# Patient Record
Sex: MALE | Race: WHITE | NOT HISPANIC OR LATINO | Employment: OTHER | URBAN - METROPOLITAN AREA
[De-identification: names, ages, dates, MRNs, and addresses within clinical notes are randomized per-mention and may not be internally consistent; named-entity substitution may affect disease eponyms.]

---

## 2017-01-01 PROBLEM — J44.89 BRONCHITIS WITH CHRONIC AIRWAY OBSTRUCTION (HCC): Status: ACTIVE | Noted: 2017-01-01

## 2017-01-02 ENCOUNTER — APPOINTMENT (OUTPATIENT)
Dept: RADIOLOGY | Facility: MEDICAL CENTER | Age: 77
DRG: 872 | End: 2017-01-02
Attending: INTERNAL MEDICINE
Payer: MEDICARE

## 2017-01-02 PROCEDURE — 71010 DX-CHEST-LIMITED (1 VIEW): CPT

## 2017-01-05 PROBLEM — F17.200 TOBACCO DEPENDENCE: Status: ACTIVE | Noted: 2017-01-05

## 2017-03-22 ENCOUNTER — HOSPITAL ENCOUNTER (OUTPATIENT)
Dept: LAB | Facility: MEDICAL CENTER | Age: 77
End: 2017-03-22
Attending: FAMILY MEDICINE
Payer: MEDICARE

## 2017-03-22 LAB
ALBUMIN SERPL BCP-MCNC: 4 G/DL (ref 3.2–4.9)
ALBUMIN/GLOB SERPL: 1.5 G/DL
ALP SERPL-CCNC: 81 U/L (ref 30–99)
ALT SERPL-CCNC: 15 U/L (ref 2–50)
ANION GAP SERPL CALC-SCNC: 6 MMOL/L (ref 0–11.9)
AST SERPL-CCNC: 22 U/L (ref 12–45)
BASOPHILS # BLD AUTO: 0.08 K/UL (ref 0–0.12)
BASOPHILS NFR BLD AUTO: 0.7 % (ref 0–1.8)
BILIRUB SERPL-MCNC: 0.6 MG/DL (ref 0.1–1.5)
BUN SERPL-MCNC: 15 MG/DL (ref 8–22)
CALCIUM SERPL-MCNC: 9.7 MG/DL (ref 8.5–10.5)
CHLORIDE SERPL-SCNC: 101 MMOL/L (ref 96–112)
CHOLEST SERPL-MCNC: 140 MG/DL (ref 100–199)
CO2 SERPL-SCNC: 32 MMOL/L (ref 20–33)
CREAT SERPL-MCNC: 0.67 MG/DL (ref 0.5–1.4)
EOSINOPHIL # BLD: 0.16 K/UL (ref 0–0.51)
EOSINOPHIL NFR BLD AUTO: 1.4 % (ref 0–6.9)
ERYTHROCYTE [DISTWIDTH] IN BLOOD BY AUTOMATED COUNT: 50.1 FL (ref 35.9–50)
GLOBULIN SER CALC-MCNC: 2.7 G/DL (ref 1.9–3.5)
GLUCOSE SERPL-MCNC: 85 MG/DL (ref 65–99)
HCT VFR BLD AUTO: 49.6 % (ref 42–52)
HDLC SERPL-MCNC: 45 MG/DL
HGB BLD-MCNC: 16.1 G/DL (ref 14–18)
IMM GRANULOCYTES # BLD AUTO: 0.02 K/UL (ref 0–0.11)
IMM GRANULOCYTES NFR BLD AUTO: 0.2 % (ref 0–0.9)
LDLC SERPL CALC-MCNC: 80 MG/DL
LYMPHOCYTES # BLD: 3.23 K/UL (ref 1–4.8)
LYMPHOCYTES NFR BLD AUTO: 28.6 % (ref 22–41)
MCH RBC QN AUTO: 31.3 PG (ref 27–33)
MCHC RBC AUTO-ENTMCNC: 32.5 G/DL (ref 33.7–35.3)
MCV RBC AUTO: 96.5 FL (ref 81.4–97.8)
MONOCYTES # BLD: 0.86 K/UL (ref 0–0.85)
MONOCYTES NFR BLD AUTO: 7.6 % (ref 0–13.4)
NEUTROPHILS # BLD: 6.96 K/UL (ref 1.82–7.42)
NEUTROPHILS NFR BLD AUTO: 61.5 % (ref 44–72)
NRBC # BLD AUTO: 0 K/UL
NRBC BLD-RTO: 0 /100 WBC
PLATELET # BLD AUTO: 393 K/UL (ref 164–446)
PMV BLD AUTO: 9.9 FL (ref 9–12.9)
POTASSIUM SERPL-SCNC: 4.7 MMOL/L (ref 3.6–5.5)
PROT SERPL-MCNC: 6.7 G/DL (ref 6–8.2)
PSA SERPL DL<=0.01 NG/ML-MCNC: 4.63 NG/ML (ref 0–4)
RBC # BLD AUTO: 5.14 M/UL (ref 4.7–6.1)
SODIUM SERPL-SCNC: 139 MMOL/L (ref 135–145)
TRIGL SERPL-MCNC: 73 MG/DL (ref 0–149)
TSH SERPL DL<=0.005 MIU/L-ACNC: 0.44 UIU/ML (ref 0.3–3.7)
WBC # BLD AUTO: 11.3 K/UL (ref 4.8–10.8)

## 2017-03-22 PROCEDURE — 80053 COMPREHEN METABOLIC PANEL: CPT

## 2017-03-22 PROCEDURE — 36415 COLL VENOUS BLD VENIPUNCTURE: CPT

## 2017-03-22 PROCEDURE — 85025 COMPLETE CBC W/AUTO DIFF WBC: CPT

## 2017-03-22 PROCEDURE — 84443 ASSAY THYROID STIM HORMONE: CPT

## 2017-03-22 PROCEDURE — 80061 LIPID PANEL: CPT

## 2017-03-22 PROCEDURE — 84153 ASSAY OF PSA TOTAL: CPT | Mod: GA

## 2017-05-01 ENCOUNTER — HOSPITAL ENCOUNTER (OUTPATIENT)
Dept: RADIOLOGY | Facility: MEDICAL CENTER | Age: 77
End: 2017-05-01
Attending: FAMILY MEDICINE
Payer: MEDICARE

## 2017-05-01 VITALS — HEIGHT: 72 IN | BODY MASS INDEX: 20.72 KG/M2 | WEIGHT: 153 LBS

## 2017-05-01 DIAGNOSIS — N40.0 ENLARGED PROSTATE: ICD-10-CM

## 2017-05-01 DIAGNOSIS — R97.20 ELEVATED PSA: ICD-10-CM

## 2017-05-01 PROCEDURE — 700117 HCHG RX CONTRAST REV CODE 255: Performed by: FAMILY MEDICINE

## 2017-05-01 PROCEDURE — A9270 NON-COVERED ITEM OR SERVICE: HCPCS | Performed by: RADIOLOGY

## 2017-05-01 PROCEDURE — 700111 HCHG RX REV CODE 636 W/ 250 OVERRIDE (IP)

## 2017-05-01 PROCEDURE — 700101 HCHG RX REV CODE 250

## 2017-05-01 PROCEDURE — 72197 MRI PELVIS W/O & W/DYE: CPT

## 2017-05-01 PROCEDURE — A9579 GAD-BASE MR CONTRAST NOS,1ML: HCPCS | Performed by: FAMILY MEDICINE

## 2017-05-01 PROCEDURE — 700102 HCHG RX REV CODE 250 W/ 637 OVERRIDE(OP): Performed by: RADIOLOGY

## 2017-05-01 RX ORDER — DIAZEPAM 5 MG/1
5 TABLET ORAL
Status: COMPLETED | OUTPATIENT
Start: 2017-05-01 | End: 2017-05-01

## 2017-05-01 RX ADMIN — GADODIAMIDE 20 ML: 287 INJECTION INTRAVENOUS at 15:45

## 2017-05-01 RX ADMIN — DIAZEPAM 5 MG: 5 TABLET ORAL at 13:40

## 2017-05-01 ASSESSMENT — PAIN SCALES - GENERAL
PAINLEVEL_OUTOF10: 0
PAINLEVEL_OUTOF10: 0

## 2017-05-01 NOTE — DISCHARGE INSTRUCTIONS
MRI ADULT DISCHARGE INSTRUCTIONS    You have been medicated today for your scan. Please follow the instructions below to ensure your safe recovery. If you have any questions or problems, feel free to call us at [phone] or [phone].     1.   Have someone stay with you to assist you as needed.    2.   Do not drive or operate any mechanical devices.    3.   Do not perform any activity that requires concentration. Make no major decisions over the next 24 hours.     4.   Be careful changing positions from laying down to sitting or standing, as you may become dizzy.     5.   Do not drink alcohol for 48 hours.    6.   There are no restrictions for eating your normal meals. Drink fluids.    7.   You may continue your usual medications for pain, tranquilizers, muscle relaxants or sedatives when awake.     8.   Tomorrow, you may continue your normal daily activities.     9.   Pressure dressing on 10 - 15 minutes. If swelling or bleeding occurs when removed, continue placing direct pressure on injection site for another 5 minutes, or until bleeding stops.     I have been informed of and understand the above discharge instructions. Diazepam (VALIUM) Oral solution  What is this medicine?  You were prescribed DIAZEPAM (dye AZ e antolin) for the procedure you had today. This medication is a benzodiazepine. It is used to treat anxiety and nervousness. It also can help treat alcohol withdrawal, relax muscles, and treat certain types of seizures.  This medicine may be used for other purposes; ask your health care provider or pharmacist if you have questions.  What side effects may I notice from receiving this medicine?  Side effects that you should report to your doctor or health care professional as soon as possible:  • allergic reactions like skin rash, itching or hives, swelling of the face, lips, or tongue  • angry, confused, depressed, other mood changes  • breathing problems  • feeling faint or lightheaded, falls  • muscle  cramps  • problems with balance, talking, walking  • restlessness  • tremors  • trouble passing urine or change in the amount of urine  • unusually weak or tired  Side effects that usually do not require medical attention (report to your doctor or health care professional if they continue or are bothersome):  • difficulty sleeping, nightmares  • dizziness, drowsiness, clumsiness, or unsteadiness, a hangover effect  • headache  • nausea, vomiting  This list may not describe all possible side effects. Call your doctor for medical advice about side effects. You may report side effects to FDA at 2-890-YWG-6377.

## 2017-05-01 NOTE — IP AVS SNAPSHOT
" <p align=\"LEFT\"><IMG SRC=\"[email or web address]\" alt=\"Image\" WIDTH=\"50%\" HEIGHT=\"200\" BORDER=\"\"></p>      `           Humphrey Cano   MRN: [id number]    Department:  [facility] - MRI [address]   Date of Visit:              `  Discharge Instructions       MRI ADULT DISCHARGE INSTRUCTIONS    You have been medicated today for your scan. Please follow the instructions below to ensure your safe recovery. If you have any questions or problems, feel free to call us at [phone] or [phone].     1.   Have someone stay with you to assist you as needed.    2.   Do not drive or operate any mechanical devices.    3.   Do not perform any activity that requires concentration. Make no major decisions over the next 24 hours.     4.   Be careful changing positions from laying down to sitting or standing, as you may become dizzy.     5.   Do not drink alcohol for 48 hours.    6.   There are no restrictions for eating your normal meals. Drink fluids.    7.   You may continue your usual medications for pain, tranquilizers, muscle relaxants or sedatives when awake.     8.   Tomorrow, you may continue your normal daily activities.     9.   Pressure dressing on 10 - 15 minutes. If swelling or bleeding occurs when removed, continue placing direct pressure on injection site for another 5 minutes, or until bleeding stops.     I have been informed of and understand the above discharge instructions. Diazepam (VALIUM) Oral solution  What is this medicine?  You were prescribed DIAZEPAM (dye AZ e antolin) for the procedure you had today. This medication is a benzodiazepine. It is used to treat anxiety and nervousness. It also can help treat alcohol withdrawal, relax muscles, and treat certain types of seizures.  This medicine may be used for other purposes; ask your health care provider or pharmacist if you have questions.  What side effects may I notice from receiving this medicine?  Side effects that you should report to your " doctor or health care professional as soon as possible:  • allergic reactions like skin rash, itching or hives, swelling of the face, lips, or tongue  • angry, confused, depressed, other mood changes  • breathing problems  • feeling faint or lightheaded, falls  • muscle cramps  • problems with balance, talking, walking  • restlessness  • tremors  • trouble passing urine or change in the amount of urine  • unusually weak or tired  Side effects that usually do not require medical attention (report to your doctor or health care professional if they continue or are bothersome):  • difficulty sleeping, nightmares  • dizziness, drowsiness, clumsiness, or unsteadiness, a hangover effect  • headache  • nausea, vomiting  This list may not describe all possible side effects. Call your doctor for medical advice about side effects. You may report side effects to FDA at 3-846-FDA-7704.       `       Diet / Nutrition:    Follow any diet instructions given to you by your doctor or the dietician, including how much salt (sodium) you are allowed each day.    If you are overweight, talk to your doctor about a weight reduction plan.    Activity:    Remain physically active following your doctor's instructions about exercise and activity.    Rest often.     Any time you become even a little tired or short of breath, SIT DOWN and rest.    Worsening Symptoms:    Report any of the following signs and symptoms to the doctor's office immediately:    *Pain of jaw, arm, or neck  *Chest pain not relieved by medication                               *Dizziness or loss of consciousness  *Difficulty breathing even when at rest   *More tired than usual                                       *Bleeding drainage or swelling of surgical site  *Swelling of feet, ankles, legs or stomach                 *Fever (>100ºF)  *Pink or blood tinged sputum  *Weight gain (3lbs/day or 5lbs /week)           *Shock from internal defibrillator (if  applicable)  *Palpitations or irregular heartbeats                *Cool and/or numb extremities    Stroke Awareness    Common Risk Factors for Stroke include:    Age  Atrial Fibrillation  Carotid Artery Stenosis  Diabetes Mellitus  Excessive alcohol consumption  High blood pressure  Overweight   Physical inactivity  Smoking    Warning signs and symptoms of a stroke include:    *Sudden numbness or weakness of the face, arm or leg (especially on one side of the body).  *Sudden confusion, trouble speaking or understanding.  *Sudden trouble seeing in one or both eyes.  *Sudden trouble walking, dizziness, loss of balance or coordination.Sudden severe headache with no known cause.    It is very important to get treatment quickly when a stroke occurs. If you experience any of the above warning signs, call 911 immediately.                    `     Quit Smoking / Tobacco Use:    I understand the use of any tobacco products increases my chance of suffering from future heart disease or stroke and could cause other illnesses which may shorten my life. Quitting the use of tobacco products is the single most important thing I can do to improve my health. For further information on smoking / tobacco cessation call a Toll Free Quit Line at 1-876.697.8050 (*[employer] [place]) or 1-804.314.8069 (American [employer] Association) or you can access the web based program at www.lungusa.org.    [place] Tobacco Users Help Line:  (503) 941-3503       Toll Free: 1-493.514.3825  Quit Tobacco Program [facility] (882)778-1073    Crisis Hotline:    [place] Crisis Hotline:  2-928-ZBZXWMK or 1-279.363.4996    [place] Crisis Hotline:    1-350.324.6505 or 239-119-7408    Discharge Survey:   Thank you for choosing [facility]. We hope we did everything we could to make your hospital stay a pleasant one. You may be receiving a phone survey and we would appreciate your time and participation in answering the questions. Your  input is very valuable to us in our efforts to improve our service to our patients and their families.        My signature on this form indicates that:    1. I have reviewed and understand the above information.  2. My questions regarding this information have been answered to my satisfaction.  3. I have formulated a plan with my discharge nurse to obtain my prescribed medications for home.                   `           Patient or Caregiver Signature:  ____________________________________________________________    Date:  ____________________________________________________________       `

## 2017-05-22 ENCOUNTER — APPOINTMENT (OUTPATIENT)
Dept: RADIOLOGY | Facility: MEDICAL CENTER | Age: 77
End: 2017-05-22
Attending: EMERGENCY MEDICINE
Payer: MEDICARE

## 2017-05-22 ENCOUNTER — HOSPITAL ENCOUNTER (EMERGENCY)
Facility: MEDICAL CENTER | Age: 77
End: 2017-05-22
Attending: EMERGENCY MEDICINE
Payer: MEDICARE

## 2017-05-22 VITALS
BODY MASS INDEX: 20.45 KG/M2 | OXYGEN SATURATION: 92 % | HEART RATE: 51 BPM | TEMPERATURE: 99 F | WEIGHT: 154.32 LBS | SYSTOLIC BLOOD PRESSURE: 141 MMHG | RESPIRATION RATE: 17 BRPM | DIASTOLIC BLOOD PRESSURE: 57 MMHG | HEIGHT: 73 IN

## 2017-05-22 DIAGNOSIS — R91.8 LUNG MASS: ICD-10-CM

## 2017-05-22 DIAGNOSIS — R04.2 HEMOPTYSIS: ICD-10-CM

## 2017-05-22 DIAGNOSIS — Z72.0 TOBACCO USE: ICD-10-CM

## 2017-05-22 LAB
ALBUMIN SERPL BCP-MCNC: 4.2 G/DL (ref 3.2–4.9)
ALBUMIN/GLOB SERPL: 1.7 G/DL
ALP SERPL-CCNC: 89 U/L (ref 30–99)
ALT SERPL-CCNC: 13 U/L (ref 2–50)
ANION GAP SERPL CALC-SCNC: 6 MMOL/L (ref 0–11.9)
AST SERPL-CCNC: 27 U/L (ref 12–45)
BASOPHILS # BLD AUTO: 0.8 % (ref 0–1.8)
BASOPHILS # BLD: 0.07 K/UL (ref 0–0.12)
BILIRUB SERPL-MCNC: 0.6 MG/DL (ref 0.1–1.5)
BNP SERPL-MCNC: 82 PG/ML (ref 0–100)
BUN SERPL-MCNC: 15 MG/DL (ref 8–22)
CALCIUM SERPL-MCNC: 9.2 MG/DL (ref 8.5–10.5)
CHLORIDE SERPL-SCNC: 106 MMOL/L (ref 96–112)
CO2 SERPL-SCNC: 29 MMOL/L (ref 20–33)
CREAT SERPL-MCNC: 0.82 MG/DL (ref 0.5–1.4)
EOSINOPHIL # BLD AUTO: 0.2 K/UL (ref 0–0.51)
EOSINOPHIL NFR BLD: 2.2 % (ref 0–6.9)
ERYTHROCYTE [DISTWIDTH] IN BLOOD BY AUTOMATED COUNT: 48 FL (ref 35.9–50)
GFR SERPL CREATININE-BSD FRML MDRD: >60 ML/MIN/1.73 M 2
GLOBULIN SER CALC-MCNC: 2.5 G/DL (ref 1.9–3.5)
GLUCOSE SERPL-MCNC: 86 MG/DL (ref 65–99)
HCT VFR BLD AUTO: 51 % (ref 42–52)
HGB BLD-MCNC: 17.1 G/DL (ref 14–18)
IMM GRANULOCYTES # BLD AUTO: 0.01 K/UL (ref 0–0.11)
IMM GRANULOCYTES NFR BLD AUTO: 0.1 % (ref 0–0.9)
INR PPP: 0.95 (ref 0.87–1.13)
LYMPHOCYTES # BLD AUTO: 3.2 K/UL (ref 1–4.8)
LYMPHOCYTES NFR BLD: 36 % (ref 22–41)
MCH RBC QN AUTO: 31.2 PG (ref 27–33)
MCHC RBC AUTO-ENTMCNC: 33.5 G/DL (ref 33.7–35.3)
MCV RBC AUTO: 93.1 FL (ref 81.4–97.8)
MONOCYTES # BLD AUTO: 0.71 K/UL (ref 0–0.85)
MONOCYTES NFR BLD AUTO: 8 % (ref 0–13.4)
NEUTROPHILS # BLD AUTO: 4.7 K/UL (ref 1.82–7.42)
NEUTROPHILS NFR BLD: 52.9 % (ref 44–72)
NRBC # BLD AUTO: 0.03 K/UL
NRBC BLD AUTO-RTO: 0.3 /100 WBC
PLATELET # BLD AUTO: 281 K/UL (ref 164–446)
PMV BLD AUTO: 9.7 FL (ref 9–12.9)
POTASSIUM SERPL-SCNC: 4.5 MMOL/L (ref 3.6–5.5)
PROT SERPL-MCNC: 6.7 G/DL (ref 6–8.2)
PROTHROMBIN TIME: 13 SEC (ref 12–14.6)
RBC # BLD AUTO: 5.48 M/UL (ref 4.7–6.1)
SODIUM SERPL-SCNC: 141 MMOL/L (ref 135–145)
WBC # BLD AUTO: 8.9 K/UL (ref 4.8–10.8)

## 2017-05-22 PROCEDURE — 700117 HCHG RX CONTRAST REV CODE 255: Performed by: EMERGENCY MEDICINE

## 2017-05-22 PROCEDURE — 85610 PROTHROMBIN TIME: CPT

## 2017-05-22 PROCEDURE — 85025 COMPLETE CBC W/AUTO DIFF WBC: CPT

## 2017-05-22 PROCEDURE — 99284 EMERGENCY DEPT VISIT MOD MDM: CPT

## 2017-05-22 PROCEDURE — 71275 CT ANGIOGRAPHY CHEST: CPT

## 2017-05-22 PROCEDURE — 36415 COLL VENOUS BLD VENIPUNCTURE: CPT

## 2017-05-22 PROCEDURE — 80053 COMPREHEN METABOLIC PANEL: CPT

## 2017-05-22 PROCEDURE — 71010 DX-CHEST-PORTABLE (1 VIEW): CPT

## 2017-05-22 PROCEDURE — 83880 ASSAY OF NATRIURETIC PEPTIDE: CPT

## 2017-05-22 RX ADMIN — IOHEXOL 75 ML: 350 INJECTION, SOLUTION INTRAVENOUS at 10:31

## 2017-05-22 ASSESSMENT — ENCOUNTER SYMPTOMS
ABDOMINAL PAIN: 0
BACK PAIN: 0
NAUSEA: 0
FEVER: 0
DIARRHEA: 0
HEMOPTYSIS: 1
HEADACHES: 0
SHORTNESS OF BREATH: 1
WHEEZING: 0
VOMITING: 0
COUGH: 1

## 2017-05-22 ASSESSMENT — PAIN SCALES - GENERAL
PAINLEVEL_OUTOF10: 0

## 2017-05-22 NOTE — ED PROVIDER NOTES
"ED Provider Note    Scribed for Melodie Canchola D.O. by Paul Kim. 5/22/2017, 8:53 AM.    Primary care provider: Randall Jama M.D.  Means of arrival: Walk In  History obtained from: Patient  History limited by: None     CHIEF COMPLAINT  Chief Complaint   Patient presents with   • Cough     started last night, with blood    • Congestion       HPI  Humphrey Cano is a 76 y.o. male who presents to the Emergency Department complaining of hemoptysis. Patient reports onset of cough last night. Patient was coughing forcefully last night, when he noticed \"bright red\" blood in his tissue after coughing. Patient describes a \"teaspoon\" amount of blood secondary to hemoptysis. He reports one more episode of hemoptysis after he woke up this morning. He denies any history of previous similar symptoms. Patient is current 0.3 ppd smoker, recently cut down from 1 ppd in the last 6 months. He has been smoking cigarettes since he was 11 years old. He otherwise, has no systemic complaints.  Patient cesar any nausea, vomiting, abdominal pain, diarrhea, chest pain, fever.     REVIEW OF SYSTEMS  Review of Systems   Constitutional: Negative for fever.   Respiratory: Positive for cough, hemoptysis and shortness of breath. Negative for wheezing.    Cardiovascular: Negative for chest pain.   Gastrointestinal: Negative for nausea, vomiting, abdominal pain and diarrhea.   Musculoskeletal: Negative for back pain.   Neurological: Negative for headaches.   All other systems reviewed and are negative.  C.     PAST MEDICAL HISTORY   has a past medical history of Hyperlipidemia; Hypertension; Myocardial infarct (with stent); Pneumonia; Indigestion; Backpain (occasional); CATARACT; and COPD.    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    SOCIAL HISTORY  Social History   Substance Use Topics   • Smoking status: Current Every Day Smoker -- 1.00 packs/day      Comment: 1 " "pk a day   • Alcohol Use: No      History   Drug Use No     FAMILY HISTORY  No history pertinent to complaint.     CURRENT MEDICATIONS  Home Medications     **Home medications have not yet been reviewed for this encounter**          ALLERGIES  No Known Allergies    PHYSICAL EXAM  VITAL SIGNS: /57 mmHg  Pulse 71  Temp(Src) 37.2 °C (99 °F) (Temporal)  Resp 18  Ht 1.854 m (6' 1\")  Wt 70 kg (154 lb 5.2 oz)  BMI 20.36 kg/m2  SpO2 91%  Vitals reviewed.  Constitutional: Patient is oriented to person, place, and time. Appears well-developed and well-nourished. No distress.    Head: Normocephalic and atraumatic.   Ears: Normal external ears bilaterally.   Mouth/Throat: Oropharynx is clear and moist, no exudates. No evidence of blood in oropharynx.   Eyes: Conjunctivae are normal. Pupils are equal, round, and reactive to light.   Neck: Normal range of motion. Neck supple.   Cardiovascular: Normal rate, regular rhythm and normal heart sounds. Normal peripheral pulses.  Pulmonary/Chest: Effort normal and breath sounds normal. No respiratory distress, no wheezes, rhonchi, or rales. No chest wall tenderness.  Abdominal: Soft. Bowel sounds are normal. There is no tenderness, rebound or guarding, or peritoneal signs. No CVA tenderness.  Musculoskeletal: No edema and no tenderness.   Lymphadenopathy: No cervical adenopathy.   Neurological: No focal deficits.   Skin: Skin is warm and dry. No erythema. No pallor.   Psychiatric: Patient has a normal mood and affect.     LABS  Results for orders placed or performed during the hospital encounter of 05/22/17   CBC w/ Differential   Result Value Ref Range    WBC 8.9 4.8 - 10.8 K/uL    RBC 5.48 4.70 - 6.10 M/uL    Hemoglobin 17.1 14.0 - 18.0 g/dL    Hematocrit 51.0 42.0 - 52.0 %    MCV 93.1 81.4 - 97.8 fL    MCH 31.2 27.0 - 33.0 pg    MCHC 33.5 (L) 33.7 - 35.3 g/dL    RDW 48.0 35.9 - 50.0 fL    Platelet Count 281 164 - 446 K/uL    MPV 9.7 9.0 - 12.9 fL    Neutrophils-Polys " 52.90 44.00 - 72.00 %    Lymphocytes 36.00 22.00 - 41.00 %    Monocytes 8.00 0.00 - 13.40 %    Eosinophils 2.20 0.00 - 6.90 %    Basophils 0.80 0.00 - 1.80 %    Immature Granulocytes 0.10 0.00 - 0.90 %    Nucleated RBC 0.30 /100 WBC    Neutrophils (Absolute) 4.70 1.82 - 7.42 K/uL    Lymphs (Absolute) 3.20 1.00 - 4.80 K/uL    Monos (Absolute) 0.71 0.00 - 0.85 K/uL    Eos (Absolute) 0.20 0.00 - 0.51 K/uL    Baso (Absolute) 0.07 0.00 - 0.12 K/uL    Immature Granulocytes (abs) 0.01 0.00 - 0.11 K/uL    NRBC (Absolute) 0.03 K/uL   Complete Metabolic Panel (CMP)   Result Value Ref Range    Sodium 141 135 - 145 mmol/L    Potassium 4.5 3.6 - 5.5 mmol/L    Chloride 106 96 - 112 mmol/L    Co2 29 20 - 33 mmol/L    Anion Gap 6.0 0.0 - 11.9    Glucose 86 65 - 99 mg/dL    Bun 15 8 - 22 mg/dL    Creatinine 0.82 0.50 - 1.40 mg/dL    Calcium 9.2 8.5 - 10.5 mg/dL    AST(SGOT) 27 12 - 45 U/L    ALT(SGPT) 13 2 - 50 U/L    Alkaline Phosphatase 89 30 - 99 U/L    Total Bilirubin 0.6 0.1 - 1.5 mg/dL    Albumin 4.2 3.2 - 4.9 g/dL    Total Protein 6.7 6.0 - 8.2 g/dL    Globulin 2.5 1.9 - 3.5 g/dL    A-G Ratio 1.7 g/dL   Btype Natriuretic Peptide   Result Value Ref Range    B Natriuretic Peptide 82 0 - 100 pg/mL   PT/INR   Result Value Ref Range    PT 13.0 12.0 - 14.6 sec    INR 0.95 0.87 - 1.13   ESTIMATED GFR   Result Value Ref Range    GFR If African American >60 >60 mL/min/1.73 m 2    GFR If Non African American >60 >60 mL/min/1.73 m 2      All labs reviewed by me    RADIOLOGY  CT-CTA CHEST PULMONARY ARTERY W/ RECONS   Final Result         1. Spiculated mass in the left upper lobe adjacent to the left hilum, concerning for malignancy. Additional 5 mm nodule more laterally could relate to a satellite lesion. No enlarged lymph nodes identified.      2. Subpleural nodular opacities in the right upper lobe are nonspecific. Malignancy cannot be entirely excluded.      3. No CT evidence of pulmonary embolism.               DX-CHEST-PORTABLE (1  VIEW)   Final Result         1. No acute cardiopulmonary abnormalities are identified.        The radiologist's interpretation of all radiological studies have been reviewed by me.    COURSE & MEDICAL DECISION MAKING  Pertinent Labs & Imaging studies reviewed. (See chart for details)      8:53 AM - Patient seen and examined at bedside. Ordered CT CTA Chest, Chest  X Ray, CBC, CMP, BNP, PT/INR, GFR to evaluate his symptoms. The differential diagnoses include but are not limited to: bronchitis, pneumonia, cancer, pulmonary embolism    12:03 PM patient is reevaluated. He has no new complaints. No pain. He has a bradycardia, otherwise, his vital signs are normal. We discussed CT findings which unfortunately, are concerning for lung cancer. There is a spiculated mass in the left upper lobe. Otherwise, his labs were unrevealing.    I've spoken with Dr. Jama, the patient's primary care doctor. Regarding this finding on CT as well as the labs. He suggested having the patient follow-up with him at noon today. This patient is very well known to him and he will discuss with him further plan of care. I've updated the patient on this plan of care they will go straight from the emergency room to Dr. Jama's office. I think this is a reasonable plan of care. Patient's discharged home in stable condition.      FINAL IMPRESSION  1. Hemoptysis    2. Lung mass    3. Tobacco use          Paul WARNER (Lyndsey), am scribing for, and in the presence of, Melodie Canchola D.O..    Electronically signed by: Paul Kim (Lyndsey), 5/22/2017    IMelodie D.O. personally performed the services described in this documentation, as scribed by Paul Kim in my presence, and it is both accurate and complete.    The note accurately reflects work and decisions made by me.  Melodie Canchola  5/22/2017  12:01 PM

## 2017-05-22 NOTE — ED NOTES
Pt ambulatory to [facility] with steady gait accompanied by family.  Changing into a gown.  Up for ERP evaluation.

## 2017-05-22 NOTE — DISCHARGE INSTRUCTIONS
Pulmonary Nodule  A pulmonary nodule is a small, round growth of tissue in the lung. Pulmonary nodules can range in size from less than 1/5 inch (4 mm) to a little bigger than an inch (25 mm). Most pulmonary nodules are detected when imaging tests of the lung are being performed for a different problem. Pulmonary nodules are usually not cancerous (benign). However, some pulmonary nodules are cancerous (malignant). Follow-up treatment or testing is based on the size of the pulmonary nodule and your risk of getting lung cancer.   CAUSES  Benign pulmonary nodules can be caused by various things. Some of the causes include:   · Bacterial, fungal, or viral infections. This is usually an old infection that is no longer active, but it can sometimes be a current, active infection.  · A benign mass of tissue.  · Inflammation from conditions such as rheumatoid arthritis.    · Abnormal blood vessels in the lungs.  Malignant pulmonary nodules can result from lung cancer or from cancers that spread to the lung from other places in the body.  SIGNS AND SYMPTOMS  Pulmonary nodules usually do not cause symptoms.  DIAGNOSIS  Most often, pulmonary nodules are found incidentally when an X-ray or CT scan is performed to look for some other problem in the lung area. To help determine whether a pulmonary nodule is benign or malignant, your health care provider will take a medical history and order a variety of tests. Tests done may include:   · Blood tests.  · A skin test called a tuberculin test. This test is used to determine if you have been exposed to the germ that causes tuberculosis.    · Chest X-rays. If possible, a new X-ray may be compared with X-rays you have had in the past.     · CT scan. This test shows smaller pulmonary nodules more clearly than an X-ray.    · Positron emission tomography (PET) scan. In this test, a safe amount of a radioactive substance is injected into the bloodstream. Then, the scan takes a picture of  the pulmonary nodule. The radioactive substance is eliminated from your body in your urine.    · Biopsy. A tiny piece of the pulmonary nodule is removed so it can be checked under a microscope.  TREATMENT   Pulmonary nodules that are benign normally do not require any treatment because they usually do not cause symptoms or breathing problems. Your health care provider may want to monitor the pulmonary nodule through follow-up CT scans. The frequency of these CT scans will vary based on the size of the nodule and the risk factors for lung cancer. For example, CT scans will need to be done more frequently if the pulmonary nodule is larger and if you have a history of smoking and a family history of cancer. Further testing or biopsies may be done if any follow-up CT scan shows that the size of the pulmonary nodule has increased.  HOME CARE INSTRUCTIONS  · Only take over-the-counter or prescription medicines as directed by your health care provider.  · Keep all follow-up appointments with your health care provider.  SEEK MEDICAL CARE IF:  · You have trouble breathing when you are active.    · You feel sick or unusually tired.    · You do not feel like eating.    · You lose weight without trying to.    · You develop chills or night sweats.    SEEK IMMEDIATE MEDICAL CARE IF:  · You cannot catch your breath, or you begin wheezing.    · You cannot stop coughing.    · You cough up blood.    · You become dizzy or feel like you are going to pass out.    · You have sudden chest pain.    · You have a fever or persistent symptoms for more than 2-3 days.    · You have a fever and your symptoms suddenly get worse.  MAKE SURE YOU:  · Understand these instructions.  · Will watch your condition.  · Will get help right away if you are not doing well or get worse.     This information is not intended to replace advice given to you by your health care provider. Make sure you discuss any questions you have with your health care  provider.     Document Released: 10/15/2010 Document Revised: 08/20/2014 Document Reviewed: 06/09/2014  [employer] Interactive Patient Education ©2016 [employer]      Hemoptysis  Hemoptysis, which means coughing up blood, can be a sign of a minor problem or a serious medical condition. The blood that is coughed up may come from the lungs and airways. Coughed-up blood can also come from bleeding that occurs outside the lungs and airways. Blood can drain into the windpipe during a severe nosebleed or when blood is vomited from the stomach. Because hemoptysis can be a sign of something serious, a medical evaluation is required. For some people with hemoptysis, no definite cause is ever identified.  CAUSES   The most common cause of hemoptysis is bronchitis. Some other common causes include:   · A ruptured blood vessel caused by coughing or an infection.    · A medical condition that causes damage to the large air passageways (bronchiectasis).    · A blood clot in the lungs (pulmonary embolism).    · Pneumonia.    · Tuberculosis.    · Breathing in a small foreign object.    · Cancer.  For some people with hemoptysis, no definite cause is ever identified.    HOME CARE INSTRUCTIONS  · Only take over-the-counter or prescription medicines as directed by your caregiver. Do not use cough suppressants unless your caregiver approves.  · If your caregiver prescribes antibiotic medicines, take them as directed. Finish them even if you start to feel better.  · Do not smoke. Also avoid secondhand smoke.  · Follow up with your caregiver as directed.  SEEK IMMEDIATE MEDICAL CARE IF:   · You cough up bloody mucus for longer than a week.  · You have a blood-producing cough that is severe or getting worse.  · You have a blood-producing cough that comes and goes over time.  · You develop problems with your breathing.    · You vomit blood.  · You develop bloody or black-colored stools.  · You have chest pain.    · You develop night  sweats.  · You feel faint or pass out.    · You have a fever or persistent symptoms for more than 2-3 days.    · You have a fever and your symptoms suddenly get worse.  MAKE SURE YOU:  · Understand these instructions.  · Will watch your condition.  · Will get help right away if you are not doing well or get worse.     This information is not intended to replace advice given to you by your health care provider. Make sure you discuss any questions you have with your health care provider.     Document Released: 02/26/2003 Document Revised: 12/04/2013 Document Reviewed: 10/04/2013  [employer] Interactive Patient Education ©2016 [employer]

## 2017-05-22 NOTE — ED NOTES
Pt given d/c instructions and f/u infowith verbal understanding.  VSS at discharge.  PIV d/c'd with tip intact.  Pt ambulatory from the ED w/ steady gait.  All belongings in possession on discharge.  Pt escorted to the lobby by RN.  Pt going directly to Dr Jama's office at this time.

## 2017-05-22 NOTE — ED AVS SNAPSHOT
5/22/2017    Humphrey Cano  [address]  [place] [address]    Dear Humphrey:    [facility] wants to ensure your discharge home is safe and you or your loved ones have had all of your questions answered regarding your care after you leave the hospital.    Below is a list of resources and contact information should you have any questions regarding your hospital stay, follow-up instructions, or active medical symptoms.    Questions or Concerns Regarding… Contact   Medical Questions Related to Your Discharge  (7 days a week, 8am-5pm) Contact a Nurse Care Coordinator   [phone]   Medical Questions Not Related to Your Discharge  (24 hours a day / 7 days a week)  Contact the Nurse Health Line   [phone]    Medications or Discharge Instructions Refer to your discharge packet   or contact your [facility] Primary Care Provider   [phone]   Follow-up Appointment(s) Schedule your appointment via [employer]   or contact Scheduling [phone]   Billing Review your statement via [employer]  or contact Billing [phone]   Medical Records Review your records via [employer]   or contact Medical Records [phone]     You may receive a telephone call within two days of discharge. This call is to make certain you understand your discharge instructions and have the opportunity to have any questions answered. You can also easily access your medical information, test results and upcoming appointments via the [employer] free online health management tool. You can learn more and sign up at [employer]. For assistance setting up your [employer] account, please call [phone].    Once again, we want to ensure your discharge home is safe and that you have a clear understanding of any next steps in your care. If you have any questions or concerns, please do not hesitate to contact us, we are here for you. Thank you for choosing [facility] for your healthcare needs.    Sincerely,    Your [facility] Healthcare Team

## 2017-05-22 NOTE — ED NOTES
"75 y/o male ambulate to triage   Chief Complaint   Patient presents with   • Cough     started last night, with blood    • Congestion     Pt states he was coughing forcefully and noticed blood \"bright red\"  Mask applied   "

## 2017-05-22 NOTE — ED AVS SNAPSHOT
Cempra Access Code: [email or web address]  Expires: 6/21/2017  9:26 AM    Cempra  A secure, online tool to manage your health information     [employer]’s Cempra® is a secure, online tool that connects you to your personalized health information from the privacy of your home -- day or night - making it very easy for you to manage your healthcare. Once the activation process is completed, you can even access your medical information using the Cempra gavino, which is available for free in the Apple Gavino store or Google Play store.     Cempra provides the following levels of access (as shown below):   My Chart Features   [facility] Primary Care Doctor [facility]  Specialists [facility]  Urgent  Care Non-[facility]  Primary Care  Doctor   Email your healthcare team securely and privately 24/7 X X X X   Manage appointments: schedule your next appointment; view details of past/upcoming appointments X      Request prescription refills. X      View recent personal medical records, including lab and immunizations X X X X   View health record, including health history, allergies, medications X X X X   Read reports about your outpatient visits, procedures, consult and ER notes X X X X   See your discharge summary, which is a recap of your hospital and/or ER visit that includes your diagnosis, lab results, and care plan. X X       How to register for Cempra:  1. Go to  [email or web address].  2. Click on the Sign Up Now box, which takes you to the New Member Sign Up page. You will need to provide the following information:  a. Enter your Cempra Access Code exactly as it appears at the top of this page. (You will not need to use this code after you’ve completed the sign-up process. If you do not sign up before the expiration date, you must request a new code.)   b. Enter your date of birth.   c. Enter your home email address.   d. Click Submit, and follow the next screen’s instructions.  3. Create a Cempra ID. This will be your Cempra  login ID and cannot be changed, so think of one that is secure and easy to remember.  4. Create a [employer] password. You can change your password at any time.  5. Enter your Password Reset Question and Answer. This can be used at a later time if you forget your password.   6. Enter your e-mail address. This allows you to receive e-mail notifications when new information is available in [employer].  7. Click Sign Up. You can now view your health information.    For assistance activating your [employer] account, call [phone]

## 2017-05-22 NOTE — ED AVS SNAPSHOT
Home Care Instructions                                                                                                                Humphrey Cano   MRN: [id number]    Department:  [facility], Emergency Dept   Date of Visit:  5/22/2017            [facility], Emergency Dept    [address]    [place] [address]    Phone:  [phone]      You were seen by     Melodie Canchola D.O.      Your Diagnosis Was     Hemoptysis     R04.2       These are the medications you received during your hospitalization from 05/22/2017 0821 to 05/22/2017 1124     Date/Time Order Dose Route Action    05/22/2017 1031 iohexol (OMNIPAQUE) 350 mg/mL 75 mL Intravenous Given      Follow-up Information     1. Follow up with Randall Jama M.D..    Specialty:  Family Medicine    Why:  Today at Noon    Contact information    [address] [facility]  [address]  [facility] [id number] [phone]        Medication Information     Review all of your home medications and newly ordered medications with your primary doctor and/or pharmacist as soon as possible. Follow medication instructions as directed by your doctor and/or pharmacist.     Please keep your complete medication list with you and share with your physician. Update the information when medications are discontinued, doses are changed, or new medications (including over-the-counter products) are added; and carry medication information at all times in the event of emergency situations.               Medication List      ASK your doctor about these medications        Instructions    Morning Afternoon Evening Bedtime    albuterol 108 (90 BASE) MCG/ACT Aers inhalation aerosol        Inhale 2 Puffs by mouth every 6 hours as needed for Shortness of Breath.   Dose:  2 Puff                        ascorbic acid 500 MG Tabs   Commonly known as:  ascorbic acid        Take 500 mg by mouth every day.   Dose:  500 mg                        Aspirin 325 MG Tbec           Take 1 Tab by mouth every day.   Dose:  325 mg                        budesonide-formoterol 80-4.5 MCG/ACT Aero   Commonly known as:  SYMBICORT        Inhale 2 Puffs by mouth 2 Times a Day.   Dose:  2 Puff                        calcium carbonate 500 MG Tabs   Commonly known as:  OS-JACKIE 500        Take 500 mg by mouth 2 times a day, with meals.   Dose:  500 mg                        coenzyme Q-10 30 MG capsule        Take 60 mg by mouth every day.   Dose:  60 mg                        diltiazem  MG Cp24   Commonly known as:  CARDIZEM CD        Take 1 Cap by mouth every day.   Dose:  240 mg                        magnesium oxide 400 MG Tabs   Commonly known as:  MAG-OX        Take 400 mg by mouth every day.   Dose:  400 mg                        * nicotine 14 MG/24HR Pt24   Commonly known as:  NICODERM        Apply 1 Patch to skin as directed every 24 hours. For tobacco dependence: 305.1   Dose:  1 Patch                        * nicotine 7 MG/24HR Pt24   Commonly known as:  NICODERM        Apply 1 Patch to skin as directed every 24 hours. For tobacco dependence: 305.1   Dose:  1 Patch                        predniSONE 20 MG Tabs   Commonly known as:  DELTASONE        Doctor's comments:  Quantity per pharm pref   40mg PO QD x 5 days, then 20mg PO QD x 5 days, then 10 mg PO QD x 6 days                        simvastatin 10 MG Tabs   Commonly known as:  ZOCOR        Take 10 mg by mouth every evening.   Dose:  10 mg                        vitamin D 1000 UNIT Tabs   Commonly known as:  cholecalciferol        Take 1,000 Units by mouth every day.   Dose:  1000 Units                        vitamin e 400 UNIT Caps   Commonly known as:  VITAMIN E        Take 400 Units by mouth 2 times a day.   Dose:  400 Units                        * Notice:  This list has 2 medication(s) that are the same as other medications prescribed for you. Read the directions carefully, and ask your doctor or other care provider to review them  with you.            Procedures and tests performed during your visit     Btype Natriuretic Peptide    CBC w/ Differential    CONSENT FOR CONTRAST INJECTION    CT-CTA CHEST PULMONARY ARTERY W/ RECONS    Complete Metabolic Panel (CMP)    DX-CHEST-PORTABLE (1 VIEW)    ESTIMATED GFR    IV Saline Lock    PT/INR        Discharge Instructions       Pulmonary Nodule  A pulmonary nodule is a small, round growth of tissue in the lung. Pulmonary nodules can range in size from less than 1/5 inch (4 mm) to a little bigger than an inch (25 mm). Most pulmonary nodules are detected when imaging tests of the lung are being performed for a different problem. Pulmonary nodules are usually not cancerous (benign). However, some pulmonary nodules are cancerous (malignant). Follow-up treatment or testing is based on the size of the pulmonary nodule and your risk of getting lung cancer.   CAUSES  Benign pulmonary nodules can be caused by various things. Some of the causes include:   · Bacterial, fungal, or viral infections. This is usually an old infection that is no longer active, but it can sometimes be a current, active infection.  · A benign mass of tissue.  · Inflammation from conditions such as rheumatoid arthritis.    · Abnormal blood vessels in the lungs.  Malignant pulmonary nodules can result from lung cancer or from cancers that spread to the lung from other places in the body.  SIGNS AND SYMPTOMS  Pulmonary nodules usually do not cause symptoms.  DIAGNOSIS  Most often, pulmonary nodules are found incidentally when an X-ray or CT scan is performed to look for some other problem in the lung area. To help determine whether a pulmonary nodule is benign or malignant, your health care provider will take a medical history and order a variety of tests. Tests done may include:   · Blood tests.  · A skin test called a tuberculin test. This test is used to determine if you have been exposed to the germ that causes tuberculosis.     · Chest X-rays. If possible, a new X-ray may be compared with X-rays you have had in the past.     · CT scan. This test shows smaller pulmonary nodules more clearly than an X-ray.    · Positron emission tomography (PET) scan. In this test, a safe amount of a radioactive substance is injected into the bloodstream. Then, the scan takes a picture of the pulmonary nodule. The radioactive substance is eliminated from your body in your urine.    · Biopsy. A tiny piece of the pulmonary nodule is removed so it can be checked under a microscope.  TREATMENT   Pulmonary nodules that are benign normally do not require any treatment because they usually do not cause symptoms or breathing problems. Your health care provider may want to monitor the pulmonary nodule through follow-up CT scans. The frequency of these CT scans will vary based on the size of the nodule and the risk factors for lung cancer. For example, CT scans will need to be done more frequently if the pulmonary nodule is larger and if you have a history of smoking and a family history of cancer. Further testing or biopsies may be done if any follow-up CT scan shows that the size of the pulmonary nodule has increased.  HOME CARE INSTRUCTIONS  · Only take over-the-counter or prescription medicines as directed by your health care provider.  · Keep all follow-up appointments with your health care provider.  SEEK MEDICAL CARE IF:  · You have trouble breathing when you are active.    · You feel sick or unusually tired.    · You do not feel like eating.    · You lose weight without trying to.    · You develop chills or night sweats.    SEEK IMMEDIATE MEDICAL CARE IF:  · You cannot catch your breath, or you begin wheezing.    · You cannot stop coughing.    · You cough up blood.    · You become dizzy or feel like you are going to pass out.    · You have sudden chest pain.    · You have a fever or persistent symptoms for more than 2-3 days.    · You have a fever and your  symptoms suddenly get worse.  MAKE SURE YOU:  · Understand these instructions.  · Will watch your condition.  · Will get help right away if you are not doing well or get worse.     This information is not intended to replace advice given to you by your health care provider. Make sure you discuss any questions you have with your health care provider.     Document Released: 10/15/2010 Document Revised: 08/20/2014 Document Reviewed: 06/09/2014  [employer] Interactive Patient Education ©2016 Else[employer] Inc.      Hemoptysis  Hemoptysis, which means coughing up blood, can be a sign of a minor problem or a serious medical condition. The blood that is coughed up may come from the lungs and airways. Coughed-up blood can also come from bleeding that occurs outside the lungs and airways. Blood can drain into the windpipe during a severe nosebleed or when blood is vomited from the stomach. Because hemoptysis can be a sign of something serious, a medical evaluation is required. For some people with hemoptysis, no definite cause is ever identified.  CAUSES   The most common cause of hemoptysis is bronchitis. Some other common causes include:   · A ruptured blood vessel caused by coughing or an infection.    · A medical condition that causes damage to the large air passageways (bronchiectasis).    · A blood clot in the lungs (pulmonary embolism).    · Pneumonia.    · Tuberculosis.    · Breathing in a small foreign object.    · Cancer.  For some people with hemoptysis, no definite cause is ever identified.    HOME CARE INSTRUCTIONS  · Only take over-the-counter or prescription medicines as directed by your caregiver. Do not use cough suppressants unless your caregiver approves.  · If your caregiver prescribes antibiotic medicines, take them as directed. Finish them even if you start to feel better.  · Do not smoke. Also avoid secondhand smoke.  · Follow up with your caregiver as directed.  SEEK IMMEDIATE MEDICAL CARE IF:   · You cough  up bloody mucus for longer than a week.  · You have a blood-producing cough that is severe or getting worse.  · You have a blood-producing cough that comes and goes over time.  · You develop problems with your breathing.    · You vomit blood.  · You develop bloody or black-colored stools.  · You have chest pain.    · You develop night sweats.  · You feel faint or pass out.    · You have a fever or persistent symptoms for more than 2-3 days.    · You have a fever and your symptoms suddenly get worse.  MAKE SURE YOU:  · Understand these instructions.  · Will watch your condition.  · Will get help right away if you are not doing well or get worse.     This information is not intended to replace advice given to you by your health care provider. Make sure you discuss any questions you have with your health care provider.     Document Released: 02/26/2003 Document Revised: 12/04/2013 Document Reviewed: 10/04/2013  "[employer]" Interactive Patient Education ©2016 "[employer]" Inc.            Patient Information     Patient Information    Following emergency treatment: all patient requiring follow-up care must return either to a private physician or a clinic if your condition worsens before you are able to obtain further medical attention, please return to the emergency room.     Billing Information    At [facility], we work to make the billing process streamlined for our patients.  Our Representatives are here to answer any questions you may have regarding your hospital bill.  If you have insurance coverage and have supplied your insurance information to us, we will submit a claim to your insurer on your behalf.  Should you have any questions regarding your bill, we can be reached online or by phone as follows:  Online: You are able pay your bills online or live chat with our representatives about any billing questions you may have. We are here to help Monday - Friday from 8:00am to 7:30pm and 9:00am - 12:00pm on Saturdays.   Please visit [email or web address]  for more information.   Phone:  [phone] or [phone]    Please note that your emergency physician, surgeon, pathologist, radiologist, anesthesiologist, and other specialists are not employed by [facility] and will therefore bill separately for their services.  Please contact them directly for any questions concerning their bills at the numbers below:     Emergency Physician Services:  [phone]  [facility]:  [phone]  [facility]:  [phone]  [facility]:  [phone]    1. Your final bill may vary from the amount quoted upon discharge if all procedures are not complete at that time, or if your doctor has additional procedures of which we are not aware. You will receive an additional bill if you return to the Emergency Department at [facility] for suture removal regardless of the facility of which the sutures were placed.     2. Please arrange for settlement of this account at the emergency registration.    3. All self-pay accounts are due in full at the time of treatment.  If you are unable to meet this obligation then payment is expected within 4-5 days.     4. If you have had radiology studies (CT, X-ray, Ultrasound, MRI), you have received a preliminary result during your emergency department visit. Please contact the radiology department [phone] to receive a copy of your final result. Please discuss the Final result with your primary physician or with the follow up physician provided.     Crisis Hotline:  [place] Crisis Hotline:  3-496-UJNSRPK or 1-352.273.4581  [place] Crisis Hotline:    1-848.239.6422 or 171-419-3768         ED Discharge Follow Up Questions    1. In order to provide you with very good care, we would like to follow up with a phone call in the next few days.  May we have your permission to contact you?     YES /  NO    2. What is the best phone number  to call you? (       )_____-__________    3. What is the best time to call you?      Morning  /  Afternoon  /  Evening                   Patient Signature:  ____________________________________________________________    Date:  ____________________________________________________________

## 2017-06-09 ENCOUNTER — HOSPITAL ENCOUNTER (OUTPATIENT)
Dept: RADIOLOGY | Facility: MEDICAL CENTER | Age: 77
End: 2017-06-09
Attending: SURGERY
Payer: MEDICARE

## 2017-06-09 VITALS — WEIGHT: 155 LBS | HEIGHT: 73 IN | BODY MASS INDEX: 20.54 KG/M2

## 2017-06-09 DIAGNOSIS — D38.1 NEOPLASM OF UNCERTAIN BEHAVIOR OF TRACHEA, BRONCHUS, AND LUNG: ICD-10-CM

## 2017-06-09 PROCEDURE — A9270 NON-COVERED ITEM OR SERVICE: HCPCS | Performed by: RADIOLOGY

## 2017-06-09 PROCEDURE — A9579 GAD-BASE MR CONTRAST NOS,1ML: HCPCS | Performed by: SURGERY

## 2017-06-09 PROCEDURE — 70553 MRI BRAIN STEM W/O & W/DYE: CPT

## 2017-06-09 PROCEDURE — 700117 HCHG RX CONTRAST REV CODE 255: Performed by: SURGERY

## 2017-06-09 PROCEDURE — 700102 HCHG RX REV CODE 250 W/ 637 OVERRIDE(OP): Performed by: RADIOLOGY

## 2017-06-09 RX ORDER — DIAZEPAM 5 MG/1
5 TABLET ORAL
Status: COMPLETED | OUTPATIENT
Start: 2017-06-09 | End: 2017-06-09

## 2017-06-09 RX ADMIN — DIAZEPAM 5 MG: 5 TABLET ORAL at 13:35

## 2017-06-09 RX ADMIN — GADODIAMIDE 15 ML: 287 INJECTION INTRAVENOUS at 15:45

## 2017-06-09 ASSESSMENT — PAIN SCALES - GENERAL
PAINLEVEL_OUTOF10: 0
PAINLEVEL_OUTOF10: 0

## 2017-06-09 NOTE — IP AVS SNAPSHOT
" <p align=\"LEFT\"><IMG SRC=\"[email or web address]\" alt=\"Image\" WIDTH=\"50%\" HEIGHT=\"200\" BORDER=\"\"></p>      `           Humphrey Cano   MRN: [id number]    Department:  [facility] - MRI [address]   Date of Visit:              `  Discharge Instructions       MRI ADULT DISCHARGE INSTRUCTIONS    You have been medicated today for your scan. Please follow the instructions below to ensure your safe recovery. If you have any questions or problems, feel free to call us at [phone] or [phone].     1.   Have someone stay with you to assist you as needed.    2.   Do not drive or operate any mechanical devices.    3.   Do not perform any activity that requires concentration. Make no major decisions over the next 24 hours.     4.   Be careful changing positions from laying down to sitting or standing, as you may become dizzy.     5.   Do not drink alcohol for 48 hours.    6.   There are no restrictions for eating your normal meals. Drink fluids.    7.   You may continue your usual medications for pain, tranquilizers, muscle relaxants or sedatives when awake.     8.   Tomorrow, you may continue your normal daily activities.     9.   Pressure dressing on 10 - 15 minutes. If swelling or bleeding occurs when removed, continue placing direct pressure on injection site for another 5 minutes, or until bleeding stops.     I have been informed of and understand the above discharge instructions. Diazepam (VALIUM) Oral solution  What is this medicine?  You were prescribed DIAZEPAM (dye AZ e antolin) for the procedure you had today. This medication is a benzodiazepine. It is used to treat anxiety and nervousness. It also can help treat alcohol withdrawal, relax muscles, and treat certain types of seizures.  This medicine may be used for other purposes; ask your health care provider or pharmacist if you have questions.  What side effects may I notice from receiving this medicine?  Side effects that you should report to your " doctor or health care professional as soon as possible:  • allergic reactions like skin rash, itching or hives, swelling of the face, lips, or tongue  • angry, confused, depressed, other mood changes  • breathing problems  • feeling faint or lightheaded, falls  • muscle cramps  • problems with balance, talking, walking  • restlessness  • tremors  • trouble passing urine or change in the amount of urine  • unusually weak or tired  Side effects that usually do not require medical attention (report to your doctor or health care professional if they continue or are bothersome):  • difficulty sleeping, nightmares  • dizziness, drowsiness, clumsiness, or unsteadiness, a hangover effect  • headache  • nausea, vomiting  This list may not describe all possible side effects. Call your doctor for medical advice about side effects. You may report side effects to FDA at 8-831-FDA-2512.       `       Diet / Nutrition:    Follow any diet instructions given to you by your doctor or the dietician, including how much salt (sodium) you are allowed each day.    If you are overweight, talk to your doctor about a weight reduction plan.    Activity:    Remain physically active following your doctor's instructions about exercise and activity.    Rest often.     Any time you become even a little tired or short of breath, SIT DOWN and rest.    Worsening Symptoms:    Report any of the following signs and symptoms to the doctor's office immediately:    *Pain of jaw, arm, or neck  *Chest pain not relieved by medication                               *Dizziness or loss of consciousness  *Difficulty breathing even when at rest   *More tired than usual                                       *Bleeding drainage or swelling of surgical site  *Swelling of feet, ankles, legs or stomach                 *Fever (>100ºF)  *Pink or blood tinged sputum  *Weight gain (3lbs/day or 5lbs /week)           *Shock from internal defibrillator (if  applicable)  *Palpitations or irregular heartbeats                *Cool and/or numb extremities    Stroke Awareness    Common Risk Factors for Stroke include:    Age  Atrial Fibrillation  Carotid Artery Stenosis  Diabetes Mellitus  Excessive alcohol consumption  High blood pressure  Overweight   Physical inactivity  Smoking    Warning signs and symptoms of a stroke include:    *Sudden numbness or weakness of the face, arm or leg (especially on one side of the body).  *Sudden confusion, trouble speaking or understanding.  *Sudden trouble seeing in one or both eyes.  *Sudden trouble walking, dizziness, loss of balance or coordination.Sudden severe headache with no known cause.    It is very important to get treatment quickly when a stroke occurs. If you experience any of the above warning signs, call 911 immediately.                    `     Quit Smoking / Tobacco Use:    I understand the use of any tobacco products increases my chance of suffering from future heart disease or stroke and could cause other illnesses which may shorten my life. Quitting the use of tobacco products is the single most important thing I can do to improve my health. For further information on smoking / tobacco cessation call a Toll Free Quit Line at 1-679.892.9471 (*[employer] [place]) or 1-717.568.7562 (American [employer] Association) or you can access the web based program at www.lungusa.org.    [place] Tobacco Users Help Line:  (335) 813-1003       Toll Free: 1-443.224.4871  Quit Tobacco Program [facility] (803)194-4609    Crisis Hotline:    [place] Crisis Hotline:  4-483-HCRRGIE or 1-226.748.8850    [place] Crisis Hotline:    1-576.157.6439 or 892-073-2616    Discharge Survey:   Thank you for choosing [facility]. We hope we did everything we could to make your hospital stay a pleasant one. You may be receiving a phone survey and we would appreciate your time and participation in answering the questions. Your  input is very valuable to us in our efforts to improve our service to our patients and their families.        My signature on this form indicates that:    1. I have reviewed and understand the above information.  2. My questions regarding this information have been answered to my satisfaction.  3. I have formulated a plan with my discharge nurse to obtain my prescribed medications for home.                   `           Patient or Caregiver Signature:  ____________________________________________________________    Date:  ____________________________________________________________       `

## 2017-06-09 NOTE — DISCHARGE INSTRUCTIONS
MRI ADULT DISCHARGE INSTRUCTIONS    You have been medicated today for your scan. Please follow the instructions below to ensure your safe recovery. If you have any questions or problems, feel free to call us at [phone] or [phone].     1.   Have someone stay with you to assist you as needed.    2.   Do not drive or operate any mechanical devices.    3.   Do not perform any activity that requires concentration. Make no major decisions over the next 24 hours.     4.   Be careful changing positions from laying down to sitting or standing, as you may become dizzy.     5.   Do not drink alcohol for 48 hours.    6.   There are no restrictions for eating your normal meals. Drink fluids.    7.   You may continue your usual medications for pain, tranquilizers, muscle relaxants or sedatives when awake.     8.   Tomorrow, you may continue your normal daily activities.     9.   Pressure dressing on 10 - 15 minutes. If swelling or bleeding occurs when removed, continue placing direct pressure on injection site for another 5 minutes, or until bleeding stops.     I have been informed of and understand the above discharge instructions. Diazepam (VALIUM) Oral solution  What is this medicine?  You were prescribed DIAZEPAM (dye AZ e antolin) for the procedure you had today. This medication is a benzodiazepine. It is used to treat anxiety and nervousness. It also can help treat alcohol withdrawal, relax muscles, and treat certain types of seizures.  This medicine may be used for other purposes; ask your health care provider or pharmacist if you have questions.  What side effects may I notice from receiving this medicine?  Side effects that you should report to your doctor or health care professional as soon as possible:  • allergic reactions like skin rash, itching or hives, swelling of the face, lips, or tongue  • angry, confused, depressed, other mood changes  • breathing problems  • feeling faint or lightheaded, falls  • muscle  cramps  • problems with balance, talking, walking  • restlessness  • tremors  • trouble passing urine or change in the amount of urine  • unusually weak or tired  Side effects that usually do not require medical attention (report to your doctor or health care professional if they continue or are bothersome):  • difficulty sleeping, nightmares  • dizziness, drowsiness, clumsiness, or unsteadiness, a hangover effect  • headache  • nausea, vomiting  This list may not describe all possible side effects. Call your doctor for medical advice about side effects. You may report side effects to FDA at 4-526-AUA-4563.

## 2017-06-20 ENCOUNTER — HOSPITAL ENCOUNTER (OUTPATIENT)
Dept: OTHER | Facility: MEDICAL CENTER | Age: 77
End: 2017-06-20
Attending: SURGERY
Payer: MEDICARE

## 2017-06-20 PROCEDURE — 94726 PLETHYSMOGRAPHY LUNG VOLUMES: CPT

## 2017-06-20 PROCEDURE — 94726 PLETHYSMOGRAPHY LUNG VOLUMES: CPT | Mod: 26 | Performed by: INTERNAL MEDICINE

## 2017-06-20 PROCEDURE — 94729 DIFFUSING CAPACITY: CPT

## 2017-06-20 PROCEDURE — 94060 EVALUATION OF WHEEZING: CPT

## 2017-06-20 PROCEDURE — 94060 EVALUATION OF WHEEZING: CPT | Mod: 26 | Performed by: INTERNAL MEDICINE

## 2017-06-20 PROCEDURE — 94729 DIFFUSING CAPACITY: CPT | Mod: 26 | Performed by: INTERNAL MEDICINE

## 2017-06-20 ASSESSMENT — PULMONARY FUNCTION TESTS
FEV1/FVC_PERCENT_PREDICTED: 71
FEV1: 1.27
FEV1/FVC: 54
FVC_PERCENT_PREDICTED: 55
FEV1_PERCENT_CHANGE: 8
FEV1_PERCENT_CHANGE: 12
FEV1_PREDICTED: 3.22
FEV1: 1.17
FVC: 2.15
FVC: 2.42
FEV1/FVC_PERCENT_PREDICTED: 73
FVC_PERCENT_PREDICTED: 49
FEV1/FVC_PERCENT_CHANGE: 67
FEV1/FVC_PERCENT_PREDICTED: 73
FVC_PREDICTED: 4.42
FEV1_PERCENT_PREDICTED: 36
FEV1_PERCENT_PREDICTED: 39
FEV1/FVC: 52.48

## 2017-06-23 NOTE — PROCEDURES
DATE OF TEST:  6/20/2017    TECHNICIAN NOTES:  The patient had good effort and cooperation.  The results   of the test meet the ATS standards for acceptability and repeatability.    SPIROMETRY:  1.  FVC was 2.42 liters, 55% of predicted.  2.  FEV1 was 1.27 liters, 39% of predicted.  3.  FEV1/FVC ratio was 52%.  4.  There was no significant response to bronchodilators.  5.  Flow volume loop was scooped consistent with obstruction.    LUNG VOLUMES:  1.  Total lung capacity was 7.89 liters, 109% of predicted.  2.  Residual volume was 5.63 liters, 218% of predicted.  3.  Diffusion capacity was moderately decreased to 52% of predicted.    IMPRESSION:  The patient has severe obstructive ventilatory defect with FEV1   only 39% of predicted and FEV1/FVC ratio of 52%.  The patient also has severe   air trapping, which is consistent with obstruction.  This reduction in   diffusion capacity can be seen in emphysema due to the loss of   alveolar-capillary units.  These findings are consistent with obstructive   ventilatory defect as mentioned above, probably secondary to chronic   obstructive pulmonary disease given the patient's history of heavy smoking.    Clinical correlation is required.       ____________________________________     MD JUAN Reeves / MIKE    DD:  06/22/2017 16:13:19  DT:  06/22/2017 18:41:27    D#:  [id number]  Job#:  [id number]

## 2017-07-05 RX ORDER — LISINOPRIL 5 MG/1
5 TABLET ORAL DAILY
COMMUNITY
End: 2019-01-01

## 2017-07-11 ENCOUNTER — HOSPITAL ENCOUNTER (INPATIENT)
Facility: MEDICAL CENTER | Age: 77
LOS: 8 days | DRG: 164 | End: 2017-07-19
Attending: SURGERY | Admitting: SURGERY
Payer: MEDICARE

## 2017-07-11 ENCOUNTER — APPOINTMENT (OUTPATIENT)
Dept: RADIOLOGY | Facility: MEDICAL CENTER | Age: 77
DRG: 164 | End: 2017-07-11
Attending: PHYSICIAN ASSISTANT
Payer: MEDICARE

## 2017-07-11 PROBLEM — D38.1 NEOPLASM OF UNCERTAIN BEHAVIOR OF TRACHEA, BRONCHUS, AND LUNG: Status: ACTIVE | Noted: 2017-07-11

## 2017-07-11 LAB
ABO GROUP BLD: NORMAL
BLD GP AB SCN SERPL QL: NORMAL
COMPONENT R 8504R: NORMAL
COMPONENT R 8504R: NORMAL
RH BLD: NORMAL

## 2017-07-11 PROCEDURE — 502571 HCHG PACK, LAP CHOLE: Performed by: SURGERY

## 2017-07-11 PROCEDURE — 500385 HCHG DRAIN, PLEUROVAC ADUL: Performed by: SURGERY

## 2017-07-11 PROCEDURE — A6402 STERILE GAUZE <= 16 SQ IN: HCPCS | Performed by: SURGERY

## 2017-07-11 PROCEDURE — 700102 HCHG RX REV CODE 250 W/ 637 OVERRIDE(OP): Performed by: PHYSICIAN ASSISTANT

## 2017-07-11 PROCEDURE — 86901 BLOOD TYPING SEROLOGIC RH(D): CPT

## 2017-07-11 PROCEDURE — 88307 TISSUE EXAM BY PATHOLOGIST: CPT

## 2017-07-11 PROCEDURE — 501463 HCHG STAPLES, UNIV. ROTIC: Performed by: SURGERY

## 2017-07-11 PROCEDURE — 700111 HCHG RX REV CODE 636 W/ 250 OVERRIDE (IP)

## 2017-07-11 PROCEDURE — 160041 HCHG SURGERY MINUTES - EA ADDL 1 MIN LEVEL 4: Performed by: SURGERY

## 2017-07-11 PROCEDURE — 86850 RBC ANTIBODY SCREEN: CPT

## 2017-07-11 PROCEDURE — 500697 HCHG HEMOCLIP, LARGE (ORANGE): Performed by: SURGERY

## 2017-07-11 PROCEDURE — 160009 HCHG ANES TIME/MIN: Performed by: SURGERY

## 2017-07-11 PROCEDURE — 160048 HCHG OR STATISTICAL LEVEL 1-5: Performed by: SURGERY

## 2017-07-11 PROCEDURE — 501583 HCHG TROCAR, THRD CAN&SEAL 5X100: Performed by: SURGERY

## 2017-07-11 PROCEDURE — 160002 HCHG RECOVERY MINUTES (STAT): Performed by: SURGERY

## 2017-07-11 PROCEDURE — 501838 HCHG SUTURE GENERAL: Performed by: SURGERY

## 2017-07-11 PROCEDURE — 700101 HCHG RX REV CODE 250

## 2017-07-11 PROCEDURE — 501570 HCHG TROCAR, SEPARATOR: Performed by: SURGERY

## 2017-07-11 PROCEDURE — A9270 NON-COVERED ITEM OR SERVICE: HCPCS | Performed by: PHYSICIAN ASSISTANT

## 2017-07-11 PROCEDURE — 88309 TISSUE EXAM BY PATHOLOGIST: CPT

## 2017-07-11 PROCEDURE — 160036 HCHG PACU - EA ADDL 30 MINS PHASE I: Performed by: SURGERY

## 2017-07-11 PROCEDURE — 500312 HCHG CONNECTOR, BLAKE DRAIN 1-WAY: Performed by: SURGERY

## 2017-07-11 PROCEDURE — 71010 DX-CHEST-PORTABLE (1 VIEW): CPT

## 2017-07-11 PROCEDURE — 0BTG4ZZ RESECTION OF LEFT UPPER LUNG LOBE, PERCUTANEOUS ENDOSCOPIC APPROACH: ICD-10-PCS | Performed by: SURGERY

## 2017-07-11 PROCEDURE — A6404 STERILE GAUZE > 48 SQ IN: HCPCS | Performed by: SURGERY

## 2017-07-11 PROCEDURE — 86900 BLOOD TYPING SEROLOGIC ABO: CPT

## 2017-07-11 PROCEDURE — 07B74ZX EXCISION OF THORAX LYMPHATIC, PERCUTANEOUS ENDOSCOPIC APPROACH, DIAGNOSTIC: ICD-10-PCS | Performed by: SURGERY

## 2017-07-11 PROCEDURE — 770006 HCHG ROOM/CARE - MED/SURG/GYN SEMI*

## 2017-07-11 PROCEDURE — A4450 NON-WATERPROOF TAPE: HCPCS | Performed by: SURGERY

## 2017-07-11 PROCEDURE — 160029 HCHG SURGERY MINUTES - 1ST 30 MINS LEVEL 4: Performed by: SURGERY

## 2017-07-11 PROCEDURE — 500378 HCHG DRAIN, J-VAC ROUND 19FR: Performed by: SURGERY

## 2017-07-11 PROCEDURE — 88305 TISSUE EXAM BY PATHOLOGIST: CPT

## 2017-07-11 PROCEDURE — 502240 HCHG MISC OR SUPPLY RC 0272: Performed by: SURGERY

## 2017-07-11 PROCEDURE — 86923 COMPATIBILITY TEST ELECTRIC: CPT | Mod: 91

## 2017-07-11 PROCEDURE — 160035 HCHG PACU - 1ST 60 MINS PHASE I: Performed by: SURGERY

## 2017-07-11 PROCEDURE — 501581 HCHG TROCAR: Performed by: SURGERY

## 2017-07-11 RX ORDER — MAGNESIUM HYDROXIDE 1200 MG/15ML
LIQUID ORAL
Status: DISCONTINUED | OUTPATIENT
Start: 2017-07-11 | End: 2017-07-11 | Stop reason: HOSPADM

## 2017-07-11 RX ORDER — LIDOCAINE HYDROCHLORIDE 10 MG/ML
0.5 INJECTION, SOLUTION INFILTRATION; PERINEURAL
Status: DISCONTINUED | OUTPATIENT
Start: 2017-07-11 | End: 2017-07-19 | Stop reason: HOSPADM

## 2017-07-11 RX ORDER — SODIUM CHLORIDE, SODIUM LACTATE, POTASSIUM CHLORIDE, CALCIUM CHLORIDE 600; 310; 30; 20 MG/100ML; MG/100ML; MG/100ML; MG/100ML
INJECTION, SOLUTION INTRAVENOUS
Status: DISCONTINUED | OUTPATIENT
Start: 2017-07-11 | End: 2017-07-11 | Stop reason: HOSPADM

## 2017-07-11 RX ORDER — DIPHENHYDRAMINE HCL 25 MG
25 TABLET ORAL EVERY 6 HOURS PRN
Status: DISCONTINUED | OUTPATIENT
Start: 2017-07-11 | End: 2017-07-19 | Stop reason: HOSPADM

## 2017-07-11 RX ORDER — SODIUM CHLORIDE, SODIUM LACTATE, POTASSIUM CHLORIDE, CALCIUM CHLORIDE 600; 310; 30; 20 MG/100ML; MG/100ML; MG/100ML; MG/100ML
INJECTION, SOLUTION INTRAVENOUS CONTINUOUS
Status: DISCONTINUED | OUTPATIENT
Start: 2017-07-11 | End: 2017-07-15 | Stop reason: ALTCHOICE

## 2017-07-11 RX ORDER — HALOPERIDOL 5 MG/ML
INJECTION INTRAMUSCULAR
Status: COMPLETED
Start: 2017-07-11 | End: 2017-07-11

## 2017-07-11 RX ORDER — LORAZEPAM 2 MG/ML
.5-1 INJECTION INTRAMUSCULAR EVERY 4 HOURS PRN
Status: DISCONTINUED | OUTPATIENT
Start: 2017-07-11 | End: 2017-07-16

## 2017-07-11 RX ORDER — MORPHINE SULFATE 4 MG/ML
1-5 INJECTION, SOLUTION INTRAMUSCULAR; INTRAVENOUS
Status: DISCONTINUED | OUTPATIENT
Start: 2017-07-11 | End: 2017-07-11

## 2017-07-11 RX ORDER — LIDOCAINE HYDROCHLORIDE 10 MG/ML
INJECTION, SOLUTION INFILTRATION; PERINEURAL
Status: COMPLETED
Start: 2017-07-11 | End: 2017-07-11

## 2017-07-11 RX ORDER — MORPHINE SULFATE 4 MG/ML
1-4 INJECTION, SOLUTION INTRAMUSCULAR; INTRAVENOUS
Status: DISCONTINUED | OUTPATIENT
Start: 2017-07-11 | End: 2017-07-19 | Stop reason: HOSPADM

## 2017-07-11 RX ORDER — HYDRALAZINE HYDROCHLORIDE 20 MG/ML
10 INJECTION INTRAMUSCULAR; INTRAVENOUS EVERY 6 HOURS PRN
Status: DISCONTINUED | OUTPATIENT
Start: 2017-07-11 | End: 2017-07-19 | Stop reason: HOSPADM

## 2017-07-11 RX ORDER — DILTIAZEM HYDROCHLORIDE 240 MG/1
240 CAPSULE, COATED, EXTENDED RELEASE ORAL DAILY
Status: DISCONTINUED | OUTPATIENT
Start: 2017-07-12 | End: 2017-07-19 | Stop reason: HOSPADM

## 2017-07-11 RX ORDER — LISINOPRIL 5 MG/1
5 TABLET ORAL DAILY
Status: DISCONTINUED | OUTPATIENT
Start: 2017-07-12 | End: 2017-07-19 | Stop reason: HOSPADM

## 2017-07-11 RX ORDER — ENALAPRILAT 1.25 MG/ML
2.5 INJECTION INTRAVENOUS EVERY 6 HOURS PRN
Status: DISCONTINUED | OUTPATIENT
Start: 2017-07-11 | End: 2017-07-19 | Stop reason: HOSPADM

## 2017-07-11 RX ORDER — PROMETHAZINE HYDROCHLORIDE 25 MG/1
25 SUPPOSITORY RECTAL EVERY 6 HOURS PRN
Status: DISCONTINUED | OUTPATIENT
Start: 2017-07-11 | End: 2017-07-19 | Stop reason: HOSPADM

## 2017-07-11 RX ORDER — FAMOTIDINE 20 MG/1
20 TABLET, FILM COATED ORAL 2 TIMES DAILY
Status: DISCONTINUED | OUTPATIENT
Start: 2017-07-11 | End: 2017-07-19 | Stop reason: HOSPADM

## 2017-07-11 RX ORDER — LIDOCAINE AND PRILOCAINE 25; 25 MG/G; MG/G
1 CREAM TOPICAL
Status: DISCONTINUED | OUTPATIENT
Start: 2017-07-11 | End: 2017-07-19 | Stop reason: HOSPADM

## 2017-07-11 RX ORDER — HYDROCODONE BITARTRATE AND ACETAMINOPHEN 5; 325 MG/1; MG/1
1-2 TABLET ORAL EVERY 4 HOURS PRN
Status: DISCONTINUED | OUTPATIENT
Start: 2017-07-11 | End: 2017-07-19 | Stop reason: HOSPADM

## 2017-07-11 RX ORDER — BUPIVACAINE HYDROCHLORIDE AND EPINEPHRINE 5; 5 MG/ML; UG/ML
INJECTION, SOLUTION EPIDURAL; INTRACAUDAL; PERINEURAL
Status: DISCONTINUED | OUTPATIENT
Start: 2017-07-11 | End: 2017-07-11 | Stop reason: HOSPADM

## 2017-07-11 RX ORDER — DIPHENHYDRAMINE HYDROCHLORIDE 50 MG/ML
25 INJECTION INTRAMUSCULAR; INTRAVENOUS EVERY 6 HOURS PRN
Status: DISCONTINUED | OUTPATIENT
Start: 2017-07-11 | End: 2017-07-19 | Stop reason: HOSPADM

## 2017-07-11 RX ORDER — MORPHINE SULFATE 10 MG/ML
5 INJECTION, SOLUTION INTRAMUSCULAR; INTRAVENOUS
Status: DISCONTINUED | OUTPATIENT
Start: 2017-07-11 | End: 2017-07-19 | Stop reason: HOSPADM

## 2017-07-11 RX ORDER — SIMVASTATIN 20 MG
10 TABLET ORAL NIGHTLY
Status: DISCONTINUED | OUTPATIENT
Start: 2017-07-11 | End: 2017-07-19 | Stop reason: HOSPADM

## 2017-07-11 RX ADMIN — SIMVASTATIN 10 MG: 20 TABLET, FILM COATED ORAL at 20:29

## 2017-07-11 RX ADMIN — FENTANYL CITRATE 25 MCG: 50 INJECTION, SOLUTION INTRAMUSCULAR; INTRAVENOUS at 16:49

## 2017-07-11 RX ADMIN — HYDROMORPHONE HYDROCHLORIDE 0.2 MG: 1 INJECTION, SOLUTION INTRAMUSCULAR; INTRAVENOUS; SUBCUTANEOUS at 16:52

## 2017-07-11 RX ADMIN — HYDROMORPHONE HYDROCHLORIDE 0.2 MG: 1 INJECTION, SOLUTION INTRAMUSCULAR; INTRAVENOUS; SUBCUTANEOUS at 17:02

## 2017-07-11 RX ADMIN — HYDROMORPHONE HYDROCHLORIDE 0.2 MG: 1 INJECTION, SOLUTION INTRAMUSCULAR; INTRAVENOUS; SUBCUTANEOUS at 17:07

## 2017-07-11 RX ADMIN — LIDOCAINE HYDROCHLORIDE: 10 INJECTION, SOLUTION INFILTRATION; PERINEURAL at 11:15

## 2017-07-11 RX ADMIN — FENTANYL CITRATE 25 MCG: 50 INJECTION, SOLUTION INTRAMUSCULAR; INTRAVENOUS at 16:55

## 2017-07-11 RX ADMIN — FENTANYL CITRATE 50 MCG: 50 INJECTION, SOLUTION INTRAMUSCULAR; INTRAVENOUS at 17:03

## 2017-07-11 RX ADMIN — SODIUM CHLORIDE, POTASSIUM CHLORIDE, SODIUM LACTATE AND CALCIUM CHLORIDE: 600; 310; 30; 20 INJECTION, SOLUTION INTRAVENOUS at 11:30

## 2017-07-11 RX ADMIN — HYDROMORPHONE HYDROCHLORIDE 0.2 MG: 1 INJECTION, SOLUTION INTRAMUSCULAR; INTRAVENOUS; SUBCUTANEOUS at 17:18

## 2017-07-11 RX ADMIN — SODIUM CHLORIDE, POTASSIUM CHLORIDE, SODIUM LACTATE AND CALCIUM CHLORIDE: 600; 310; 30; 20 INJECTION, SOLUTION INTRAVENOUS at 16:30

## 2017-07-11 RX ADMIN — HYDROMORPHONE HYDROCHLORIDE 0.2 MG: 1 INJECTION, SOLUTION INTRAMUSCULAR; INTRAVENOUS; SUBCUTANEOUS at 17:13

## 2017-07-11 RX ADMIN — FAMOTIDINE 20 MG: 20 TABLET, FILM COATED ORAL at 20:29

## 2017-07-11 ASSESSMENT — PAIN SCALES - GENERAL
PAINLEVEL_OUTOF10: ASSUMED PAIN PRESENT
PAINLEVEL_OUTOF10: ASSUMED PAIN PRESENT
PAINLEVEL_OUTOF10: 0
PAINLEVEL_OUTOF10: 4
PAINLEVEL_OUTOF10: ASSUMED PAIN PRESENT
PAINLEVEL_OUTOF10: 0

## 2017-07-11 NOTE — OP REPORT
DATE OF OPERATION: 7/11/2017    PREOPERATIVE DIAGNOSIS:  Left upper lobe lung mass     POSTOPERATIVE DIAGNOSIS:  Same    PROCEDURE PERFORMED:  1.  Bronchoscopy  2.  Thoracoscopic left upper lobectomy  3.  Thoracic lymphadenectomy    SURGEON: John H Ganser, M.D.    ASSISTANT: DANG Steven PA-C    ANESTHESIOLOGIST: Dr. Fong    ANESTHESIA:  General endotracheal anesthesia.    INDICATION:  The patient is a 76 y.o. male with Left upper lobe mass found incidentally. PET is positive. Lung function is reduced but felt amenable to upper lobectomy. He is taken to the operating room for thoracoscopic left upper lobectomy and node dissection. Risks and alternatives discussed.    FINDINGS:  Central mass. No effusion or adenopathy.    WOUND CLASSIFICATION: Class Clean contaminated    SPECIMEN:  Left upper lobe, nodes.    ESTIMATED BLOOD LOSS:  100 mL.    Procedure:    The patient was identified and taken to the operating room. General anesthetic was administered and he was placed in the right lateral decubitus position and his left chest prepped and draped in the usual sterile fashion. Local anesthesia of Marcaine was injected prior to making skin incisions. A small incision was made in the midaxillary line in approximately 7 intercostal space and a 5 mm trocar and the thoracoscope inserted. Additional 5 and one 12 trochars were placed appropriately. Inspection showed no evidence of effusion or nodularity. The hilar pleura was divided with cautery. The apical artery branch was circumferentially dissected and divided with the vascular stapler. Hilar and AP window nodes were dissected out and sent as separate specimens. The vein was then circumferentially dissected and divided with the vascular stapler. The fissure was then opened and the artery identified there. Posterior and lingular branches were divided with the vascular stapler. Nodes around the bronchus were dissected out and sent with the station 10 lymph nodes. The  bronchus was then divided with the stapler using a green load.    The 12 mm trocar site in the 8th intercostal space anteriorly was then enlarged and endoscopic bag and passed. The specimen was withdrawn. Palpation confirmed the mass in the center of the lobe. Hemostasis assured and the lung was reexpanded. A 19 [place] drain was then passed and secured skin with silk. The other incisions were closed with Vicryl. Sterile dressings were applied in the tube attached to a pleural suction device. Patient was returned to recovery in stable condition

## 2017-07-11 NOTE — IP AVS SNAPSHOT
7/19/2017    Humphrey Cano Jr.   [address]  [facility] [address]    Dear Humphrey:    [facility] wants to ensure your discharge home is safe and you or your loved ones have had all of your questions answered regarding your care after you leave the hospital.    Below is a list of resources and contact information should you have any questions regarding your hospital stay, follow-up instructions, or active medical symptoms.    Questions or Concerns Regarding… Contact   Medical Questions Related to Your Discharge  (7 days a week, 8am-5pm) Contact a Nurse Care Coordinator   [phone]   Medical Questions Not Related to Your Discharge  (24 hours a day / 7 days a week)  Contact the Nurse Health Line   [phone]    Medications or Discharge Instructions Refer to your discharge packet   or contact your [facility] Primary Care Provider   [phone]   Follow-up Appointment(s) Schedule your appointment via [employer]book   or contact Scheduling [phone]   Billing Review your statement via W[employer]book  or contact Billing [phone]   Medical Records Review your records via W[employer]book   or contact Medical Records [phone]     You may receive a telephone call within two days of discharge. This call is to make certain you understand your discharge instructions and have the opportunity to have any questions answered. You can also easily access your medical information, test results and upcoming appointments via the [employer]book free online health management tool. You can learn more and sign up at [email or web address]. For assistance setting up your Wipebook account, please call [phone].    Once again, we want to ensure your discharge home is safe and that you have a clear understanding of any next steps in your care. If you have any questions or concerns, please do not hesitate to contact us, we are here for you. Thank you for choosing [facility] for your healthcare needs.    Sincerely,    Your [facility] Healthcare Team

## 2017-07-11 NOTE — IP AVS SNAPSHOT
Home Care Instructions                                                                                                                  Name:Humphrey Cano Jr.  Medical Record Number:[id number]  CSN: [id number]    YOB: 1940   Age: 76 y.o.  Sex: male  HT:1.829 m (6') WT: 71 kg (156 lb 8.4 oz)          Admit Date: 7/11/2017     Discharge Date:   Today's Date: 7/19/2017  Attending Doctor:  John H Ganser, M.D.                  Allergies:  Review of patient's allergies indicates no known allergies.            Discharge Instructions       Discharge Instructions    Discharged to home by car with relative. Discharged via walking, hospital escort: Yes.  Special equipment needed: Oxygen    Be sure to schedule a follow-up appointment with your primary care doctor or any specialists as instructed.     Discharge Plan:   May shower   Remove Tegaderms in 3-4 days  No baths, hot tubs, soaks for 7 days  No lifting >20 lbs for 1 wk  No driving for 4-5 days   F/U with Dr. Ganser in 1-2 weeks  F/U with Dr. Wheat in 2 weeks    Influenza Vaccine Indication: Not indicated: Previously immunized this influenza season and > 8 years of age    I understand that a diet low in cholesterol, fat, and sodium is recommended for good health. Unless I have been given specific instructions below for another diet, I accept this instruction as my diet prescription.   Other diet: Regular    Special Instructions: None    · Is patient discharged on Warfarin / Coumadin?   No     · Is patient Post Blood Transfusion?  No    Depression / Suicide Risk    As you are discharged from this [facility] facility, it is important to learn how to keep safe from harming yourself.    Recognize the warning signs:  · Abrupt changes in personality, positive or negative- including increase in energy   · Giving away possessions  · Change in eating patterns- significant weight changes-  positive or negative  · Change in sleeping patterns- unable to sleep or  sleeping all the time   · Unwillingness or inability to communicate  · Depression  · Unusual sadness, discouragement and loneliness  · Talk of wanting to die  · Neglect of personal appearance   · Rebelliousness- reckless behavior  · Withdrawal from people/activities they love  · Confusion- inability to concentrate     If you or a loved one observes any of these behaviors or has concerns about self-harm, here's what you can do:  · Talk about it- your feelings and reasons for harming yourself  · Remove any means that you might use to hurt yourself (examples: pills, rope, extension cords, firearm)  · Get professional help from the community (Mental Health, Substance Abuse, psychological counseling)  · Do not be alone:Call your Safe Contact- someone whom you trust who will be there for you.  · Call your local CRISIS HOTLINE 492-1316 or 318-100-1692  · Call your local Children's Mobile Crisis Response Team [place] (256) 935-1821 or www.Wifi Online  · Call the toll free National Suicide Prevention Hotlines   · National Suicide Prevention Lifeline 337-846-NAXU (9310)  · National Hope Line Network 800-SUICIDE (231-4486)  Amiodarone tablets  What is this medicine?  AMIODARONE (a WILBER oh da rajni) is an antiarrhythmic drug. It helps make your heart beat regularly. Because of the side effects caused by this medicine, it is only used when other medicines have not worked. It is usually used for heartbeat problems that may be life threatening.  This medicine may be used for other purposes; ask your health care provider or pharmacist if you have questions.  COMMON BRAND NAME(S): Cordarone, Pacerone  What should I tell my health care provider before I take this medicine?  They need to know if you have any of these conditions:  -liver disease  -lung disease  -other heart problems  -thyroid disease  -an unusual or allergic reaction to amiodarone, iodine, other medicines, foods, dyes, or preservatives  -pregnant or trying to  get pregnant  -breast-feeding  How should I use this medicine?  Take this medicine by mouth with a glass of water. Follow the directions on the prescription label. You can take this medicine with or without food. However, you should always take it the same way each time. Take your doses at regular intervals. Do not take your medicine more often than directed. Do not stop taking except on the advice of your doctor or health care professional.  A special MedGuide will be given to you by the pharmacist with each prescription and refill. Be sure to read this information carefully each time.  Talk to your pediatrician regarding the use of this medicine in children. Special care may be needed.  Overdosage: If you think you have taken too much of this medicine contact a poison control center or emergency room at once.  NOTE: This medicine is only for you. Do not share this medicine with others.  What if I miss a dose?  If you miss a dose, take it as soon as you can. If it is almost time for your next dose, take only that dose. Do not take double or extra doses.  What may interact with this medicine?  Do not take this medicine with any of the following medications:  -abarelix  -amoxapine  -apomorphine  -arsenic trioxide  -certain macrolide antibiotics  -certain quinolone antibiotics  -cisapride  -droperidol  -haloperidol  -hawthorn  -levomethadyl  -maprotiline  -medicines for malaria like chloroquine and halofantrine  -medicines for mental depression such as tricyclic antidepressants  -medicines to control heart rhythm like disopyramide, dofetilide, ibutilide, propafenone, and sotalol  -methadone  -mibefradil  -pentamidine  -phenothiazines like chlorpromazine, mesoridazine, and thioridazine  -pimozide  -probucol  -ranolazine  -sertindole  -vardenafil  -red yeast rice  -ziprasidone  This medicine may also interact with the following medications:  -beta blockers  -calcium channel  blockers  -cholestyramine  -cimetidine  -clopidogrel  -cyclosporine  -dextromethorphan  -digoxin  -diuretics  -fentanyl  -flecainide  -fluindione  -general anesthetics  -grapefruit juice  -lidocaine  -loratadine  -medicines for fungal infections like ketoconazole, fluconazole, and itraconazole  -medicines for HIV, AIDS  -medicines for seizures such as phenytoin  -medicines for thyroid problems  -medicines to lower cholesterol such as atorvastatin, cerivastatin, lovastatin, or simvastatin  -methotrexate  -procainamide  -quinidine  -rifampin, rifabutin, or rifapentine  -Boris's Wort  -trazodone  -warfarin  This list may not describe all possible interactions. Give your health care provider a list of all the medicines, herbs, non-prescription drugs, or dietary supplements you use. Also tell them if you smoke, drink alcohol, or use illegal drugs. Some items may interact with your medicine.  What should I watch for while using this medicine?  Your condition will be monitored closely when you first begin therapy. Often, this drug is first started in a hospital or other monitored health care setting. Once you are on maintenance therapy, visit your doctor or health care professional for regular checks on your progress. Because your condition and use of this medicine carry some risk, it is a good idea to carry an identification card, necklace or bracelet with details of your condition, medications, and doctor or health care professional.  You may get drowsy or dizzy. Do not drive, use machinery, or do anything that needs mental alertness until you know how this medicine affects you. Do not stand or sit up quickly, especially if you are an older patient. This reduces the risk of dizzy or fainting spells.  This medicine can make you more sensitive to the sun. Keep out of the sun. If you cannot avoid being in the sun, wear protective clothing and use sunscreen. Do not use sun lamps or tanning beds/booths.  You should have  regular eye exams before and during treatment. Call your doctor if you have blurred vision, see halos, or your eyes become sensitive to light. Your eyes may get dry. It may be helpful to use a lubricating eye solution or artificial tears solution.  If you are going to have surgery or a procedure that requires contrast dyes, tell your doctor or health care professional that you are taking this medicine.  What side effects may I notice from receiving this medicine?  Side effects that you should report to your doctor or health care professional as soon as possible:  -allergic reactions like skin rash, itching or hives, swelling of the face, lips, or tongue  -blue-gray coloring of the skin  -blurred vision, seeing blue green halos, increased sensitivity of the eyes to light  -breathing problems  -chest pain  -dark urine  -fast, irregular heartbeat  -feeling faint or light-headed  -intolerance to heat or cold  -nausea or vomiting  -pain and swelling of the scrotum  -pain, tingling, numbness in feet, hands  -redness, blistering, peeling or loosening of the skin, including inside the mouth  -spitting up blood  -stomach pain  -sweating  -unusual or uncontrolled movements of body  -unusually weak or tired  -weight gain or loss  -yellowing of the eyes or skin  Side effects that usually do not require medical attention (report to your doctor or health care professional if they continue or are bothersome):  -change in sex drive or performance  -constipation  -dizziness  -headache  -loss of appetite  -trouble sleeping  This list may not describe all possible side effects. Call your doctor for medical advice about side effects. You may report side effects to FDA at 3-891-FDA-3171.  Where should I keep my medicine?  Keep out of the reach of children.  Store at room temperature between 20 and 25 degrees C (68 and 77 degrees F). Protect from light. Keep container tightly closed. Throw away any unused medicine after the expiration  date.  NOTE: This sheet is a summary. It may not cover all possible information. If you have questions about this medicine, talk to your doctor, pharmacist, or health care provider.  © 2014, Elsevier/Gold Standard. (7/16/2010 2:08:28 PM)    Rivaroxaban oral tablets  What is this medicine?  RIVAROXABAN (ri va ZEB a ban) is an anticoagulant (blood thinner). It is used to treat blood clots in the lungs or in the veins. It is also used after knee or hip surgeries to prevent blood clots. It is also used to lower the chance of stroke in people with a medical condition called atrial fibrillation.  This medicine may be used for other purposes; ask your health care provider or pharmacist if you have questions.  COMMON BRAND NAME(S): Xarelto  What should I tell my health care provider before I take this medicine?  They need to know if you have any of these conditions:  -bleeding disorders  -bleeding in the brain  -blood in your stools (black or tarry stools) or if you have blood in your vomit  -history of stomach bleeding  -kidney disease  -liver disease  -low blood counts, like low white cell, platelet, or red cell counts  -recent or planned spinal or epidural procedure  -take medicines that treat or prevent blood clots  -an unusual or allergic reaction to rivaroxaban, other medicines, foods, dyes, or preservatives  -pregnant or trying to get pregnant  -breast-feeding  How should I use this medicine?  Take this medicine by mouth with a glass of water. Follow the directions on the prescription label. Take your medicine at regular intervals. Do not take it more often than directed. Do not stop taking except on your doctor's advice.  If you are taking this medicine after hip or knee replacement surgery, take it with or without food.  If you are taking this medicine for atrial fibrillation, take it with your evening meal. If you are taking this medicine to treat blood clots, take it with food at the same time each day. If you  are unable to swallow your tablet, you may crush the tablet and mix it in applesauce. Then, immediately eat the applesauce. You should eat more food right after you eat the applesauce containing the crushed tablet.  Talk to your pediatrician regarding the use of this medicine in children. Special care may be needed.  Overdosage: If you think you've taken too much of this medicine contact a poison control center or emergency room at once.  Overdosage: If you think you have taken too much of this medicine contact a poison control center or emergency room at once.  NOTE: This medicine is only for you. Do not share this medicine with others.  What if I miss a dose?  If you take your medicine once a day and miss a dose, take the missed dose as soon as you remember. If you take your medicine twice a day and miss a dose, take the missed dose immediately. In this instance, 2 tablets may be taken at the same time. The next day you should take 1 tablet twice a day as directed.  What may interact with this medicine?  -aspirin and aspirin-like medicines  -certain antibiotics like erythromycin, azithromycin, and clarithromycin  -certain medicines for fungal infections like ketoconazole and itraconazole  -certain medicines for irregular heart beat like amiodarone, quinidine, dronedarone  -certain medicines for seizures like carbamazepine, phenytoin  -certain medicines that treat or prevent blood clots like warfarin, enoxaparin, and dalteparin   -conivaptan  -diltiazem  -felodipine  -indinavir  -lopinavir; ritonavir  -NSAIDS, medicines for pain and inflammation, like ibuprofen or naproxen  -ranolazine  -rifampin  -ritonavir  -Boris's wort  -verapamil  This list may not describe all possible interactions. Give your health care provider a list of all the medicines, herbs, non-prescription drugs, or dietary supplements you use. Also tell them if you smoke, drink alcohol, or use illegal drugs. Some items may interact with your  medicine.  What should I watch for while using this medicine?  Do not stop taking this medicine without first talking to your doctor. Stopping this medicine may increase your risk of having a stroke. Be sure to refill your prescription before you run out of medicine.  This medicine may increase your risk to bruise or bleed. Call your doctor or health care professional if you notice any unusual bleeding.  Be careful brushing and flossing your teeth or using a toothpick because you may bleed more easily. If you have any dental work done, tell your dentist you are receiving this medicine.  What side effects may I notice from receiving this medicine?  Side effects that you should report to your doctor or health care professional as soon as possible:  -allergic reactions like skin rash, itching or hives, swelling of the face, lips, or tongue  -back pain  -bloody or black, tarry stools  -changes in vision  -confusion, trouble speaking or understanding  -red or dark-brown urine  -redness, blistering, peeling or loosening of the skin, including inside the mouth  -severe headaches  -spitting up blood or brown material that looks like coffee grounds  -sudden numbness or weakness of the face, arm or leg  -trouble walking, dizziness, loss of balance or coordination  -unusual bruising or bleeding from the eye, gums, or nose   Side effects that usually do not require medical attention (Report these to your doctor or health care professional if they continue or are bothersome.):  -dizziness  -muscle pain  This list may not describe all possible side effects. Call your doctor for medical advice about side effects. You may report side effects to FDA at 0-639-FDA-4886.  Where should I keep my medicine?  Keep out of the reach of children.  Store at room temperature between 15 and 30 degrees C (59 and 86 degrees F). Throw away any unused medicine after the expiration date.  NOTE: This sheet is a summary. It may not cover all possible  information. If you have questions about this medicine, talk to your doctor, pharmacist, or health care provider.  © 2014, Elsevier/Gold Standard. (3/17/2014 3:32:09 PM)        Your appointments     Jul 25, 2017  3:20 PM   HOSPITAL FOLLOW UP with Calvin Wheat MD,[facility]    [address] [address]  [address]  [facility] [address]   [phone]                 Discharge Medication Instructions:    Below are the medications your physician expects you to take upon discharge:    Review all your home medications and newly ordered medications with your doctor and/or pharmacist. Follow medication instructions as directed by your doctor and/or pharmacist.    Please keep your medication list with you and share with your physician.               Medication List      START taking these medications        Instructions    Morning Afternoon Evening Bedtime    amiodarone 400 MG tablet   Last time this was given:  400 mg on 7/19/2017  8:21 AM   Commonly known as:  PACERONE   Next Dose Due:  7/20/2017        Take 1 Tab by mouth every day.   Dose:  400 mg                        digoxin 125 MCG Tabs   Last time this was given:  125 mcg on 7/18/2017 10:04 PM   Commonly known as:  LANOXIN   Next Dose Due:  7/19/2017        Take 1 Tab by mouth every day at 6 PM.   Dose:  125 mcg                        rivaroxaban 15 MG Tabs tablet   Commonly known as:  XARELTO   Next Dose Due:  7/19/2017        Take 1 Tab by mouth with dinner.   Dose:  15 mg                          CHANGE how you take these medications        Instructions    Morning Afternoon Evening Bedtime    aspirin 81 MG EC tablet   Start taking on:  7/20/2017   What changed:    - medication strength  - how much to take   Next Dose Due:  7/20/2017        Take 1 Tab by mouth every day.   Dose:  81 mg                          CONTINUE taking these medications        Instructions    Morning Afternoon Evening Bedtime     calcium carbonate 500 MG Tabs   Commonly known as:  OS-JACKIE 500   Next Dose Due:  7/19/2017        Take 500 mg by mouth 2 times a day, with meals.   Dose:  500 mg                        CoQ-10 100 MG Caps   Next Dose Due:  7/19/2017        Take  by mouth every day.                        diltiazem  MG Cp24   Last time this was given:  240 mg on 7/19/2017  9:00 AM   Commonly known as:  CARDIZEM CD   Next Dose Due:  7/20/2017        Take 1 Cap by mouth every day.   Dose:  240 mg                        lisinopril 5 MG Tabs   Last time this was given:  5 mg on 7/19/2017  8:22 AM   Commonly known as:  PRINIVIL   Next Dose Due:  7/20/2017        Take 5 mg by mouth every day.   Dose:  5 mg                        Non Formulary Request   Next Dose Due:  7/19/2017        BLACK CUMIN OIL 1 TABLESPOON TWICE A DAY                        simvastatin 10 MG Tabs   Last time this was given:  10 mg on 7/18/2017 10:05 PM   Commonly known as:  ZOCOR   Next Dose Due:  7/19/2017        Take 10 mg by mouth every evening.   Dose:  10 mg                        VITAMIN C PO   Next Dose Due:  7/19/2017        Take 2,000 mg by mouth every day.   Dose:  2000 mg                        vitamin D 1000 UNIT Tabs   Commonly known as:  cholecalciferol   Next Dose Due:  7/19/2017        Take 1,000 Units by mouth 2 Times a Day.   Dose:  1000 Units                        vitamin e 400 UNIT Caps   Commonly known as:  VITAMIN E   Next Dose Due:  7/19/2017        Take 400 Units by mouth 2 times a day.   Dose:  400 Units                             Where to Get Your Medications      Information about where to get these medications is not yet available     ! Ask your nurse or doctor about these medications    - amiodarone 400 MG tablet  - aspirin 81 MG EC tablet  - digoxin 125 MCG Tabs  - rivaroxaban 15 MG Tabs tablet            Instructions           Diet / Nutrition:    Follow any diet instructions given to you by your doctor or the dietician,  including how much salt (sodium) you are allowed each day.    If you are overweight, talk to your doctor about a weight reduction plan.    Activity:    Remain physically active following your doctor's instructions about exercise and activity.    Rest often.     Any time you become even a little tired or short of breath, SIT DOWN and rest.    Worsening Symptoms:    Report any of the following signs and symptoms to the doctor's office immediately:    *Pain of jaw, arm, or neck  *Chest pain not relieved by medication                               *Dizziness or loss of consciousness  *Difficulty breathing even when at rest   *More tired than usual                                       *Bleeding drainage or swelling of surgical site  *Swelling of feet, ankles, legs or stomach                 *Fever (>100ºF)  *Pink or blood tinged sputum  *Weight gain (3lbs/day or 5lbs /week)           *Shock from internal defibrillator (if applicable)  *Palpitations or irregular heartbeats                *Cool and/or numb extremities    Stroke Awareness    Common Risk Factors for Stroke include:    Age  Atrial Fibrillation  Carotid Artery Stenosis  Diabetes Mellitus  Excessive alcohol consumption  High blood pressure  Overweight   Physical inactivity  Smoking    Warning signs and symptoms of a stroke include:    *Sudden numbness or weakness of the face, arm or leg (especially on one side of the body).  *Sudden confusion, trouble speaking or understanding.  *Sudden trouble seeing in one or both eyes.  *Sudden trouble walking, dizziness, loss of balance or coordination.Sudden severe headache with no known cause.    It is very important to get treatment quickly when a stroke occurs. If you experience any of the above warning signs, call 911 immediately.                   Disclaimer         Quit Smoking / Tobacco Use:    I understand the use of any tobacco products increases my chance of suffering from future heart disease or stroke and  could cause other illnesses which may shorten my life. Quitting the use of tobacco products is the single most important thing I can do to improve my health. For further information on smoking / tobacco cessation call a Toll Free Quit Line at 1-442.555.7513 (*National [employer] [place]) or 1-165.905.1498 (American Lung Association) or you can access the web based program at www.lungusa.org.    [place] Tobacco Users Help Line:  (787) 395-5148       Toll Free: 1-557.267.1011  Quit Tobacco Program [facility] ([phone]    Crisis Hotline:    [place] Crisis Hotline:  4-128-ULWBOXQ or 1-779.207.7036    [place] Crisis Hotline:    1-748.377.1474 or 523-107-4536    Discharge Survey:   Thank you for choosing [facility]. We hope we did everything we could to make your hospital stay a pleasant one. You may be receiving a phone survey and we would appreciate your time and participation in answering the questions. Your input is very valuable to us in our efforts to improve our service to our patients and their families.        My signature on this form indicates that:    1. I have reviewed and understand the above information.  2. My questions regarding this information have been answered to my satisfaction.  3. I have formulated a plan with my discharge nurse to obtain my prescribed medications for home.                  Disclaimer         __________________________________                     __________       ________                       Patient Signature                                                 Date                    Time

## 2017-07-11 NOTE — IP AVS SNAPSHOT
" <p align=\"LEFT\"><IMG SRC=\"//[email or web address]$/[email or web address]\" alt=\"Image\" WIDTH=\"50%\" HEIGHT=\"200\" BORDER=\"\"></p>                   Name:Humphrey Cano Jr.  Medical Record Number:[id number]  CSN: [id number]    YOB: 1940   Age: 76 y.o.  Sex: male  HT:1.829 m (6') WT: 71 kg (156 lb 8.4 oz)          Admit Date: 7/11/2017     Discharge Date:   Today's Date: 7/19/2017  Attending Doctor:  John H Ganser, M.D.                  Allergies:  Review of patient's allergies indicates no known allergies.          Your appointments     Jul 25, 2017  3:20 PM   HOSPITAL FOLLOW UP with Calvin Wheat MD,[facility] ([facility]    [address] [place] [address]  [address] [facility] [address]   [phone]                 Medication List      Take these Medications        Instructions    amiodarone 400 MG tablet   Commonly known as:  PACERONE    Take 1 Tab by mouth every day.   Dose:  400 mg       aspirin 81 MG EC tablet   Start taking on:  7/20/2017   What changed:    - medication strength  - how much to take    Take 1 Tab by mouth every day.   Dose:  81 mg       calcium carbonate 500 MG Tabs   Commonly known as:  OS-JACKIE 500    Take 500 mg by mouth 2 times a day, with meals.   Dose:  500 mg       CoQ-10 100 MG Caps    Take  by mouth every day.       digoxin 125 MCG Tabs   Commonly known as:  LANOXIN    Take 1 Tab by mouth every day at 6 PM.   Dose:  125 mcg       diltiazem  MG Cp24   Commonly known as:  CARDIZEM CD    Take 1 Cap by mouth every day.   Dose:  240 mg       lisinopril 5 MG Tabs   Commonly known as:  PRINIVIL    Take 5 mg by mouth every day.   Dose:  5 mg       Non Formulary Request    BLACK CUMIN OIL 1 TABLESPOON TWICE A DAY       rivaroxaban 15 MG Tabs tablet   Commonly known as:  XARELTO    Take 1 Tab by mouth with dinner.   Dose:  15 mg       simvastatin 10 MG Tabs   Commonly known as:  ZOCOR    Take 10 mg by mouth every evening.   Dose:  10 mg      " VITAMIN C PO    Take 2,000 mg by mouth every day.   Dose:  2000 mg       vitamin D 1000 UNIT Tabs   Commonly known as:  cholecalciferol    Take 1,000 Units by mouth 2 Times a Day.   Dose:  1000 Units       vitamin e 400 UNIT Caps   Commonly known as:  VITAMIN E    Take 400 Units by mouth 2 times a day.   Dose:  400 Units

## 2017-07-11 NOTE — IP AVS SNAPSHOT
Nourish Access Code: Activation code not generated  Current Nourish Status: Active    [employer]  A secure, online tool to manage your health information     [employer]s Nourish® is a secure, online tool that connects you to your personalized health information from the privacy of your home -- day or night - making it very easy for you to manage your healthcare. Once the activation process is completed, you can even access your medical information using the Nourish gavino, which is available for free in the Apple Gavino store or Google Play store.     Nourish provides the following levels of access (as shown below):   My Chart Features   [facility] Primary Care Doctor [facility]  Specialists [facility]  Urgent  Care Non-[facility]  Primary Care  Doctor   Email your healthcare team securely and privately 24/7 X X X X   Manage appointments: schedule your next appointment; view details of past/upcoming appointments X      Request prescription refills. X      View recent personal medical records, including lab and immunizations X X X X   View health record, including health history, allergies, medications X X X X   Read reports about your outpatient visits, procedures, consult and ER notes X X X X   See your discharge summary, which is a recap of your hospital and/or ER visit that includes your diagnosis, lab results, and care plan. X X       How to register for Nourish:  1. Go to  [email or web address].  2. Click on the Sign Up Now box, which takes you to the New Member Sign Up page. You will need to provide the following information:  a. Enter your Nourish Access Code exactly as it appears at the top of this page. (You will not need to use this code after you’ve completed the sign-up process. If you do not sign up before the expiration date, you must request a new code.)   b. Enter your date of birth.   c. Enter your home email address.   d. Click Submit, and follow the next screen’s instructions.  3. Create a Nourish ID. This will  be your [employer] login ID and cannot be changed, so think of one that is secure and easy to remember.  4. Create a [employer] password. You can change your password at any time.  5. Enter your Password Reset Question and Answer. This can be used at a later time if you forget your password.   6. Enter your e-mail address. This allows you to receive e-mail notifications when new information is available in [employer].  7. Click Sign Up. You can now view your health information.    For assistance activating your [employer] account, call [phone]

## 2017-07-12 ENCOUNTER — APPOINTMENT (OUTPATIENT)
Dept: RADIOLOGY | Facility: MEDICAL CENTER | Age: 77
DRG: 164 | End: 2017-07-12
Attending: PHYSICIAN ASSISTANT
Payer: MEDICARE

## 2017-07-12 LAB
ANION GAP SERPL CALC-SCNC: 6 MMOL/L (ref 0–11.9)
BUN SERPL-MCNC: 17 MG/DL (ref 8–22)
CALCIUM SERPL-MCNC: 8.7 MG/DL (ref 8.5–10.5)
CHLORIDE SERPL-SCNC: 102 MMOL/L (ref 96–112)
CO2 SERPL-SCNC: 29 MMOL/L (ref 20–33)
CREAT SERPL-MCNC: 0.82 MG/DL (ref 0.5–1.4)
ERYTHROCYTE [DISTWIDTH] IN BLOOD BY AUTOMATED COUNT: 52.3 FL (ref 35.9–50)
GFR SERPL CREATININE-BSD FRML MDRD: >60 ML/MIN/1.73 M 2
GLUCOSE SERPL-MCNC: 115 MG/DL (ref 65–99)
HCT VFR BLD AUTO: 42.4 % (ref 42–52)
HGB BLD-MCNC: 14 G/DL (ref 14–18)
MCH RBC QN AUTO: 30.6 PG (ref 27–33)
MCHC RBC AUTO-ENTMCNC: 33 G/DL (ref 33.7–35.3)
MCV RBC AUTO: 92.8 FL (ref 81.4–97.8)
PLATELET # BLD AUTO: 245 K/UL (ref 164–446)
PMV BLD AUTO: 9.6 FL (ref 9–12.9)
POTASSIUM SERPL-SCNC: 4.6 MMOL/L (ref 3.6–5.5)
RBC # BLD AUTO: 4.57 M/UL (ref 4.7–6.1)
SODIUM SERPL-SCNC: 137 MMOL/L (ref 135–145)
WBC # BLD AUTO: 18.4 K/UL (ref 4.8–10.8)

## 2017-07-12 PROCEDURE — 80048 BASIC METABOLIC PNL TOTAL CA: CPT

## 2017-07-12 PROCEDURE — 770006 HCHG ROOM/CARE - MED/SURG/GYN SEMI*

## 2017-07-12 PROCEDURE — 85027 COMPLETE CBC AUTOMATED: CPT

## 2017-07-12 PROCEDURE — 700102 HCHG RX REV CODE 250 W/ 637 OVERRIDE(OP): Performed by: PHYSICIAN ASSISTANT

## 2017-07-12 PROCEDURE — A9270 NON-COVERED ITEM OR SERVICE: HCPCS | Performed by: PHYSICIAN ASSISTANT

## 2017-07-12 PROCEDURE — 71010 DX-CHEST-PORTABLE (1 VIEW): CPT

## 2017-07-12 PROCEDURE — 36415 COLL VENOUS BLD VENIPUNCTURE: CPT

## 2017-07-12 PROCEDURE — 700105 HCHG RX REV CODE 258: Performed by: PHYSICIAN ASSISTANT

## 2017-07-12 PROCEDURE — 700105 HCHG RX REV CODE 258: Performed by: SURGERY

## 2017-07-12 PROCEDURE — 700111 HCHG RX REV CODE 636 W/ 250 OVERRIDE (IP): Performed by: PHYSICIAN ASSISTANT

## 2017-07-12 RX ADMIN — DILTIAZEM HYDROCHLORIDE 240 MG: 240 CAPSULE, COATED, EXTENDED RELEASE ORAL at 10:15

## 2017-07-12 RX ADMIN — HYDROCODONE BITARTRATE AND ACETAMINOPHEN 2 TABLET: 5; 325 TABLET ORAL at 12:58

## 2017-07-12 RX ADMIN — HYDROCODONE BITARTRATE AND ACETAMINOPHEN 2 TABLET: 5; 325 TABLET ORAL at 17:00

## 2017-07-12 RX ADMIN — ENOXAPARIN SODIUM 40 MG: 100 INJECTION SUBCUTANEOUS at 10:16

## 2017-07-12 RX ADMIN — MORPHINE SULFATE 4 MG: 4 INJECTION INTRAVENOUS at 10:15

## 2017-07-12 RX ADMIN — FAMOTIDINE 20 MG: 20 TABLET, FILM COATED ORAL at 22:07

## 2017-07-12 RX ADMIN — SIMVASTATIN 10 MG: 20 TABLET, FILM COATED ORAL at 22:07

## 2017-07-12 RX ADMIN — LISINOPRIL 5 MG: 5 TABLET ORAL at 10:16

## 2017-07-12 RX ADMIN — FAMOTIDINE 20 MG: 20 TABLET, FILM COATED ORAL at 10:16

## 2017-07-12 RX ADMIN — HYDROCODONE BITARTRATE AND ACETAMINOPHEN 2 TABLET: 5; 325 TABLET ORAL at 22:07

## 2017-07-12 RX ADMIN — SODIUM CHLORIDE, POTASSIUM CHLORIDE, SODIUM LACTATE AND CALCIUM CHLORIDE: 600; 310; 30; 20 INJECTION, SOLUTION INTRAVENOUS at 00:03

## 2017-07-12 ASSESSMENT — ENCOUNTER SYMPTOMS
NAUSEA: 0
FEVER: 0
COUGH: 1
CHILLS: 0
VOMITING: 0

## 2017-07-12 ASSESSMENT — LIFESTYLE VARIABLES
AVERAGE NUMBER OF DAYS PER WEEK YOU HAVE A DRINK CONTAINING ALCOHOL: 1
HAVE YOU EVER FELT YOU SHOULD CUT DOWN ON YOUR DRINKING: NO
TOTAL SCORE: 0
HOW MANY TIMES IN THE PAST YEAR HAVE YOU HAD 5 OR MORE DRINKS IN A DAY: 0
EVER HAD A DRINK FIRST THING IN THE MORNING TO STEADY YOUR NERVES TO GET RID OF A HANGOVER: NO
EVER FELT BAD OR GUILTY ABOUT YOUR DRINKING: NO
EVER_SMOKED: YES
ALCOHOL_USE: YES
TOTAL SCORE: 0
CONSUMPTION TOTAL: NEGATIVE
EVER_SMOKED: YES
PACK_YEARS: 65
TOTAL SCORE: 0
HAVE PEOPLE ANNOYED YOU BY CRITICIZING YOUR DRINKING: NO
ON A TYPICAL DAY WHEN YOU DRINK ALCOHOL HOW MANY DRINKS DO YOU HAVE: 1

## 2017-07-12 ASSESSMENT — COPD QUESTIONNAIRES
COPD SCREENING SCORE: 6
HAVE YOU SMOKED AT LEAST 100 CIGARETTES IN YOUR ENTIRE LIFE: YES
DURING THE PAST 4 WEEKS HOW MUCH DID YOU FEEL SHORT OF BREATH: SOME OF THE TIME
DO YOU EVER COUGH UP ANY MUCUS OR PHLEGM?: YES, A FEW DAYS A WEEK OR MONTH

## 2017-07-12 ASSESSMENT — PAIN SCALES - GENERAL
PAINLEVEL_OUTOF10: 8
PAINLEVEL_OUTOF10: 3
PAINLEVEL_OUTOF10: 5
PAINLEVEL_OUTOF10: 5

## 2017-07-12 NOTE — PROGRESS NOTES
Assumed care at 0700. Received report from night shift RN. Bedside report completed. AOx4.    C/o pain-medicated.  Denies nausea.  Tolerating diet.   Left Chest Tube to 20cm wall suction, small air leak present, dressing CDI.  93% O2 Saturation on 3L O2  +voiding. -BM since PTA.  Ambulating with 1 assist.   Pt call light and belongings within reach, fall precautions and hourly rounding in place. Family at bedside.

## 2017-07-12 NOTE — PROGRESS NOTES
Surgical Progress Note    Author: Fransico Steven Date & Time created: 2017   2:30 PM     Interval Events:  S/p  Bronchoscopy,  Thoracoscopic left upper lobectomy, Thoracic lymphadenectomy -POD#1, doing well; pain controlled; using IS  Review of Systems   Constitutional: Negative for fever and chills.   Respiratory: Positive for cough.    Gastrointestinal: Negative for nausea and vomiting.   Skin: Negative for itching and rash.     Hemodynamics:  Temp (24hrs), Av.7 °C (98 °F), Min:36.1 °C (97 °F), Max:37.4 °C (99.3 °F)  Temperature: 36.8 °C (98.3 °F)  Pulse  Av  Min: 62  Max: 88Heart Rate (Monitored): 64  Blood Pressure : 138/49 mmHg, Arterial BP: 113/55 mmHg, NIBP: 122/50 mmHg     Respiratory:    Respiration: 18, Pulse Oximetry: 95 %, O2 Daily Delivery Respiratory : Silicone Nasal Cannula  Chest Tube Group 1 (A) Left ROUND CASIMIRO DRAIN 19-Device: Suction 20 cm Water  Work Of Breathing / Effort: Mild  RUL Breath Sounds: Clear, RML Breath Sounds: Diminished, RLL Breath Sounds: Diminished, JENNIFER Breath Sounds: Diminished, LLL Breath Sounds: Diminished  Neuro:  GCS       Fluids:    Intake/Output Summary (Last 24 hours) at 17 1430  Last data filed at 17 1200   Gross per 24 hour   Intake   1470 ml   Output   1891 ml   Net   -421 ml        Current Diet Order   Procedures   • DIET ORDER     Physical Exam   Constitutional: He is oriented to person, place, and time. No distress.   Cardiovascular: Regular rhythm.    Pulmonary/Chest: Effort normal. No respiratory distress.   Chest tube in place, ~ 110ml serosang output/24 hrs  Small expiratory airleak seen  Wounds c/d/i   Abdominal: Soft.   Neurological: He is alert and oriented to person, place, and time.   Skin: Skin is warm and dry.   Psychiatric: He has a normal mood and affect.   Nursing note and vitals reviewed.    Labs:  Recent Results (from the past 24 hour(s))   BASIC METABOLIC PANEL    Collection Time: 17  3:36 AM   Result Value Ref Range     Sodium 137 135 - 145 mmol/L    Potassium 4.6 3.6 - 5.5 mmol/L    Chloride 102 96 - 112 mmol/L    Co2 29 20 - 33 mmol/L    Glucose 115 (H) 65 - 99 mg/dL    Bun 17 8 - 22 mg/dL    Creatinine 0.82 0.50 - 1.40 mg/dL    Calcium 8.7 8.5 - 10.5 mg/dL    Anion Gap 6.0 0.0 - 11.9   CBC WITHOUT DIFFERENTIAL    Collection Time: 07/12/17  3:36 AM   Result Value Ref Range    WBC 18.4 (H) 4.8 - 10.8 K/uL    RBC 4.57 (L) 4.70 - 6.10 M/uL    Hemoglobin 14.0 14.0 - 18.0 g/dL    Hematocrit 42.4 42.0 - 52.0 %    MCV 92.8 81.4 - 97.8 fL    MCH 30.6 27.0 - 33.0 pg    MCHC 33.0 (L) 33.7 - 35.3 g/dL    RDW 52.3 (H) 35.9 - 50.0 fL    Platelet Count 245 164 - 446 K/uL    MPV 9.6 9.0 - 12.9 fL   ESTIMATED GFR    Collection Time: 07/12/17  3:36 AM   Result Value Ref Range    GFR If African American >60 >60 mL/min/1.73 m 2    GFR If Non African American >60 >60 mL/min/1.73 m 2     Medical Decision Making, by Problem:  Active Hospital Problems    Diagnosis   • Neoplasm of uncertain behavior of trachea, bronchus, 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  Frequent I.S.  Mobilize  D/c IVF, KVO      Quality Measures:  Labs reviewed, Medications reviewed and Radiology images reviewed  Alvarado catheter: No Alvarado      DVT Prophylaxis: Enoxaparin (Lovenox)  DVT prophylaxis - mechanical: SCDs            Discussed patient condition with Family, RN, Patient and Dr. Ganser

## 2017-07-12 NOTE — RESPIRATORY CARE
COPD EDUCATION by COPD CLINICAL EDUCATOR  7/12/2017 at 4:36 PM by Myrtle Paige     Patient reviewed by COPD education team. Patient does not qualify for COPD program.

## 2017-07-12 NOTE — PROGRESS NOTES
"Pt A&O X 4, with prompting. Pt feels a \"little foggy in the head\" since surgery.   Assessment complete.   Pt has L CT to 20cm wall suction, no air leak present, dressing CDI.  Call light within reach, personal belonging available, bed in low position.   Pt reports of 0/10 pain, communication board updated, POC discussed.   No additional needs at this time.   Hourly rounding in place.    "

## 2017-07-12 NOTE — PROGRESS NOTES
Pt admitted to room T 434-2 via transport in hospital bed from PACU at 2000. Current VSS, and is reporting of 0/10 pain on a scale of 1-10. Pt and wife orientated to room call light and skyline. Reviewed plan of care (equipment, incentive spirometer, sequential compression devices, medications, activity, diet, fall precautions, skin care, and pain) with patient and family.

## 2017-07-13 ENCOUNTER — APPOINTMENT (OUTPATIENT)
Dept: RADIOLOGY | Facility: MEDICAL CENTER | Age: 77
DRG: 164 | End: 2017-07-13
Attending: PHYSICIAN ASSISTANT
Payer: MEDICARE

## 2017-07-13 PROCEDURE — 700111 HCHG RX REV CODE 636 W/ 250 OVERRIDE (IP): Performed by: PHYSICIAN ASSISTANT

## 2017-07-13 PROCEDURE — 302043 TAPE, HYPAFIX: Performed by: SURGERY

## 2017-07-13 PROCEDURE — 700102 HCHG RX REV CODE 250 W/ 637 OVERRIDE(OP): Performed by: PHYSICIAN ASSISTANT

## 2017-07-13 PROCEDURE — 71010 DX-CHEST-PORTABLE (1 VIEW): CPT

## 2017-07-13 PROCEDURE — A9270 NON-COVERED ITEM OR SERVICE: HCPCS | Performed by: PHYSICIAN ASSISTANT

## 2017-07-13 PROCEDURE — 770006 HCHG ROOM/CARE - MED/SURG/GYN SEMI*

## 2017-07-13 RX ADMIN — FAMOTIDINE 20 MG: 20 TABLET, FILM COATED ORAL at 22:30

## 2017-07-13 RX ADMIN — HYDROCODONE BITARTRATE AND ACETAMINOPHEN 2 TABLET: 5; 325 TABLET ORAL at 06:17

## 2017-07-13 RX ADMIN — LISINOPRIL 5 MG: 5 TABLET ORAL at 10:28

## 2017-07-13 RX ADMIN — ENOXAPARIN SODIUM 40 MG: 100 INJECTION SUBCUTANEOUS at 10:28

## 2017-07-13 RX ADMIN — HYDROCODONE BITARTRATE AND ACETAMINOPHEN 2 TABLET: 5; 325 TABLET ORAL at 18:56

## 2017-07-13 RX ADMIN — DILTIAZEM HYDROCHLORIDE 240 MG: 240 CAPSULE, COATED, EXTENDED RELEASE ORAL at 10:28

## 2017-07-13 RX ADMIN — DIPHENHYDRAMINE HYDROCHLORIDE 25 MG: 50 INJECTION, SOLUTION INTRAMUSCULAR; INTRAVENOUS at 09:41

## 2017-07-13 RX ADMIN — SIMVASTATIN 10 MG: 20 TABLET, FILM COATED ORAL at 22:30

## 2017-07-13 RX ADMIN — FAMOTIDINE 20 MG: 20 TABLET, FILM COATED ORAL at 10:28

## 2017-07-13 RX ADMIN — HYDROCODONE BITARTRATE AND ACETAMINOPHEN 2 TABLET: 5; 325 TABLET ORAL at 02:08

## 2017-07-13 RX ADMIN — MORPHINE SULFATE 2 MG: 4 INJECTION INTRAVENOUS at 19:24

## 2017-07-13 RX ADMIN — HYDROCODONE BITARTRATE AND ACETAMINOPHEN 2 TABLET: 5; 325 TABLET ORAL at 15:01

## 2017-07-13 ASSESSMENT — PAIN SCALES - GENERAL
PAINLEVEL_OUTOF10: 3
PAINLEVEL_OUTOF10: 4
PAINLEVEL_OUTOF10: 4
PAINLEVEL_OUTOF10: 6
PAINLEVEL_OUTOF10: 10

## 2017-07-13 NOTE — PROGRESS NOTES
Assumed care of pt. Pt is A&O x4 and assessment was completed. Pt had a new pulse ox placed, and has call light within reach, family present at the time. Bed rails are up, and patient was educated on using call light for any questions or needs. At the time pt reported no pain. Pt also was able to ambulate with assistance of CNA. Was fatigued afterwards and rested on chair in room. Pt was then assisted to his bed, with family present, and call light within reach. Hourly rounds are being placed.

## 2017-07-13 NOTE — PROGRESS NOTES
Assumed care at 0700. Received report from night shift RN. Bedside report completed. AOx4.     Denies pain.  Denies nausea.  Tolerating diet.  Left Chest Tube to 20cm wall suction, small air leak present, dressing CDI.  96% O2 Saturation on 2L O2  +voiding. -BM since PTA.  Ambulating with 1 assist and FWW.    Pt call light and belongings within reach, fall precautions and hourly rounding in place. Family at bedside.

## 2017-07-13 NOTE — PROGRESS NOTES
Bedside report received from day RN, Bruna, assumed care of pt at 2000.   Pt A&O X 4. Assessment complete.   Pt has CT to L side at 20cm wall suction, air leak present, MD aware. SP02 98% on L NC.   Call light within reach, personal belonging available, bed in low position.   Pt reports of 2/10 pain,medicated per MAR, communication board updated, POC discussed.   No additional needs at this time.   Hourly rounding in place.

## 2017-07-14 ENCOUNTER — APPOINTMENT (OUTPATIENT)
Dept: RADIOLOGY | Facility: MEDICAL CENTER | Age: 77
DRG: 164 | End: 2017-07-14
Attending: SURGERY
Payer: MEDICARE

## 2017-07-14 ENCOUNTER — APPOINTMENT (OUTPATIENT)
Dept: RADIOLOGY | Facility: MEDICAL CENTER | Age: 77
DRG: 164 | End: 2017-07-14
Attending: PHYSICIAN ASSISTANT
Payer: MEDICARE

## 2017-07-14 ENCOUNTER — HOSPITAL ENCOUNTER (OUTPATIENT)
Dept: RADIOLOGY | Facility: MEDICAL CENTER | Age: 77
End: 2017-07-14

## 2017-07-14 PROCEDURE — 700111 HCHG RX REV CODE 636 W/ 250 OVERRIDE (IP): Performed by: PHYSICIAN ASSISTANT

## 2017-07-14 PROCEDURE — 71010 DX-CHEST-PORTABLE (1 VIEW): CPT

## 2017-07-14 PROCEDURE — 770006 HCHG ROOM/CARE - MED/SURG/GYN SEMI*

## 2017-07-14 PROCEDURE — 700105 HCHG RX REV CODE 258: Performed by: SURGERY

## 2017-07-14 PROCEDURE — 700102 HCHG RX REV CODE 250 W/ 637 OVERRIDE(OP): Performed by: PHYSICIAN ASSISTANT

## 2017-07-14 PROCEDURE — A9270 NON-COVERED ITEM OR SERVICE: HCPCS | Performed by: PHYSICIAN ASSISTANT

## 2017-07-14 RX ADMIN — ENOXAPARIN SODIUM 40 MG: 100 INJECTION SUBCUTANEOUS at 10:36

## 2017-07-14 RX ADMIN — FAMOTIDINE 20 MG: 20 TABLET, FILM COATED ORAL at 21:12

## 2017-07-14 RX ADMIN — SODIUM CHLORIDE, POTASSIUM CHLORIDE, SODIUM LACTATE AND CALCIUM CHLORIDE: 600; 310; 30; 20 INJECTION, SOLUTION INTRAVENOUS at 18:45

## 2017-07-14 RX ADMIN — SIMVASTATIN 10 MG: 20 TABLET, FILM COATED ORAL at 21:12

## 2017-07-14 RX ADMIN — FAMOTIDINE 20 MG: 20 TABLET, FILM COATED ORAL at 10:36

## 2017-07-14 RX ADMIN — DIPHENHYDRAMINE HCL 25 MG: 25 TABLET ORAL at 19:08

## 2017-07-14 RX ADMIN — LISINOPRIL 5 MG: 5 TABLET ORAL at 10:36

## 2017-07-14 RX ADMIN — HYDROCODONE BITARTRATE AND ACETAMINOPHEN 2 TABLET: 5; 325 TABLET ORAL at 18:38

## 2017-07-14 RX ADMIN — DILTIAZEM HYDROCHLORIDE 240 MG: 240 CAPSULE, COATED, EXTENDED RELEASE ORAL at 10:35

## 2017-07-14 RX ADMIN — HYDROCODONE BITARTRATE AND ACETAMINOPHEN 2 TABLET: 5; 325 TABLET ORAL at 10:36

## 2017-07-14 RX ADMIN — HYDROCODONE BITARTRATE AND ACETAMINOPHEN 2 TABLET: 5; 325 TABLET ORAL at 23:58

## 2017-07-14 RX ADMIN — HYDROCODONE BITARTRATE AND ACETAMINOPHEN 2 TABLET: 5; 325 TABLET ORAL at 01:46

## 2017-07-14 ASSESSMENT — ENCOUNTER SYMPTOMS
FEVER: 0
COUGH: 1
CHILLS: 0
NAUSEA: 0
VOMITING: 0

## 2017-07-14 ASSESSMENT — PAIN SCALES - GENERAL
PAINLEVEL_OUTOF10: 4
PAINLEVEL_OUTOF10: 5
PAINLEVEL_OUTOF10: 5
PAINLEVEL_OUTOF10: 2

## 2017-07-14 NOTE — PROGRESS NOTES
Surgical Progress Note    Author: Fransico Steven Date & Time created: 2017   12:09 PM     Interval Events:  S/p  Bronchoscopy,  Thoracoscopic left upper lobectomy, Thoracic lymphadenectomy -POD#3, doing well; pain controlled; using IS; ambulatory; aurelio po; passing flatus  Review of Systems   Constitutional: Negative for fever and chills.   Respiratory: Positive for cough.    Cardiovascular: Negative for chest pain and leg swelling.   Gastrointestinal: Negative for nausea and vomiting.   Skin: Negative for itching and rash.     Hemodynamics:  Temp (24hrs), Av.3 °C (97.3 °F), Min:36.1 °C (97 °F), Max:36.6 °C (97.8 °F)  Temperature: 36.3 °C (97.4 °F)  Pulse  Av.4  Min: 56  Max: 95   Blood Pressure : 155/63 mmHg     Respiratory:    Respiration: 18, Pulse Oximetry: 92 %  Chest Tube Group 1 (A) Left ROUND CASIMIRO DRAIN 19-Device: Suction 20 cm Water;Air Leak Present  Work Of Breathing / Effort: Mild  RUL Breath Sounds: Diminished, RML Breath Sounds: Diminished, RLL Breath Sounds: Diminished, JENNIFER Breath Sounds: Absent, LLL Breath Sounds: Diminished  Neuro:  GCS       Fluids:    Intake/Output Summary (Last 24 hours) at 17 1209  Last data filed at 17 0745   Gross per 24 hour   Intake    280 ml   Output    995 ml   Net   -715 ml        Current Diet Order   Procedures   • DIET ORDER     Physical Exam   Constitutional: He is oriented to person, place, and time. No distress.   Cardiovascular: Regular rhythm.    Pulmonary/Chest: Effort normal.   Chest tube in place, ~ 50ml serosang output/12 hrs  Small airleak seen  Wounds c/d/i  No ptx on CXR   Abdominal: Soft.   Neurological: He is alert and oriented to person, place, and time.   Skin: Skin is warm and dry.   Psychiatric: He has a normal mood and affect.   Nursing note and vitals reviewed.    Labs:  No results found for this or any previous visit (from the past 24 hour(s)).  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 Ganser  IS  Up ad dayana  Hep lock IV    Quality Measures:  Core Measures    Discussed patient condition with Family, Patient and Dr. Ganser

## 2017-07-14 NOTE — PROGRESS NOTES
"Met with patient and wife.  Introduced self and explained role of navigator.  They reported that patient was \"cancer free\".  Patient and wife able to provide additional information that physician had reviewed with them.  Provided education and reinforced use of oxygen if needed, and incentive spirometer.  Discussed Dr Ganser will follow up from surgery and then discuss continued follow up as well.  Gave business card for them to call if questions or concerns arise at this time.  No barriers to care identified and no additional questions at this time.  "

## 2017-07-14 NOTE — PROGRESS NOTES
Pt received on his room AxOx4 with PIV on his right FA and left wrist patent SL. Left chest tube in place with drsg CDI, small leak noted, per report MD is aware. Saturating 95-96% on 2L/NC. Pain well controlled on Norco 1-2 tabs and Morphine IV given alternatively. Up to the BR with 1 assist and he tolerated well. Voiding without difficulty. Needs attended. Call light within reach. Hourly rounding practiced.

## 2017-07-14 NOTE — PROGRESS NOTES
Progress Note:    S: Doing well  Walking in halls    O:  Recent Labs      07/12/17   0336   WBC  18.4*   RBC  4.57*   HEMOGLOBIN  14.0   HEMATOCRIT  42.4   MCV  92.8   MCH  30.6   MCHC  33.0*   RDW  52.3*   PLATELETCT  245   MPV  9.6     Recent Labs      07/12/17   0336   SODIUM  137   POTASSIUM  4.6   CHLORIDE  102   CO2  29   GLUCOSE  115*   BUN  17   CREATININE  0.82   CALCIUM  8.7         Current Facility-Administered Medications   Medication Dose   • lidocaine-prilocaine (EMLA) 2.5-2.5 % cream 1 Application  1 Application    Or   • lidocaine (XYLOCAINE) 1%  injection  0.5 mL   • lactated ringers infusion     • diltiazem CD (CARDIZEM CD) capsule 240 mg  240 mg   • lisinopril (PRINIVIL) tablet 5 mg  5 mg   • simvastatin (ZOCOR) tablet 10 mg  10 mg   • Respiratory Care per Protocol     • enalaprilat (VASOTEC) injection 2.5 mg  2.5 mg   • enoxaparin (LOVENOX) inj 40 mg  40 mg   • famotidine (PEPCID) tablet 20 mg  20 mg   • hydrALAZINE (APRESOLINE) injection 10 mg  10 mg   • hydrocodone-acetaminophen (NORCO) 5-325 MG per tablet 1-2 Tab  1-2 Tab   • lactated ringers infusion     • lorazepam (ATIVAN) injection 0.5-1 mg  0.5-1 mg   • promethazine (PHENERGAN) suppository 25 mg  25 mg   • diphenhydrAMINE (BENADRYL) tablet/capsule 25 mg  25 mg    Or   • diphenhydrAMINE (BENADRYL) injection 25 mg  25 mg   • morphine (pf) 4 mg/ml injection 1-4 mg  1-4 mg    Or   • morphine (pf) 10 mg/ml injection 5 mg  5 mg       PE:  Blood pressure 114/68, pulse 95, temperature 36.1 °C (97 °F), resp. rate 20, height 1.829 m (6'), weight 70.9 kg (156 lb 4.9 oz), SpO2 96 %.    Intake/Output Summary (Last 24 hours) at 07/13/17 1718  Last data filed at 07/13/17 1230   Gross per 24 hour   Intake    440 ml   Output    634 ml   Net   -194 ml       Chest tube: Small air leak      Rads:  DX-CHEST-PORTABLE (1 VIEW)   Final Result         1.  Left pulmonary infiltrates or contusion, similar to prior.   2.  Tiny residual left pneumothorax, decreased  in size since prior.   3.  Extensive soft tissue gas in the left chest wall, appears somewhat increased since prior.      DX-CHEST-PORTABLE (1 VIEW)   Final Result         1.  Left pulmonary infiltrates or contusion, similar to prior.   2.  Left apical pneumothorax, decreased in size since prior study, thoracostomy tube remains in place.      DX-CHEST-PORTABLE (1 VIEW)   Final Result      Postsurgical change in the left lung with chest tube in place with apparent small left apical pneumothorax.          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  [facility]

## 2017-07-14 NOTE — PROGRESS NOTES
Assumed care of patient at 0700. Patient AOx4. Resting cofortably in bed, chest tube in place to suction, no air leak noted. Denies any pain or nausea. Family is at bedside, no further needs at this time. The call light is in reach, hourly rounding is in place, and floor clear of obstacles. LBM 7/11. Voiding small amounts. Ambulation attempted at 0810 but patient did not feel comfortable walking, so he moved from bed to chair. 1100 ambulated with 1+ assist and walker.

## 2017-07-14 NOTE — PROGRESS NOTES
Assumed care at 0700. Received report from night shift RN. Bedside report completed. AOx4.     C/O pain-medicated.  Denies nausea.  Tolerating diet.  Left Chest Tube to 20cm wall suction, dressing CDI.  95% O2 Saturation on 2L O2  +voiding. -BM since PTA.  Ambulating with 1 assist and FWW.    Pt call light and belongings within reach, fall precautions and hourly rounding in place. Family at bedside

## 2017-07-14 NOTE — CARE PLAN
Problem: Safety  Goal: Free from accidental injury  Calls appropriately. Call light within reach. Hourly rounding practiced.     Problem: Skin Integrity  Goal: Skin integrity is maintained or improved  No skin breakdown noted. Can turned and repositioned himself in bed.      Problem: Pain  Goal: Alleviation of Pain or a reduction in pain to the patient’s comfort goal  Pain well controlled with Norco tab and Morphine IV given alternatively.

## 2017-07-15 LAB
ANION GAP SERPL CALC-SCNC: 7 MMOL/L (ref 0–11.9)
BUN SERPL-MCNC: 8 MG/DL (ref 8–22)
CALCIUM SERPL-MCNC: 9.4 MG/DL (ref 8.5–10.5)
CHLORIDE SERPL-SCNC: 97 MMOL/L (ref 96–112)
CO2 SERPL-SCNC: 33 MMOL/L (ref 20–33)
CREAT SERPL-MCNC: 0.64 MG/DL (ref 0.5–1.4)
EKG IMPRESSION: NORMAL
ERYTHROCYTE [DISTWIDTH] IN BLOOD BY AUTOMATED COUNT: 47.3 FL (ref 35.9–50)
GFR SERPL CREATININE-BSD FRML MDRD: >60 ML/MIN/1.73 M 2
GLUCOSE SERPL-MCNC: 116 MG/DL (ref 65–99)
HCT VFR BLD AUTO: 44.7 % (ref 42–52)
HGB BLD-MCNC: 15.1 G/DL (ref 14–18)
MCH RBC QN AUTO: 30.7 PG (ref 27–33)
MCHC RBC AUTO-ENTMCNC: 33.8 G/DL (ref 33.7–35.3)
MCV RBC AUTO: 90.9 FL (ref 81.4–97.8)
MORPHOLOGY BLD-IMP: NORMAL
PLATELET # BLD AUTO: 261 K/UL (ref 164–446)
PMV BLD AUTO: 9.6 FL (ref 9–12.9)
POTASSIUM SERPL-SCNC: 3.7 MMOL/L (ref 3.6–5.5)
RBC # BLD AUTO: 4.92 M/UL (ref 4.7–6.1)
SODIUM SERPL-SCNC: 137 MMOL/L (ref 135–145)
WBC # BLD AUTO: 13 K/UL (ref 4.8–10.8)

## 2017-07-15 PROCEDURE — 700111 HCHG RX REV CODE 636 W/ 250 OVERRIDE (IP): Performed by: SURGERY

## 2017-07-15 PROCEDURE — 80048 BASIC METABOLIC PNL TOTAL CA: CPT

## 2017-07-15 PROCEDURE — 700105 HCHG RX REV CODE 258

## 2017-07-15 PROCEDURE — 700111 HCHG RX REV CODE 636 W/ 250 OVERRIDE (IP): Performed by: PHYSICIAN ASSISTANT

## 2017-07-15 PROCEDURE — 93005 ELECTROCARDIOGRAM TRACING: CPT | Performed by: SURGERY

## 2017-07-15 PROCEDURE — 700111 HCHG RX REV CODE 636 W/ 250 OVERRIDE (IP)

## 2017-07-15 PROCEDURE — 85027 COMPLETE CBC AUTOMATED: CPT

## 2017-07-15 PROCEDURE — A9270 NON-COVERED ITEM OR SERVICE: HCPCS | Performed by: PHYSICIAN ASSISTANT

## 2017-07-15 PROCEDURE — 36415 COLL VENOUS BLD VENIPUNCTURE: CPT

## 2017-07-15 PROCEDURE — 770020 HCHG ROOM/CARE - TELE (206)

## 2017-07-15 PROCEDURE — 93010 ELECTROCARDIOGRAM REPORT: CPT | Performed by: INTERNAL MEDICINE

## 2017-07-15 PROCEDURE — 700102 HCHG RX REV CODE 250 W/ 637 OVERRIDE(OP): Performed by: PHYSICIAN ASSISTANT

## 2017-07-15 RX ORDER — DEXTROSE MONOHYDRATE 50 MG/ML
500 INJECTION, SOLUTION INTRAVENOUS CONTINUOUS
Status: DISCONTINUED | OUTPATIENT
Start: 2017-07-15 | End: 2017-07-18

## 2017-07-15 RX ADMIN — DEXTROSE MONOHYDRATE 500 ML: 50 INJECTION, SOLUTION INTRAVENOUS at 01:47

## 2017-07-15 RX ADMIN — AMIODARONE HYDROCHLORIDE 150 MG: 50 INJECTION, SOLUTION INTRAVENOUS at 01:48

## 2017-07-15 RX ADMIN — LISINOPRIL 5 MG: 5 TABLET ORAL at 08:57

## 2017-07-15 RX ADMIN — ENOXAPARIN SODIUM 40 MG: 100 INJECTION SUBCUTANEOUS at 08:48

## 2017-07-15 RX ADMIN — SIMVASTATIN 10 MG: 20 TABLET, FILM COATED ORAL at 21:20

## 2017-07-15 RX ADMIN — HYDROCODONE BITARTRATE AND ACETAMINOPHEN 2 TABLET: 5; 325 TABLET ORAL at 09:24

## 2017-07-15 RX ADMIN — FAMOTIDINE 20 MG: 20 TABLET, FILM COATED ORAL at 21:20

## 2017-07-15 RX ADMIN — AMIODARONE HYDROCHLORIDE 0.5 MG/MIN: 50 INJECTION, SOLUTION INTRAVENOUS at 10:38

## 2017-07-15 RX ADMIN — HYDRALAZINE HYDROCHLORIDE 10 MG: 20 INJECTION INTRAMUSCULAR; INTRAVENOUS at 23:46

## 2017-07-15 RX ADMIN — ENOXAPARIN SODIUM 80 MG: 100 INJECTION SUBCUTANEOUS at 21:20

## 2017-07-15 RX ADMIN — DILTIAZEM HYDROCHLORIDE 240 MG: 240 CAPSULE, COATED, EXTENDED RELEASE ORAL at 08:57

## 2017-07-15 RX ADMIN — AMIODARONE HYDROCHLORIDE 1 MG/MIN: 50 INJECTION, SOLUTION INTRAVENOUS at 02:11

## 2017-07-15 RX ADMIN — ENOXAPARIN SODIUM 40 MG: 100 INJECTION SUBCUTANEOUS at 10:40

## 2017-07-15 RX ADMIN — HYDROCODONE BITARTRATE AND ACETAMINOPHEN 1 TABLET: 5; 325 TABLET ORAL at 21:25

## 2017-07-15 RX ADMIN — FAMOTIDINE 20 MG: 20 TABLET, FILM COATED ORAL at 08:57

## 2017-07-15 RX ADMIN — DIPHENHYDRAMINE HYDROCHLORIDE 25 MG: 50 INJECTION, SOLUTION INTRAMUSCULAR; INTRAVENOUS at 08:48

## 2017-07-15 ASSESSMENT — PAIN SCALES - GENERAL
PAINLEVEL_OUTOF10: 2
PAINLEVEL_OUTOF10: 0
PAINLEVEL_OUTOF10: 9
PAINLEVEL_OUTOF10: 1
PAINLEVEL_OUTOF10: 0

## 2017-07-15 NOTE — PROGRESS NOTES
Spoke to Dr. Ganser about pt'd HR that staying between 140s-150s with HTN at 183/96. Reported EKG result of Atrial  Fib with multiple PVCs. Pt denies any chest pain or any form of pain. Dr. Ganser ordered to transfer pt to tele floor, administer amiodarone bolus and start pt on amiodarone drip per pharmacy dose. RBAV.

## 2017-07-15 NOTE — PROGRESS NOTES
Spoke to daughter Anne Javier to inform about recent events and pt's transfer to room 724-2. All questions answered. Daughter stated they will visit in the morning.

## 2017-07-15 NOTE — CODE DOCUMENTATION
Pt has increased HR from 60's up to mid 130's.  No complaints of chest pain.  Pt is clear to diminished.  EKG at bedside. MD paged.

## 2017-07-15 NOTE — PROGRESS NOTES
Pt arrived to floor. Assessment conducted. Amiodarone bolus given and drip initiated. Pt tolerated well. Pt is anxious, not reporting any pain. HR and BP decreased after bolus and pt is becoming more calm.  Will continue to monitor closely. Call light is within reach, safety precautions are in place and pt educated to call for assistance.

## 2017-07-15 NOTE — CONSULTS
"Cardiology Consult Note:    Linda Duke  Date & Time note created:    7/15/2017   10:19 AM     Referring MD:  Dr. Ganser    Patient ID:   Name:             Humphrey Cano   YOB: 1940  Age:                 76 y.o.  male   MRN:               [id number]                                                             Reason for Consult:      A. fib with RVR post lobectomy    History of Present Illness:    Patient is a pleasant 76-year-old male with known history of emphysema, prior history of CAD angiogram from 2005 showed patent stent in mid LAD mid RCA 40-50% stenosis septal  severe ostial stenosis which is at distal end of the stent and severe ostial stenosis of small diagonal branch, ostial LCx 20% stenosis, lung cancer without any metastasis, emphysema, hypertension, dyslipidemia, admitted for thoracoscopic left upper lobectomy which was performed on 7/11/17 overnight went into A. fib with RVR because of which cardiology was consulted.     Patient is currently alert oriented ×1 denied any complaints of chest pain. Patient was asymptomatic even when he was in A. fib with RVR. Most of the information obtained from reviewing the chart talking to family members.    Review of Systems:      Unobtainable due to confusion.  Denied any complaints of pain.      Past Medical History:   Past Medical History   Diagnosis Date   • Hyperlipidemia    • Hypertension    • Pneumonia    • Indigestion    • Backpain occasional   • COPD    • High cholesterol    • Myocardial infarct (CMS-HCC) with stent     1/23/2005   • Cancer (CMS-HCC) 6/2017     Lung   • CATARACT      Bilateral IOL's   • Coughing blood 7/5/17     Blood and productive in May 2017. 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   • Thoracoscopy Left 7/11/2017     Procedure: THORACOSCOPY UPPER LOBECTOMY, NODE DISSECTION;  Surgeon: John H Ganser, M.D.;  Location: SURGERY [facility];  Service:     hernia repair  Needed. Hospital Medications:    Current facility-administered medications:   •  Action is required: Protocol 405 Amiodarone Infusion Pharmacy Protocol has been implemented , , , Once **AND** Protocol 405 - Amiodarone Infusion, , , CONTINUOUS **AND** Protocol 405 - Amiodarone Infusion, , , CONTINUOUS **AND** Protocol 405 - Amiodarone Infusion, , , CONTINUOUS **AND** Protocol 405 - Amiodarone Infusion, , , CONTINUOUS **AND** Protocol 405 - Amiodarone Infusion, , , CONTINUOUS **AND** Protocol 405 - Amiodarone Infusion, , , CONTINUOUS **AND** Protocol 405 - Amiodarone Infusion, , , CONTINUOUS **AND** amiodarone (CORDARONE) 450 mg in D5W 250 mL Infusion, 0.5-1 mg/min, Intravenous, Continuous, Levon S. Algate, PHARMD, Last Rate: 17 mL/hr at 07/15/17 0827, 0.5 mg/min at 07/15/17 0827  •  D5W infusion 500 mL, 500 mL, Intravenous, Continuous, Levon SJose Enrique Dickeyate, PHARMD, Last Rate: 30 mL/hr at 07/15/17 0147, 500 mL at 07/15/17 0147  •  enoxaparin (LOVENOX) inj 60 mg, 60 mg, Subcutaneous, BID, John H Ganser, M.D.  •  lidocaine-prilocaine (EMLA) 2.5-2.5 % cream 1 Application, 1 Application, Topical, Once PRN **OR** lidocaine (XYLOCAINE) 1%  injection, 0.5 mL, Intradermal, Once PRN, John H Ganser, M.D.  •  lactated ringers infusion, , Intravenous, Continuous, John H Ganser, M.D., Last Rate: 10 mL/hr at 07/14/17 1845  •  diltiazem CD (CARDIZEM CD) capsule 240 mg, 240 mg, Oral, DAILY, Fransico J Steven, P.A., 240 mg at 07/15/17 0857  •  lisinopril (PRINIVIL) tablet 5 mg, 5 mg, Oral, DAILY, Fransico Steven, P.A., 5 mg at 07/15/17 0857  •  simvastatin (ZOCOR) tablet 10 mg, 10 mg, Oral, Nightly, Fransico Steven, P.A., 10 mg at 07/14/17 2112  •  Respiratory  Care per Protocol, , Nebulization, Continuous RT, Fransico Steven, P.A.  •  enalaprilat (VASOTEC) injection 2.5 mg, 2.5 mg, Intravenous, Q6HRS PRN, Fransico Steven, P.A.  •  famotidine (PEPCID) tablet 20 mg, 20 mg, Oral, BID, 20 mg at 07/15/17 0857 **OR** [DISCONTINUED] famotidine (PEPCID) injection 20 mg, 20 mg, Intravenous, BID, Fransico Steven, P.A.  •  hydrALAZINE (APRESOLINE) injection 10 mg, 10 mg, Intravenous, Q6HRS PRN, Fransico Steven, P.A.  •  hydrocodone-acetaminophen (NORCO) 5-325 MG per tablet 1-2 Tab, 1-2 Tab, Oral, Q4HRS PRN, Fransico Steven, P.A., 2 Tab at 07/15/17 0924  •  lactated ringers infusion, , Intravenous, Continuous, Fransico Steven, P.A., Last Rate: 75 mL/hr at 07/11/17 1630  •  lorazepam (ATIVAN) injection 0.5-1 mg, 0.5-1 mg, Intravenous, Q4HRS PRN, Fransico Steven, P.A.  •  promethazine (PHENERGAN) suppository 25 mg, 25 mg, Rectal, Q6HRS PRN, Fransico Steven, P.A.  •  diphenhydrAMINE (BENADRYL) tablet/capsule 25 mg, 25 mg, Oral, Q6HRS PRN, 25 mg at 07/14/17 1908 **OR** diphenhydrAMINE (BENADRYL) injection 25 mg, 25 mg, Intravenous, Q6HRS PRN, Fransico Steven, P.A., 25 mg at 07/15/17 0848  •  morphine (pf) 4 mg/ml injection 1-4 mg, 1-4 mg, Intravenous, Q HOUR PRN, 2 mg at 07/13/17 1924 **OR** morphine (pf) 10 mg/ml injection 5 mg, 5 mg, Intravenous, Q HOUR PRN, Fransico Steven, P.A.    Current Outpatient Medications:  Prescriptions prior to admission   Medication Sig Dispense Refill Last Dose   • Ascorbic Acid (VITAMIN C PO) Take 2,000 mg by mouth every day.   7/5/2017 at AM   • Coenzyme Q10 (COQ-10) 100 MG Cap Take  by mouth every day.   7/5/2017 at AM   • lisinopril (PRINIVIL) 5 MG Tab Take 5 mg by mouth every day.   7/5/2017 at AM   • Non Formulary Request BLACK CUMIN OIL  1 TABLESPOON TWICE A DAY   7/3/2017 at AM   • diltiazem CD (CARDIZEM CD) 240 MG CAPSULE SR 24 HR Take 1 Cap by mouth every day. 30 Cap 2 7/11/2017 at 0830   • Aspirin 325 MG Tablet Delayed Response Take 1 Tab by mouth every  day. 365 Tab 0 7/3/2017 at AM   • simvastatin (ZOCOR) 10 MG Tab Take 10 mg by mouth every evening.   7/4/2017 at PM   • vitamin D (CHOLECALCIFEROL) 1000 UNIT Tab Take 1,000 Units by mouth 2 Times a Day.   7/5/2017 at AM   • calcium carbonate (OS-JACKIE 500) 500 MG Tab Take 500 mg by mouth 2 times a day, with meals.   7/5/2017 at AM   • vitamin e (VITAMIN E) 400 UNIT Cap Take 400 Units by mouth 2 times a day.   7/5/2017 at AM       Medication Allergy:  No Known Allergies    Family History:  History reviewed. No pertinent family history.   Unobtainable due to confusion    Social History:  Social History     Social History   • Marital Status:      Spouse Name: N/A   • Number of Children: N/A   • Years of Education: N/A     Occupational History   • Not on file.     Social History Main Topics   • Smoking status: Former Smoker -- 1.00 packs/day for 60 years     Types: Cigarettes     Quit date: 06/21/2017   • Smokeless tobacco: Not on file      Comment: 1 pk a day   • Alcohol Use: Yes      Comment: Rarely   • Drug Use: No   • Sexual Activity: Not on file     Other Topics Concern   • Not on file     Social History Narrative    ** Merged History Encounter **              Physical Exam:  Vitals/ General Appearance:   Weight/BMI: Body mass index is 21.19 kg/(m^2).  Blood pressure 136/75, pulse 119, temperature 36.8 °C (98.3 °F), resp. rate 17, height 1.829 m (6'), weight 70.9 kg (156 lb 4.9 oz), SpO2 98 %.  Filed Vitals:    07/15/17 0800 07/15/17 0854 07/15/17 0942 07/15/17 0950   BP: 136/75      Pulse: 110 94 130 119   Temp: 36.8 °C (98.3 °F)      Resp: 16 17     Height:       Weight:       SpO2: 99% 97%  98%     Oxygen Therapy:  Pulse Oximetry: 98 %, O2 (LPM): 0, O2 Delivery: None (Room Air)    Constitutional:   Well developed, Well nourished, No acute distress  HENMT:  Normocephalic, Atraumatic, Oropharynx moist mucous membranes, No oral exudates, Nose normal.   Eyes:  EOMI, Conjunctiva normal, No discharge.  Neck:   Normal range of motion, No cervical tenderness,  no JVD.  Cardiovascular: Irregularly irregular rhythm tachycardic could not appreciate any murmurs  Dorsalis pedis pulses 1+ no lower extremity edema  Lungs:  decreased air entry bilateral lungs no wheezing or crackles.   Chest tube present on left side  Abdomen: Bowel sounds normal, Soft, No tenderness, No guarding, No rebound, No masses, No hepatosplenomegaly.  Skin: Warm, Dry, No erythema, No rash, no induration.  Neurologic: Alert & oriented x 1, moving all 4 extremities.  Psychiatric: Normal mood.      MDM (Data Review):     Records reviewed and summarized in current documentation    Lab Data Review:  Recent Results (from the past 24 hour(s))   EKG    Collection Time: 07/15/17 12:15 AM   Result Value Ref Range    Report       [facility]    Test Date:  2017-07-15  Pt Name:    JOSE DE JESUS HOPPER               Department: 141  MRN:        [id number]                      Room:       [facility]  Gender:     M                            Technician: CHRISTIAN  :        1940                   Requested By:JOHN H GANSER  Order #:    [id number]                    Reading MD:    Measurements  Intervals                                Axis  Rate:       122                          P:  IL:                                      QRS:        67  QRSD:       98                           T:          -65  QT:         312  QTc:        445    Interpretive Statements  ATRIAL FIBRILLATION, V-RATE    MULTIPLE PREMATURE COMPLEXES, VENT & SUPRAVEN  RSR' IN V1 OR V2, PROBABLY NORMAL VARIANT  REPOL ABNRM SUGGESTS ISCHEMIA, DIFFUSE LEADS  BASELINE WANDER IN LEAD(S) V4  Compared to ECG 2017 08:26:17  RSR' in V1 or V2 now present  Early repolarization now present  Possible ischemia now present  Sinus bradycardia no longer present   ST (T wave) deviation no longer present     CBC WITHOUT DIFFERENTIAL    Collection Time: 07/15/17  9:19 AM   Result Value Ref Range    WBC 13.0 (H) 4.8 - 10.8  K/uL    RBC 4.92 4.70 - 6.10 M/uL    Hemoglobin 15.1 14.0 - 18.0 g/dL    Hematocrit 44.7 42.0 - 52.0 %    MCV 90.9 81.4 - 97.8 fL    MCH 30.7 27.0 - 33.0 pg    MCHC 33.8 33.7 - 35.3 g/dL    RDW 47.3 35.9 - 50.0 fL    Platelet Count 261 164 - 446 K/uL    MPV 9.6 9.0 - 12.9 fL   PERIPHERAL SMEAR REVIEW    Collection Time: 07/15/17  9:19 AM   Result Value Ref Range    Peripheral Smear Review see below          Chest Xray: 17  Reviewed  1.  Tiny LEFT apical pneumothorax with chest tube present, no definite change from prior exam.  2.  Extensive LEFT chest wall emphysema.  3.  Volume loss of LEFT hemithorax, improved from prior exam with improvement of LEFT basilar atelectasis    EK/15/17  Atrial fibrillation at 122 bpm normal axis ST depression inferolateral leads could be rate related versus ischemia  RSR prime in V1.  EKG personally reviewed by me.    ECHO: 17  No prior study is available for comparison.   Normal left ventricular systolic function.  Left ventricular ejection fraction is visually estimated to be 60%.  Grade II diastolic dysfunction.  The right ventricle was normal in size and function.  No significant valve disease or flow abnormalities    CT chest: 17  Left lung cancer without PET-CT evidence of metastatic disease    Coronary angiogram: 2005  Left Main mild plaque in proximal portion no significant disease.  Ostium LCx 20% stenosis.  Patent stent in mid LAD.  Second septal  at the distal end of stent has severe ostial lesion.   With the small negligible diagonal branch arises from proximal edge of stent severe ostial lesion.  Diffuse calcification of mid and distal RCA. 40-50% diffuse lesion in mid RCA. RCA is dominant large caliber vessel.  Distal anterior wall and apical inferior wall are mildly hypokinetic with LVEF 60%.    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 fib with RVR because of which cardiology was consulted.     A. fib with RVR:  Continue IV amiodarone drip along with Lovenox for anticoagulation.   Continue Cardizem 240 mg daily. (2 underlying emphysema patient is not on beta blockers) once the acute situation resolve we'll consider switching him to Coreg on outpatient basis.  Hopefully in a 1 or 2 he might spontaneously converted back to sinus rhythm.    Coronary artery disease:  If no contraindication consider initiating aspirin 81 mg daily  Angiogram from 2005 showed patent stent in mid LAD but he does known nonocclusive disease in RCA and circumflex with severe ostial stenosis of a septal  as well as diagonal.  Patient will need outpatient follow-up for management of CAD.  Continue Zocor 10 mg qHs.    Hypertension:  Continue lisinopril and Cardizem.    Thank you for allowing me to participate in taking care of patient.    Linda Morales MD.

## 2017-07-15 NOTE — CODE DOCUMENTATION
Per Dr. Ganser, order amiodarone gtt with bolus and transfer patient to tele.  Orders read back to MD and implemented.

## 2017-07-15 NOTE — PROGRESS NOTES
Report given to MARYA Aburto. Pt transferring to Tele with 2 RNs from Telemetry [facility].

## 2017-07-15 NOTE — PROGRESS NOTES
Pt's . Denies chest pain or any form of pain. Reported to Charge RN. Then rapid response was called.

## 2017-07-15 NOTE — PROGRESS NOTES
Patient sitting in chair in room with multiple family members around him. Patient denies pain in chest area at this time. Oxygen SATs above 90% on 1L of oxygen per NC. Patient has been confused all day and restless at times pulling at his lines. Family at bedside at all times and able to redirect patient. Very supportive family.

## 2017-07-15 NOTE — PROGRESS NOTES
Progress Note:    S: Rapid A. Fib this AM, transferred to [facility] and Amiodarone started  Rate better, still A. Fib  Developed left chest, neck and face swelling    O:              Current Facility-Administered Medications   Medication Dose   • amiodarone (CORDARONE) 450 mg in D5W 250 mL Infusion  0.5-1 mg/min   • D5W infusion 500 mL  500 mL   • lidocaine-prilocaine (EMLA) 2.5-2.5 % cream 1 Application  1 Application    Or   • lidocaine (XYLOCAINE) 1%  injection  0.5 mL   • lactated ringers infusion     • diltiazem CD (CARDIZEM CD) capsule 240 mg  240 mg   • lisinopril (PRINIVIL) tablet 5 mg  5 mg   • simvastatin (ZOCOR) tablet 10 mg  10 mg   • Respiratory Care per Protocol     • enalaprilat (VASOTEC) injection 2.5 mg  2.5 mg   • enoxaparin (LOVENOX) inj 40 mg  40 mg   • famotidine (PEPCID) tablet 20 mg  20 mg   • hydrALAZINE (APRESOLINE) injection 10 mg  10 mg   • hydrocodone-acetaminophen (NORCO) 5-325 MG per tablet 1-2 Tab  1-2 Tab   • lactated ringers infusion     • lorazepam (ATIVAN) injection 0.5-1 mg  0.5-1 mg   • promethazine (PHENERGAN) suppository 25 mg  25 mg   • diphenhydrAMINE (BENADRYL) tablet/capsule 25 mg  25 mg    Or   • diphenhydrAMINE (BENADRYL) injection 25 mg  25 mg   • morphine (pf) 4 mg/ml injection 1-4 mg  1-4 mg    Or   • morphine (pf) 10 mg/ml injection 5 mg  5 mg       PE:  Blood pressure 136/75, pulse 94, temperature 36.8 °C (98.3 °F), resp. rate 17, height 1.829 m (6'), weight 70.9 kg (156 lb 4.9 oz), SpO2 97 %.    Intake/Output Summary (Last 24 hours) at 07/15/17 0929  Last data filed at 07/15/17 0905   Gross per 24 hour   Intake   1370 ml   Output   2700 ml   Net  -1330 ml       Chest tube: Small air leak  SQ air left chest, neck and face-mild  Wounds dry    Rads:  CT-FOREIGN FILM CAT SCAN   Preliminary Result      DX-CHEST-PORTABLE (1 VIEW)   Final Result      1.  Tiny LEFT apical pneumothorax with chest tube present, no definite change from prior exam.   2.  Extensive LEFT chest wall  emphysema.   3.  Volume loss of LEFT hemithorax, improved from prior exam with improvement of LEFT basilar atelectasis.      DX-CHEST-PORTABLE (1 VIEW)   Final Result      Chest findings unchanged compared to 7/13.      DX-CHEST-PORTABLE (1 VIEW)   Final Result         1.  Left pulmonary infiltrates or contusion, similar to prior.   2.  Tiny residual left pneumothorax, decreased in size since prior.   3.  Extensive soft tissue gas in the left chest wall, appears somewhat increased since prior.      DX-CHEST-PORTABLE (1 VIEW)   Final Result         1.  Left pulmonary infiltrates or contusion, similar to prior.   2.  Left apical pneumothorax, decreased in size since prior study, thoracostomy tube remains in place.      DX-CHEST-PORTABLE (1 VIEW)   Final Result      Postsurgical change in the left lung with chest tube in place with apparent small left apical pneumothorax.          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  [facility]

## 2017-07-15 NOTE — PROGRESS NOTES
Assumed care after bedside report received from Criselda MALHOTRA. Patient is awake and alert and orientated x3. Wife is at bedside. Plan of care reviewed with patient. Verbalized understanding.

## 2017-07-15 NOTE — RESPIRATORY CARE
Respiratory Rapid Response Note    Symptoms elevated HR     Breath Sounds  Pre/Post Intervention: Pre Intervention Assessment (07/15/17 0016)  RUL Breath Sounds: Clear (07/15/17 0016)  RML Breath Sounds: Diminished (07/15/17 0016)  RLL Breath Sounds: Diminished (07/15/17 0016)  JENNIFER Breath Sounds: Clear (07/15/17 0016)  LLL Breath Sounds: Diminished (07/15/17 0016)       O2 (LPM): 2 (07/15/17 0016)  O2 Daily Delivery Respiratory : Silicone Nasal Cannula (07/15/17 0016)    Events/Summary/Plan: RRT (07/15/17 0016)

## 2017-07-15 NOTE — CARE PLAN
Problem: Bowel/Gastric:  Goal: Normal bowel function is maintained or improved  Outcome: PROGRESSING AS EXPECTED  Unable to ambulate patient due to chest tube and multiple lines. Encouraged fluids, prune juice and stool softeners to keep bowels moving.

## 2017-07-15 NOTE — PROGRESS NOTES
Family reports patients face, neck and left eye are swollen. Increased swelling on left eye and left face. Crepitus noted in shoulders/upper chest/neck.

## 2017-07-15 NOTE — CARE PLAN
Problem: Safety  Goal: Will remain free from injury  Outcome: PROGRESSING AS EXPECTED  Patient restless today and confused. Family members at bedside redirecting patient and assisting with keeping him safe from falls or injury.

## 2017-07-15 NOTE — PROGRESS NOTES
Physician Assistant for surgery paged at this time to update them on patients facial swelling. Waiting on phone at this time.  Received return phone call from Dr. Ganser. New orders received.

## 2017-07-16 ENCOUNTER — APPOINTMENT (OUTPATIENT)
Dept: RADIOLOGY | Facility: MEDICAL CENTER | Age: 77
DRG: 164 | End: 2017-07-16
Attending: SURGERY
Payer: MEDICARE

## 2017-07-16 PROCEDURE — 700111 HCHG RX REV CODE 636 W/ 250 OVERRIDE (IP)

## 2017-07-16 PROCEDURE — 700111 HCHG RX REV CODE 636 W/ 250 OVERRIDE (IP): Performed by: SURGERY

## 2017-07-16 PROCEDURE — 700105 HCHG RX REV CODE 258

## 2017-07-16 PROCEDURE — 700102 HCHG RX REV CODE 250 W/ 637 OVERRIDE(OP): Performed by: PHYSICIAN ASSISTANT

## 2017-07-16 PROCEDURE — 700111 HCHG RX REV CODE 636 W/ 250 OVERRIDE (IP): Performed by: INTERNAL MEDICINE

## 2017-07-16 PROCEDURE — 770020 HCHG ROOM/CARE - TELE (206)

## 2017-07-16 PROCEDURE — 71010 DX-CHEST-PORTABLE (1 VIEW): CPT

## 2017-07-16 PROCEDURE — 700111 HCHG RX REV CODE 636 W/ 250 OVERRIDE (IP): Performed by: PHYSICIAN ASSISTANT

## 2017-07-16 PROCEDURE — A9270 NON-COVERED ITEM OR SERVICE: HCPCS | Performed by: PHYSICIAN ASSISTANT

## 2017-07-16 PROCEDURE — 71010 DX-CHEST-LIMITED (1 VIEW): CPT

## 2017-07-16 RX ORDER — DIGOXIN 0.25 MG/ML
500 INJECTION INTRAMUSCULAR; INTRAVENOUS ONCE
Status: COMPLETED | OUTPATIENT
Start: 2017-07-16 | End: 2017-07-16

## 2017-07-16 RX ORDER — HALOPERIDOL 5 MG/ML
5 INJECTION INTRAMUSCULAR EVERY 4 HOURS PRN
Status: CANCELLED | OUTPATIENT
Start: 2017-07-16

## 2017-07-16 RX ORDER — DIGOXIN 125 MCG
125 TABLET ORAL DAILY
Status: DISCONTINUED | OUTPATIENT
Start: 2017-07-17 | End: 2017-07-19 | Stop reason: HOSPADM

## 2017-07-16 RX ORDER — HALOPERIDOL 5 MG/ML
5 INJECTION INTRAMUSCULAR EVERY 4 HOURS PRN
Status: DISCONTINUED | OUTPATIENT
Start: 2017-07-16 | End: 2017-07-19 | Stop reason: HOSPADM

## 2017-07-16 RX ORDER — DIGOXIN 125 MCG
125 TABLET ORAL DAILY
Status: DISCONTINUED | OUTPATIENT
Start: 2017-07-16 | End: 2017-07-16

## 2017-07-16 RX ORDER — DIGOXIN 0.25 MG/ML
250 INJECTION INTRAMUSCULAR; INTRAVENOUS ONCE
Status: COMPLETED | OUTPATIENT
Start: 2017-07-16 | End: 2017-07-16

## 2017-07-16 RX ORDER — LORAZEPAM 2 MG/ML
.5-1 INJECTION INTRAMUSCULAR
Status: DISCONTINUED | OUTPATIENT
Start: 2017-07-16 | End: 2017-07-19 | Stop reason: HOSPADM

## 2017-07-16 RX ADMIN — ENOXAPARIN SODIUM 80 MG: 100 INJECTION SUBCUTANEOUS at 19:17

## 2017-07-16 RX ADMIN — HYDROCODONE BITARTRATE AND ACETAMINOPHEN 2 TABLET: 5; 325 TABLET ORAL at 19:17

## 2017-07-16 RX ADMIN — ENOXAPARIN SODIUM 80 MG: 100 INJECTION SUBCUTANEOUS at 09:17

## 2017-07-16 RX ADMIN — DIGOXIN 250 MCG: 0.25 INJECTION INTRAMUSCULAR; INTRAVENOUS at 15:24

## 2017-07-16 RX ADMIN — AMIODARONE HYDROCHLORIDE 0.5 MG/MIN: 50 INJECTION, SOLUTION INTRAVENOUS at 04:19

## 2017-07-16 RX ADMIN — LORAZEPAM 1 MG: 2 INJECTION INTRAMUSCULAR; INTRAVENOUS at 17:23

## 2017-07-16 RX ADMIN — FAMOTIDINE 20 MG: 20 TABLET, FILM COATED ORAL at 19:17

## 2017-07-16 RX ADMIN — FAMOTIDINE 20 MG: 20 TABLET, FILM COATED ORAL at 09:16

## 2017-07-16 RX ADMIN — DILTIAZEM HYDROCHLORIDE 240 MG: 240 CAPSULE, COATED, EXTENDED RELEASE ORAL at 09:16

## 2017-07-16 RX ADMIN — DIGOXIN 500 MCG: 0.25 INJECTION INTRAMUSCULAR; INTRAVENOUS at 09:10

## 2017-07-16 RX ADMIN — DIPHENHYDRAMINE HCL 25 MG: 25 TABLET ORAL at 19:17

## 2017-07-16 RX ADMIN — LISINOPRIL 5 MG: 5 TABLET ORAL at 09:16

## 2017-07-16 RX ADMIN — SIMVASTATIN 10 MG: 20 TABLET, FILM COATED ORAL at 19:17

## 2017-07-16 RX ADMIN — AMIODARONE HYDROCHLORIDE 0.5 MG/MIN: 50 INJECTION, SOLUTION INTRAVENOUS at 17:26

## 2017-07-16 RX ADMIN — DEXTROSE MONOHYDRATE 500 ML: 50 INJECTION, SOLUTION INTRAVENOUS at 09:12

## 2017-07-16 ASSESSMENT — PATIENT HEALTH QUESTIONNAIRE - PHQ9
2. FEELING DOWN, DEPRESSED, IRRITABLE, OR HOPELESS: NOT AT ALL
1. LITTLE INTEREST OR PLEASURE IN DOING THINGS: NOT AT ALL
SUM OF ALL RESPONSES TO PHQ9 QUESTIONS 1 AND 2: 0
SUM OF ALL RESPONSES TO PHQ QUESTIONS 1-9: 0

## 2017-07-16 ASSESSMENT — PAIN SCALES - GENERAL
PAINLEVEL_OUTOF10: 0
PAINLEVEL_OUTOF10: 0
PAINLEVEL_OUTOF10: 4
PAINLEVEL_OUTOF10: 0

## 2017-07-16 ASSESSMENT — ENCOUNTER SYMPTOMS
ABDOMINAL PAIN: 0
CHILLS: 0
VOMITING: 0
PALPITATIONS: 0
CLAUDICATION: 0
FEVER: 0
NAUSEA: 0
ORTHOPNEA: 0
PND: 0
SHORTNESS OF BREATH: 0
COUGH: 0

## 2017-07-16 NOTE — PROGRESS NOTES
Cardiology Progress Note               Author: Blossom Britton Date & Time created: 2017  12:34 PM     Interval History:  Consultation for atrial fibrillation with RVR post the left lung upper lobectomy    Admitted for thoracoscopy left upper lobectomy    History of emphysema, coronary angiogram in  (patent stent in mid LAD, 50% stenosis mid RCA, severe ostial stenosis of septal , severe ostial stenosis of small diagonal) lung cancer without metastasis, dyslipidemia    Labs reviewed  NO labs today     BP =147/97  HR = ( 150 on standing )          17 underwent left upper lobectomy with node dissection secondary to left upper lobe lung mass which found incidentally    Review of Systems   Respiratory: Negative for cough and shortness of breath.    Cardiovascular: Negative for chest pain, palpitations, orthopnea, claudication, leg swelling and PND.       Physical Exam   Constitutional: He is oriented to person, place, and time.   HENT:   Head: Normocephalic.   Eyes: Conjunctivae are normal.   Neck: No JVD present. No thyromegaly present.   Cardiovascular: An irregularly irregular rhythm present. Tachycardia present.    Pulses:       Carotid pulses are 2+ on the right side, and 2+ on the left side.       Radial pulses are 2+ on the left side.        Posterior tibial pulses are 2+ on the right side, and 2+ on the left side.   Pulmonary/Chest: He has wheezes.   Mild wheezing   Abdominal: Soft.   Musculoskeletal: He exhibits no edema.   Neurological: He is alert and oriented to person, place, and time.   Skin: Skin is warm and dry.       Hemodynamics:  Temp (24hrs), Av.7 °C (98.1 °F), Min:36.4 °C (97.6 °F), Max:37.1 °C (98.8 °F)  Temperature: 37.1 °C (98.8 °F)  Pulse  Av.5  Min: 56  Max: 148   Blood Pressure : 147/97 mmHg     Respiratory:    Respiration: 16, Pulse Oximetry: 91 %  Chest Tube Group 1 (A) Left ROUND CASIMIRO DRAIN 19-Device:  (suction 30 cm per Dr. Cohen)  Work Of Breathing  / Effort: Moderate  RUL Breath Sounds: Clear, RML Breath Sounds: Clear, RLL Breath Sounds: Clear;Diminished, JENNIFER Breath Sounds: Diminished, LLL Breath Sounds: Diminished  Fluids:  Date 07/16/17 0700 - 07/17/17 0659   Shift 6900-8648 3501-1005 0569-6347 24 Hour Total   I  N  T  A  K  E   Shift Total       O  U  T  P  U  T   Urine 775   775    Shift Total 775   775   Weight (kg) 67.8 67.8 67.8 67.8       Weight: 67.8 kg (149 lb 7.6 oz)  GI/Nutrition:  Orders Placed This Encounter   Procedures   • DIET ORDER     Standing Status: Standing      Number of Occurrences: 1      Standing Expiration Date:      Order Specific Question:  Diet:     Answer:  Regular [1]     Lab Results:  Recent Labs      07/15/17   0919   WBC  13.0*   RBC  4.92   HEMOGLOBIN  15.1   HEMATOCRIT  44.7   MCV  90.9   MCH  30.7   MCHC  33.8   RDW  47.3   PLATELETCT  261   MPV  9.6     Recent Labs      07/15/17   0919   SODIUM  137   POTASSIUM  3.7   CHLORIDE  97   CO2  33   GLUCOSE  116*   BUN  8   CREATININE  0.64   CALCIUM  9.4                         Medical Decision Making, by Problem:  Active Hospital Problems    Diagnosis    coronary artery disease  Lung cancer  Previous stent/PCI  Hypertension  Plan:    A. fib with RVR (new onset post lobectomy), IV amiodarone, diltiazem 240, digoxin was added for RVR with standing, remains in afib      On therapeutic Lovenox    Hypertension, lisinopril 5 & diltiazem 240, blood pressure 130's-140's/70-90, room to up titrate ACE-I      History of CAD and previous PCI/stent, on simvastatin 10, start aspirin if okay with surgeon        Medications reviewed and Labs reviewed

## 2017-07-16 NOTE — PROGRESS NOTES
Bedside report performed with [place]. Pt is a/o, safety check performed, all possessions and call bell within reach. POC and doctors orders addressed as needed.

## 2017-07-16 NOTE — PROGRESS NOTES
Surgical Progress Note    Author: Lucille Manrique Date & Time created: 2017   10:25 AM     Interval Events:  S/p Bronchoscopy, thoracoscopic left upper lobectomy and thoracic lymphadenectomy     Chest xray shows increased SQ air.  Difficulty with swallow and voice changed from SQ emphysema.  Chest tube found to be kinked. Increased suction to 30 cm.  Follow up xray this afternoon.  Boost added for nutrition.    Review of Systems   Constitutional: Negative for fever and chills.   Respiratory: Negative for cough and shortness of breath.    Gastrointestinal: Negative for nausea, vomiting and abdominal pain.   All other systems reviewed and are negative.    Hemodynamics:  Temp (24hrs), Av.8 °C (98.2 °F), Min:36.4 °C (97.6 °F), Max:37.2 °C (98.9 °F)  Temperature: 37.1 °C (98.8 °F)  Pulse  Av.5  Min: 56  Max: 148   Blood Pressure : 147/97 mmHg     Respiratory:    Respiration: 16, Pulse Oximetry: 91 %  Chest Tube Group 1 (A) Left ROUND CASIMIRO DRAIN 19-Device: Suction 20 cm Water;Air Leak Present  Work Of Breathing / Effort: Moderate  RUL Breath Sounds: Clear, RML Breath Sounds: Clear, RLL Breath Sounds: Clear;Diminished, JENNIFER Breath Sounds: Diminished, LLL Breath Sounds: Diminished  Neuro:  GCS       Fluids:    Intake/Output Summary (Last 24 hours) at 17 1025  Last data filed at 17 1000   Gross per 24 hour   Intake   2340 ml   Output   2375 ml   Net    -35 ml     Weight: 67.8 kg (149 lb 7.6 oz)  Current Diet Order   Procedures   • DIET ORDER     Physical Exam   Constitutional: He is oriented to person, place, and time. He appears well-developed. No distress.   HENT:   Head: Normocephalic.   Eyes: Pupils are equal, round, and reactive to light.   Neck: Normal range of motion.   Cardiovascular: Normal rate and regular rhythm.    Pulmonary/Chest: Effort normal. No respiratory distress. He exhibits no tenderness.   crepitus   Abdominal: Soft. He exhibits no distension. There is no rebound.    Musculoskeletal: Normal range of motion. He exhibits no edema.   Neurological: He is alert and oriented to person, place, and time.   Skin: Skin is warm and dry.   Psychiatric: He has a normal mood and affect.   Nursing note and vitals reviewed.    Labs:  No results found for this or any previous visit (from the past 24 hour(s)).  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  Difficulty with swallow and voice changed from SQ emphysema.  Chest tube found to be kinked. Increased suction to 30 cm.  Follow up xray this afternoon.  Boost added for nutrition.    Quality Measures:  Labs reviewed, Medications reviewed and Radiology images reviewed  Alvarado catheter: No Alvarado      DVT Prophylaxis: Enoxaparin (Lovenox)  DVT prophylaxis - mechanical: SCDs  Ulcer prophylaxis: Yes    Assessed for rehab: Patient was assess for and/or received rehabilitation services during this hospitalization      Discussed patient condition with Family, RN, Patient and Dr. Cohen

## 2017-07-16 NOTE — CARE PLAN
Problem: Infection  Goal: Will remain free from infection  Intervention: Assess signs and symptoms of infection  Pt lab values monitored, VS monitored. No new s/s infection.           Problem: Respiratory:  Goal: Respiratory status will improve  Intervention: Assess and monitor pulmonary status    07/15/17 0016 07/15/17 2005 07/16/17 0800   OTHER   Respiration --  --  16   Pulse Oximetry --  --  91 %   O2 (LPM) --  --  0   Work Of Breathing / Effort --  Moderate --    Pre/Post Intervention Pre Intervention Assessment --  --    RUL Breath Sounds --  --  Clear   RML Breath Sounds --  --  Clear   RLL Breath Sounds --  --  Clear;Diminished   JENNIFER Breath Sounds --  --  Diminished   LLL Breath Sounds --  --  Diminished

## 2017-07-16 NOTE — PROGRESS NOTES
Report received by NOC RN. Assumed care of pt. Assessment complete. Family at bedside. Pt A&O x 4. Pt has chest tube left torso-draining bloody fluid (night rn reports 290 mL overnight)20 cm suction, dressing CDI, is in afib rate 90s-amio drip running at .5. Pt in no apparent signs of distress. Plan of care discussed. Call light in reach,  bed in lowest position, and pt has no further questions at this time.

## 2017-07-16 NOTE — PROGRESS NOTES
Paged Dr. Cohen to update pt is noted to have puffy swelling around the left eye with crepitus. Dr. Cohen orders increase suction to 40.

## 2017-07-17 LAB
LV EJECT FRACT  99904: 55
LV EJECT FRACT MOD 4C 99902: 63.25

## 2017-07-17 PROCEDURE — 770020 HCHG ROOM/CARE - TELE (206)

## 2017-07-17 PROCEDURE — 93308 TTE F-UP OR LMTD: CPT

## 2017-07-17 PROCEDURE — A9270 NON-COVERED ITEM OR SERVICE: HCPCS | Performed by: INTERNAL MEDICINE

## 2017-07-17 PROCEDURE — 700111 HCHG RX REV CODE 636 W/ 250 OVERRIDE (IP): Performed by: SURGERY

## 2017-07-17 PROCEDURE — 700111 HCHG RX REV CODE 636 W/ 250 OVERRIDE (IP)

## 2017-07-17 PROCEDURE — 700102 HCHG RX REV CODE 250 W/ 637 OVERRIDE(OP): Performed by: INTERNAL MEDICINE

## 2017-07-17 PROCEDURE — 700102 HCHG RX REV CODE 250 W/ 637 OVERRIDE(OP): Performed by: PHYSICIAN ASSISTANT

## 2017-07-17 PROCEDURE — 93306 TTE W/DOPPLER COMPLETE: CPT | Mod: 26 | Performed by: INTERNAL MEDICINE

## 2017-07-17 PROCEDURE — 700105 HCHG RX REV CODE 258

## 2017-07-17 PROCEDURE — A9270 NON-COVERED ITEM OR SERVICE: HCPCS | Performed by: PHYSICIAN ASSISTANT

## 2017-07-17 PROCEDURE — 700111 HCHG RX REV CODE 636 W/ 250 OVERRIDE (IP): Performed by: PHYSICIAN ASSISTANT

## 2017-07-17 RX ORDER — ECHINACEA PURPUREA EXTRACT 125 MG
2 TABLET ORAL PRN
Status: DISCONTINUED | OUTPATIENT
Start: 2017-07-17 | End: 2017-07-19 | Stop reason: HOSPADM

## 2017-07-17 RX ADMIN — LISINOPRIL 5 MG: 5 TABLET ORAL at 08:50

## 2017-07-17 RX ADMIN — AMIODARONE HYDROCHLORIDE 0.51 MG/MIN: 50 INJECTION, SOLUTION INTRAVENOUS at 11:56

## 2017-07-17 RX ADMIN — FAMOTIDINE 20 MG: 20 TABLET, FILM COATED ORAL at 21:42

## 2017-07-17 RX ADMIN — DIGOXIN 125 MCG: 125 TABLET ORAL at 18:43

## 2017-07-17 RX ADMIN — HALOPERIDOL LACTATE 5 MG: 5 INJECTION, SOLUTION INTRAMUSCULAR at 00:59

## 2017-07-17 RX ADMIN — FAMOTIDINE 20 MG: 20 TABLET, FILM COATED ORAL at 08:50

## 2017-07-17 RX ADMIN — HALOPERIDOL LACTATE 5 MG: 5 INJECTION, SOLUTION INTRAMUSCULAR at 23:21

## 2017-07-17 RX ADMIN — ENOXAPARIN SODIUM 80 MG: 100 INJECTION SUBCUTANEOUS at 08:50

## 2017-07-17 RX ADMIN — ENALAPRILAT 2.5 MG: 1.25 INJECTION, SOLUTION INTRAVENOUS at 08:50

## 2017-07-17 RX ADMIN — DEXTROSE MONOHYDRATE 500 ML: 50 INJECTION, SOLUTION INTRAVENOUS at 18:43

## 2017-07-17 RX ADMIN — DIPHENHYDRAMINE HYDROCHLORIDE 25 MG: 50 INJECTION, SOLUTION INTRAMUSCULAR; INTRAVENOUS at 02:04

## 2017-07-17 RX ADMIN — SIMVASTATIN 10 MG: 20 TABLET, FILM COATED ORAL at 21:42

## 2017-07-17 RX ADMIN — ENOXAPARIN SODIUM 80 MG: 100 INJECTION SUBCUTANEOUS at 21:43

## 2017-07-17 RX ADMIN — HALOPERIDOL LACTATE 5 MG: 5 INJECTION, SOLUTION INTRAMUSCULAR at 12:04

## 2017-07-17 RX ADMIN — DILTIAZEM HYDROCHLORIDE 240 MG: 240 CAPSULE, COATED, EXTENDED RELEASE ORAL at 08:50
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
## 2017-07-17 NOTE — PROGRESS NOTES
Bedside report performed with Bruce. Pt is a/o, safety check performed, all possessions and call bell within reach. POC and doctors orders addressed as needed.
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
## 2017-07-17 NOTE — PROGRESS NOTES
Dr. Ganser gave the verbal okay to release the restraints and have the Pt sit up in a chair. An agreement was made with the Pt regarding when or if restraints would be reinstated, and the Pt verbalized understanding and agreed. The Pt is found to be sound of mind, and in accordance with the family's assessment of his baseline, is capable of making such an agreement.
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
## 2017-07-17 NOTE — PROGRESS NOTES
Date of Service: 7/17/2017  Chief Complaint:No chief complaint on file.    Interval History:  77 y/o male;Consultation for atrial fibrillation with RVR post the left lung upper lobectomy Currently fast VR needs better control;    Breathing OK  History of emphysema, coronary angiogram in 2005 (patent stent in mid LAD, 50% stenosis mid RCA, severe ostial stenosis of septal , severe ostial stenosis of small diagonal) lung cancer without metastasis, dyslipidemiaAll recent medication, labs, imaging studies and procedures reviewed    Physical Exam   Blood pressure 155/102, pulse 125, temperature 36.6 °C (97.9 °F), resp. rate 18, height 1.829 m (6'), weight 67.8 kg (149 lb 7.6 oz), SpO2 95 %.    Constitutional: He appears well-developed.   HENT: Normocephalic and atraumatic. No scleral icterus.   Neck: No JVD present.   Cardiovascular: Fast rate. IRR; Exam reveals no gallop and no friction rub. No murmur heard.   Pulmonary/Chest: CTAB coarse   Abdominal: S/NT/ND BS+   Musculoskeletal: He exhibits no edema. Pulses present.  Skin: Skin is warm and dry.       Intake/Output Summary (Last 24 hours) at 07/17/17 0849  Last data filed at 07/17/17 0600   Gross per 24 hour   Intake   1574 ml   Output   1770 ml   Net   -196 ml       LABS:  Lab Results   Component Value Date/Time    CHOLESTEROL, 03/22/2017 11:24 AM    LDL 80 03/22/2017 11:24 AM    HDL 45 03/22/2017 11:24 AM    TRIGLYCERIDES 73 03/22/2017 11:24 AM       Lab Results   Component Value Date/Time    WBC 13.0* 07/15/2017 09:19 AM    RBC 4.92 07/15/2017 09:19 AM    HEMOGLOBIN 15.1 07/15/2017 09:19 AM    HEMATOCRIT 44.7 07/15/2017 09:19 AM    MCV 90.9 07/15/2017 09:19 AM    NEUTROPHILS-POLYS 46.90 07/05/2017 08:34 AM    LYMPHOCYTES 38.80 07/05/2017 08:34 AM    MONOCYTES 10.50 07/05/2017 08:34 AM    EOSINOPHILS 2.90 07/05/2017 08:34 AM    BASOPHILS 0.80 07/05/2017 08:34 AM    ANISOCYTOSIS 1+ 10/19/2011 02:26 PM     Lab Results   Component Value Date/Time     SODIUM 137 07/15/2017 09:19 AM    POTASSIUM 3.7 07/15/2017 09:19 AM    CHLORIDE 97 07/15/2017 09:19 AM    CO2 33 07/15/2017 09:19 AM    GLUCOSE 116* 07/15/2017 09:19 AM    BUN 8 07/15/2017 09:19 AM    CREATININE 0.64 07/15/2017 09:19 AM         Lab Results   Component Value Date/Time    ALKALINE PHOSPHATASE 70 07/05/2017 08:34 AM    AST(SGOT) 25 07/05/2017 08:34 AM    ALT(SGPT) 14 07/05/2017 08:34 AM    TOTAL BILIRUBIN 0.6 07/05/2017 08:34 AM      Lab Results   Component Value Date/Time    B NATRIURETIC PEPTIDE 82 05/22/2017 09:10 AM      No results found for: TSH  Lab Results   Component Value Date/Time    PT 13.0 05/22/2017 09:10 AM    INR 0.95 05/22/2017 09:10 AM        Medications reviewed    Imaging reviewed    ECHO(1/2017):No prior study is available for comparison.   Normal left ventricular systolic function.  Left ventricular ejection fraction is visually estimated to be 60%.  Grade II diastolic dysfunction.  The right ventricle was normal in size and function.  No significant valve disease or flow abnormalities    Impressions:  A. fib with RVR (new onset post lobectomy),   Hypertension,    Recommendations:  WBC elevated; monitor, no fever  IV amiodarone, diltiazem 240, digoxin PRN was added for RVR with standing, remains in afib  On therapeutic Lovenox  History of CAD and previous PCI/stent, on simvastatin 10, start aspirin   Echo orderd  Will follow  Thx

## 2017-07-18 ENCOUNTER — APPOINTMENT (OUTPATIENT)
Dept: RADIOLOGY | Facility: MEDICAL CENTER | Age: 77
DRG: 164 | End: 2017-07-18
Attending: SURGERY
Payer: MEDICARE

## 2017-07-18 PROBLEM — C34.90 LUNG CANCER (HCC): Status: ACTIVE | Noted: 2017-07-11

## 2017-07-18 LAB — DIGOXIN SERPL-MCNC: 0.5 NG/ML (ref 0.8–2)

## 2017-07-18 PROCEDURE — A9270 NON-COVERED ITEM OR SERVICE: HCPCS | Performed by: PHYSICIAN ASSISTANT

## 2017-07-18 PROCEDURE — 700102 HCHG RX REV CODE 250 W/ 637 OVERRIDE(OP): Performed by: INTERNAL MEDICINE

## 2017-07-18 PROCEDURE — A9270 NON-COVERED ITEM OR SERVICE: HCPCS | Performed by: SURGERY

## 2017-07-18 PROCEDURE — 700102 HCHG RX REV CODE 250 W/ 637 OVERRIDE(OP): Performed by: PHYSICIAN ASSISTANT

## 2017-07-18 PROCEDURE — A9270 NON-COVERED ITEM OR SERVICE: HCPCS | Performed by: INTERNAL MEDICINE

## 2017-07-18 PROCEDURE — 700111 HCHG RX REV CODE 636 W/ 250 OVERRIDE (IP): Performed by: INTERNAL MEDICINE

## 2017-07-18 PROCEDURE — 700102 HCHG RX REV CODE 250 W/ 637 OVERRIDE(OP): Performed by: SURGERY

## 2017-07-18 PROCEDURE — 700111 HCHG RX REV CODE 636 W/ 250 OVERRIDE (IP): Performed by: PHYSICIAN ASSISTANT

## 2017-07-18 PROCEDURE — 80162 ASSAY OF DIGOXIN TOTAL: CPT

## 2017-07-18 PROCEDURE — 36415 COLL VENOUS BLD VENIPUNCTURE: CPT

## 2017-07-18 PROCEDURE — 700111 HCHG RX REV CODE 636 W/ 250 OVERRIDE (IP): Performed by: SURGERY

## 2017-07-18 PROCEDURE — 71010 DX-CHEST-PORTABLE (1 VIEW): CPT

## 2017-07-18 PROCEDURE — 770020 HCHG ROOM/CARE - TELE (206)

## 2017-07-18 RX ORDER — AMIODARONE HYDROCHLORIDE 200 MG/1
400 TABLET ORAL
Status: DISCONTINUED | OUTPATIENT
Start: 2017-07-18 | End: 2017-07-19 | Stop reason: HOSPADM

## 2017-07-18 RX ORDER — DILTIAZEM HYDROCHLORIDE 5 MG/ML
10 INJECTION INTRAVENOUS PRN
Status: DISCONTINUED | OUTPATIENT
Start: 2017-07-18 | End: 2017-07-19 | Stop reason: HOSPADM

## 2017-07-18 RX ORDER — AMIODARONE HYDROCHLORIDE 400 MG/1
400 TABLET ORAL DAILY
Qty: 30 TAB | Refills: 1 | Status: SHIPPED | OUTPATIENT
Start: 2017-07-18 | End: 2017-07-19 | Stop reason: SDUPTHER

## 2017-07-18 RX ORDER — ACETAMINOPHEN 325 MG/1
650 TABLET ORAL EVERY 6 HOURS PRN
Status: DISCONTINUED | OUTPATIENT
Start: 2017-07-18 | End: 2017-07-19 | Stop reason: HOSPADM

## 2017-07-18 RX ADMIN — ENOXAPARIN SODIUM 80 MG: 100 INJECTION SUBCUTANEOUS at 22:05

## 2017-07-18 RX ADMIN — ACETAMINOPHEN 650 MG: 325 TABLET, FILM COATED ORAL at 16:17

## 2017-07-18 RX ADMIN — ENALAPRILAT 2.5 MG: 1.25 INJECTION, SOLUTION INTRAVENOUS at 01:21

## 2017-07-18 RX ADMIN — ENOXAPARIN SODIUM 80 MG: 100 INJECTION SUBCUTANEOUS at 08:04

## 2017-07-18 RX ADMIN — HYDRALAZINE HYDROCHLORIDE 10 MG: 20 INJECTION INTRAMUSCULAR; INTRAVENOUS at 22:05

## 2017-07-18 RX ADMIN — DIGOXIN 125 MCG: 125 TABLET ORAL at 22:04

## 2017-07-18 RX ADMIN — DILTIAZEM HYDROCHLORIDE 10 MG: 5 INJECTION INTRAVENOUS at 23:07

## 2017-07-18 RX ADMIN — FAMOTIDINE 20 MG: 20 TABLET, FILM COATED ORAL at 22:04

## 2017-07-18 RX ADMIN — AMIODARONE HYDROCHLORIDE 400 MG: 200 TABLET ORAL at 09:58

## 2017-07-18 RX ADMIN — SIMVASTATIN 10 MG: 20 TABLET, FILM COATED ORAL at 22:05

## 2017-07-18 RX ADMIN — FAMOTIDINE 20 MG: 20 TABLET, FILM COATED ORAL at 08:04

## 2017-07-18 RX ADMIN — LISINOPRIL 5 MG: 5 TABLET ORAL at 08:03

## 2017-07-18 RX ADMIN — DILTIAZEM HYDROCHLORIDE 240 MG: 240 CAPSULE, COATED, EXTENDED RELEASE ORAL at 09:00

## 2017-07-18 ASSESSMENT — PAIN SCALES - GENERAL: PAINLEVEL_OUTOF10: 0

## 2017-07-18 NOTE — PROGRESS NOTES
Progress Note:    S: Doing well  More alert  Decreased SQ air    O:              Current Facility-Administered Medications   Medication Dose   • amiodarone (CORDARONE) tablet 400 mg  400 mg   • sodium chloride (OCEAN) 0.65 % nasal spray 2 Spray  2 Spray   • digoxin (LANOXIN) tablet 125 mcg  125 mcg   • lorazepam (ATIVAN) injection 0.5-1 mg  0.5-1 mg   • haloperidol lactate (HALDOL) injection 5 mg  5 mg   • enoxaparin (LOVENOX) inj 80 mg  1 mg/kg   • lidocaine-prilocaine (EMLA) 2.5-2.5 % cream 1 Application  1 Application    Or   • lidocaine (XYLOCAINE) 1%  injection  0.5 mL   • diltiazem CD (CARDIZEM CD) capsule 240 mg  240 mg   • lisinopril (PRINIVIL) tablet 5 mg  5 mg   • simvastatin (ZOCOR) tablet 10 mg  10 mg   • Respiratory Care per Protocol     • enalaprilat (VASOTEC) injection 2.5 mg  2.5 mg   • famotidine (PEPCID) tablet 20 mg  20 mg   • hydrALAZINE (APRESOLINE) injection 10 mg  10 mg   • hydrocodone-acetaminophen (NORCO) 5-325 MG per tablet 1-2 Tab  1-2 Tab   • promethazine (PHENERGAN) suppository 25 mg  25 mg   • diphenhydrAMINE (BENADRYL) tablet/capsule 25 mg  25 mg    Or   • diphenhydrAMINE (BENADRYL) injection 25 mg  25 mg   • morphine (pf) 4 mg/ml injection 1-4 mg  1-4 mg    Or   • morphine (pf) 10 mg/ml injection 5 mg  5 mg       PE:  Blood pressure 156/64, pulse 61, temperature 36.9 °C (98.5 °F), resp. rate 16, height 1.829 m (6'), weight 71 kg (156 lb 8.4 oz), SpO2 94 %.    Intake/Output Summary (Last 24 hours) at 07/18/17 1411  Last data filed at 07/18/17 0700   Gross per 24 hour   Intake   1490 ml   Output   1675 ml   Net   -185 ml       Sinus rhythm  Chest tube: no air leak    Rads:  DX-CHEST-PORTABLE (1 VIEW)   Final Result      1.  Probable small LEFT apical pneumothorax, increased from prior exam, with chest tube present.  Evaluation is limited by extensive chest wall emphysema.   2.  Persistent elevation of LEFT hemidiaphragm.      ECHOCARDIOGRAM COMP W/O CONT   Final Result       DX-CHEST-LIMITED (1 VIEW)   Final Result      No significant change from prior exam.         DX-CHEST-PORTABLE (1 VIEW)   Final Result      Increasing subcutaneous emphysema. Minimal left basilar atelectasis, stable.      CT-FOREIGN FILM CAT SCAN   Preliminary Result      DX-CHEST-PORTABLE (1 VIEW)   Final Result      1.  Tiny LEFT apical pneumothorax with chest tube present, no definite change from prior exam.   2.  Extensive LEFT chest wall emphysema.   3.  Volume loss of LEFT hemithorax, improved from prior exam with improvement of LEFT basilar atelectasis.      DX-CHEST-PORTABLE (1 VIEW)   Final Result      Chest findings unchanged compared to 7/13.      DX-CHEST-PORTABLE (1 VIEW)   Final Result         1.  Left pulmonary infiltrates or contusion, similar to prior.   2.  Tiny residual left pneumothorax, decreased in size since prior.   3.  Extensive soft tissue gas in the left chest wall, appears somewhat increased since prior.      DX-CHEST-PORTABLE (1 VIEW)   Final Result         1.  Left pulmonary infiltrates or contusion, similar to prior.   2.  Left apical pneumothorax, decreased in size since prior study, thoracostomy tube remains in place.      DX-CHEST-PORTABLE (1 VIEW)   Final Result      Postsurgical change in the left lung with chest tube in place with apparent small left apical pneumothorax.          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  [facility]

## 2017-07-18 NOTE — PROGRESS NOTES
Date of Service: 7/18/2017  Chief Complaint:No chief complaint on file.    Interval History:  75 y/o male;Consultation for atrial fibrillation with RVR post the left lung upper lobectomy Currently fast VR needs better control;     Breathing OK  History of emphysema, coronary angiogram in 2005 (patent stent in mid LAD, 50% stenosis mid RCA, severe ostial stenosis of septal , severe ostial stenosis of small diagonal) lung cancer without metastasis, dyslipidemiaAll recent medication, labs, imaging studies and procedures reviewed  Echo reviewed 7/2017  Feels better and more with it;  Breathing better; Converted back to SR Yesterday PM  All recent medication, labs, imaging studies and procedures reviewed    Physical Exam   Blood pressure 145/68, pulse 63, temperature 36.9 °C (98.5 °F), resp. rate 17, height 1.829 m (6'), weight 71 kg (156 lb 8.4 oz), SpO2 91 %.    Constitutional:  He appears well-developed.   HENT: Normocephalic and atraumatic. No scleral icterus.   Neck: No JVD present.   Cardiovascular: Normal rate. RRR;Exam reveals no gallop and no friction rub. No murmur heard.   Pulmonary/Chest: Minor wheezes diffusely   Abdominal: S/NT/ND BS+   Musculoskeletal: He exhibits no edema. Pulses present.  Skin: Skin is warm and dry.       Intake/Output Summary (Last 24 hours) at 07/18/17 0851  Last data filed at 07/18/17 0700   Gross per 24 hour   Intake   1490 ml   Output   2280 ml   Net   -790 ml       LABS:  Lab Results   Component Value Date/Time    CHOLESTEROL, 03/22/2017 11:24 AM    LDL 80 03/22/2017 11:24 AM    HDL 45 03/22/2017 11:24 AM    TRIGLYCERIDES 73 03/22/2017 11:24 AM       Lab Results   Component Value Date/Time    WBC 13.0* 07/15/2017 09:19 AM    RBC 4.92 07/15/2017 09:19 AM    HEMOGLOBIN 15.1 07/15/2017 09:19 AM    HEMATOCRIT 44.7 07/15/2017 09:19 AM    MCV 90.9 07/15/2017 09:19 AM    NEUTROPHILS-POLYS 46.90 07/05/2017 08:34 AM    LYMPHOCYTES 38.80 07/05/2017 08:34 AM    MONOCYTES  10.50 07/05/2017 08:34 AM    EOSINOPHILS 2.90 07/05/2017 08:34 AM    BASOPHILS 0.80 07/05/2017 08:34 AM    ANISOCYTOSIS 1+ 10/19/2011 02:26 PM     Lab Results   Component Value Date/Time    SODIUM 137 07/15/2017 09:19 AM    POTASSIUM 3.7 07/15/2017 09:19 AM    CHLORIDE 97 07/15/2017 09:19 AM    CO2 33 07/15/2017 09:19 AM    GLUCOSE 116* 07/15/2017 09:19 AM    BUN 8 07/15/2017 09:19 AM    CREATININE 0.64 07/15/2017 09:19 AM         Lab Results   Component Value Date/Time    ALKALINE PHOSPHATASE 70 07/05/2017 08:34 AM    AST(SGOT) 25 07/05/2017 08:34 AM    ALT(SGPT) 14 07/05/2017 08:34 AM    TOTAL BILIRUBIN 0.6 07/05/2017 08:34 AM      Lab Results   Component Value Date/Time    B NATRIURETIC PEPTIDE 82 05/22/2017 09:10 AM      No results found for: TSH  Lab Results   Component Value Date/Time    PT 13.0 05/22/2017 09:10 AM    INR 0.95 05/22/2017 09:10 AM        Medications reviewed    Imaging reviewed    ECHO(1/2017):No prior study is available for comparison.   Normal left ventricular systolic function.  Left ventricular ejection fraction is visually estimated to be 60%.  Grade II diastolic dysfunction.  The right ventricle was normal in size and function.  No significant valve disease or flow abnormalities    Echo 7/17/2017:Compared to the images of the prior study done 1/2/17, there has been   no significant change.  Normal left ventricular systolic function.   No evidence of valvular abnormality based on Doppler evaluation.   Technically difficult study incomplete information is obtained.     Impressions:  A. fib with RVR (new onset post lobectomy), Now in SR   Hypertension,    Recommendations:  WBC elevated; monitor, no fever  In SR now; IV amiodarone change to  mg/d, diltiazem 240, digoxin PRN was added for RVR with standing, remains in afib  On therapeutic Lovenox  History of CAD and previous PCI/stent, on simvastatin 10, start aspirin    Echo reviewed and no sig changes from prior;  Will follow as  outpatient 2 wks after d/c from [facility]

## 2017-07-18 NOTE — PROGRESS NOTES
Bedside report performed with Rohit. Pt is a/o, safety check performed, all possessions and call bell within reach. POC and doctors orders addressed as needed.

## 2017-07-18 NOTE — PROGRESS NOTES
Bedside report received. Pt remains in converted to NS. Will address w/ Cardiology. CXR showed small, slightly increased pneumothorax in LL base. Dr. Ganser to review this afternoon for possible chest tube removal. Pt mentation and combativeness were less of an issue, per PM RN. Swelling from subq emphysema seems reduced, as well. Pt denies CP, SOB and N&V, at this time. Pt is having BM and urinating. Bed low and locked, alarm on, call bell and phone within reach.

## 2017-07-18 NOTE — CARE PLAN
Problem: Discharge Barriers/Planning  Goal: Patient’s continuum of care needs will be met  Outcome: PROGRESSING AS EXPECTED  No needs to be met, at this time. DC 7/19/17.    Problem: Respiratory:  Goal: Respiratory status will improve  Outcome: PROGRESSING AS EXPECTED  Chest tube removed, O2 sat maintained, subq emphysema resolving.

## 2017-07-19 ENCOUNTER — PATIENT OUTREACH (OUTPATIENT)
Dept: HEALTH INFORMATION MANAGEMENT | Facility: OTHER | Age: 77
End: 2017-07-19

## 2017-07-19 ENCOUNTER — TELEPHONE (OUTPATIENT)
Dept: VASCULAR LAB | Facility: MEDICAL CENTER | Age: 77
End: 2017-07-19

## 2017-07-19 VITALS
DIASTOLIC BLOOD PRESSURE: 74 MMHG | RESPIRATION RATE: 18 BRPM | OXYGEN SATURATION: 95 % | HEIGHT: 72 IN | TEMPERATURE: 98.1 F | HEART RATE: 78 BPM | BODY MASS INDEX: 21.2 KG/M2 | WEIGHT: 156.53 LBS | SYSTOLIC BLOOD PRESSURE: 121 MMHG

## 2017-07-19 DIAGNOSIS — I48.91 ATRIAL FIBRILLATION, UNSPECIFIED TYPE (HCC): ICD-10-CM

## 2017-07-19 DIAGNOSIS — I25.10 CORONARY ARTERY DISEASE INVOLVING NATIVE CORONARY ARTERY OF NATIVE HEART WITHOUT ANGINA PECTORIS: ICD-10-CM

## 2017-07-19 LAB
ALBUMIN SERPL BCP-MCNC: 3.3 G/DL (ref 3.2–4.9)
ALBUMIN/GLOB SERPL: 1.5 G/DL
ALP SERPL-CCNC: 71 U/L (ref 30–99)
ALT SERPL-CCNC: 19 U/L (ref 2–50)
ANION GAP SERPL CALC-SCNC: 5 MMOL/L (ref 0–11.9)
AST SERPL-CCNC: 20 U/L (ref 12–45)
BASOPHILS # BLD AUTO: 0.3 % (ref 0–1.8)
BASOPHILS # BLD: 0.06 K/UL (ref 0–0.12)
BILIRUB SERPL-MCNC: 0.8 MG/DL (ref 0.1–1.5)
BUN SERPL-MCNC: 14 MG/DL (ref 8–22)
CALCIUM SERPL-MCNC: 9.3 MG/DL (ref 8.5–10.5)
CHLORIDE SERPL-SCNC: 100 MMOL/L (ref 96–112)
CO2 SERPL-SCNC: 32 MMOL/L (ref 20–33)
CREAT SERPL-MCNC: 0.88 MG/DL (ref 0.5–1.4)
EOSINOPHIL # BLD AUTO: 0.06 K/UL (ref 0–0.51)
EOSINOPHIL NFR BLD: 0.3 % (ref 0–6.9)
ERYTHROCYTE [DISTWIDTH] IN BLOOD BY AUTOMATED COUNT: 49 FL (ref 35.9–50)
GFR SERPL CREATININE-BSD FRML MDRD: >60 ML/MIN/1.73 M 2
GLOBULIN SER CALC-MCNC: 2.2 G/DL (ref 1.9–3.5)
GLUCOSE SERPL-MCNC: 160 MG/DL (ref 65–99)
HCT VFR BLD AUTO: 43 % (ref 42–52)
HGB BLD-MCNC: 14.6 G/DL (ref 14–18)
IMM GRANULOCYTES # BLD AUTO: 0.11 K/UL (ref 0–0.11)
IMM GRANULOCYTES NFR BLD AUTO: 0.5 % (ref 0–0.9)
LYMPHOCYTES # BLD AUTO: 1.51 K/UL (ref 1–4.8)
LYMPHOCYTES NFR BLD: 7.2 % (ref 22–41)
MCH RBC QN AUTO: 31.3 PG (ref 27–33)
MCHC RBC AUTO-ENTMCNC: 34 G/DL (ref 33.7–35.3)
MCV RBC AUTO: 92.1 FL (ref 81.4–97.8)
MONOCYTES # BLD AUTO: 1.49 K/UL (ref 0–0.85)
MONOCYTES NFR BLD AUTO: 7.1 % (ref 0–13.4)
NEUTROPHILS # BLD AUTO: 17.8 K/UL (ref 1.82–7.42)
NEUTROPHILS NFR BLD: 84.6 % (ref 44–72)
NRBC # BLD AUTO: 0 K/UL
NRBC BLD AUTO-RTO: 0 /100 WBC
PLATELET # BLD AUTO: 299 K/UL (ref 164–446)
PMV BLD AUTO: 10.2 FL (ref 9–12.9)
POTASSIUM SERPL-SCNC: 4.5 MMOL/L (ref 3.6–5.5)
PROT SERPL-MCNC: 5.5 G/DL (ref 6–8.2)
RBC # BLD AUTO: 4.67 M/UL (ref 4.7–6.1)
SODIUM SERPL-SCNC: 137 MMOL/L (ref 135–145)
WBC # BLD AUTO: 21 K/UL (ref 4.8–10.8)

## 2017-07-19 PROCEDURE — 700102 HCHG RX REV CODE 250 W/ 637 OVERRIDE(OP): Performed by: PHYSICIAN ASSISTANT

## 2017-07-19 PROCEDURE — A9270 NON-COVERED ITEM OR SERVICE: HCPCS | Performed by: INTERNAL MEDICINE

## 2017-07-19 PROCEDURE — 700111 HCHG RX REV CODE 636 W/ 250 OVERRIDE (IP): Performed by: SURGERY

## 2017-07-19 PROCEDURE — 36415 COLL VENOUS BLD VENIPUNCTURE: CPT

## 2017-07-19 PROCEDURE — 80053 COMPREHEN METABOLIC PANEL: CPT

## 2017-07-19 PROCEDURE — 85025 COMPLETE CBC W/AUTO DIFF WBC: CPT

## 2017-07-19 PROCEDURE — 700102 HCHG RX REV CODE 250 W/ 637 OVERRIDE(OP): Performed by: INTERNAL MEDICINE

## 2017-07-19 PROCEDURE — A9270 NON-COVERED ITEM OR SERVICE: HCPCS | Performed by: PHYSICIAN ASSISTANT

## 2017-07-19 RX ORDER — ASPIRIN 81 MG/1
81 TABLET ORAL DAILY
Qty: 30 TAB | Refills: 2 | OUTPATIENT
Start: 2017-07-20 | End: 2019-01-01

## 2017-07-19 RX ORDER — AMIODARONE HYDROCHLORIDE 400 MG/1
400 TABLET ORAL DAILY
Qty: 30 TAB | Refills: 1 | OUTPATIENT
Start: 2017-07-19 | End: 2017-07-28

## 2017-07-19 RX ORDER — DIGOXIN 125 MCG
125 TABLET ORAL DAILY
Qty: 30 TAB | Refills: 3 | Status: SHIPPED | OUTPATIENT
Start: 2017-07-19 | End: 2017-07-19 | Stop reason: SDUPTHER

## 2017-07-19 RX ORDER — ASPIRIN 81 MG/1
81 TABLET ORAL DAILY
Qty: 30 TAB | Refills: 2 | Status: SHIPPED | OUTPATIENT
Start: 2017-07-20 | End: 2017-07-19 | Stop reason: SDUPTHER

## 2017-07-19 RX ORDER — DIGOXIN 125 MCG
125 TABLET ORAL DAILY
Qty: 30 TAB | Refills: 3 | OUTPATIENT
Start: 2017-07-19 | End: 2017-07-28

## 2017-07-19 RX ADMIN — ENOXAPARIN SODIUM 80 MG: 100 INJECTION SUBCUTANEOUS at 08:22

## 2017-07-19 RX ADMIN — AMIODARONE HYDROCHLORIDE 400 MG: 200 TABLET ORAL at 08:21

## 2017-07-19 RX ADMIN — LISINOPRIL 5 MG: 5 TABLET ORAL at 08:22

## 2017-07-19 RX ADMIN — FAMOTIDINE 20 MG: 20 TABLET, FILM COATED ORAL at 08:21

## 2017-07-19 RX ADMIN — DILTIAZEM HYDROCHLORIDE 240 MG: 240 CAPSULE, COATED, EXTENDED RELEASE ORAL at 09:00

## 2017-07-19 ASSESSMENT — PAIN SCALES - GENERAL
PAINLEVEL_OUTOF10: 0
PAINLEVEL_OUTOF10: 2
PAINLEVEL_OUTOF10: 0
PAINLEVEL_OUTOF10: 0

## 2017-07-19 NOTE — CARE PLAN
Problem: Infection  Goal: Will remain free from infection  Outcome: PROGRESSING AS EXPECTED  Vital signs and labs assessed for infection. Proper hand hygiene performed prior to entering and exiting pt's room. Pt and family educated on proper hand hygiene.     Problem: Mobility  Goal: Risk for activity intolerance will decrease  Outcome: PROGRESSING AS EXPECTED  Pt moves around room on own well, and is steady on feet. Pt shows no sign of intolerance. Pt sits when needed to catch breath.

## 2017-07-19 NOTE — PROGRESS NOTES
Spoke with Dr. Ganser and got the ok to discharge pt. Dr. Ganser will call prescription for amiodarone in to pt's pharmacy. Other discharging medications were given to pt from Dr. Wheat at hospital. SW working on pt's insurance to be sure xarelto will be covered. Pt updated and waiting for final discharge.

## 2017-07-19 NOTE — PROGRESS NOTES
Paged cardiology per pt's wife request to find out about discharge. Cardiology wants to see pt before pt is discharged. Pt's wife is very adamant about leaving hospital right now and really wants to leave. Cardiology called again, RN shared information with cardiologist and he says he will be by to see them within an hour. Pt's wife is pretty upset at not being able to leave right now.

## 2017-07-19 NOTE — PROGRESS NOTES
Date of Service: 7/19/2017  Chief Complaint:AFib    Interval History:  77 y/o male;Consultation for atrial fibrillation with RVR post the left lung upper lobectomy Went into AFib and then converted to SR on Amiodarone drip; Last night back to AFib w/RVR; CCB justr given; Currently fast VR needs better control;     Breathing OK; Started On Xarelto, off Lovenox; Continue Digoxin  Wife want him to go home; He is able to ambulate 100 ft w/o sig dyspnea  All recent medication, labs, imaging studies and procedures reviewed    Physical Exam   Blood pressure 121/64, pulse 103, temperature 36.8 °C (98.3 °F), resp. rate 18, height 1.829 m (6'), weight 71 kg (156 lb 8.4 oz), SpO2 98 %.    Constitutional:  He appears well-developed.    HENT: Normocephalic and atraumatic. No scleral icterus.    Neck: No JVD present.    Cardiovascular: Fast rate. IRR;Exam reveals no gallop and no friction rub. No murmur heard.    Pulmonary/Chest: Minor wheezes diffusely    Abdominal: S/NT/ND BS+   Musculoskeletal: He exhibits no edema. Pulses present.  Skin: Skin is warm and dry      Intake/Output Summary (Last 24 hours) at 07/19/17 0945  Last data filed at 07/19/17 0900   Gross per 24 hour   Intake    480 ml   Output      0 ml   Net    480 ml       LABS:  Lab Results   Component Value Date/Time    CHOLESTEROL, 03/22/2017 11:24 AM    LDL 80 03/22/2017 11:24 AM    HDL 45 03/22/2017 11:24 AM    TRIGLYCERIDES 73 03/22/2017 11:24 AM       Lab Results   Component Value Date/Time    WBC 13.0* 07/15/2017 09:19 AM    RBC 4.92 07/15/2017 09:19 AM    HEMOGLOBIN 15.1 07/15/2017 09:19 AM    HEMATOCRIT 44.7 07/15/2017 09:19 AM    MCV 90.9 07/15/2017 09:19 AM    NEUTROPHILS-POLYS 46.90 07/05/2017 08:34 AM    LYMPHOCYTES 38.80 07/05/2017 08:34 AM    MONOCYTES 10.50 07/05/2017 08:34 AM    EOSINOPHILS 2.90 07/05/2017 08:34 AM    BASOPHILS 0.80 07/05/2017 08:34 AM    ANISOCYTOSIS 1+ 10/19/2011 02:26 PM     Lab Results   Component Value Date/Time    SODIUM  137 07/15/2017 09:19 AM    POTASSIUM 3.7 07/15/2017 09:19 AM    CHLORIDE 97 07/15/2017 09:19 AM    CO2 33 07/15/2017 09:19 AM    GLUCOSE 116* 07/15/2017 09:19 AM    BUN 8 07/15/2017 09:19 AM    CREATININE 0.64 07/15/2017 09:19 AM         Lab Results   Component Value Date/Time    ALKALINE PHOSPHATASE 70 07/05/2017 08:34 AM    AST(SGOT) 25 07/05/2017 08:34 AM    ALT(SGPT) 14 07/05/2017 08:34 AM    TOTAL BILIRUBIN 0.6 07/05/2017 08:34 AM      Lab Results   Component Value Date/Time    B NATRIURETIC PEPTIDE 82 05/22/2017 09:10 AM      No results found for: TSH  Lab Results   Component Value Date/Time    PT 13.0 05/22/2017 09:10 AM    INR 0.95 05/22/2017 09:10 AM        Medications reviewed    Imaging reviewed      ECHO(1/2017):No prior study is available for comparison.   Normal left ventricular systolic function.  Left ventricular ejection fraction is visually estimated to be 60%.  Grade II diastolic dysfunction.  The right ventricle was normal in size and function.  No significant valve disease or flow abnormalities    Echo 7/17/2017:Compared to the images of the prior study done 1/2/17, there has been   no significant change.  Normal left ventricular systolic function.   No evidence of valvular abnormality based on Doppler evaluation.   Technically difficult study incomplete information is obtained:    Impressions:  A. fib with RVR (new onset post lobectomy), Was in SR; Now back in AFib RVR  Hypertension    Recommendations:  Started Xarelto  HR controlled  O2 suppl NC  Aims for Rate control for now; Consider Digoxin  History of CAD and previous PCI/stent, on simvastatin 10, start aspirin    Echo reviewed and no sig changes from prior;  Will follow as outpatient 2 wks after d/c from [facility]

## 2017-07-19 NOTE — PROGRESS NOTES
Per cardiology request paged Dr. Ganser to ensure prescribing xarelto would not conflict with any of his treatments for pt. Dr. Ganser okay with pt being started on xarelto and said to hold the discharge until cardiology decides further.

## 2017-07-19 NOTE — PROGRESS NOTES
Received report and assumed care of patient. Patient is alert and oriented x4. Patient is in no signs of distress and denies pain at this time. Patient was updated on the plan of care for the day. Patients wife present at bedside. Call light within reach, bed in low position, 2 side rails up. Educated on fall risk, verbalizes understanding. Will continue to monitor

## 2017-07-19 NOTE — DISCHARGE PLANNING
Medical Social Work    Faxed pt's Xarelto rx to [facility] pharmacy on [address]. Pt's co pay is $382. Informed pt and his wife who stated they are unable to afford co pay and will not be picking medication up. BRIANDA called and spoke with [employer] who was able to get pt 1 month worth of samples and savings card.   Information relayed to pt and spouse.

## 2017-07-19 NOTE — CARE PLAN
Problem: Respiratory:  Goal: Respiratory status will improve  Intervention: Assess and monitor pulmonary status    07/15/17 0016 07/18/17 2000 07/18/17 2335   OTHER   Respiration --  --  20   Pulse Oximetry --  --  94 %   O2 (LPM) --  --  2   Respiratory Pattern --  Dyspnea with Exertion --    Work Of Breathing / Effort --  Mild --    Pre/Post Intervention Pre Intervention Assessment --  --    RUL Breath Sounds --  Clear --    RML Breath Sounds --  Clear --    RLL Breath Sounds --  Diminished --    JENINFER Breath Sounds --  Diminished --    LLL Breath Sounds --  Diminished --            Problem: Skin Integrity  Goal: Risk for impaired skin integrity will decrease  Intervention: Implement precautions to protect skin integrity in collaboration with the interdisciplinary team    07/18/17 0800 07/18/17 2000   OTHER   Skin Preventative Measures --  Pillows in Use for Support / Positioning;Silicone Oxygen Tubing in Use   Bed Types --  Pressure Redistribution Mattress (Atmosair)   Friction Interventions --  Draw Sheet / Pad Used for Repositioning   Moisturizers --  Moisturizer    Activity  --  Bed;Edge of bed;Up to bathroom   Patient Turns / Repositioning --  Patient Turns Self from Side to Side   Assistance / Tolerance for Turning/Repositioning Assistance of One --    Patient is Receiving Nutrition --  Oral Intake Adequate   Vitamin Therapy in Use --  No

## 2017-07-19 NOTE — DISCHARGE PLANNING
Upon utilization review, patient noted to be on the following medications that could potentially require prior authorization if prescribed at discharge: xarelto.  If it is anticipated that patient will require these medications at discharge, beginning the prescription prior auth process in advance to anticipated discharge could assist in preventing delays when patient is medically cleared to be discharged from the hospital.

## 2017-07-19 NOTE — PROGRESS NOTES
Report received at bedside, assumed care of patient. Patient A&O x 4, no signs of distress noted. All LDAs assessed. Discussed POC with patient and all questions answered at this time. Denies pain at this time. Bed in lowest position, upper side rails up. Non-skid socks in place. Call light within reach. Personal possessions in reach. Will continue to monitor pt status.

## 2017-07-19 NOTE — TELEPHONE ENCOUNTER
[facility]    Brought 30 days supply of Xarelto to pt from [facility].   Educated pt and provided counseling.    CrCl ~74 mL/min, however per discussion with Dr Wheat, Pt to have lower Xarelto dose due to DDI (amiodaronde and ditiazem) and concomitant ASA usage.      Asael Norris, PHARMD

## 2017-07-19 NOTE — DISCHARGE INSTRUCTIONS
Discharge Instructions    Discharged to home by car with relative. Discharged via walking, hospital escort: Yes.  Special equipment needed: Oxygen    Be sure to schedule a follow-up appointment with your primary care doctor or any specialists as instructed.     Discharge Plan:   May shower   Remove Tegaderms in 3-4 days  No baths, hot tubs, soaks for 7 days  No lifting >20 lbs for 1 wk  No driving for 4-5 days   F/U with Dr. Ganser in 1-2 weeks  F/U with Dr. Wheat in 2 weeks    Influenza Vaccine Indication: Not indicated: Previously immunized this influenza season and > 8 years of age    I understand that a diet low in cholesterol, fat, and sodium is recommended for good health. Unless I have been given specific instructions below for another diet, I accept this instruction as my diet prescription.   Other diet: Regular    Special Instructions: None    · Is patient discharged on Warfarin / Coumadin?   No     · Is patient Post Blood Transfusion?  No    Depression / Suicide Risk    As you are discharged from this [facility] facility, it is important to learn how to keep safe from harming yourself.    Recognize the warning signs:  · Abrupt changes in personality, positive or negative- including increase in energy   · Giving away possessions  · Change in eating patterns- significant weight changes-  positive or negative  · Change in sleeping patterns- unable to sleep or sleeping all the time   · Unwillingness or inability to communicate  · Depression  · Unusual sadness, discouragement and loneliness  · Talk of wanting to die  · Neglect of personal appearance   · Rebelliousness- reckless behavior  · Withdrawal from people/activities they love  · Confusion- inability to concentrate     If you or a loved one observes any of these behaviors or has concerns about self-harm, here's what you can do:  · Talk about it- your feelings and reasons for harming yourself  · Remove any means that you might use to hurt yourself  (examples: pills, rope, extension cords, firearm)  · Get professional help from the community (Mental Health, Substance Abuse, psychological counseling)  · Do not be alone:Call your Safe Contact- someone whom you trust who will be there for you.  · Call your local CRISIS HOTLINE 553-7648 or 443-682-5352  · Call your local Children's Mobile Crisis Response Team [place] (843) 486-7552 or www.[employer]  · Call the toll free National Suicide Prevention Hotlines   · National Suicide Prevention Lifeline 536-912-GZFS (9875)  · National Hope Line Network 800-SUICIDE (686-7913)  Amiodarone tablets  What is this medicine?  AMIODARONE (a WILBER oh da rajni) is an antiarrhythmic drug. It helps make your heart beat regularly. Because of the side effects caused by this medicine, it is only used when other medicines have not worked. It is usually used for heartbeat problems that may be life threatening.  This medicine may be used for other purposes; ask your health care provider or pharmacist if you have questions.  COMMON BRAND NAME(S): Cordarone, Pacerone  What should I tell my health care provider before I take this medicine?  They need to know if you have any of these conditions:  -liver disease  -lung disease  -other heart problems  -thyroid disease  -an unusual or allergic reaction to amiodarone, iodine, other medicines, foods, dyes, or preservatives  -pregnant or trying to get pregnant  -breast-feeding  How should I use this medicine?  Take this medicine by mouth with a glass of water. Follow the directions on the prescription label. You can take this medicine with or without food. However, you should always take it the same way each time. Take your doses at regular intervals. Do not take your medicine more often than directed. Do not stop taking except on the advice of your doctor or health care professional.  A special MedGuide will be given to you by the pharmacist with each prescription and refill. Be sure to  read this information carefully each time.  Talk to your pediatrician regarding the use of this medicine in children. Special care may be needed.  Overdosage: If you think you have taken too much of this medicine contact a poison control center or emergency room at once.  NOTE: This medicine is only for you. Do not share this medicine with others.  What if I miss a dose?  If you miss a dose, take it as soon as you can. If it is almost time for your next dose, take only that dose. Do not take double or extra doses.  What may interact with this medicine?  Do not take this medicine with any of the following medications:  -abarelix  -amoxapine  -apomorphine  -arsenic trioxide  -certain macrolide antibiotics  -certain quinolone antibiotics  -cisapride  -droperidol  -haloperidol  -hawthorn  -levomethadyl  -maprotiline  -medicines for malaria like chloroquine and halofantrine  -medicines for mental depression such as tricyclic antidepressants  -medicines to control heart rhythm like disopyramide, dofetilide, ibutilide, propafenone, and sotalol  -methadone  -mibefradil  -pentamidine  -phenothiazines like chlorpromazine, mesoridazine, and thioridazine  -pimozide  -probucol  -ranolazine  -sertindole  -vardenafil  -red yeast rice  -ziprasidone  This medicine may also interact with the following medications:  -beta blockers  -calcium channel blockers  -cholestyramine  -cimetidine  -clopidogrel  -cyclosporine  -dextromethorphan  -digoxin  -diuretics  -fentanyl  -flecainide  -fluindione  -general anesthetics  -grapefruit juice  -lidocaine  -loratadine  -medicines for fungal infections like ketoconazole, fluconazole, and itraconazole  -medicines for HIV, AIDS  -medicines for seizures such as phenytoin  -medicines for thyroid problems  -medicines to lower cholesterol such as atorvastatin, cerivastatin, lovastatin, or simvastatin  -methotrexate  -procainamide  -quinidine  -rifampin, rifabutin, or rifapentine  -Crowell's  Wort  -trazodone  -warfarin  This list may not describe all possible interactions. Give your health care provider a list of all the medicines, herbs, non-prescription drugs, or dietary supplements you use. Also tell them if you smoke, drink alcohol, or use illegal drugs. Some items may interact with your medicine.  What should I watch for while using this medicine?  Your condition will be monitored closely when you first begin therapy. Often, this drug is first started in a hospital or other monitored health care setting. Once you are on maintenance therapy, visit your doctor or health care professional for regular checks on your progress. Because your condition and use of this medicine carry some risk, it is a good idea to carry an identification card, necklace or bracelet with details of your condition, medications, and doctor or health care professional.  You may get drowsy or dizzy. Do not drive, use machinery, or do anything that needs mental alertness until you know how this medicine affects you. Do not stand or sit up quickly, especially if you are an older patient. This reduces the risk of dizzy or fainting spells.  This medicine can make you more sensitive to the sun. Keep out of the sun. If you cannot avoid being in the sun, wear protective clothing and use sunscreen. Do not use sun lamps or tanning beds/booths.  You should have regular eye exams before and during treatment. Call your doctor if you have blurred vision, see halos, or your eyes become sensitive to light. Your eyes may get dry. It may be helpful to use a lubricating eye solution or artificial tears solution.  If you are going to have surgery or a procedure that requires contrast dyes, tell your doctor or health care professional that you are taking this medicine.  What side effects may I notice from receiving this medicine?  Side effects that you should report to your doctor or health care professional as soon as possible:  -allergic  reactions like skin rash, itching or hives, swelling of the face, lips, or tongue  -blue-gray coloring of the skin  -blurred vision, seeing blue green halos, increased sensitivity of the eyes to light  -breathing problems  -chest pain  -dark urine  -fast, irregular heartbeat  -feeling faint or light-headed  -intolerance to heat or cold  -nausea or vomiting  -pain and swelling of the scrotum  -pain, tingling, numbness in feet, hands  -redness, blistering, peeling or loosening of the skin, including inside the mouth  -spitting up blood  -stomach pain  -sweating  -unusual or uncontrolled movements of body  -unusually weak or tired  -weight gain or loss  -yellowing of the eyes or skin  Side effects that usually do not require medical attention (report to your doctor or health care professional if they continue or are bothersome):  -change in sex drive or performance  -constipation  -dizziness  -headache  -loss of appetite  -trouble sleeping  This list may not describe all possible side effects. Call your doctor for medical advice about side effects. You may report side effects to FDA at 0-716-FDA-9865.  Where should I keep my medicine?  Keep out of the reach of children.  Store at room temperature between 20 and 25 degrees C (68 and 77 degrees F). Protect from light. Keep container tightly closed. Throw away any unused medicine after the expiration date.  NOTE: This sheet is a summary. It may not cover all possible information. If you have questions about this medicine, talk to your doctor, pharmacist, or health care provider.  © 2014, Elsevier/Gold Standard. (7/16/2010 2:08:28 PM)    Rivaroxaban oral tablets  What is this medicine?  RIVAROXABAN (ri va ZEB a ban) is an anticoagulant (blood thinner). It is used to treat blood clots in the lungs or in the veins. It is also used after knee or hip surgeries to prevent blood clots. It is also used to lower the chance of stroke in people with a medical condition called atrial  fibrillation.  This medicine may be used for other purposes; ask your health care provider or pharmacist if you have questions.  COMMON BRAND NAME(S): Xarelto  What should I tell my health care provider before I take this medicine?  They need to know if you have any of these conditions:  -bleeding disorders  -bleeding in the brain  -blood in your stools (black or tarry stools) or if you have blood in your vomit  -history of stomach bleeding  -kidney disease  -liver disease  -low blood counts, like low white cell, platelet, or red cell counts  -recent or planned spinal or epidural procedure  -take medicines that treat or prevent blood clots  -an unusual or allergic reaction to rivaroxaban, other medicines, foods, dyes, or preservatives  -pregnant or trying to get pregnant  -breast-feeding  How should I use this medicine?  Take this medicine by mouth with a glass of water. Follow the directions on the prescription label. Take your medicine at regular intervals. Do not take it more often than directed. Do not stop taking except on your doctor's advice.  If you are taking this medicine after hip or knee replacement surgery, take it with or without food.  If you are taking this medicine for atrial fibrillation, take it with your evening meal. If you are taking this medicine to treat blood clots, take it with food at the same time each day. If you are unable to swallow your tablet, you may crush the tablet and mix it in applesauce. Then, immediately eat the applesauce. You should eat more food right after you eat the applesauce containing the crushed tablet.  Talk to your pediatrician regarding the use of this medicine in children. Special care may be needed.  Overdosage: If you think you've taken too much of this medicine contact a poison control center or emergency room at once.  Overdosage: If you think you have taken too much of this medicine contact a poison control center or emergency room at once.  NOTE: This  medicine is only for you. Do not share this medicine with others.  What if I miss a dose?  If you take your medicine once a day and miss a dose, take the missed dose as soon as you remember. If you take your medicine twice a day and miss a dose, take the missed dose immediately. In this instance, 2 tablets may be taken at the same time. The next day you should take 1 tablet twice a day as directed.  What may interact with this medicine?  -aspirin and aspirin-like medicines  -certain antibiotics like erythromycin, azithromycin, and clarithromycin  -certain medicines for fungal infections like ketoconazole and itraconazole  -certain medicines for irregular heart beat like amiodarone, quinidine, dronedarone  -certain medicines for seizures like carbamazepine, phenytoin  -certain medicines that treat or prevent blood clots like warfarin, enoxaparin, and dalteparin   -conivaptan  -diltiazem  -felodipine  -indinavir  -lopinavir; ritonavir  -NSAIDS, medicines for pain and inflammation, like ibuprofen or naproxen  -ranolazine  -rifampin  -ritonavir  -Boris's wort  -verapamil  This list may not describe all possible interactions. Give your health care provider a list of all the medicines, herbs, non-prescription drugs, or dietary supplements you use. Also tell them if you smoke, drink alcohol, or use illegal drugs. Some items may interact with your medicine.  What should I watch for while using this medicine?  Do not stop taking this medicine without first talking to your doctor. Stopping this medicine may increase your risk of having a stroke. Be sure to refill your prescription before you run out of medicine.  This medicine may increase your risk to bruise or bleed. Call your doctor or health care professional if you notice any unusual bleeding.  Be careful brushing and flossing your teeth or using a toothpick because you may bleed more easily. If you have any dental work done, tell your dentist you are receiving this  medicine.  What side effects may I notice from receiving this medicine?  Side effects that you should report to your doctor or health care professional as soon as possible:  -allergic reactions like skin rash, itching or hives, swelling of the face, lips, or tongue  -back pain  -bloody or black, tarry stools  -changes in vision  -confusion, trouble speaking or understanding  -red or dark-brown urine  -redness, blistering, peeling or loosening of the skin, including inside the mouth  -severe headaches  -spitting up blood or brown material that looks like coffee grounds  -sudden numbness or weakness of the face, arm or leg  -trouble walking, dizziness, loss of balance or coordination  -unusual bruising or bleeding from the eye, gums, or nose   Side effects that usually do not require medical attention (Report these to your doctor or health care professional if they continue or are bothersome.):  -dizziness  -muscle pain  This list may not describe all possible side effects. Call your doctor for medical advice about side effects. You may report side effects to FDA at 0-694-FDA-6576.  Where should I keep my medicine?  Keep out of the reach of children.  Store at room temperature between 15 and 30 degrees C (59 and 86 degrees F). Throw away any unused medicine after the expiration date.  NOTE: This sheet is a summary. It may not cover all possible information. If you have questions about this medicine, talk to your doctor, pharmacist, or health care provider.  © 2014, Elsevier/Gold Standard. (3/17/2014 3:32:09 PM)

## 2017-07-19 NOTE — PROGRESS NOTES
Discharge instructions given to patient. Prescriptions given to patient. Discharge instructions given to patient at bedside, verbalizes understanding and states plans for follow-up with surgeon and cardiology. New and home medication review, post-discharge activity level and worsening of symptoms needing follow-up care discussed. Telemetry monitor/IV cathlon removed. All belongings accounted for, all questions answered at this time. Patient taken down in wheelchair with CNA, patient driven home by wife in their car.

## 2017-07-19 NOTE — PROGRESS NOTES
MS:  SR 83-85, converted to Afib @ 2110   Afib 111-123, tach up to 130-150  (f) PVC, (r) bigem  --/.08/--

## 2017-07-20 ENCOUNTER — TELEPHONE (OUTPATIENT)
Dept: CARDIOLOGY | Facility: MEDICAL CENTER | Age: 77
End: 2017-07-20

## 2017-07-20 NOTE — TELEPHONE ENCOUNTER
Called [facility] to change patients digoxin, amiodarone and aspirin rx over to Dr. Wheat as the prescribing physician. Pharmacist will fax prior auth form for amiodarone to be completed by our office and spoke with patient's wife to let her know. She verbalized understanding and will wait to hear from us on the outcome.  Also helped r/s patient f/u appt as they originally had patient scheduled in [facility] and patient lives in [facility].  2 appointment times held as of right now and patient will call me back tomorrow to let me know which one works better so that we can cancel the other.             Patient's prescription needs prior authorization  Received: Today       Madeline PARKS/Jong     Patient was discharged from [facility] today and Dr Wheat followed the patient while in the hospital. Dr John Ganser wrote a prescription for Amiodarone and found out that a prior authorization is needed. He wants Dr Wheat to take over on this prescription.

## 2017-07-20 NOTE — TELEPHONE ENCOUNTER
PAR pending for patient  Amiodarone 400mg  Patient's plan #[phone]  ID# [id number]  Ref#[id number]

## 2017-07-21 ENCOUNTER — TELEPHONE (OUTPATIENT)
Dept: CARDIOLOGY | Facility: MEDICAL CENTER | Age: 77
End: 2017-07-21

## 2017-07-22 ENCOUNTER — PATIENT OUTREACH (OUTPATIENT)
Dept: HEALTH INFORMATION MANAGEMENT | Facility: OTHER | Age: 77
End: 2017-07-22

## 2017-07-22 NOTE — TELEPHONE ENCOUNTER
----- Message from Shubham Melgar M.D. sent at 7/21/2017  5:04 PM PDT -----    Continue digoxin and obtain trough digoxin level    ----- Message -----     From: Marcela Phillips R.N.     Sent: 7/21/2017   4:51 PM       To: Shubham Melgar M.D.    Can you advise please?    ----- Message -----     From: Shirley Lutz     Sent: 7/21/2017   4:11 PM       To: VINCE Uribe/Marcela    Patient's wife Daiana has concerns about his medications. She just picked up his Amiodarone and it said not to take with Digoxin. Dr. Wheat also has him on Digoxin. She can be reached at [phone].

## 2017-07-22 NOTE — PROGRESS NOTES
07/22/2017 1506 - Discharge Outreach - received call from patient's wife. States he had lung resection done and now feet are starting to swell. States he does not have HF - no note of that in EPIC. States he has been sitting a lot with feet on floor. Denies any SOB. Advised to elevate feet when sitting and to ankle pumps freq. Advised to call cardiologist if swelling continues or worsens. Return to ED if SOB. No other questions.

## 2017-07-22 NOTE — TELEPHONE ENCOUNTER
On further review, Dr. Melgar advises that Dr. Wheat decide on the next step regarding digoxin.  For now, patient will stay on it over the weekend and discuss with Jong/Dr Wheat on Monday.    On further discussion with wife, patient was on an Amiodarone drip in hospital, but did not start his discharge prescription until today (off Amio 400 x 2 days).  Advised her to call ADD over the weekend if he goes back into atrial fib and explain.

## 2017-07-24 NOTE — TELEPHONE ENCOUNTER
To MARYA Pugh        Message  Received: Today       Calvin Wheat MD,[facility]  Marcela Phillips R.N.       Caller: Unspecified (3 days ago, 5:31 PM)                     What is his HR and BP? AFib or not? If question, get EKG in our clinic and report back to me; [facility] appt?? Thx

## 2017-07-28 ENCOUNTER — OFFICE VISIT (OUTPATIENT)
Dept: CARDIOLOGY | Facility: MEDICAL CENTER | Age: 77
End: 2017-07-28
Payer: MEDICARE

## 2017-07-28 VITALS
SYSTOLIC BLOOD PRESSURE: 134 MMHG | BODY MASS INDEX: 21 KG/M2 | OXYGEN SATURATION: 91 % | HEIGHT: 71 IN | DIASTOLIC BLOOD PRESSURE: 50 MMHG | HEART RATE: 56 BPM | WEIGHT: 150 LBS

## 2017-07-28 DIAGNOSIS — I25.10 CORONARY ARTERY DISEASE INVOLVING NATIVE CORONARY ARTERY OF NATIVE HEART WITHOUT ANGINA PECTORIS: ICD-10-CM

## 2017-07-28 DIAGNOSIS — I48.91 ATRIAL FIBRILLATION, UNSPECIFIED TYPE (HCC): ICD-10-CM

## 2017-07-28 DIAGNOSIS — R07.9 CHEST PAIN, UNSPECIFIED TYPE: ICD-10-CM

## 2017-07-28 DIAGNOSIS — Z79.01 CHRONIC ANTICOAGULATION: ICD-10-CM

## 2017-07-28 DIAGNOSIS — I10 ESSENTIAL HYPERTENSION: ICD-10-CM

## 2017-07-28 DIAGNOSIS — E78.5 DYSLIPIDEMIA: ICD-10-CM

## 2017-07-28 PROCEDURE — 99204 OFFICE O/P NEW MOD 45 MIN: CPT | Performed by: INTERNAL MEDICINE

## 2017-07-28 RX ORDER — AMIODARONE HYDROCHLORIDE 400 MG/1
200 TABLET ORAL DAILY
Qty: 30 TAB | Refills: 1 | OUTPATIENT
Start: 2017-07-28 | End: 2017-12-27 | Stop reason: SDUPTHER

## 2017-07-28 NOTE — MR AVS SNAPSHOT
"        Humphrey Cnao JrJose Enrique   2017 2:30 PM   Office Visit   MRN: [id number]    Department:  [facility]   Dept Phone:  [phone]    Description:  Male : 1940   Provider:  Calvin Wheat MD,[facility]           Reason for Visit     Hospital Follow-up           Allergies as of 2017     No Known Allergies      You were diagnosed with     Chest pain, unspecified type   [7265191]       Coronary artery disease involving native coronary artery of native heart without angina pectoris   [6259317]       Atrial fibrillation, unspecified type (CMS-[facility])   [0360066]       Dyslipidemia   [568857]       Essential hypertension   [1814362]       Chronic anticoagulation   [854511]         Vital Signs     Blood Pressure Pulse Height Weight Body Mass Index Oxygen Saturation    134/50 mmHg 56 1.803 m (5' 11\") 68.04 kg (150 lb) 20.93 kg/m2 91%    Smoking Status                   Former Smoker           Basic Information     Date Of Birth Sex Race Ethnicity Preferred Language    1940 Male White Non- English      Your appointments     Aug 08, 2017  9:45 AM   EKG with Calvin Wheat MD,[facility] (--)    [address]  [address]  [place] [address] [phone]            Aug 11, 2017 10:15 AM   NM CARDIAC STRESS TEST (30) with [facility] NM DSPECT 2   [facility]    [address]  [place] [id number] [phone]           Some exams require specific prep instructions that would have been given to you at time of scheduling. If you have any additional questions about the prep instructions, please call Imaging Scheduling at [phone] and press #2.            Aug 28, 2017 11:15 AM   FOLLOW UP with Calvin Wheat MD,[facility] (--)    [address]  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   8/23/2010 - Present    CAD (coronary artery disease) I25.10   8/23/2010 - Present    Hypertension I10   8/23/2010 - Present    Dyslipidemia E78.5   8/23/2010 - Present    Malnutrition of mild degree (CMS-HCC) E44.1   8/24/2010 - Present    Abdominal pain  High  10/20/2011 - Present    COPD exacerbation (CMS-HCC) J44.1   12/31/2016 - Present    Bronchitis with chronic airway obstruction (CMS-HCC) J44.9   1/1/2017 - Present    Tobacco dependence F17.200   1/5/2017 - Present    Lung cancer (CMS-HCC) C34.90 High  7/11/2017 - Present      Health Maintenance        Date Due Completion Dates    COLONOSCOPY 8/29/1992 ---    IMM ZOSTER VACCINE 8/29/2002 ---    IMM DTaP/Tdap/Td Vaccine (1 - Tdap) 8/28/2014 8/27/2014    IMM INFLUENZA (1) 9/1/2017 1/4/2017, 7/20/2011    IMM PNEUMOCOCCAL 65+ (ADULT) LOW/MEDIUM RISK SERIES (2 of 2 - PPSV23) 1/4/2018 1/4/2017            Current Immunizations     13-VALENT PCV PREVNAR 1/4/2017  6:22 AM    Influenza TIV (IM) 7/20/2011    Influenza Vaccine Adult HD 1/4/2017  6:23 AM    Pneumococcal Vaccine (UF)Historical Data 5/22/2008    TD Vaccine 8/27/2014      Below and/or attached are the medications your provider expects you to take. Review all of your home medications and newly ordered medications with your provider and/or pharmacist. Follow medication instructions as directed by your provider and/or pharmacist. Please keep your medication list with you and share with your provider. Update the information when medications are discontinued, doses are changed, or new medications (including over-the-counter products) are added; and carry medication information at all times in the event of emergency situations     Allergies:  No Known Allergies          Medications  Valid as of: July 28, 2017 -  3:27 PM    Generic Name Brand Name Tablet Size Instructions for use    Amiodarone HCl (Tab) PACERONE 400 MG  Take 0.5 Tabs by mouth every day.        Ascorbic Acid   Take 2,000 mg by mouth every day.        Aspirin (Tablet Delayed Response) aspirin 81 MG Take 1 Tab by mouth every day.        Cholecalciferol (Tab) cholecalciferol 1000 UNIT Take 1,000 Units by mouth 2 Times a Day.        Coenzyme Q10 (Cap) CoQ-10 100 MG Take  by mouth every day.        DilTIAZem HCl Coated Beads (CAPSULE SR 24 HR) CARDIZEM  MG Take 1 Cap by mouth every day.        Lisinopril (Tab) PRINIVIL 5 MG Take 5 mg by mouth every day.        Non Formulary Request Non Formulary Request  BLACK CUMIN OIL  1 TABLESPOON TWICE A DAY        Oyster Shell (Tab) OS-JACKIE 500 500 MG Take 500 mg by mouth 2 times a day, with meals.        Rivaroxaban (Tab) XARELTO 15 MG Take 1 Tab by mouth with dinner.        Rivaroxaban (Tab) XARELTO 15 MG Take 1 Tab by mouth with dinner.        Simvastatin (Tab) ZOCOR 10 MG Take 10 mg by mouth every evening.        Vitamin E (Cap) VITAMIN E 400 UNIT Take 400 Units by mouth 2 times a day.        .                 Medicines prescribed today were sent to:     [facility] [address] [place] [address], [place] - [address] AT [facility] [address] [place] [address] [address]    Phone: [phone] Fax: [phone]    Open 24 Hours?: No      Medication refill instructions:       If your prescription bottle indicates you have medication refills left, it is not necessary to call your provider’s office. Please contact your pharmacy and they will refill your medication.    If your prescription bottle indicates you do not have any refills left, you may request refills at any time through one of the following ways: The online [employer] system (except Urgent Care), by calling your provider’s office, or by asking your pharmacy to contact your provider’s office with a refill request. Medication refills are processed only during regular business hours and may not be available until the next business day. Your provider may request  additional information or to have a follow-up visit with you prior to refilling your medication.   *Please Note: Medication refills are assigned a new Rx number when refilled electronically. Your pharmacy may indicate that no refills were authorized even though a new prescription for the same medication is available at the pharmacy. Please request the medicine by name with the pharmacy before contacting your provider for a refill.        Your To Do List     Future Labs/Procedures Complete By Expires    BASIC METABOLIC PANEL  As directed 7/28/2018    NM-CARDIAC STRESS TEST  As directed 7/28/2018         [employer] Access Code: Activation code not generated  Current [employer] Status: Active

## 2017-07-28 NOTE — PROGRESS NOTES
Cardiology Consult Note:    Calvin Wheat  Date & Time note created:    7/28/2017   3:01 PM       Patient ID:  Name:             Humphrey Cano   YOB: 1940  Age:                 76 y.o.  male   MRN:               [id number]                                                             Chief Complaint:   Chief Complaint   Patient presents with   • Hospital Follow-up             History of Present Illness:   Humphrey Cano Jr. 77 y/o male;Consultation for atrial fibrillation with RVR post the left lung upper lobectomy Went into AFib and then converted to SR on Amiodarone drip; Last night back to AFib w/RVR; CCB justr given; Currently fast VR needs better control;     Breathing OK; Started On Xarelto, off Lovenox; Continue Digoxin  Wife want him to go home; He is able to ambulate 100 ft w/o sig dyspnea  All recent medication, labs, imaging studies and procedures reviewed    ECHO(1/2017):No prior study is available for comparison.   Normal left ventricular systolic function.  Left ventricular ejection fraction is visually estimated to be 60%.  Grade II diastolic dysfunction.  The right ventricle was normal in size and function.  No significant valve disease or flow abnormalities    Echo 7/17/2017:Compared to the images of the prior study done 1/2/17, there has been   no significant change.  Normal left ventricular systolic function.   No evidence of valvular abnormality based on Doppler evaluation.   Technically difficult study incomplete information is obtained:      Review of Systems:     Constitutional: Denies fevers, Denies weight changes  Eyes: Denies changes in vision, no eye pain  Ears/Nose/Throat/Mouth: Denies nasal congestion or sore throat   Cardiovascular: Denies chest pain or palpitations   Respiratory: Denies shortness of breath , Denies cough  Gastrointestinal/Hepatic: Denies abdominal pain, nausea, vomiting, diarrhea, constipation or GI bleeding   Genitourinary: Denies bladder  "dysfunction, dysuria or frequency  Musculoskeletal/Rheum: Denies  joint pain and swelling   Skin/Breast: Denies rash, denies breast lumps or discharge  Neurological: Denies headache, confusion, memory loss or focal weakness/parasthesias  Psychiatric: denies mood disorder   Endocrine: denies hx of diabetes or thyroid dysfunction  Heme/Oncology/Lymph Nodes: Denies enlarged lymph nodes, denies bruising or known bleeding disorder  Allergic/Immunologic: Denies hx of allergies      All other systems were reviewed and are negative (AMA/CMS criteria)    Past Medical History:   Past Medical History   Diagnosis Date   • Hyperlipidemia    • Hypertension    • Pneumonia    • Indigestion    • Backpain occasional   • COPD    • High cholesterol    • Myocardial infarct (CMS-HCC) with stent     1/23/2005   • Cancer (CMS-HCC) 6/2017     Lung   • CATARACT      Bilateral IOL's   • Coughing blood 7/5/17     Blood and productive in May 2017. \"Just one day\"         Past Surgical History:  Past Surgical History   Procedure Laterality Date   • Other       kidney stone   • Other cardiac surgery     • Splenectomy  8/23/2010     Performed by JIM OMALLEY at SURGERY [facility]   • Thoracoscopy Left 7/11/2017     Procedure: THORACOSCOPY UPPER LOBECTOMY, NODE DISSECTION;  Surgeon: John H Ganser, M.D.;  Location: SURGERY [facility];  Service:        Hospital Medications:    Current outpatient prescriptions:   •  rivaroxaban (XARELTO) 15 MG Tab tablet, Take 1 Tab by mouth with dinner., Disp: 30 Tab, Rfl: 3  •  rivaroxaban (XARELTO) 15 MG Tab tablet, Take 1 Tab by mouth with dinner., Disp: 30 Tab, Rfl: 0  •  amiodarone (PACERONE) 400 MG tablet, Take 1 Tab by mouth every day., Disp: 30 Tab, Rfl: 1  •  digoxin (LANOXIN) 125 MCG Tab, Take 1 Tab by mouth every day at 6 PM., Disp: 30 Tab, Rfl: 3  •  aspirin 81 MG EC tablet, Take 1 Tab by mouth every day., Disp: 30 Tab, Rfl: 2  •  Ascorbic Acid (VITAMIN C PO), Take 2,000 mg by mouth every day., " Disp: , Rfl:   •  Coenzyme Q10 (COQ-10) 100 MG Cap, Take  by mouth every day., Disp: , Rfl:   •  lisinopril (PRINIVIL) 5 MG Tab, Take 5 mg by mouth every day., Disp: , Rfl:   •  Non Formulary Request, BLACK CUMIN OIL 1 TABLESPOON TWICE A DAY, Disp: , Rfl:   •  diltiazem CD (CARDIZEM CD) 240 MG CAPSULE SR 24 HR, Take 1 Cap by mouth every day., Disp: 30 Cap, Rfl: 2  •  simvastatin (ZOCOR) 10 MG Tab, Take 10 mg by mouth every evening., Disp: , Rfl:   •  vitamin D (CHOLECALCIFEROL) 1000 UNIT Tab, Take 1,000 Units by mouth 2 Times a Day., Disp: , Rfl:   •  calcium carbonate (OS-JACKIE 500) 500 MG Tab, Take 500 mg by mouth 2 times a day, with meals., Disp: , Rfl:   •  vitamin e (VITAMIN E) 400 UNIT Cap, Take 400 Units by mouth 2 times a day., Disp: , Rfl:     Current Outpatient Medications:  Current Outpatient Prescriptions   Medication Sig Dispense Refill   • rivaroxaban (XARELTO) 15 MG Tab tablet Take 1 Tab by mouth with dinner. 30 Tab 3   • rivaroxaban (XARELTO) 15 MG Tab tablet Take 1 Tab by mouth with dinner. 30 Tab 0   • amiodarone (PACERONE) 400 MG tablet Take 1 Tab by mouth every day. 30 Tab 1   • digoxin (LANOXIN) 125 MCG Tab Take 1 Tab by mouth every day at 6 PM. 30 Tab 3   • aspirin 81 MG EC tablet Take 1 Tab by mouth every day. 30 Tab 2   • Ascorbic Acid (VITAMIN C PO) Take 2,000 mg by mouth every day.     • Coenzyme Q10 (COQ-10) 100 MG Cap Take  by mouth every day.     • lisinopril (PRINIVIL) 5 MG Tab Take 5 mg by mouth every day.     • Non Formulary Request BLACK CUMIN OIL  1 TABLESPOON TWICE A DAY     • diltiazem CD (CARDIZEM CD) 240 MG CAPSULE SR 24 HR Take 1 Cap by mouth every day. 30 Cap 2   • simvastatin (ZOCOR) 10 MG Tab Take 10 mg by mouth every evening.     • vitamin D (CHOLECALCIFEROL) 1000 UNIT Tab Take 1,000 Units by mouth 2 Times a Day.     • calcium carbonate (OS-JACKIE 500) 500 MG Tab Take 500 mg by mouth 2 times a day, with meals.     • vitamin e (VITAMIN E) 400 UNIT Cap Take 400 Units by mouth 2  "times a day.       No current facility-administered medications for this visit.         Medication Allergy/Sensitivities:  No Known Allergies    Family History:  No family history of premature CAD    Social History:  Social History     Social History   • Marital Status:      Spouse Name: N/A   • Number of Children: N/A   • Years of Education: N/A     Occupational History   • Not on file.     Social History Main Topics   • Smoking status: Former Smoker -- 1.00 packs/day for 60 years     Types: Cigarettes     Quit date: 06/21/2017   • Smokeless tobacco: Not on file      Comment: 1 pk a day   • Alcohol Use: Yes      Comment: Rarely   • Drug Use: No   • Sexual Activity: Not on file     Other Topics Concern   • Not on file     Social History Narrative    ** Merged History Encounter **              Physical Exam:  Vitals  Weight/BMI: Body mass index is 20.93 kg/(m^2).  Blood pressure 134/50, pulse 56, height 1.803 m (5' 11\"), weight 68.04 kg (150 lb), SpO2 91 %.  Filed Vitals:    07/28/17 1447   BP: 134/50   Pulse: 56   Height: 1.803 m (5' 11\")   Weight: 68.04 kg (150 lb)   SpO2: 91%     Oxygen Therapy:  Pulse Oximetry: 91 %  General Appearance:   Well developed, Well nourished, No acute distress, Non-toxic appearance.   HENT:  Normocephalic, Atraumatic, Oropharynx moist mucous membranes, Dentition: , Nose normal.    Eyes:  PERRLA, EOMI, Conjunctiva normal, No discharge.  Neck:  Normal range of motion, No cervical tenderness, Supple, No stridor, no JVD .  No thyromegaly.  No carotid bruit.  Cardiovascular:  Normal heart rate, Normal rhythm,  S1, S2, no S3,  S4; No gallops; No murmurs, No rubs, .   Extremitites with intact distal pulses, no cyanosis, clubbing or edema.  No heaves, thrills, HJR;  Peripheral pulses: carotid 2+, brachial 2+, radial 2+, ulnar 2+, femoral 2+, popliteal 2+, PT 2+, DP 2+;  Lungs:  Respiratory effort is normal. Normal breath sounds, breath sounds clear to auscultation bilaterally,  no " rales, no rhonchi, no wheezing.   Abdomen: Bowel sounds normal, Soft, No tenderness, No guarding, No rebound, No masses, No hepatosplenomegaly.  Skin: Warm, Dry, No erythema, No rash, no induration or crepitus.  Neurologic: Alert & oriented x 3, Normal motor function, Normal sensory function, No focal deficits noted, cranial nerves II through XII are normal,  normal gait.  Psychiatric: Affect normal, Judgment normal, Mood normal.      Data Review:     Records reviewed and summarized in current documentation    Lab Data Review:  Lab Results   Component Value Date/Time    SODIUM 137 07/19/2017 10:17 AM    POTASSIUM 4.5 07/19/2017 10:17 AM    CHLORIDE 100 07/19/2017 10:17 AM    CO2 32 07/19/2017 10:17 AM    GLUCOSE 160* 07/19/2017 10:17 AM    BUN 14 07/19/2017 10:17 AM    CREATININE 0.88 07/19/2017 10:17 AM      Lab Results   Component Value Date/Time    PT 13.0 05/22/2017 09:10 AM    INR 0.95 05/22/2017 09:10 AM      Lab Results   Component Value Date/Time    WBC 21.0* 07/19/2017 10:17 AM    RBC 4.67* 07/19/2017 10:17 AM    HEMOGLOBIN 14.6 07/19/2017 10:17 AM    HEMATOCRIT 43.0 07/19/2017 10:17 AM    MCV 92.1 07/19/2017 10:17 AM    MCH 31.3 07/19/2017 10:17 AM    MCHC 34.0 07/19/2017 10:17 AM    MPV 10.2 07/19/2017 10:17 AM    NEUTROPHILS-POLYS 84.60* 07/19/2017 10:17 AM    LYMPHOCYTES 7.20* 07/19/2017 10:17 AM    MONOCYTES 7.10 07/19/2017 10:17 AM    EOSINOPHILS 0.30 07/19/2017 10:17 AM    BASOPHILS 0.30 07/19/2017 10:17 AM    ANISOCYTOSIS 1+ 10/19/2011 02:26 PM        Imaging/Procedures Review:    To my review shows:see above    EKG To my review shows: SR 56 bpm; QTc 530 msecST-T depression multiple leads    Assessment and Plan.   76 y.o. male Aims for Rate control for now; Consider Digoxin  History of CAD and previous PCI/stent, on simvastatin 10, start aspirin    Echo reviewed and no sig changes from prior  Lexiscan to eval for ischemia  EKG nxt [facility] in 1 wk; recheck QTc  Off Digoxin  Decrease Amiodarone yoandy 200  mg/d    1. Chest pain, unspecified type  Lexiscan    2. Coronary artery disease involving native coronary artery of native heart without angina pectoris  Lexiscan stress test    3. Atrial fibrillation, unspecified type (CMS-HCC)  Rate control; Xarelto anticoag        Return to clinic in  2 months  1. Chest pain, unspecified type  NM-CARDIAC STRESS TEST    CANCELED: EKG   2. Coronary artery disease involving native coronary artery of native heart without angina pectoris  NM-CARDIAC STRESS TEST    CANCELED: EKG   3. Atrial fibrillation, unspecified type (CMS-HCC)  CANCELED: EKG   4. Dyslipidemia     5. Essential hypertension     6. Chronic anticoagulation

## 2017-07-28 NOTE — Clinical Note
[facility]   [address]   [address]  [place] [address]  Phone: [phone]  Fax: [phone]              Humphrey Cano Jr.  1940    Encounter Date: 7/28/2017    Randall Jama M.D.    Thank you for the referral. I had the pleasure of seeing Humphrey Cano Jr. today in cardiology clinic. I've attached my visit note below. If you have any questions please feel free to give me a call anytime.      Calvin Wheat MD, PhD, [facility]  Cardiology and Lipidology  [facility]                                                                    PROGRESS NOTE:  Cardiology Consult Note:    Calvin Wheat  Date & Time note created:    7/28/2017   3:01 PM       Patient ID:  Name:             Humphrey Cano   YOB: 1940  Age:                 76 y.o.  male   MRN:               [id number]                                                             Chief Complaint:   Chief Complaint   Patient presents with   • Hospital Follow-up             History of Present Illness:   Humphrey Cano Jr. 77 y/o male;Consultation for atrial fibrillation with RVR post the left lung upper lobectomy Went into AFib and then converted to SR on Amiodarone drip; Last night back to AFib w/RVR; CCB justr given; Currently fast VR needs better control;     Breathing OK; Started On Xarelto, off Lovenox; Continue Digoxin  Wife want him to go home; He is able to ambulate 100 ft w/o sig dyspnea  All recent medication, labs, imaging studies and procedures reviewed    ECHO(1/2017):No prior study is available for comparison.   Normal left ventricular systolic function.  Left ventricular ejection fraction is visually estimated to be 60%.  Grade II diastolic dysfunction.  The right ventricle was normal in size and function.  No significant valve disease or flow abnormalities    Echo 7/17/2017:Compared to the images of  "the prior study done 1/2/17, there has been   no significant change.  Normal left ventricular systolic function.   No evidence of valvular abnormality based on Doppler evaluation.   Technically difficult study incomplete information is obtained:      Review of Systems:     Constitutional: Denies fevers, Denies weight changes  Eyes: Denies changes in vision, no eye pain  Ears/Nose/Throat/Mouth: Denies nasal congestion or sore throat   Cardiovascular: Denies chest pain or palpitations   Respiratory: Denies shortness of breath , Denies cough  Gastrointestinal/Hepatic: Denies abdominal pain, nausea, vomiting, diarrhea, constipation or GI bleeding   Genitourinary: Denies bladder dysfunction, dysuria or frequency  Musculoskeletal/Rheum: Denies  joint pain and swelling   Skin/Breast: Denies rash, denies breast lumps or discharge  Neurological: Denies headache, confusion, memory loss or focal weakness/parasthesias  Psychiatric: denies mood disorder   Endocrine: denies hx of diabetes or thyroid dysfunction  Heme/Oncology/Lymph Nodes: Denies enlarged lymph nodes, denies bruising or known bleeding disorder  Allergic/Immunologic: Denies hx of allergies      All other systems were reviewed and are negative (AMA/CMS criteria)    Past Medical History:   Past Medical History   Diagnosis Date   • Hyperlipidemia    • Hypertension    • Pneumonia    • Indigestion    • Backpain occasional   • COPD    • High cholesterol    • Myocardial infarct (CMS-HCC) with stent     1/23/2005   • Cancer (CMS-HCC) 6/2017     Lung   • CATARACT      Bilateral IOL's   • Coughing blood 7/5/17     Blood and productive in May 2017. \"Just one day\"         Past Surgical History:  Past Surgical History   Procedure Laterality Date   • Other       kidney stone   • Other cardiac surgery     • Splenectomy  8/23/2010     Performed by JIM OMALLEY at [facility] ORS   • Thoracoscopy Left 7/11/2017     Procedure: THORACOSCOPY UPPER LOBECTOMY, NODE DISSECTION; "  Surgeon: John H Ganser, M.D.;  Location: SURGERY [facility];  Service:        Hospital Medications:    Current outpatient prescriptions:   •  rivaroxaban (XARELTO) 15 MG Tab tablet, Take 1 Tab by mouth with dinner., Disp: 30 Tab, Rfl: 3  •  rivaroxaban (XARELTO) 15 MG Tab tablet, Take 1 Tab by mouth with dinner., Disp: 30 Tab, Rfl: 0  •  amiodarone (PACERONE) 400 MG tablet, Take 1 Tab by mouth every day., Disp: 30 Tab, Rfl: 1  •  digoxin (LANOXIN) 125 MCG Tab, Take 1 Tab by mouth every day at 6 PM., Disp: 30 Tab, Rfl: 3  •  aspirin 81 MG EC tablet, Take 1 Tab by mouth every day., Disp: 30 Tab, Rfl: 2  •  Ascorbic Acid (VITAMIN C PO), Take 2,000 mg by mouth every day., Disp: , Rfl:   •  Coenzyme Q10 (COQ-10) 100 MG Cap, Take  by mouth every day., Disp: , Rfl:   •  lisinopril (PRINIVIL) 5 MG Tab, Take 5 mg by mouth every day., Disp: , Rfl:   •  Non Formulary Request, BLACK CUMIN OIL 1 TABLESPOON TWICE A DAY, Disp: , Rfl:   •  diltiazem CD (CARDIZEM CD) 240 MG CAPSULE SR 24 HR, Take 1 Cap by mouth every day., Disp: 30 Cap, Rfl: 2  •  simvastatin (ZOCOR) 10 MG Tab, Take 10 mg by mouth every evening., Disp: , Rfl:   •  vitamin D (CHOLECALCIFEROL) 1000 UNIT Tab, Take 1,000 Units by mouth 2 Times a Day., Disp: , Rfl:   •  calcium carbonate (OS-JACKIE 500) 500 MG Tab, Take 500 mg by mouth 2 times a day, with meals., Disp: , Rfl:   •  vitamin e (VITAMIN E) 400 UNIT Cap, Take 400 Units by mouth 2 times a day., Disp: , Rfl:     Current Outpatient Medications:  Current Outpatient Prescriptions   Medication Sig Dispense Refill   • rivaroxaban (XARELTO) 15 MG Tab tablet Take 1 Tab by mouth with dinner. 30 Tab 3   • rivaroxaban (XARELTO) 15 MG Tab tablet Take 1 Tab by mouth with dinner. 30 Tab 0   • amiodarone (PACERONE) 400 MG tablet Take 1 Tab by mouth every day. 30 Tab 1   • digoxin (LANOXIN) 125 MCG Tab Take 1 Tab by mouth every day at 6 PM. 30 Tab 3   • aspirin 81 MG EC tablet Take 1 Tab by mouth every day. 30 Tab 2   •  "Ascorbic Acid (VITAMIN C PO) Take 2,000 mg by mouth every day.     • Coenzyme Q10 (COQ-10) 100 MG Cap Take  by mouth every day.     • lisinopril (PRINIVIL) 5 MG Tab Take 5 mg by mouth every day.     • Non Formulary Request BLACK CUMIN OIL  1 TABLESPOON TWICE A DAY     • diltiazem CD (CARDIZEM CD) 240 MG CAPSULE SR 24 HR Take 1 Cap by mouth every day. 30 Cap 2   • simvastatin (ZOCOR) 10 MG Tab Take 10 mg by mouth every evening.     • vitamin D (CHOLECALCIFEROL) 1000 UNIT Tab Take 1,000 Units by mouth 2 Times a Day.     • calcium carbonate (OS-JACKIE 500) 500 MG Tab Take 500 mg by mouth 2 times a day, with meals.     • vitamin e (VITAMIN E) 400 UNIT Cap Take 400 Units by mouth 2 times a day.       No current facility-administered medications for this visit.         Medication Allergy/Sensitivities:  No Known Allergies    Family History:  No family history of premature CAD    Social History:  Social History     Social History   • Marital Status:      Spouse Name: N/A   • Number of Children: N/A   • Years of Education: N/A     Occupational History   • Not on file.     Social History Main Topics   • Smoking status: Former Smoker -- 1.00 packs/day for 60 years     Types: Cigarettes     Quit date: 06/21/2017   • Smokeless tobacco: Not on file      Comment: 1 pk a day   • Alcohol Use: Yes      Comment: Rarely   • Drug Use: No   • Sexual Activity: Not on file     Other Topics Concern   • Not on file     Social History Narrative    ** Merged History Encounter **              Physical Exam:  Vitals  Weight/BMI: Body mass index is 20.93 kg/(m^2).  Blood pressure 134/50, pulse 56, height 1.803 m (5' 11\"), weight 68.04 kg (150 lb), SpO2 91 %.  Filed Vitals:    07/28/17 1447   BP: 134/50   Pulse: 56   Height: 1.803 m (5' 11\")   Weight: 68.04 kg (150 lb)   SpO2: 91%     Oxygen Therapy:  Pulse Oximetry: 91 %  General Appearance:   Well developed, Well nourished, No acute distress, Non-toxic appearance.   HENT:  Normocephalic, " Atraumatic, Oropharynx moist mucous membranes, Dentition: , Nose normal.    Eyes:  PERRLA, EOMI, Conjunctiva normal, No discharge.  Neck:  Normal range of motion, No cervical tenderness, Supple, No stridor, no JVD .  No thyromegaly.  No carotid bruit.  Cardiovascular:  Normal heart rate, Normal rhythm,  S1, S2, no S3,  S4; No gallops; No murmurs, No rubs, .   Extremitites with intact distal pulses, no cyanosis, clubbing or edema.  No heaves, thrills, HJR;  Peripheral pulses: carotid 2+, brachial 2+, radial 2+, ulnar 2+, femoral 2+, popliteal 2+, PT 2+, DP 2+;  Lungs:  Respiratory effort is normal. Normal breath sounds, breath sounds clear to auscultation bilaterally,  no rales, no rhonchi, no wheezing.   Abdomen: Bowel sounds normal, Soft, No tenderness, No guarding, No rebound, No masses, No hepatosplenomegaly.  Skin: Warm, Dry, No erythema, No rash, no induration or crepitus.  Neurologic: Alert & oriented x 3, Normal motor function, Normal sensory function, No focal deficits noted, cranial nerves II through XII are normal,  normal gait.  Psychiatric: Affect normal, Judgment normal, Mood normal.      Data Review:     Records reviewed and summarized in current documentation    Lab Data Review:  Lab Results   Component Value Date/Time    SODIUM 137 07/19/2017 10:17 AM    POTASSIUM 4.5 07/19/2017 10:17 AM    CHLORIDE 100 07/19/2017 10:17 AM    CO2 32 07/19/2017 10:17 AM    GLUCOSE 160* 07/19/2017 10:17 AM    BUN 14 07/19/2017 10:17 AM    CREATININE 0.88 07/19/2017 10:17 AM      Lab Results   Component Value Date/Time    PT 13.0 05/22/2017 09:10 AM    INR 0.95 05/22/2017 09:10 AM      Lab Results   Component Value Date/Time    WBC 21.0* 07/19/2017 10:17 AM    RBC 4.67* 07/19/2017 10:17 AM    HEMOGLOBIN 14.6 07/19/2017 10:17 AM    HEMATOCRIT 43.0 07/19/2017 10:17 AM    MCV 92.1 07/19/2017 10:17 AM    MCH 31.3 07/19/2017 10:17 AM    MCHC 34.0 07/19/2017 10:17 AM    MPV 10.2 07/19/2017 10:17 AM    NEUTROPHILS-POLYS 84.60*  07/19/2017 10:17 AM    LYMPHOCYTES 7.20* 07/19/2017 10:17 AM    MONOCYTES 7.10 07/19/2017 10:17 AM    EOSINOPHILS 0.30 07/19/2017 10:17 AM    BASOPHILS 0.30 07/19/2017 10:17 AM    ANISOCYTOSIS 1+ 10/19/2011 02:26 PM        Imaging/Procedures Review:    To my review shows:see above    EKG To my review shows: SR 56 bpm; QTc 530 msecST-T depression multiple leads    Assessment and Plan.   76 y.o. male Aims for Rate control for now; Consider Digoxin  History of CAD and previous PCI/stent, on simvastatin 10, start aspirin    Echo reviewed and no sig changes from prior  Lexiscan to eval for ischemia  EKG nxt FV in 1 wk; recheck QTc    1. Chest pain, unspecified type  Lexiscan    2. Coronary artery disease involving native coronary artery of native heart without angina pectoris  Lexiscan stress test    3. Atrial fibrillation, unspecified type (CMS-HCC)  Rate control; Xarelto anticoag        Return to clinic in  2 months  1. Chest pain, unspecified type  NM-CARDIAC STRESS TEST    CANCELED: EKG   2. Coronary artery disease involving native coronary artery of native heart without angina pectoris  NM-CARDIAC STRESS TEST    CANCELED: EKG   3. Atrial fibrillation, unspecified type (CMS-HCC)  CANCELED: EKG   4. Dyslipidemia     5. Essential hypertension     6. Chronic anticoagulation           Randall Jama M.D.  [address] [facility]  [address] [place] [address]  VIA Facsimile: [phone]

## 2017-08-01 NOTE — DISCHARGE SUMMARY
DATE OF ADMISSION:  07/11/2017    DATE OF DISCHARGE:  07/19/2017    DISCHARGE DIAGNOSIS:  Left upper lobe lung cancer, squamous cell, T1c N0.    PROCEDURES:  On 07/11/2017, thoracoscopic left upper lobectomy with lymph node   dissection.    HOSPITAL COURSE:  The patient is a 76-year-old male with a lung mass found   incidentally.  PET showed uptake.  He has limited lung function and was felt   amenable for lobectomy.  He underwent a successful minimally invasive left   upper lobectomy.  Postoperatively, he had an air leak.  He did develop   significant subcutaneous emphysema going up into the space and required   prolonged chest tube management.  He also developed rapid atrial fibrillation   and cardiology was consulted and it was converted with medications.  He was   kept anticoagulated throughout this time.  Ultimately, his chest tube was able   to be removed after leaving it on water seal overnight, having no   pneumothorax and no air leak.  At the time of discharge, he is tolerating a   diet, pain is controlled with oral medications and wounds are healing well   without sign of infection and his heart is in a normal sinus rhythm.    CONDITION ON DISCHARGE:  Stable.    FOLLOWUP:  Follow up with Dr. Ganser in 1 week.    DISCHARGE MEDICATIONS:  Xarelto 15 mg, Zocor 10 mg, lisinopril 5 mg, diltiazem    mg.       ____________________________________     JOHN H. GANSER, MD [facility] / MIKE    DD:  07/31/2017 12:07:10  DT:  07/31/2017 20:43:06    D#:  [id number]  Job#:  [id number]

## 2017-08-02 ENCOUNTER — TELEPHONE (OUTPATIENT)
Dept: CARDIOLOGY | Facility: MEDICAL CENTER | Age: 77
End: 2017-08-02

## 2017-08-02 DIAGNOSIS — I48.19 PERSISTENT ATRIAL FIBRILLATION (HCC): ICD-10-CM

## 2017-08-02 RX ORDER — DILTIAZEM HYDROCHLORIDE 180 MG/1
180 CAPSULE, COATED, EXTENDED RELEASE ORAL DAILY
Qty: 90 CAP | Refills: 3 | OUTPATIENT
Start: 2017-08-02 | End: 2019-01-01

## 2017-08-02 NOTE — TELEPHONE ENCOUNTER
----- Message from Calvin Wheat MD,[facility] sent at 8/2/2017  6:35 AM PDT -----  Regarding: RE: Pulse running low  Contact: [phone]  [place] to 180 mg/d; Thx  ----- Message -----     From: Lexy Cuba     Sent: 8/1/2017   6:36 PM       To: Calvin Wheat MD,[facility]  Subject: FW: Pulse running low                            Patient's wife called to report patient is feeling well but his heart rate is dropping down into the 40's and she states that heart rate is staying between 45-55 and they were told by his PCP that they needed to call us to see if amiodarone or diltiazem needed to be decreased.  She said other than the low heart rate he is feeling really good and gaining more strength every day and definitely headed in the right direction - he is currently taking 200mg daily amiodarone and 240mg daily diltiazem.  BP is good she said - please advise sir!!!    ----- Message -----     From: Barbra Jiménez     Sent: 8/1/2017  12:48 PM       To: Lexy Cuba  Subject: Pulse running low                                EC/Breezy    Pt wife, Daiana is calling. States  pulse is running low . She wants to know if strength of amiodarone should be changed? Please call to advise at  [phone].

## 2017-08-02 NOTE — TELEPHONE ENCOUNTER
Spoke with wife and instructed her to have patient decrease dose of diltiazem from 240mg to 180 - new rx called into pharmacy and wife will  today.  Instructed her to keep track of heart rate target HR between 60-80 and do this for 1-2 weeks and report back. She verbalized understanding and will call in the meantime with any questions or concerns

## 2017-08-03 ENCOUNTER — TELEPHONE (OUTPATIENT)
Dept: CARDIOLOGY | Facility: MEDICAL CENTER | Age: 77
End: 2017-08-03

## 2017-08-03 NOTE — TELEPHONE ENCOUNTER
----- Message from Renee Brito R.N. sent at 8/3/2017  2:08 PM PDT -----  Regarding: FW: Pt wife calling about  prescription   Contact: [phone]  Can you help?   ----- Message -----     From: Barbra Jiménez     Sent: 8/3/2017  12:54 PM       To: Renee Brito R.N.  Subject: Pt wife calling about  prescription       EC/Renee,    Pt wifeDaiana is calling. hospitals pharmacy won't fill RX for diltiazem CD (CARDIZEM CD) 180 MG CAPSULE SR 24 HR because it requires Prior auth. Please call toa advise [phone].

## 2017-08-08 ENCOUNTER — NON-PROVIDER VISIT (OUTPATIENT)
Dept: CARDIOLOGY | Facility: MEDICAL CENTER | Age: 77
End: 2017-08-08
Payer: MEDICARE

## 2017-08-08 DIAGNOSIS — R07.9 CHEST PAIN, UNSPECIFIED TYPE: ICD-10-CM

## 2017-08-08 DIAGNOSIS — R94.31 NONSPECIFIC ABNORMAL ELECTROCARDIOGRAM (ECG) (EKG): ICD-10-CM

## 2017-08-08 DIAGNOSIS — R94.31 PROLONGED Q-T INTERVAL ON ECG: ICD-10-CM

## 2017-08-08 LAB — EKG IMPRESSION: NORMAL

## 2017-08-08 PROCEDURE — 93000 ELECTROCARDIOGRAM COMPLETE: CPT | Performed by: INTERNAL MEDICINE

## 2017-08-08 NOTE — PROGRESS NOTES
EKG done today shows SR, SB 54 bpm rSr' in V1-2 BL inf T wave abnormality; QTc 440 msec (530 msec on 8/2/2017)  He is reassured and will continue to monitor his pulses and if sx's?

## 2017-08-08 NOTE — MR AVS SNAPSHOT
Humphrey Cano JrJose Enrique   2017 9:45 AM   Non-Provider Visit   MRN: [id number]    Department:  [facility]   Dept Phone:  [phone]    Description:  Male : 1940   Provider:  HONEY           Allergies as of 2017     No Known Allergies      You were diagnosed with     Chest pain, unspecified type   [4993944]       Nonspecific abnormal electrocardiogram (ECG) (EKG)   [794.31.ICD-9-CM]       Prolonged Q-T interval on ECG   [398491]         Vital Signs     Smoking Status                   Former Smoker           Basic Information     Date Of Birth Sex Race Ethnicity Preferred Language    1940 Male White Non- English      Your appointments     Aug 11, 2017 10:15 AM   NM CARDIAC STRESS TEST (30) with [facility] NM DSPECT 2   [facility] ([facility])    [address] [facility] [address]   [phone]           Some exams require specific prep instructions that would have been given to you at time of scheduling. If you have any additional questions about the prep instructions, please call Imaging Scheduling at [phone] and press #2.            Aug 28, 2017 11:15 AM   FOLLOW UP with Calvin Wheat MD,[facility]    [address]  [address]  [facility] [id number] [phone]              Problem List              ICD-10-CM Priority Class Noted - Resolved    Laceration of spleen S36.039A   2010 - Present    CAD (coronary artery disease) I25.10   2010 - Present    Hypertension I10   2010 - Present    Dyslipidemia E78.5   2010 - Present    Malnutrition of mild degree (CMS-HCC) E44.1   2010 - Present    Abdominal pain  High  10/20/2011 - Present    COPD exacerbation (CMS-HCC) J44.1   2016 - Present    Bronchitis with chronic airway obstruction (CMS-HCC) J44.9   2017 - Present    Tobacco dependence F17.200   2017 - Present    Lung cancer (CMS-[facility]) C34.90 High  2017 - Present      Health Maintenance        Date Due Completion Dates    COLONOSCOPY 1992 ---    IMM ZOSTER VACCINE 2002 ---    IMM DTaP/Tdap/Td Vaccine (1 - Tdap) 2014    IMM INFLUENZA (1) 2017, 2011    IMM PNEUMOCOCCAL 65+ (ADULT) LOW/MEDIUM RISK SERIES (2 of 2 - PPSV23) 2018            Results     EKG      Component    Report    [facility]    Test Date:  2017  Pt Name:    JOSE DE JESUS HOPPER               Department: [facility]  MRN:        [id number]                      Room:  Gender:                                 Technician: APARNA  :        1940                   Requested By:MANISH WHEAT  Order #:    [id number]                    Reading MD: Manish Wheat MD    Measurements  Intervals                                Axis  Rate:       54                           P:          83  KS:         152                          QRS:        68  QRSD:       100                          T:          214  QT:         464  QTc:        440    Interpretive Statements  SINUS BRADYCARDIA  RSR' IN V1 OR V2, RIGHT VCD OR RVH  BORDERLINE T ABNORMALITIES, INFERIOR LEADS  Compared to ECG 07/15/2017 00:15:14  Right ventricular hypertrophy now present  RSR' in V1 or V2 now present  T-wave abnormality now present  Atrial fibrillation no longer present  Possible ischemia no longer present    Electronically Signed On 2017 10:52:39 PDT by Manish Wheat MD                          Current Immunizations     13-VALENT PCV PREVNAR 2017  6:22 AM    Influenza TIV (IM) 2011    Influenza Vaccine Adult HD 2017  6:23 AM    Pneumococcal Vaccine (UF)Historical Data 2008    TD Vaccine 2014      Below and/or attached are the medications your provider expects you to take. Review all of your home medications and newly ordered medications with your provider and/or pharmacist. Follow medication instructions  as directed by your provider and/or pharmacist. Please keep your medication list with you and share with your provider. Update the information when medications are discontinued, doses are changed, or new medications (including over-the-counter products) are added; and carry medication information at all times in the event of emergency situations     Allergies:  No Known Allergies          Medications  Valid as of: August 08, 2017 - 12:12 PM    Generic Name Brand Name Tablet Size Instructions for use    Amiodarone HCl (Tab) PACERONE 400 MG Take 0.5 Tabs by mouth every day.        Ascorbic Acid   Take 2,000 mg by mouth every day.        Aspirin (Tablet Delayed Response) aspirin 81 MG Take 1 Tab by mouth every day.        Cholecalciferol (Tab) cholecalciferol 1000 UNIT Take 1,000 Units by mouth 2 Times a Day.        Coenzyme Q10 (Cap) CoQ-10 100 MG Take  by mouth every day.        DilTIAZem HCl Coated Beads (CAPSULE SR 24 HR) CARDIZEM  MG Take 1 Cap by mouth every day.        Lisinopril (Tab) PRINIVIL 5 MG Take 5 mg by mouth every day.        Non Formulary Request Non Formulary Request  BLACK CUMIN OIL  1 TABLESPOON TWICE A DAY        Oyster Shell (Tab) OS-JACKIE 500 500 MG Take 500 mg by mouth 2 times a day, with meals.        Rivaroxaban (Tab) XARELTO 15 MG Take 1 Tab by mouth with dinner.        Rivaroxaban (Tab) XARELTO 15 MG Take 1 Tab by mouth with dinner.        Simvastatin (Tab) ZOCOR 10 MG Take 10 mg by mouth every evening.        Vitamin E (Cap) VITAMIN E 400 UNIT Take 400 Units by mouth 2 times a day.        .                 Medicines prescribed today were sent to:     [facility] [address] [place] [address] [place] - [address] AT [facility]    [address] [address]    Phone: [phone] Fax: [phone]    Open 24 Hours?: No      Medication refill instructions:       If your prescription bottle indicates you have medication refills left, it is not necessary to call your  provider’s office. Please contact your pharmacy and they will refill your medication.    If your prescription bottle indicates you do not have any refills left, you may request refills at any time through one of the following ways: The online [employer] system (except Urgent Care), by calling your provider’s office, or by asking your pharmacy to contact your provider’s office with a refill request. Medication refills are processed only during regular business hours and may not be available until the next business day. Your provider may request additional information or to have a follow-up visit with you prior to refilling your medication.   *Please Note: Medication refills are assigned a new Rx number when refilled electronically. Your pharmacy may indicate that no refills were authorized even though a new prescription for the same medication is available at the pharmacy. Please request the medicine by name with the pharmacy before contacting your provider for a refill.           [employer] Access Code: Activation code not generated  Current [employer] Status: Active

## 2017-08-11 ENCOUNTER — HOSPITAL ENCOUNTER (OUTPATIENT)
Dept: RADIOLOGY | Facility: MEDICAL CENTER | Age: 77
End: 2017-08-11
Attending: INTERNAL MEDICINE
Payer: MEDICARE

## 2017-08-11 DIAGNOSIS — I25.10 CORONARY ARTERY DISEASE INVOLVING NATIVE CORONARY ARTERY OF NATIVE HEART WITHOUT ANGINA PECTORIS: ICD-10-CM

## 2017-08-11 DIAGNOSIS — R07.9 CHEST PAIN, UNSPECIFIED TYPE: ICD-10-CM

## 2017-08-11 PROCEDURE — A9502 TC99M TETROFOSMIN: HCPCS

## 2017-08-11 PROCEDURE — 700111 HCHG RX REV CODE 636 W/ 250 OVERRIDE (IP)

## 2017-08-11 RX ORDER — REGADENOSON 0.08 MG/ML
INJECTION, SOLUTION INTRAVENOUS
Status: COMPLETED
Start: 2017-08-11 | End: 2017-08-11

## 2017-08-11 RX ADMIN — REGADENOSON 0.4 MG: 0.08 INJECTION, SOLUTION INTRAVENOUS at 11:25

## 2017-08-11 NOTE — PROGRESS NOTES
Nursing care plan includes knowledge deficit, potential for discomfort, potential for compromised cardiac output. POC includes teaching, comfort measures and reassurance, and access to code cart, cardiology stand by and availability of rapid response team. Pt verbalizes good understanding of benefits and risks of pharmacological cardiac stress test. Informed consent obtained. Lexiscan given, pt developed the following symptoms SOB, flushing, heavy limbs and light headedness. VS stable, major symptoms resolved. To waiting room, caffeinated fluids and/or snacks given, awaiting second scan. Nursing goals met.

## 2017-08-15 ENCOUNTER — TELEPHONE (OUTPATIENT)
Dept: CARDIOLOGY | Facility: MEDICAL CENTER | Age: 77
End: 2017-08-15

## 2017-08-15 NOTE — TELEPHONE ENCOUNTER
----- Message from Madeline Stauffer sent at 8/15/2017  1:26 PM PDT -----  Regarding: Patient's wife calling for 's Stress Test results  CHARLY/Jong    Patient's wife, Daiana, wants to get the results of her 's Stress Test that was done on 8/11. She can be reached at [phone].

## 2017-08-15 NOTE — TELEPHONE ENCOUNTER
Spoke with patient's wife and discussed stress test results with her - reminded her of patient's f/u appt with EC on 8/28 at which time they will go over results in greater detail - she verbalized understanding and will call back with any other questions or concerns

## 2017-08-22 ENCOUNTER — TELEPHONE (OUTPATIENT)
Dept: CARDIOLOGY | Facility: MEDICAL CENTER | Age: 77
End: 2017-08-22

## 2017-08-22 DIAGNOSIS — I48.91 ATRIAL FIBRILLATION, UNSPECIFIED TYPE (HCC): ICD-10-CM

## 2017-08-22 NOTE — TELEPHONE ENCOUNTER
Patient's wife calling to discuss options for anticoag therapy as Xarelto was going to cost them over $600 a month - explained to her the differences between coumadin, eliquis, pradaxa and xarelto and they are ok with patient staying on xarelto so I explained that the cost she was given was without prior authorization and that in order to really understand what the cost would be we would need to send in Rx and do PA and go from there.  I told her I would start all of this process and in the meantime she could get samples from [facility] while we wait to hear - she was agreeable and very grateful for our help and will pick samples up tomorrow. She will wait to hear from me regarding PA and cost and will call me with any other questions or concerns.

## 2017-08-22 NOTE — TELEPHONE ENCOUNTER
----- Message from Marianela Soto sent at 8/22/2017 10:50 AM PDT -----  Regarding: ready to discuss xarelto options  Contact: [phone]  CHARLY/Jong    Pt's wife Daiana calling to discuss expense of Xarelto, which is unaffordable.  EC had her research some options which Daiana wishes to discuss.   Please call Daiana at [phone]

## 2017-08-24 ENCOUNTER — TELEPHONE (OUTPATIENT)
Dept: CARDIOLOGY | Facility: MEDICAL CENTER | Age: 77
End: 2017-08-24

## 2017-08-24 NOTE — TELEPHONE ENCOUNTER
Spoke with wife Daiana and let her know that Eliquis and Pradaxa are non formulary and not covered and that Xarelto was covered no PA required but was a Tier 3 so it would cost $382/month for Rx.  Explained patient assistance application process and she is willing to try and see if they qualify and while we do this he can use samples and if they do not end up qualify for assistance they will have to do coumadin.  Mailed her their portion of the assistance application with a self addressed return envelope and she will fill it out and get it back to me and once i get it I will send it in with the provider portion of application and keep her posted on what we hear.  She verbalized understanding and will call back with any other questions or concerns and was very appreciative of our help with this

## 2017-08-28 ENCOUNTER — OFFICE VISIT (OUTPATIENT)
Dept: CARDIOLOGY | Facility: MEDICAL CENTER | Age: 77
End: 2017-08-28
Payer: MEDICARE

## 2017-08-28 ENCOUNTER — HOSPITAL ENCOUNTER (OUTPATIENT)
Dept: LAB | Facility: MEDICAL CENTER | Age: 77
End: 2017-08-28
Attending: INTERNAL MEDICINE
Payer: MEDICARE

## 2017-08-28 VITALS
BODY MASS INDEX: 21.7 KG/M2 | WEIGHT: 155 LBS | OXYGEN SATURATION: 88 % | SYSTOLIC BLOOD PRESSURE: 150 MMHG | DIASTOLIC BLOOD PRESSURE: 70 MMHG | HEART RATE: 62 BPM | HEIGHT: 71 IN

## 2017-08-28 DIAGNOSIS — Z79.01 CHRONIC ANTICOAGULATION: ICD-10-CM

## 2017-08-28 DIAGNOSIS — I25.10 CORONARY ARTERY DISEASE INVOLVING NATIVE CORONARY ARTERY OF NATIVE HEART WITHOUT ANGINA PECTORIS: ICD-10-CM

## 2017-08-28 DIAGNOSIS — I48.91 ATRIAL FIBRILLATION, UNSPECIFIED TYPE (HCC): ICD-10-CM

## 2017-08-28 DIAGNOSIS — E78.5 DYSLIPIDEMIA: ICD-10-CM

## 2017-08-28 DIAGNOSIS — I10 ESSENTIAL HYPERTENSION: ICD-10-CM

## 2017-08-28 LAB
ANION GAP SERPL CALC-SCNC: 9 MMOL/L (ref 0–11.9)
BUN SERPL-MCNC: 17 MG/DL (ref 8–22)
CALCIUM SERPL-MCNC: 9.8 MG/DL (ref 8.5–10.5)
CHLORIDE SERPL-SCNC: 105 MMOL/L (ref 96–112)
CO2 SERPL-SCNC: 27 MMOL/L (ref 20–33)
CREAT SERPL-MCNC: 0.98 MG/DL (ref 0.5–1.4)
ERYTHROCYTE [DISTWIDTH] IN BLOOD BY AUTOMATED COUNT: 54.6 FL (ref 35.9–50)
GFR SERPL CREATININE-BSD FRML MDRD: >60 ML/MIN/1.73 M 2
GLUCOSE SERPL-MCNC: 94 MG/DL (ref 65–99)
HCT VFR BLD AUTO: 43.6 % (ref 42–52)
HGB BLD-MCNC: 14.3 G/DL (ref 14–18)
MCH RBC QN AUTO: 31.5 PG (ref 27–33)
MCHC RBC AUTO-ENTMCNC: 32.8 G/DL (ref 33.7–35.3)
MCV RBC AUTO: 96 FL (ref 81.4–97.8)
PLATELET # BLD AUTO: 361 K/UL (ref 164–446)
PMV BLD AUTO: 10 FL (ref 9–12.9)
POTASSIUM SERPL-SCNC: 5.1 MMOL/L (ref 3.6–5.5)
RBC # BLD AUTO: 4.54 M/UL (ref 4.7–6.1)
SODIUM SERPL-SCNC: 141 MMOL/L (ref 135–145)
WBC # BLD AUTO: 10.7 K/UL (ref 4.8–10.8)

## 2017-08-28 PROCEDURE — 80048 BASIC METABOLIC PNL TOTAL CA: CPT

## 2017-08-28 PROCEDURE — 99214 OFFICE O/P EST MOD 30 MIN: CPT | Performed by: INTERNAL MEDICINE

## 2017-08-28 PROCEDURE — 36415 COLL VENOUS BLD VENIPUNCTURE: CPT

## 2017-08-28 PROCEDURE — 85027 COMPLETE CBC AUTOMATED: CPT

## 2017-08-28 NOTE — PROGRESS NOTES
"Chief Complaint   Patient presents with   • Follow-Up         This patient is an established male who is here today to discuss:  Question about AFib and use of DOAC    Patient Active Problem List    Diagnosis Date Noted   • Lung cancer (CMS-HCC) 07/11/2017     Priority: High   • Abdominal pain 10/20/2011     Priority: High   • Tobacco dependence 01/05/2017   • Bronchitis with chronic airway obstruction (CMS-HCC) 01/01/2017   • COPD exacerbation (CMS-HCC) 12/31/2016   • Malnutrition of mild degree (CMS-HCC) 08/24/2010   • Laceration of spleen 08/23/2010   • CAD (coronary artery disease) 08/23/2010   • Hypertension 08/23/2010   • Dyslipidemia 08/23/2010       Past Medical History:   Diagnosis Date   • Coughing blood 7/5/17    Blood and productive in May 2017. \"Just one day\"   • Cancer (CMS-HCC) 6/2017    Lung   • Backpain occasional   • CATARACT     Bilateral IOL's   • COPD    • High cholesterol    • Hyperlipidemia    • Hypertension    • Indigestion    • Myocardial infarct (CMS-HCC) with stent    1/23/2005   • Pneumonia      Past Surgical History:   Procedure Laterality Date   • THORACOSCOPY Left 7/11/2017    Procedure: THORACOSCOPY UPPER LOBECTOMY, NODE DISSECTION;  Surgeon: John H Ganser, M.D.;  Location: SUR[facility]ERY [facility];  Service:    • SPLENECTOMY  8/23/2010    Performed by JIM OMALLEY at SURGERY [facility]   • OTHER      kidney stone   • OTHER CARDIAC SURGERY       Social History     Social History   • Marital status:      Spouse name: N/A   • Number of children: N/A   • Years of education: N/A     Social History Main Topics   • Smoking status: Former Smoker     Packs/day: 1.00     Years: 60.00     Types: Cigarettes     Quit date: 6/21/2017   • Smokeless tobacco: Never Used      Comment: 1 pk a day   • Alcohol use Yes      Comment: Rarely   • Drug use: No   • Sexual activity: Not on file     Other Topics Concern   • Not on file     Social History Narrative    ** Merged History Encounter ** "          Family History   Problem Relation Age of Onset   • Heart Disease Neg Hx        Current Outpatient Prescriptions   Medication Sig Dispense Refill   • diltiazem CD (CARDIZEM CD) 180 MG CAPSULE SR 24 HR Take 1 Cap by mouth every day. 90 Cap 3   • amiodarone (PACERONE) 400 MG tablet Take 0.5 Tabs by mouth every day. 30 Tab 1   • rivaroxaban (XARELTO) 15 MG Tab tablet Take 1 Tab by mouth with dinner. 30 Tab 3   • aspirin 81 MG EC tablet Take 1 Tab by mouth every day. 30 Tab 2   • Ascorbic Acid (VITAMIN C PO) Take 2,000 mg by mouth every day.     • Coenzyme Q10 (COQ-10) 100 MG Cap Take  by mouth every day.     • lisinopril (PRINIVIL) 5 MG Tab Take 5 mg by mouth every day.     • simvastatin (ZOCOR) 10 MG Tab Take 10 mg by mouth every evening.     • vitamin D (CHOLECALCIFEROL) 1000 UNIT Tab Take 1,000 Units by mouth 2 Times a Day.     • calcium carbonate (OS-JACKIE 500) 500 MG Tab Take 500 mg by mouth 2 times a day, with meals.     • vitamin e (VITAMIN E) 400 UNIT Cap Take 400 Units by mouth 2 times a day.     • Non Formulary Request BLACK CUMIN OIL  1 TABLESPOON TWICE A DAY       No current facility-administered medications for this visit.      Review of patient's allergies indicates no known allergies.    Review of Systems:     Constitutional: Denies fevers, Denies weight changes  Eyes: Denies changes in vision, no eye pain  Ears/Nose/Throat/Mouth: Denies nasal congestion or sore throat   Cardiovascular: Denies chest pain or palpitations   Respiratory: Denies shortness of breath , Denies cough  Gastrointestinal/Hepatic: Denies abdominal pain, nausea, vomiting, diarrhea, constipation or GI bleeding   Genitourinary: Denies bladder dysfunction, dysuria or frequency  Musculoskeletal/Rheum: Denies  joint pain and swelling   Skin/Breast: Denies rash, denies breast lumps or discharge  Neurological: Denies headache, confusion, memory loss or focal weakness/parasthesias  Psychiatric: denies mood disorder   Endocrine: denies  "hx of diabetes or thyroid dysfunction  Heme/Oncology/Lymph Nodes: Denies enlarged lymph nodes, denies brusing or known bleeding disorder  Allergic/Immunologic: Denies hx of allergies      All other systems were reviewed and are negative (AMA/CMS criteria)      Blood pressure 150/70, pulse 62, height 1.803 m (5' 11\"), weight 70.3 kg (155 lb), SpO2 88 %.  General Appearance:   Well developed, Well nourished, No acute distress, Non-toxic appearance.   HENT:  Normocephalic, Atraumatic, Oropharynx moist mucous membranes, Dentition: , Nose normal.    Eyes:  PERRLA, EOMI, Conjunctiva normal, No discharge.  Neck:  Normal range of motion, No cervical tenderness, Supple, No stridor, no JVD .  No thyromegaly.  No carotid bruit.  Cardiovascular:  Normal heart rate, Normal rhythm,  S1, S2, no S3,  S4; No gallops; No murmurs, No rubs, .   Extremitites with intact distal pulses, no cyanosis, clubbing or edema.  No heaves, thrills, HJR;  Peripheral pulses: carotid 2+, brachial 2+, radial 2+, ulnar 2+, femoral 2+, popliteal 2+, PT 2+, DP 2+;  Lungs:  Respiratory effort is normal. Normal breath sounds, breath sounds clear to auscultation bilaterally,  no rales, no rhonchi, no wheezing.   Abdomen: Bowel sounds normal, Soft, No tenderness, No guarding, No rebound, No masses, No hepatosplenomegaly.  Skin: Warm, Dry, No erythema, No rash, no induration or crepitus.  Neurologic: Alert & oriented x 3, Normal motor function, Normal sensory function, No focal deficits noted, cranial nerves II through XII are normal,  normal gait.  Psychiatric: Affect normal, Judgment normal, Mood normal.  Results for JOSE DE JESUS HOPPER JR. (MRN [id number]) as of 8/28/2017 11:43   Ref. Range 1/4/2017 04:41 3/22/2017 11:24   Cholesterol,Tot Latest Ref Range: 100 - 199 mg/dL  140   Triglycerides Latest Ref Range: 0 - 149 mg/dL  73   HDL Latest Ref Range: >=40 mg/dL  45   LDL Latest Ref Range: <100 mg/dL  80     Assessment and Plan.   76 y.o. male has PAF " curently on Xarelto; Will see if assistant program; ; [facility]; [id number] scored 3;     ADDN: RA O2 88%  He will need continue O2 suppl      1. Atrial fibrillation, unspecified type (CMS-HCC)  asx    2. Coronary artery disease involving native coronary artery of native heart without angina pectoris  asx    3. Essential hypertension  controlled    4. Dyslipidemia  Recheck; Last check wa when he was ill;    5. Chronic anticoagulation  On Xarelto      Return to clinic in  6 , months    1. Atrial fibrillation, unspecified type (CMS-HCC)     2. Coronary artery disease involving native coronary artery of native heart without angina pectoris     3. Essential hypertension     4. Dyslipidemia     5. Chronic anticoagulation

## 2017-09-20 ENCOUNTER — TELEPHONE (OUTPATIENT)
Dept: CARDIOLOGY | Facility: MEDICAL CENTER | Age: 77
End: 2017-09-20

## 2017-09-20 NOTE — TELEPHONE ENCOUNTER
"Spoke with wife who states she has received a letter from medicare that they are denying patient's oxygen because they need for information from [facility]. Spoke with Sandrine in billing dept at [place] and she states that patient's account was being audited to find just cause for patient needing to have oxygen still because it was originally ordered through hospitalization in January and medicare is deeming the need for o2 as acute and that patient should no longer be requiring oxygen.  Faxed notes of recent July hospitalization as well as OV note on 8/28 that shows patient only 88% on RA here in office to Sandrine at [facility] and she is going to work on denial and let me know if she needs anything else. Informed wife to \"sit tight\" as we are working on this on our end and there is nothing that she needs to do right now.  She verbalized understanding and was very appreciative of the help - she will call back with any other questions or concerns   "

## 2017-12-19 ENCOUNTER — HOSPITAL ENCOUNTER (OUTPATIENT)
Dept: RADIOLOGY | Facility: MEDICAL CENTER | Age: 77
End: 2017-12-19
Attending: FAMILY MEDICINE
Payer: MEDICARE

## 2017-12-19 DIAGNOSIS — C34.10 PANCOAST'S SYNDROME, UNSPECIFIED LATERALITY (HCC): ICD-10-CM

## 2017-12-19 PROCEDURE — 71250 CT THORAX DX C-: CPT

## 2017-12-27 DIAGNOSIS — I48.91 ATRIAL FIBRILLATION, UNSPECIFIED TYPE (HCC): ICD-10-CM

## 2017-12-27 DIAGNOSIS — I25.10 CORONARY ARTERY DISEASE INVOLVING NATIVE CORONARY ARTERY OF NATIVE HEART WITHOUT ANGINA PECTORIS: ICD-10-CM

## 2017-12-28 RX ORDER — AMIODARONE HYDROCHLORIDE 400 MG/1
200 TABLET ORAL DAILY
Qty: 45 TAB | Refills: 1 | Status: SHIPPED | OUTPATIENT
Start: 2017-12-28 | End: 2019-01-01

## 2018-01-09 ENCOUNTER — PATIENT OUTREACH (OUTPATIENT)
Dept: OTHER | Facility: MEDICAL CENTER | Age: 78
End: 2018-01-09

## 2018-01-09 NOTE — PROGRESS NOTES
Cancer treatment summary and survivorship care plan and information mailed to patient.  Will follow up with phone call to review.

## 2018-01-15 ENCOUNTER — PATIENT OUTREACH (OUTPATIENT)
Dept: OTHER | Facility: MEDICAL CENTER | Age: 78
End: 2018-01-15

## 2018-03-28 ENCOUNTER — HOSPITAL ENCOUNTER (OUTPATIENT)
Dept: LAB | Facility: MEDICAL CENTER | Age: 78
End: 2018-03-28
Attending: FAMILY MEDICINE
Payer: MEDICARE

## 2018-03-28 LAB
ALBUMIN SERPL BCP-MCNC: 4.3 G/DL (ref 3.2–4.9)
ALBUMIN/GLOB SERPL: 1.9 G/DL
ALP SERPL-CCNC: 66 U/L (ref 30–99)
ALT SERPL-CCNC: 12 U/L (ref 2–50)
ANION GAP SERPL CALC-SCNC: 7 MMOL/L (ref 0–11.9)
AST SERPL-CCNC: 22 U/L (ref 12–45)
BASOPHILS # BLD AUTO: 1.1 % (ref 0–1.8)
BASOPHILS # BLD: 0.1 K/UL (ref 0–0.12)
BILIRUB SERPL-MCNC: 0.6 MG/DL (ref 0.1–1.5)
BUN SERPL-MCNC: 20 MG/DL (ref 8–22)
CALCIUM SERPL-MCNC: 9.5 MG/DL (ref 8.5–10.5)
CHLORIDE SERPL-SCNC: 103 MMOL/L (ref 96–112)
CHOLEST SERPL-MCNC: 154 MG/DL (ref 100–199)
CO2 SERPL-SCNC: 29 MMOL/L (ref 20–33)
CREAT SERPL-MCNC: 0.99 MG/DL (ref 0.5–1.4)
EOSINOPHIL # BLD AUTO: 0.13 K/UL (ref 0–0.51)
EOSINOPHIL NFR BLD: 1.4 % (ref 0–6.9)
ERYTHROCYTE [DISTWIDTH] IN BLOOD BY AUTOMATED COUNT: 55.7 FL (ref 35.9–50)
GLOBULIN SER CALC-MCNC: 2.3 G/DL (ref 1.9–3.5)
GLUCOSE SERPL-MCNC: 82 MG/DL (ref 65–99)
HCT VFR BLD AUTO: 49.5 % (ref 42–52)
HDLC SERPL-MCNC: 56 MG/DL
HGB BLD-MCNC: 15.7 G/DL (ref 14–18)
IMM GRANULOCYTES # BLD AUTO: 0.02 K/UL (ref 0–0.11)
IMM GRANULOCYTES NFR BLD AUTO: 0.2 % (ref 0–0.9)
LDLC SERPL CALC-MCNC: 84 MG/DL
LYMPHOCYTES # BLD AUTO: 2.32 K/UL (ref 1–4.8)
LYMPHOCYTES NFR BLD: 25.6 % (ref 22–41)
MCH RBC QN AUTO: 30.4 PG (ref 27–33)
MCHC RBC AUTO-ENTMCNC: 31.7 G/DL (ref 33.7–35.3)
MCV RBC AUTO: 95.9 FL (ref 81.4–97.8)
MONOCYTES # BLD AUTO: 0.94 K/UL (ref 0–0.85)
MONOCYTES NFR BLD AUTO: 10.4 % (ref 0–13.4)
NEUTROPHILS # BLD AUTO: 5.54 K/UL (ref 1.82–7.42)
NEUTROPHILS NFR BLD: 61.3 % (ref 44–72)
NRBC # BLD AUTO: 0 K/UL
NRBC BLD-RTO: 0 /100 WBC
PLATELET # BLD AUTO: 331 K/UL (ref 164–446)
PMV BLD AUTO: 10.3 FL (ref 9–12.9)
POTASSIUM SERPL-SCNC: 5.4 MMOL/L (ref 3.6–5.5)
PROT SERPL-MCNC: 6.6 G/DL (ref 6–8.2)
PSA SERPL-MCNC: 4.23 NG/ML (ref 0–4)
RBC # BLD AUTO: 5.16 M/UL (ref 4.7–6.1)
SODIUM SERPL-SCNC: 139 MMOL/L (ref 135–145)
TRIGL SERPL-MCNC: 72 MG/DL (ref 0–149)
TSH SERPL DL<=0.005 MIU/L-ACNC: 0.75 UIU/ML (ref 0.38–5.33)
WBC # BLD AUTO: 9.1 K/UL (ref 4.8–10.8)

## 2018-03-28 PROCEDURE — 84153 ASSAY OF PSA TOTAL: CPT | Mod: GA

## 2018-03-28 PROCEDURE — 84443 ASSAY THYROID STIM HORMONE: CPT

## 2018-03-28 PROCEDURE — 80053 COMPREHEN METABOLIC PANEL: CPT

## 2018-03-28 PROCEDURE — 80061 LIPID PANEL: CPT

## 2018-03-28 PROCEDURE — 85025 COMPLETE CBC W/AUTO DIFF WBC: CPT

## 2018-03-28 PROCEDURE — 36415 COLL VENOUS BLD VENIPUNCTURE: CPT

## 2018-04-05 ENCOUNTER — HOSPITAL ENCOUNTER (OUTPATIENT)
Dept: RADIOLOGY | Facility: MEDICAL CENTER | Age: 78
End: 2018-04-05
Attending: FAMILY MEDICINE
Payer: MEDICARE

## 2018-04-05 DIAGNOSIS — C34.10 PANCOAST'S SYNDROME, UNSPECIFIED LATERALITY (HCC): ICD-10-CM

## 2018-04-05 PROCEDURE — 71250 CT THORAX DX C-: CPT

## 2018-04-24 ENCOUNTER — HOSPITAL ENCOUNTER (OUTPATIENT)
Dept: RADIOLOGY | Facility: MEDICAL CENTER | Age: 78
End: 2018-04-24
Attending: FAMILY MEDICINE
Payer: MEDICARE

## 2018-04-24 DIAGNOSIS — C34.10 SUPERIOR PULMONARY SULCUS SYNDROME, UNSPECIFIED LATERALITY (HCC): ICD-10-CM

## 2018-04-24 PROCEDURE — A9552 F18 FDG: HCPCS

## 2018-08-07 ENCOUNTER — HOSPITAL ENCOUNTER (OUTPATIENT)
Dept: LAB | Facility: MEDICAL CENTER | Age: 78
End: 2018-08-07
Attending: SURGERY
Payer: MEDICARE

## 2018-08-07 LAB
BUN SERPL-MCNC: 16 MG/DL (ref 8–22)
CREAT SERPL-MCNC: 1.03 MG/DL (ref 0.5–1.4)

## 2018-08-07 PROCEDURE — 82565 ASSAY OF CREATININE: CPT

## 2018-08-07 PROCEDURE — 36415 COLL VENOUS BLD VENIPUNCTURE: CPT

## 2018-08-07 PROCEDURE — 84520 ASSAY OF UREA NITROGEN: CPT

## 2018-08-11 ENCOUNTER — HOSPITAL ENCOUNTER (OUTPATIENT)
Dept: RADIOLOGY | Facility: MEDICAL CENTER | Age: 78
End: 2018-08-11
Attending: SURGERY
Payer: MEDICARE

## 2018-08-11 DIAGNOSIS — D38.1 NEOPLASM OF UNCERTAIN BEHAVIOR OF RIGHT UPPER LOBE OF LUNG: ICD-10-CM

## 2018-08-11 PROCEDURE — 700117 HCHG RX CONTRAST REV CODE 255: Performed by: SURGERY

## 2018-08-11 PROCEDURE — 71260 CT THORAX DX C+: CPT

## 2018-08-11 RX ADMIN — IOHEXOL 75 ML: 350 INJECTION, SOLUTION INTRAVENOUS at 15:12

## 2018-10-01 ENCOUNTER — APPOINTMENT (RX ONLY)
Dept: URBAN - METROPOLITAN AREA CLINIC 20 | Facility: CLINIC | Age: 78
Setting detail: DERMATOLOGY
End: 2018-10-01

## 2018-10-01 DIAGNOSIS — D22 MELANOCYTIC NEVI: ICD-10-CM

## 2018-10-01 DIAGNOSIS — L57.0 ACTINIC KERATOSIS: ICD-10-CM

## 2018-10-01 DIAGNOSIS — D18.0 HEMANGIOMA: ICD-10-CM

## 2018-10-01 DIAGNOSIS — L82.1 OTHER SEBORRHEIC KERATOSIS: ICD-10-CM

## 2018-10-01 DIAGNOSIS — L82.0 INFLAMED SEBORRHEIC KERATOSIS: ICD-10-CM

## 2018-10-01 DIAGNOSIS — Z71.89 OTHER SPECIFIED COUNSELING: ICD-10-CM

## 2018-10-01 DIAGNOSIS — L81.4 OTHER MELANIN HYPERPIGMENTATION: ICD-10-CM

## 2018-10-01 PROBLEM — D22.61 MELANOCYTIC NEVI OF RIGHT UPPER LIMB, INCLUDING SHOULDER: Status: ACTIVE | Noted: 2018-10-01

## 2018-10-01 PROBLEM — D18.01 HEMANGIOMA OF SKIN AND SUBCUTANEOUS TISSUE: Status: ACTIVE | Noted: 2018-10-01

## 2018-10-01 PROBLEM — E78.5 HYPERLIPIDEMIA, UNSPECIFIED: Status: ACTIVE | Noted: 2018-10-01

## 2018-10-01 PROBLEM — D48.5 NEOPLASM OF UNCERTAIN BEHAVIOR OF SKIN: Status: ACTIVE | Noted: 2018-10-01

## 2018-10-01 PROBLEM — D22.62 MELANOCYTIC NEVI OF LEFT UPPER LIMB, INCLUDING SHOULDER: Status: ACTIVE | Noted: 2018-10-01

## 2018-10-01 PROBLEM — I10 ESSENTIAL (PRIMARY) HYPERTENSION: Status: ACTIVE | Noted: 2018-10-01

## 2018-10-01 PROBLEM — D22.5 MELANOCYTIC NEVI OF TRUNK: Status: ACTIVE | Noted: 2018-10-01

## 2018-10-01 PROCEDURE — 11100: CPT | Mod: 59

## 2018-10-01 PROCEDURE — 17110 DESTRUCTION B9 LES UP TO 14: CPT

## 2018-10-01 PROCEDURE — 99203 OFFICE O/P NEW LOW 30 MIN: CPT | Mod: 25

## 2018-10-01 PROCEDURE — ? SUNSCREEN RECOMMENDATIONS

## 2018-10-01 PROCEDURE — 17003 DESTRUCT PREMALG LES 2-14: CPT

## 2018-10-01 PROCEDURE — 17000 DESTRUCT PREMALG LESION: CPT | Mod: 59

## 2018-10-01 PROCEDURE — ? LIQUID NITROGEN

## 2018-10-01 PROCEDURE — ? BIOPSY BY SHAVE METHOD

## 2018-10-01 PROCEDURE — ? COUNSELING

## 2018-10-01 ASSESSMENT — LOCATION DETAILED DESCRIPTION DERM
LOCATION DETAILED: RIGHT INFERIOR LATERAL FOREHEAD
LOCATION DETAILED: LEFT PROXIMAL DORSAL FOREARM
LOCATION DETAILED: SUPERIOR THORACIC SPINE
LOCATION DETAILED: RIGHT INFERIOR CENTRAL MALAR CHEEK
LOCATION DETAILED: LEFT RADIAL DORSAL HAND
LOCATION DETAILED: RIGHT MEDIAL FOREHEAD
LOCATION DETAILED: RIGHT RADIAL DORSAL HAND
LOCATION DETAILED: RIGHT INFERIOR UPPER BACK
LOCATION DETAILED: RIGHT VENTRAL DISTAL FOREARM
LOCATION DETAILED: LEFT LATERAL FOREHEAD
LOCATION DETAILED: INFERIOR THORACIC SPINE
LOCATION DETAILED: RIGHT MEDIAL SUPERIOR CHEST
LOCATION DETAILED: RIGHT INFERIOR MEDIAL FOREHEAD
LOCATION DETAILED: RIGHT MEDIAL UPPER BACK
LOCATION DETAILED: LEFT INFERIOR CENTRAL MALAR CHEEK
LOCATION DETAILED: RIGHT PROXIMAL DORSAL FOREARM
LOCATION DETAILED: LEFT VENTRAL PROXIMAL FOREARM

## 2018-10-01 ASSESSMENT — LOCATION SIMPLE DESCRIPTION DERM
LOCATION SIMPLE: RIGHT FOREHEAD
LOCATION SIMPLE: RIGHT FOREARM
LOCATION SIMPLE: CHEST
LOCATION SIMPLE: RIGHT UPPER BACK
LOCATION SIMPLE: LEFT HAND
LOCATION SIMPLE: LEFT CHEEK
LOCATION SIMPLE: RIGHT HAND
LOCATION SIMPLE: LEFT FOREARM
LOCATION SIMPLE: UPPER BACK
LOCATION SIMPLE: RIGHT CHEEK
LOCATION SIMPLE: LEFT FOREHEAD

## 2018-10-01 ASSESSMENT — LOCATION ZONE DERM
LOCATION ZONE: TRUNK
LOCATION ZONE: ARM
LOCATION ZONE: FACE
LOCATION ZONE: HAND

## 2018-10-01 NOTE — PROCEDURE: LIQUID NITROGEN
Number Of Freeze-Thaw Cycles: 2 freeze-thaw cycles
Detail Level: Detailed
Post-Care Instructions: I reviewed with the patient in detail post-care instructions. Patient is to wear sunprotection, and avoid picking at any of the treated lesions. Pt may apply Vaseline to crusted or scabbing areas.
Duration Of Freeze Thaw-Cycle (Seconds): 10
Render Post-Care Instructions In Note?: yes
Consent: The patient's consent was obtained including but not limited to risks of crusting, scabbing, blistering, scarring, darker or lighter pigmentary change, recurrence, incomplete removal and infection. RTC in 2 months if lesion(s) persistent.
Add 52 Modifier (Optional): no
Medical Necessity Information: It is in your best interest to select a reason for this procedure from the list below. All of these items fulfill various CMS LCD requirements except the new and changing color options.
Consent: The patient's consent was obtained including but not limited to risks of crusting, scabbing, blistering, scarring, darker or lighter pigmentary change, recurrence, incomplete removal and infection.
Medical Necessity Clause: This procedure was medically necessary because the lesions that were treated were:

## 2018-10-01 NOTE — PROCEDURE: BIOPSY BY SHAVE METHOD
X Size Of Lesion In Cm: 1
Post-Care Instructions: I reviewed with the patient in detail post-care instructions. Patient is to keep the biopsy site clean once daily and then apply petroleum and bandaid  until healed.
Detail Level: Detailed
Lab: [id number]
Wound Care: Bacitracin
Destruction After The Procedure: No
Lab Facility: 
Type Of Destruction Used: Curettage
Additional Anesthesia Volume In Cc (Will Not Render If 0): 0
Dressing: Band-Aid
Notification Instructions: Patient will be notified of biopsy results. However, patient instructed to call the office if not contacted within 2 weeks.
Biopsy Method: Personna blade
Size Of Lesion In Cm: 1.3
Depth Of Biopsy: dermis
Anesthesia Type: 1% lidocaine with 1:100,000 epinephrine and a 1:12 solution of 8.4% sodium bicarbonate
Billing Type: Third-Party Bill
Biopsy Type: H and E
Was A Bandage Applied: Yes
Consent: Written consent was obtained and risks were reviewed including but not limited to scarring, infection, bleeding, scabbing, incomplete removal, nerve damage and allergy to anesthesia.
Hemostasis: Drysol and Electrocautery

## 2018-10-08 ENCOUNTER — APPOINTMENT (RX ONLY)
Dept: URBAN - METROPOLITAN AREA CLINIC 20 | Facility: CLINIC | Age: 78
Setting detail: DERMATOLOGY
End: 2018-10-08

## 2018-10-08 PROBLEM — C44.91 BASAL CELL CARCINOMA OF SKIN, UNSPECIFIED: Status: ACTIVE | Noted: 2018-10-08

## 2018-10-08 PROBLEM — C44.319 BASAL CELL CARCINOMA OF SKIN OF OTHER PARTS OF FACE: Status: ACTIVE | Noted: 2018-10-08

## 2018-10-08 PROCEDURE — ? MOHS SURGERY PHONE CONSULTATION

## 2018-10-08 NOTE — PROCEDURE: MOHS SURGERY PHONE CONSULTATION
Patient Preferred Phone Number: [phone]
Has The Pathology Report Been Received?: Yes
Has The Patient Ever Been Seen In Our Practice For Mohs Surgery Before?: No
Time Of Mohs Surgery: 12:00
Patient's Insurance: Medicare
Date Of Mohs Surgery: 10/22/2018
Date Of Surgical Consultation If Needed: 10/08/2018
Pathology Accession #: [id number]
Office Location Of Mohs Surgery: [place] [id number]
Patient Reported Location: Left Inferior Central Malar Cheek
Detail Level: Simple
Referring Provider: Kleber
Which Antibiotic Do They Take For Surgical Prophylaxis?: Amoxicillin (2 grams)
If Yes- What Blood Thinners Do They Take?: Aspirin 325mg
If Yes- Details?: Heart Attack, 10years ago. Pt now has a stent

## 2018-10-22 ENCOUNTER — APPOINTMENT (RX ONLY)
Dept: URBAN - METROPOLITAN AREA CLINIC 36 | Facility: CLINIC | Age: 78
Setting detail: DERMATOLOGY
End: 2018-10-22

## 2018-10-22 PROBLEM — C44.319 BASAL CELL CARCINOMA OF SKIN OF OTHER PARTS OF FACE: Status: ACTIVE | Noted: 2018-10-22

## 2018-10-22 PROCEDURE — 17311 MOHS 1 STAGE H/N/HF/G: CPT

## 2018-10-22 PROCEDURE — 17312 MOHS ADDL STAGE: CPT

## 2018-10-22 PROCEDURE — 13132 CMPLX RPR F/C/C/M/N/AX/G/H/F: CPT

## 2018-10-22 PROCEDURE — ? MOHS SURGERY

## 2018-10-22 NOTE — PROCEDURE: MOHS SURGERY
Stage 9: Number Of Blocks?: 0
Post-Care Instructions: I reviewed with the patient in detail post-care instructions. Patient is not to engage in any heavy lifting, exercise, or swimming for the next 14 days. Should the patient develop any fevers, chills, bleeding, severe pain patient will contact the office immediately.
Oculoplastic Surgeon Procedure Text (D): After obtaining clear surgical margins the patient was sent to oculoplastics for surgical repair.  The patient understands they will receive post-surgical care and follow-up from the referring physician's office.
Quadrant Reporting?: no
Epidermal Sutures: 5-0 Ethilon
Composite Graft Text: The defect edges were debeveled with a #15 scalpel blade.  Given the location of the defect, shape of the defect, the proximity to free margins and the fact the defect was full thickness a composite graft was deemed most appropriate.  The defect was outline and then transferred to the donor site.  A full thickness graft was then excised from the donor site. The graft was then placed in the primary defect, oriented appropriately and then sutured into place.  The secondary defect was then repaired using a primary closure.
Stage 13: Additional Anesthesia Type: 1% lidocaine with epinephrine
Bcc Infiltrative Histology Text: There were numerous aggregates of basaloid cells demonstrating an infiltrative pattern.
Bi-Rhombic Flap Text: The defect edges were debeveled with a #15 scalpel blade.  Given the location of the defect and the proximity to free margins a bi-rhombic flap was deemed most appropriate.  Using a sterile surgical marker, an appropriate rhombic flap was drawn incorporating the defect. The area thus outlined was incised deep to adipose tissue with a #15 scalpel blade.  The skin margins were undermined to an appropriate distance in all directions utilizing iris scissors.
Partial Purse String (Simple) Text: Given the location of the defect and the characteristics of the surrounding skin a simple purse string closure was deemed most appropriate.  Undermining was performed circumfirentially around the surgical defect.  A purse string suture was then placed and tightened. Wound tension only allowed a partial closure of the circular defect.
Consent (Marginal Mandibular)/Introductory Paragraph: The rationale for Mohs was explained to the patient and consent was obtained. The risks, benefits and alternatives to therapy were discussed in detail. Specifically, the risks of damage to the marginal mandibular branch of the facial nerve, infection, scarring, bleeding, prolonged wound healing, incomplete removal, allergy to anesthesia, and recurrence were addressed. Prior to the procedure, the treatment site was clearly identified and confirmed by the patient. All components of Universal Protocol/PAUSE Rule completed.
Same Histology In Subsequent Stages Text: The pattern and morphology of the tumor is as described in the first stage.
Plastic Surgeon Procedure Text (E): After obtaining clear surgical margins the patient was sent to plastics for surgical repair.  The patient understands they will receive post-surgical care and follow-up from the referring physician's office.
Crescentic Complex Repair Preamble Text (Leave Blank If You Do Not Want): Extensive wide undermining was performed at least 2 cm in all directions.
Area M Indication Text: Tumors in this location are included in Area M (cheek, forehead, scalp, neck, jawline and pretibial skin).  Mohs surgery is indicated for tumors in these anatomic locations.
H Plasty Text: Given the location of the defect, shape of the defect and the proximity to free margins a H-plasty was deemed most appropriate for repair.  Using a sterile surgical marker, the appropriate advancement arms of the H-plasty were drawn incorporating the defect and placing the expected incisions within the relaxed skin tension lines where possible. The area thus outlined was incised deep to adipose tissue with a #15 scalpel blade. The skin margins were undermined to an appropriate distance in all directions utilizing iris scissors.  The opposing advancement arms were then advanced into place in opposite direction and anchored with interrupted buried subcutaneous sutures.
O-T Advancement Flap Text: The defect edges were debeveled with a #15 scalpel blade.  Given the location of the defect, shape of the defect and the proximity to free margins an O-T advancement flap was deemed most appropriate.  Using a sterile surgical marker, an appropriate advancement flap was drawn incorporating the defect and placing the expected incisions within the relaxed skin tension lines where possible.    The area thus outlined was incised deep to adipose tissue with a #15 scalpel blade.  The skin margins were undermined to an appropriate distance in all directions utilizing iris scissors.
Primary Defect Width In Cm (Final Defect Size - Required For Flaps/Grafts): 1.8
Asc Procedure Text (B): After obtaining clear surgical margins the patient was sent to an ASC for surgical repair.  The patient understands they will receive post-surgical care and follow-up from the ASC physician.
Otolaryngologist Procedure Text (A): After obtaining clear surgical margins the patient was sent to otolaryngology for surgical repair.  The patient understands they will receive post-surgical care and follow-up from the referring physician's office.
Surgeon Performing Repair (Optional): Leon
Mastoid Interpolation Flap Text: A decision was made to reconstruct the defect utilizing an interpolation axial flap and a staged reconstruction.  A telfa template was made of the defect.  This telfa template was then used to outline the mastoid interpolation flap.  The donor area for the pedicle flap was then injected with anesthesia.  The flap was excised through the skin and subcutaneous tissue down to the layer of the underlying musculature.  The pedicle flap was carefully excised within this deep plane to maintain its blood supply.  The edges of the donor site were undermined.   The donor site was closed in a primary fashion.  The pedicle was then rotated into position and sutured.  Once the tube was sutured into place, adequate blood supply was confirmed with blanching and refill.  The pedicle was then wrapped with xeroform gauze and dressed appropriately with a telfa and gauze bandage to ensure continued blood supply and protect the attached pedicle.
Alternatives Discussed Intro (Do Not Add Period): I discussed alternative treatments to Mohs surgery and specifically discussed the risks and benefits of
Eye Protection Verbiage: Before proceeding with the stage, a plastic scleral shield was inserted. The globe was anesthetized with a few drops of 1% lidocaine with 1:100,000 epinephrine. Then, an appropriate sized scleral shield was chosen and coated with lacrilube ointment. The shield was gently inserted and left in place for the duration of each stage. After the stage was completed, the shield was gently removed.
Star Wedge Flap Text: The defect edges were debeveled with a #15 scalpel blade.  Given the location of the defect, shape of the defect and the proximity to free margins a star wedge flap was deemed most appropriate.  Using a sterile surgical marker, an appropriate rotation flap was drawn incorporating the defect and placing the expected incisions within the relaxed skin tension lines where possible. The area thus outlined was incised deep to adipose tissue with a #15 scalpel blade.  The skin margins were undermined to an appropriate distance in all directions utilizing iris scissors.
Double M-Plasty Intermediate Repair Preamble Text (Leave Blank If You Do Not Want): Undermining was performed with blunt dissection.
Mohs Rapid Report Verbiage: The area of clinically evident tumor was marked with skin marking ink and appropriately hatched.  The initial incision was made following the Mohs approach through the skin.  The specimen was taken to the lab, divided into the necessary number of pieces, chromacoded and processed according to the Mohs protocol.  This was repeated in successive stages until a tumor free defect was achieved.
Modified Advancement Flap Text: The defect edges were debeveled with a #15 scalpel blade.  Given the location of the defect, shape of the defect and the proximity to free margins a modified advancement flap was deemed most appropriate.  Using a sterile surgical marker, an appropriate advancement flap was drawn incorporating the defect and placing the expected incisions within the relaxed skin tension lines where possible.    The area thus outlined was incised deep to adipose tissue with a #15 scalpel blade.  The skin margins were undermined to an appropriate distance in all directions utilizing iris scissors.
Consent (Near Eyelid Margin)/Introductory Paragraph: The rationale for Mohs was explained to the patient and consent was obtained. The risks, benefits and alternatives to therapy were discussed in detail. Specifically, the risks of ectropion or eyelid deformity, infection, scarring, bleeding, prolonged wound healing, incomplete removal, allergy to anesthesia, nerve injury and recurrence were addressed. Prior to the procedure, the treatment site was clearly identified and confirmed by the patient. All components of Universal Protocol/PAUSE Rule completed.
Mohs Case Number: 
Suture Removal: 7 days
Ftsg Text: The defect edges were debeveled with a #15 scalpel blade.  Given the location of the defect, shape of the defect and the proximity to free margins a full thickness skin graft was deemed most appropriate.  Using a sterile surgical marker, the primary defect shape was transferred to the donor site. The area thus outlined was incised deep to adipose tissue with a #15 scalpel blade.  The harvested graft was then trimmed of adipose tissue until only dermis and epidermis was left.  The skin margins of the secondary defect were undermined to an appropriate distance in all directions utilizing iris scissors.  The secondary defect was closed with interrupted buried subcutaneous sutures.  The skin edges were then re-apposed with running  sutures.  The skin graft was then placed in the primary defect and oriented appropriately.
Estimated Blood Loss (Cc): less than 5 cc
Cheek-To-Nose Interpolation Flap Text: A decision was made to reconstruct the defect utilizing an interpolation axial flap and a staged reconstruction.  A telfa template was made of the defect.  This telfa template was then used to outline the Cheek-To-Nose Interpolation flap.  The donor area for the pedicle flap was then injected with anesthesia.  The flap was excised through the skin and subcutaneous tissue down to the layer of the underlying musculature.  The interpolation flap was carefully excised within this deep plane to maintain its blood supply.  The edges of the donor site were undermined.   The donor site was closed in a primary fashion.  The pedicle was then rotated into position and sutured.  Once the tube was sutured into place, adequate blood supply was confirmed with blanching and refill.  The pedicle was then wrapped with xeroform gauze and dressed appropriately with a telfa and gauze bandage to ensure continued blood supply and protect the attached pedicle.
O-T Plasty Text: The defect edges were debeveled with a #15 scalpel blade.  Given the location of the defect, shape of the defect and the proximity to free margins an O-T plasty was deemed most appropriate.  Using a sterile surgical marker, an appropriate O-T plasty was drawn incorporating the defect and placing the expected incisions within the relaxed skin tension lines where possible.    The area thus outlined was incised deep to adipose tissue with a #15 scalpel blade.  The skin margins were undermined to an appropriate distance in all directions utilizing iris scissors.
Consent (Scalp)/Introductory Paragraph: The rationale for Mohs was explained to the patient and consent was obtained. The risks, benefits and alternatives to therapy were discussed in detail. Specifically, the risks of changes in hair growth pattern secondary to repair, infection, scarring, bleeding, prolonged wound healing, incomplete removal, allergy to anesthesia, nerve injury and recurrence were addressed. Prior to the procedure, the treatment site was clearly identified and confirmed by the patient. All components of Universal Protocol/PAUSE Rule completed.
Tissue Cultured Epidermal Autograft Text: The defect edges were debeveled with a #15 scalpel blade.  Given the location of the defect, shape of the defect and the proximity to free margins a tissue cultured epidermal autograft was deemed most appropriate.  The graft was then trimmed to fit the size of the defect.  The graft was then placed in the primary defect and oriented appropriately.
Mid-Level Procedure Text (A): After obtaining clear surgical margins the patient was sent to a mid-level provider for surgical repair.  The patient understands they will receive post-surgical care and follow-up from the mid-level provider.
Bilobed Transposition Flap Text: The defect edges were debeveled with a #15 scalpel blade.  Given the location of the defect and the proximity to free margins a bilobed transposition flap was deemed most appropriate.  Using a sterile surgical marker, an appropriate bilobe flap drawn around the defect.    The area thus outlined was incised deep to adipose tissue with a #15 scalpel blade.  The skin margins were undermined to an appropriate distance in all directions utilizing iris scissors.
Home Suture Removal Text: Patient was provided instructions on removing sutures and will remove their sutures at home.  If they have any questions or difficulties they will call the office.
A-T Advancement Flap Text: The defect edges were debeveled with a #15 scalpel blade.  Given the location of the defect, shape of the defect and the proximity to free margins an A-T advancement flap was deemed most appropriate.  Using a sterile surgical marker, an appropriate advancement flap was drawn incorporating the defect and placing the expected incisions within the relaxed skin tension lines where possible.    The area thus outlined was incised deep to adipose tissue with a #15 scalpel blade.  The skin margins were undermined to an appropriate distance in all directions utilizing iris scissors.
Cheek Interpolation Flap Text: A decision was made to reconstruct the defect utilizing an interpolation axial flap and a staged reconstruction.  A telfa template was made of the defect.  This telfa template was then used to outline the Cheek Interpolation flap.  The donor area for the pedicle flap was then injected with anesthesia.  The flap was excised through the skin and subcutaneous tissue down to the layer of the underlying musculature.  The interpolation flap was carefully excised within this deep plane to maintain its blood supply.  The edges of the donor site were undermined.   The donor site was closed in a primary fashion.  The pedicle was then rotated into position and sutured.  Once the tube was sutured into place, adequate blood supply was confirmed with blanching and refill.  The pedicle was then wrapped with xeroform gauze and dressed appropriately with a telfa and gauze bandage to ensure continued blood supply and protect the attached pedicle.
Anesthesia Type: 0.5% lidocaine with 1:200,000 epinephrine and a 1:10 solution of 8.4% sodium bicarbonate and 408mcg clindamycin/ml
Unna Boot Text: An Unna boot was placed to help immobilize the limb and facilitate more rapid healing.
Repair Performed By Another Provider Text (Leave Blank If You Do Not Want): After obtaining clear surgical margins the defect was repaired by another provider.
Show Asc Variables: Yes
Stage 2: Additional Anesthesia Type: 1% lidocaine with 1:100,000 epinephrine and 408mcg clindamycin/ml and a 1:10 solution of 8.4% sodium bicarbonate
Unique Flap 1 Text: A decision was made to reconstruct the defect utilizing a myocutaneous Island pedicle Flap based on the levator labii superioris muscle.  A telfa template was made of the defect.  This telfa template was then used to outline the myocutaneous flap, based along the meilolabial fold.  The donor area for the pedicle flap was then injected with anesthesia.  The flap was excised through the skin and subcutaneous tissue down to the layer of the underlying musculature.  The myocutaneous flap was carefully excised within this deep plane to maintain its blood supply. Based on the muscle. The edges of the donor site were undermined.   The donor site was closed in a primary fashion to the point of transposition.  The pedicle was then transposed into position and sutured.  Once the flap was sutured into place, adequate blood supply was confirmed with blanching and refill.
Consent Type: Consent 1 (Standard)
Tarsorrhaphy Text: A tarsorrhaphy was performed using Frost sutures.
No Repair - Repaired With Adjacent Surgical Defect Text (Leave Blank If You Do Not Want): After obtaining clear surgical margins the defect was repaired concurrently with another surgical defect which was in close approximation.
Epidermal Closure Graft Donor Site (Optional): simple interrupted
Alar Island Pedicle Flap Text: The defect edges were debeveled with a #15 scalpel blade.  Given the location of the defect, shape of the defect and the proximity to the alar rim an island pedicle advancement flap was deemed most appropriate.  Using a sterile surgical marker, an appropriate advancement flap was drawn incorporating the defect, outlining the appropriate donor tissue and placing the expected incisions within the nasal ala running parallel to the alar rim. The area thus outlined was incised with a #15 scalpel blade.  The skin margins were undermined minimally to an appropriate distance in all directions around the primary defect and laterally outward around the island pedicle utilizing iris scissors.  There was minimal undermining beneath the pedicle flap.
Area L Indication Text: Tumors in this location are included in Area L (trunk and extremities).  Mohs surgery is indicated for larger tumors, or tumors with aggressive histologic features, in these anatomic locations.
Mohs Method Verbiage: An incision at a 45 degree angle following the standard Mohs approach was done and the specimen was harvested as a microscopic controlled layer.
Cheiloplasty (Complex) Text: A decision was made to reconstruct the defect with a  cheiloplasty.  The defect was undermined extensively.  Additional obicularis oris muscle was excised with a 15 blade scalpel.  The defect was converted into a full thickness wedge to facilite a better cosmetic result.  Small vessels were then tied off with 5-0 monocyrl. The obicularis oris, superficial fascia, adipose and dermis were then reapproximated.  After the deeper layers were approximated the epidermis was reapproximated with particular care given to realign the vermilion border.
Graft Donor Site Bandage (Optional-Leave Blank If You Don't Want In Note): Aquaphor and telefa placed on wound. Pressure dressing applied to donor site
Split-Thickness Skin Graft Text: The defect edges were debeveled with a #15 scalpel blade.  Given the location of the defect, shape of the defect and the proximity to free margins a split thickness skin graft was deemed most appropriate.  Using a sterile surgical marker, the primary defect shape was transferred to the donor site. The split thickness graft was then harvested.  The skin graft was then placed in the primary defect and oriented appropriately.
Dressing: dry sterile dressing
Additional Anesthesia Volume In Cc: 6
Burow's Advancement Flap Text: The defect edges were debeveled with a #15 scalpel blade.  Given the location of the defect and the proximity to free margins a Burow's advancement flap was deemed most appropriate.  Using a sterile surgical marker, the appropriate advancement flap was drawn incorporating the defect and placing the expected incisions within the relaxed skin tension lines where possible.    The area thus outlined was incised deep to adipose tissue with a #15 scalpel blade.  The skin margins were undermined to an appropriate distance in all directions utilizing iris scissors.
Initial Size Of Lesion: 1.5
Trilobed Flap Text: The defect edges were debeveled with a #15 scalpel blade.  Given the location of the defect and the proximity to free margins a trilobed flap was deemed most appropriate.  Using a sterile surgical marker, an appropriate trilobed flap drawn around the defect.    The area thus outlined was incised deep to adipose tissue with a #15 scalpel blade.  The skin margins were undermined to an appropriate distance in all directions utilizing iris scissors.
Previous Accession (Optional): [id number]
Donor Site Anesthesia Type: same as repair anesthesia
Repair Type: Complex Repair
Consent (Spinal Accessory)/Introductory Paragraph: The rationale for Mohs was explained to the patient and consent was obtained. The risks, benefits and alternatives to therapy were discussed in detail. Specifically, the risks of damage to the spinal accessory nerve, infection, scarring, bleeding, prolonged wound healing, incomplete removal, allergy to anesthesia, and recurrence were addressed. Prior to the procedure, the treatment site was clearly identified and confirmed by the patient. All components of Universal Protocol/PAUSE Rule completed.
Unique Flap 2 Name: Peng Flap
Rhombic Flap Text: The defect edges were debeveled with a #15 scalpel blade.  Given the location of the defect and the proximity to free margins a rhombic flap was deemed most appropriate.  Using a sterile surgical marker, an appropriate rhombic flap was drawn incorporating the defect.    The area thus outlined was incised deep to adipose tissue with a #15 scalpel blade.  The skin margins were undermined to an appropriate distance in all directions utilizing iris scissors.
Partial Purse String (Intermediate) Text: Given the location of the defect and the characteristics of the surrounding skin an intermediate purse string closure was deemed most appropriate.  Undermining was performed circumfirentially around the surgical defect.  A purse string suture was then placed and tightened. Wound tension only allowed a partial closure of the circular defect.
V-Y Plasty Text: The defect edges were debeveled with a #15 scalpel blade.  Given the location of the defect, shape of the defect and the proximity to free margins an V-Y advancement flap was deemed most appropriate.  Using a sterile surgical marker, an appropriate advancement flap was drawn incorporating the defect and placing the expected incisions within the relaxed skin tension lines where possible.    The area thus outlined was incised deep to adipose tissue with a #15 scalpel blade.  The skin margins were undermined to an appropriate distance in all directions utilizing iris scissors.
Melolabial Interpolation Flap Text: A decision was made to reconstruct the defect utilizing an interpolation axial flap and a staged reconstruction.  A telfa template was made of the defect.  This telfa template was then used to outline the melolabial interpolation flap.  The donor area for the pedicle flap was then injected with anesthesia.  The flap was excised through the skin and subcutaneous tissue down to the layer of the underlying musculature.  The pedicle flap was carefully excised within this deep plane to maintain its blood supply.  The edges of the donor site were undermined.   The donor site was closed in a primary fashion.  The pedicle was then rotated into position and sutured.  Once the tube was sutured into place, adequate blood supply was confirmed with blanching and refill.  The pedicle was then wrapped with xeroform gauze and dressed appropriately with a telfa and gauze bandage to ensure continued blood supply and protect the attached pedicle.
Unique Flap 1 Name: Myocutaneous Island pedicle Flap
Mercedes Flap Text: The defect edges were debeveled with a #15 scalpel blade.  Given the location of the defect, shape of the defect and the proximity to free margins a Mercedes flap was deemed most appropriate.  Using a sterile surgical marker, an appropriate advancement flap was drawn incorporating the defect and placing the expected incisions within the relaxed skin tension lines where possible. The area thus outlined was incised deep to adipose tissue with a #15 scalpel blade.  The skin margins were undermined to an appropriate distance in all directions utilizing iris scissors.
Unique Flap 2 Text: A decision was made to reconstruct the defect utilizing a Peng Flap (Bilateral Advancement Rotation Flap). Given the location of the defect and the proximity to free margins, this flap was deemed most appropriate.  Using a sterile surgical marker, the appropriate rotation flaps were drawn incorporating the defect and placing the expected incisions within the relaxed skin tension lines where possible.    The area thus outlined was incised deep to adipose tissue with a #15 scalpel blade.  The skin margins were undermined to an appropriate distance in all directions utilizing iris scissors.
Keystone Flap Text: The defect edges were debeveled with a #15 scalpel blade.  Given the location of the defect, shape of the defect a keystone flap was deemed most appropriate.  Using a sterile surgical marker, an appropriate keystone flap was drawn incorporating the defect, outlining the appropriate donor tissue and placing the expected incisions within the relaxed skin tension lines where possible. The area thus outlined was incised deep to adipose tissue with a #15 scalpel blade.  The skin margins were undermined to an appropriate distance in all directions around the primary defect and laterally outward around the flap utilizing iris scissors.
Consent 2/Introductory Paragraph: Mohs surgery was explained to the patient and consent was obtained. The risks, benefits and alternatives to therapy were discussed in detail. Specifically, the risks of infection, scarring, bleeding, prolonged wound healing, incomplete removal, allergy to anesthesia, nerve injury and recurrence were addressed. Prior to the procedure, the treatment site was clearly identified and confirmed by the patient. All components of Universal Protocol/PAUSE Rule completed.
Xenograft Text: The defect edges were debeveled with a #15 scalpel blade.  Given the location of the defect, shape of the defect and the proximity to free margins a xenograft was deemed most appropriate.  The graft was then trimmed to fit the size of the defect.  The graft was then placed in the primary defect and oriented appropriately.
Ear Star Wedge Flap Text: The defect edges were debeveled with a #15 blade scalpel.  Given the location of the defect and the proximity to free margins (helical rim) an ear star wedge flap was deemed most appropriate.  Using a sterile surgical marker, the appropriate flap was drawn incorporating the defect and placing the expected incisions between the helical rim and antihelix where possible.  The area thus outlined was incised through and through with a #15 scalpel blade.
Subsequent Stages Histo Method Verbiage: Using a similar technique to that described above, a thin layer of tissue was removed from all areas where tumor was visible on the previous stage.  The tissue was again oriented, mapped, dyed, and processed as above.
Localized Dermabrasion With Wire Brush Text: The patient was draped in routine manner.  Localized dermabrasion using 3 x 17 mm wire brush was performed in routine manner to papillary dermis. This spot dermabrasion is being performed to complete skin cancer reconstruction. It also will eliminate the other sun damaged precancerous cells that are known to be part of the regional effect of a lifetime's worth of sun exposure. This localized dermabrasion is therapeutic and should not be considered cosmetic in any regard.
Transposition Flap Text: The defect edges were debeveled with a #15 scalpel blade.  Given the location of the defect and the proximity to free margins a transposition flap was deemed most appropriate.  Using a sterile surgical marker, an appropriate transposition flap was drawn incorporating the defect.    The area thus outlined was incised deep to adipose tissue with a #15 scalpel blade.  The skin margins were undermined to an appropriate distance in all directions utilizing iris scissors.
Island Pedicle Flap With Canthal Suspension Text: The defect edges were debeveled with a #15 scalpel blade.  Given the location of the defect, shape of the defect and the proximity to free margins an island pedicle advancement flap was deemed most appropriate.  Using a sterile surgical marker, an appropriate advancement flap was drawn incorporating the defect, outlining the appropriate donor tissue and placing the expected incisions within the relaxed skin tension lines where possible. The area thus outlined was incised deep to adipose tissue with a #15 scalpel blade.  The skin margins were undermined to an appropriate distance in all directions around the primary defect and laterally outward around the island pedicle utilizing iris scissors.  There was minimal undermining beneath the pedicle flap. A suspension suture was placed in the canthal tendon to prevent tension and prevent ectropion.
Graft Donor Site Epidermal Sutures (Optional): 5-0 Ethibond
Skin Substitute Text: The defect edges were debeveled with a #15 scalpel blade.  Given the location of the defect, shape of the defect and the proximity to free margins a skin substitute graft was deemed most appropriate.  The graft material was trimmed to fit the size of the defect. The graft was then placed in the primary defect and oriented appropriately.
Medical Necessity Statement: Based on my medical judgement, Mohs surgery is the most appropriate treatment for this cancer compared to other treatments.
Consent (Lip)/Introductory Paragraph: The rationale for Mohs was explained to the patient and consent was obtained. The risks, benefits and alternatives to therapy were discussed in detail. Specifically, the risks of lip deformity, changes in the oral aperture, infection, scarring, bleeding, prolonged wound healing, incomplete removal, allergy to anesthesia, nerve injury and recurrence were addressed. Prior to the procedure, the treatment site was clearly identified and confirmed by the patient. All components of Universal Protocol/PAUSE Rule completed.
Interpolation Flap Text: A decision was made to reconstruct the defect utilizing an interpolation axial flap and a staged reconstruction.  A telfa template was made of the defect.  This telfa template was then used to outline the interpolation flap.  The donor area for the pedicle flap was then injected with anesthesia.  The flap was excised through the skin and subcutaneous tissue down to the layer of the underlying musculature.  The interpolation flap was carefully excised within this deep plane to maintain its blood supply.  The edges of the donor site were undermined.   The donor site was closed in a primary fashion.  The pedicle was then rotated into position and sutured.  Once the tube was sutured into place, adequate blood supply was confirmed with blanching and refill.  The pedicle was then wrapped with xeroform gauze and dressed appropriately with a telfa and gauze bandage to ensure continued blood supply and protect the attached pedicle.
Unique Flap 4 Text: The defect edges were debeveled with a #15 scalpel blade.  Given the location of the defect and the proximity to free margins a Banner transposition flap was deemed most appropriate.  Using a sterile surgical marker, an appropriate Banner transposition flap was drawn incorporating the defect.    The area thus outlined was incised deep to adipose tissue with a #15 scalpel blade.  The skin margins were undermined to an appropriate distance in all directions utilizing iris scissors.
Wound Care (No Sutures): Petrolatum
Cheiloplasty (Less Than 50%) Text: A decision was made to reconstruct the defect with a  cheiloplasty.  The defect was undermined extensively.  Additional obicularis oris muscle was excised with a 15 blade scalpel.  The defect was converted into a full thickness wedge, of less than 50% of the vertical height of the lip, to facilite a better cosmetic result.  Small vessels were then tied off with 5-0 monocyrl. The obicularis oris, superficial fascia, adipose and dermis were then reapproximated.  After the deeper layers were approximated the epidermis was reapproximated with particular care given to realign the vermilion border.
Rotation Flap Text: The defect edges were debeveled with a #15 scalpel blade.  Given the location of the defect, shape of the defect and the proximity to free margins a rotation flap was deemed most appropriate.  Using a sterile surgical marker, an appropriate rotation flap was drawn incorporating the defect and placing the expected incisions within the relaxed skin tension lines where possible.    The area thus outlined was incised deep to adipose tissue with a #15 scalpel blade.  The skin margins were undermined to an appropriate distance in all directions utilizing iris scissors.
O-L Flap Text: The defect edges were debeveled with a #15 scalpel blade.  Given the location of the defect, shape of the defect and the proximity to free margins an O-L flap was deemed most appropriate.  Using a sterile surgical marker, an appropriate advancement flap was drawn incorporating the defect and placing the expected incisions within the relaxed skin tension lines where possible.    The area thus outlined was incised deep to adipose tissue with a #15 scalpel blade.  The skin margins were undermined to an appropriate distance in all directions utilizing iris scissors.
Closure 4 Information: This tab is for additional flaps and grafts above and beyond our usual structured repairs.  Please note if you enter information here it will not currently bill and you will need to add the billing information manually.
Complex Repair And Graft Additional Text (Will Appearing After The Standard Complex Repair Text): The complex repair was not sufficient to completely close the primary defect. The remaining additional defect was repaired with the graft mentioned below.
Hemostasis: Electrocautery
Consent (Nose)/Introductory Paragraph: The rationale for Mohs was explained to the patient and consent was obtained. The risks, benefits and alternatives to therapy were discussed in detail. Specifically, the risks of nasal deformity, changes in the flow of air through the nose, infection, scarring, bleeding, prolonged wound healing, incomplete removal, allergy to anesthesia, nerve injury and recurrence were addressed. Prior to the procedure, the treatment site was clearly identified and confirmed by the patient. All components of Universal Protocol/PAUSE Rule completed.
Simple / Intermediate / Complex Repair - Final Wound Length In Cm: 5
Dorsal Nasal Flap Text: The defect edges were debeveled with a #15 scalpel blade.  Given the location of the defect and the proximity to free margins a dorsal nasal flap,based upon the glabellar folds, was deemed most appropriate.  Using a sterile surgical marker, an appropriate dorsal nasal flap was drawn around the defect.    The area thus outlined was incised deep to adipose tissue with a #15 scalpel blade.  The skin margins were undermined to an appropriate distance in all directions utilizing iris scissors.
Ear Wedge Repair Text: A wedge excision was completed by carrying down an excision through the full thickness of the ear and cartilage with an inward facing Burow's triangle. The wound was then closed in a layered fashion.
Repair Anesthesia Method: local infiltration
S Plasty Text: Given the location and shape of the defect, and the orientation of relaxed skin tension lines, an S-plasty was deemed most appropriate for repair.  Using a sterile surgical marker, the appropriate outline of the S-plasty was drawn, incorporating the defect and placing the expected incisions within the relaxed skin tension lines where possible.  The area thus outlined was incised deep to adipose tissue with a #15 scalpel blade.  The skin margins were undermined to an appropriate distance in all directions utilizing iris scissors. The skin flaps were advanced over the defect.  The opposing margins were then approximated with interrupted buried subcutaneous sutures.
Cartilage Graft Text: The defect edges were debeveled with a #15 scalpel blade.  Given the location of the defect, shape of the defect, the fact the defect involved a full thickness cartilage defect a cartilage graft was deemed most appropriate.  An appropriate donor site was identified, cleansed, and anesthetized. The cartilage graft was then harvested and transferred to the recipient site, oriented appropriately and then sutured into place.  The secondary defect was then repaired using a primary closure.
Mauc Instructions: By selecting yes to the question below the MAUC number will be added into the note.  This will be calculated automatically based on the diagnosis chosen, the size entered, the body zone selected (H,M,L) and the specific indications you chose. You will also have the option to override the Mohs AUC if you disagree with the automatically calculated number and this option is found in the Case Summary tab.
Unique Flap 3 Name: Mercedes Flap
Postop Diagnosis: same
Stage 1: Number Of Blocks?: 1
Advancement-Rotation Flap Text: The defect edges were debeveled with a #15 scalpel blade.  Given the location of the defect, shape of the defect and the proximity to free margins an advancement-rotation flap was deemed most appropriate.  Using a sterile surgical marker, an appropriate flap was drawn incorporating the defect and placing the expected incisions within the relaxed skin tension lines where possible. The area thus outlined was incised deep to adipose tissue with a #15 scalpel blade.  The skin margins were undermined to an appropriate distance in all directions utilizing iris scissors.
Number Of Stages: 2
Advancement Flap (Double) Text: The defect edges were debeveled with a #15 scalpel blade.  Given the location of the defect and the proximity to free margins a double advancement flap was deemed most appropriate.  Using a sterile surgical marker, the appropriate advancement flaps were drawn incorporating the defect and placing the expected incisions within the relaxed skin tension lines where possible.    The area thus outlined was incised deep to adipose tissue with a #15 scalpel blade.  The skin margins were undermined to an appropriate distance in all directions utilizing iris scissors.
Epidermal Closure: running cuticular
M-Plasty Complex Repair Preamble Text (Leave Blank If You Do Not Want): Extensive wide undermining was performed.
Location Indication Override (Is Already Calculated Based On Selected Body Location): Area M
Inflammation Suggestive Of Cancer Camouflage Histology Text: There was a dense lymphocytic infiltrate which prevented adequate histologic evaluation of adjacent structures.
Muscle Hinge Flap Text: The defect edges were debeveled with a #15 scalpel blade.  Given the size, depth and location of the defect and the proximity to free margins a muscle hinge flap was deemed most appropriate.  Using a sterile surgical marker, an appropriate hinge flap was drawn incorporating the defect. The area thus outlined was incised with a #15 scalpel blade.  The skin margins were undermined to an appropriate distance in all directions utilizing iris scissors.
Island Pedicle Flap-Requiring Vessel Identification Text: The defect edges were debeveled with a #15 scalpel blade.  Given the location of the defect, shape of the defect and the proximity to free margins an island pedicle advancement flap was deemed most appropriate.  Using a sterile surgical marker, an appropriate advancement flap was drawn, based on the axial vessel mentioned above, incorporating the defect, outlining the appropriate donor tissue and placing the expected incisions within the relaxed skin tension lines where possible.    The area thus outlined was incised deep to adipose tissue with a #15 scalpel blade.  The skin margins were undermined to an appropriate distance in all directions around the primary defect and laterally outward around the island pedicle utilizing iris scissors.  There was minimal undermining beneath the pedicle flap.
Island Pedicle Flap Text: The defect edges were debeveled with a #15 scalpel blade.  Given the location of the defect, shape of the defect and the proximity to free margins an island pedicle advancement flap was deemed most appropriate.  Using a sterile surgical marker, an appropriate advancement flap was drawn incorporating the defect, outlining the appropriate donor tissue and placing the expected incisions within the relaxed skin tension lines where possible.    The area thus outlined was incised deep to adipose tissue with a #15 scalpel blade.  The skin margins were undermined to an appropriate distance in all directions around the primary defect and laterally outward around the island pedicle utilizing iris scissors.  There was minimal undermining beneath the pedicle flap.
Paramedian Forehead Flap Text: A decision was made to reconstruct the defect utilizing an interpolation axial flap and a staged reconstruction.  A telfa template was made of the defect.  This telfa template was then used to outline the paramedian forehead pedicle flap.  The donor area for the pedicle flap was then injected with anesthesia.  The flap was excised through the skin and subcutaneous tissue down to the layer of the underlying musculature.  The pedicle flap was carefully excised within this deep plane to maintain its blood supply.  The edges of the donor site were undermined.   The donor site was closed in a primary fashion.  The pedicle was then rotated into position and sutured.  Once the tube was sutured into place, adequate blood supply was confirmed with blanching and refill.  The pedicle was then wrapped with xeroform gauze and dressed appropriately with a telfa and gauze bandage to ensure continued blood supply and protect the attached pedicle.
Consent 3/Introductory Paragraph: I gave the patient a chance to ask questions they had about the procedure.  Following this I explained the Mohs procedure and consent was obtained. The risks, benefits and alternatives to therapy were discussed in detail. Specifically, the risks of infection, scarring, bleeding, prolonged wound healing, incomplete removal, allergy to anesthesia, nerve injury and recurrence were addressed. Prior to the procedure, the treatment site was clearly identified and confirmed by the patient. All components of Universal Protocol/PAUSE Rule completed.
Manual Repair Warning Statement: We plan on removing the manually selected variable below in favor of our much easier automatic structured text blocks found in the previous tab. We decided to do this to help make the flow better and give you the full power of structured data. Manual selection is never going to be ideal in our platform and I would encourage you to avoid using manual selection from this point on, especially since I will be sunsetting this feature. It is important that you do one of two things with the customized text below. First, you can save all of the text in a word file so you can have it for future reference. Second, transfer the text to the appropriate area in the Library tab. Lastly, if there is a flap or graft type which we do not have you need to let us know right away so I can add it in before the variable is hidden. No need to panic, we plan to give you roughly 6 months to make the change.
V-Y Flap Text: The defect edges were debeveled with a #15 scalpel blade.  Given the location of the defect, shape of the defect and the proximity to free margins a V-Y flap was deemed most appropriate.  Using a sterile surgical marker, an appropriate advancement flap was drawn incorporating the defect and placing the expected incisions within the relaxed skin tension lines where possible.    The area thus outlined was incised deep to adipose tissue with a #15 scalpel blade.  The skin margins were undermined to an appropriate distance in all directions utilizing iris scissors.
Banner Transposition Flap Text: The defect edges were debeveled with a #15 scalpel blade.  Given the location of the defect and the proximity to free margins a Banner transposition flap was deemed most appropriate.  Using a sterile surgical marker, an appropriate flap drawn around the defect. The area thus outlined was incised deep to adipose tissue with a #15 scalpel blade.  The skin margins were undermined to an appropriate distance in all directions utilizing iris scissors.
Hatchet Flap Text: The defect edges were debeveled with a #15 scalpel blade.  Given the location of the defect, shape of the defect and the proximity to free margins a hatchet flap based from the glabella was deemed most appropriate.  Using a sterile surgical marker, an appropriate glabellar hatchet flap was drawn incorporating the defect and placing the expected incisions within the relaxed skin tension lines where possible.    The area thus outlined was incised deep to adipose tissue with a #15 scalpel blade.  The skin margins were undermined to an appropriate distance in all directions utilizing iris scissors.
Dermal Autograft Text: The defect edges were debeveled with a #15 scalpel blade.  Given the location of the defect, shape of the defect and the proximity to free margins a dermal autograft was deemed most appropriate.  Using a sterile surgical marker, the primary defect shape was transferred to the donor site. The area thus outlined was incised deep to adipose tissue with a #15 scalpel blade.  The harvested graft was then trimmed of adipose and epidermal tissue until only dermis was left.  The skin graft was then placed in the primary defect and oriented appropriately.
W Plasty Text: The lesion was extirpated to the level of the fat with a #15 scalpel blade.  Given the location of the defect, shape of the defect and the proximity to free margins a W-plasty was deemed most appropriate for repair.  Using a sterile surgical marker, the appropriate transposition arms of the W-plasty were drawn incorporating the defect and placing the expected incisions within the relaxed skin tension lines where possible.    The area thus outlined was incised deep to adipose tissue with a #15 scalpel blade.  The skin margins were undermined to an appropriate distance in all directions utilizing iris scissors.  The opposing transposition arms were then transposed into place in opposite direction and anchored with interrupted buried subcutaneous sutures.
Graft Basting Suture (Optional): 5-0 Fast Absorbing Gut
Unique Flap 3 Text: The defect edges were debeveled with a #15 scalpel blade.  Given the location of the defect, shape of the defect and the proximity to free margins a Mercedes (double advancement flap) was deemed most appropriate.  Using a sterile surgical marker, the appropriate transposition flaps were drawn incorporating the defect and placing the expected incisions within the relaxed skin tension lines where possible.    The area thus outlined was incised deep to adipose tissue with a #15 scalpel blade.  The skin margins were undermined to an appropriate distance in all directions utilizing iris scissors.  Hemostasis was achieved with electrocautery.  The flaps were then advanced into the defect and anchored with interrupted buried subcutaneous sutures.
Surgeon/Pathologist Verbiage (Will Incorporate Name Of Surgeon From Intro If Not Blank): operated in two distinct and integrated capacities as the surgeon and pathologist.
Purse String (Simple) Text: Given the location of the defect and the characteristics of the surrounding skin a purse string closure was deemed most appropriate.  Undermining was performed circumfirentially around the surgical defect.  A purse string suture was then placed and tightened.
Bilateral Helical Rim Advancement Flap Text: The defect edges were debeveled with a #15 blade scalpel.  Given the location of the defect and the proximity to free margins (helical rim) a bilateral helical rim advancement flap was deemed most appropriate.  Using a sterile surgical marker, the appropriate advancement flaps were drawn incorporating the defect and placing the expected incisions between the helical rim and antihelix where possible.  The area thus outlined was incised through and through with a #15 scalpel blade.  With a skin hook and iris scissors, the flaps were gently and sharply undermined and freed up.
Area H Indication Text: Tumors in this location are included in Area H (eyelids, eyebrows, nose, lips, chin, ear, pre-auricular, post-auricular, temple, genitalia, hands, feet, ankles and areola).  Tissue conservation is critical in these anatomic locations.
Double Island Pedicle Flap Text: The defect edges were debeveled with a #15 scalpel blade.  Given the location of the defect, shape of the defect and the proximity to free margins a double island pedicle advancement flap was deemed most appropriate.  Using a sterile surgical marker, an appropriate advancement flap was drawn incorporating the defect, outlining the appropriate donor tissue and placing the expected incisions within the relaxed skin tension lines where possible.    The area thus outlined was incised deep to adipose tissue with a #15 scalpel blade.  The skin margins were undermined to an appropriate distance in all directions around the primary defect and laterally outward around the island pedicle utilizing iris scissors.  There was minimal undermining beneath the pedicle flap.
Graft Cartilage Fenestration Text: The cartilage was fenestrated with a 2mm punch biopsy to help facilitate graft survival and healing.
Epidermal Autograft Text: The defect edges were debeveled with a #15 scalpel blade.  Given the location of the defect, shape of the defect and the proximity to free margins an epidermal autograft was deemed most appropriate.  Using a sterile surgical marker, the primary defect shape was transferred to the donor site. The epidermal graft was then harvested.  The skin graft was then placed in the primary defect and oriented appropriately.
Referring Physician (Optional): IFRAH Shahid
Purse String (Intermediate) Text: Given the location of the defect and the characteristics of the surrounding skin a purse string intermediate closure was deemed most appropriate.  Undermining was performed circumfirentially around the surgical defect.  A purse string suture was then placed and tightened.
Helical Rim Advancement Flap Text: The defect edges were debeveled with a #15 blade scalpel.  Given the location of the defect and the proximity to free margins (helical rim) a double helical rim advancement flap was deemed most appropriate.  Using a sterile surgical marker, the appropriate advancement flaps were drawn incorporating the defect and placing the expected incisions between the helical rim and antihelix where possible.  The area thus outlined was incised through and through with a #15 scalpel blade.  With a skin hook and iris scissors, the flaps were gently and sharply undermined and freed up.
Unique Flap 4 Name: Banner Flap
Spiral Flap Text: The defect edges were debeveled with a #15 scalpel blade.  Given the location of the defect, shape of the defect and the proximity to free margins a spiral flap was deemed most appropriate.  Using a sterile surgical marker, an appropriate rotation flap was drawn incorporating the defect and placing the expected incisions within the relaxed skin tension lines where possible. The area thus outlined was incised deep to adipose tissue with a #15 scalpel blade.  The skin margins were undermined to an appropriate distance in all directions utilizing iris scissors.
Consent (Ear)/Introductory Paragraph: The rationale for Mohs was explained to the patient and consent was obtained. The risks, benefits and alternatives to therapy were discussed in detail. Specifically, the risks of ear deformity, infection, scarring, bleeding, prolonged wound healing, incomplete removal, allergy to anesthesia, nerve injury and recurrence were addressed. Prior to the procedure, the treatment site was clearly identified and confirmed by the patient. All components of Universal Protocol/PAUSE Rule completed.
Consent 1/Introductory Paragraph: The rationale for Mohs was explained to the patient and consent was obtained. The risks, benefits and alternatives to therapy were discussed in detail. Specifically, the risks of infection, scarring, bleeding, prolonged wound healing, incomplete removal, allergy to anesthesia, nerve injury and recurrence were addressed. Prior to the procedure, the treatment site was clearly identified and confirmed by the patient. All components of Universal Protocol/PAUSE Rule completed.
No Residual Tumor Seen Histology Text: There were no malignant cells seen in the sections examined.
Posterior Auricular Interpolation Flap Text: A decision was made to reconstruct the defect utilizing an interpolation axial flap and a staged reconstruction.  A telfa template was made of the defect.  This telfa template was then used to outline the posterior auricular interpolation flap.  The donor area for the pedicle flap was then injected with anesthesia.  The flap was excised through the skin and subcutaneous tissue down to the layer of the underlying musculature.  The pedicle flap was carefully excised within this deep plane to maintain its blood supply.  The edges of the donor site were undermined.   The donor site was closed in a primary fashion.  The pedicle was then rotated into position and sutured.  Once the tube was sutured into place, adequate blood supply was confirmed with blanching and refill.  The pedicle was then wrapped with xeroform gauze and dressed appropriately with a telfa and gauze bandage to ensure continued blood supply and protect the attached pedicle.
Bcc Histology Text: There were numerous aggregates of basaloid cells.
Full Thickness Lip Wedge Repair (Flap) Text: Given the location of the defect and the proximity to free margins a full thickness wedge repair was deemed most appropriate.  Using a sterile surgical marker, the appropriate repair was drawn incorporating the defect and placing the expected incisions perpendicular to the vermilion border.  The vermilion border was also meticulously outlined to ensure appropriate reapproximation during the repair.  The area thus outlined was incised through and through with a #15 scalpel blade.  The muscularis and dermis were reaproximated with deep sutures following hemostasis. Care was taken to realign the vermilion border before proceeding with the superficial closure.  Once the vermilion was realigned the superfical and mucosal closure was finished.
Advancement Flap (Single) Text: The defect edges were debeveled with a #15 scalpel blade.  Given the location of the defect and the proximity to free margins a single advancement flap was deemed most appropriate.  Using a sterile surgical marker, an appropriate advancement flap was drawn incorporating the defect and placing the expected incisions within the relaxed skin tension lines where possible.    The area thus outlined was incised deep to adipose tissue with a #15 scalpel blade.  The skin margins were undermined to an appropriate distance in all directions utilizing iris scissors.
Bilobed Flap Text: The defect edges were debeveled with a #15 scalpel blade.  Given the location of the defect and the proximity to free margins a bilobe flap was deemed most appropriate.  Using a sterile surgical marker, an appropriate bilobe flap drawn around the defect.    The area thus outlined was incised deep to adipose tissue with a #15 scalpel blade.  The skin margins were undermined to an appropriate distance in all directions utilizing iris scissors.
Mohs Histo Method Verbiage: Each section was then chromacoded and processed in the Mohs lab using the Mohs protocol and submitted for frozen section.
Z Plasty Text: The lesion was extirpated to the level of the fat with a #15 scalpel blade.  Given the location of the defect, shape of the defect and the proximity to free margins a Z-plasty was deemed most appropriate for repair.  Using a sterile surgical marker, the appropriate transposition arms of the Z-plasty were drawn incorporating the defect and placing the expected incisions within the relaxed skin tension lines where possible.    The area thus outlined was incised deep to adipose tissue with a #15 scalpel blade.  The skin margins were undermined to an appropriate distance in all directions utilizing iris scissors.  The opposing transposition arms were then transposed into place in opposite direction and anchored with interrupted buried subcutaneous sutures.
Non-Graft Cartilage Fenestration Text: The cartilage was fenestrated with a 2mm punch biopsy to help facilitate healing.
Deep Sutures: 5-0 Vicryl
Mucosal Advancement Flap Text: Given the location of the defect, shape of the defect and the proximity to free margins a mucosal advancement flap was deemed most appropriate. Incisions were made with a 15 blade scalpel in the appropriate fashion along the cutaneous vermilion border and the mucosal lip. The remaining actinically damaged mucosal tissue was excised.  The mucosal advancement flap was then elevated to the gingival sulcus with care taken to preserve the neurovascular structures and advanced into the primary defect. Care was taken to ensure that precise realignment of the vermilion border was achieved.
Crescentic Advancement Flap Text: The defect edges were debeveled with a #15 scalpel blade.  Given the location of the defect and the proximity to free margins a crescentic advancement flap was deemed most appropriate.  Using a sterile surgical marker, the appropriate advancement flap was drawn incorporating the defect and placing the expected incisions within the relaxed skin tension lines where possible.    The area thus outlined was incised deep to adipose tissue with a #15 scalpel blade.  The skin margins were undermined to an appropriate distance in all directions utilizing iris scissors.
Detail Level: Detailed
Closure 2 Information: This tab is for additional flaps and grafts, including complex repair and grafts and complex repair and flaps. You can also specify a different location for the additional defect, if the location is the same you do not need to select a new one. We will insert the automated text for the repair you select below just as we do for solitary flaps and grafts. Please note that at this time if you select a location with a different insurance zone you will need to override the ICD10 and CPT if appropriate.
Complex Repair And Flap Additional Text (Will Appearing After The Standard Complex Repair Text): The complex repair was not sufficient to completely close the primary defect. The remaining additional defect was repaired with the flap mentioned below.
Melolabial Transposition Flap Text: The defect edges were debeveled with a #15 scalpel blade.  Given the location of the defect and the proximity to free margins a melolabial flap was deemed most appropriate.  Using a sterile surgical marker, an appropriate melolabial transposition flap was drawn incorporating the defect.    The area thus outlined was incised deep to adipose tissue with a #15 scalpel blade.  The skin margins were undermined to an appropriate distance in all directions utilizing iris scissors.
Secondary Intention Text (Leave Blank If You Do Not Want): The defect will heal with secondary intention.
Wound Care: Aquaphor
O-Z Plasty Text: The defect edges were debeveled with a #15 scalpel blade.  Given the location of the defect, shape of the defect and the proximity to free margins an O-Z plasty (double transposition flap) was deemed most appropriate.  Using a sterile surgical marker, the appropriate transposition flaps were drawn incorporating the defect and placing the expected incisions within the relaxed skin tension lines where possible.    The area thus outlined was incised deep to adipose tissue with a #15 scalpel blade.  The skin margins were undermined to an appropriate distance in all directions utilizing iris scissors.  Hemostasis was achieved with electrocautery.  The flaps were then transposed into place, one clockwise and the other counterclockwise, and anchored with interrupted buried subcutaneous sutures.
Consent (Temporal Branch)/Introductory Paragraph: The rationale for Mohs was explained to the patient and consent was obtained. The risks, benefits and alternatives to therapy were discussed in detail. Specifically, the risks of damage to the temporal branch of the facial nerve, infection, scarring, bleeding, prolonged wound healing, incomplete removal, allergy to anesthesia, and recurrence were addressed. Prior to the procedure, the treatment site was clearly identified and confirmed by the patient. All components of Universal Protocol/PAUSE Rule completed.

## 2018-11-14 ENCOUNTER — HOSPITAL ENCOUNTER (OUTPATIENT)
Dept: RADIOLOGY | Facility: MEDICAL CENTER | Age: 78
End: 2018-11-14
Attending: FAMILY MEDICINE
Payer: MEDICARE

## 2018-11-14 DIAGNOSIS — C34.10 PANCOAST'S SYNDROME, UNSPECIFIED LATERALITY (HCC): ICD-10-CM

## 2018-11-14 PROCEDURE — 71250 CT THORAX DX C-: CPT

## 2019-01-01 ENCOUNTER — HOSPITAL ENCOUNTER (INPATIENT)
Facility: MEDICAL CENTER | Age: 79
LOS: 16 days | DRG: 871 | End: 2020-01-14
Attending: EMERGENCY MEDICINE | Admitting: HOSPITALIST
Payer: MEDICARE

## 2019-01-01 ENCOUNTER — HOSPITAL ENCOUNTER (OUTPATIENT)
Dept: LAB | Facility: MEDICAL CENTER | Age: 79
End: 2019-04-11
Attending: FAMILY MEDICINE
Payer: MEDICARE

## 2019-01-01 ENCOUNTER — APPOINTMENT (OUTPATIENT)
Dept: RADIOLOGY | Facility: MEDICAL CENTER | Age: 79
DRG: 871 | End: 2019-01-01
Attending: INTERNAL MEDICINE
Payer: MEDICARE

## 2019-01-01 ENCOUNTER — APPOINTMENT (OUTPATIENT)
Dept: RADIOLOGY | Facility: MEDICAL CENTER | Age: 79
DRG: 871 | End: 2019-01-01
Attending: HOSPITALIST
Payer: MEDICARE

## 2019-01-01 ENCOUNTER — APPOINTMENT (OUTPATIENT)
Dept: RADIOLOGY | Facility: MEDICAL CENTER | Age: 79
DRG: 871 | End: 2019-01-01
Attending: EMERGENCY MEDICINE
Payer: MEDICARE

## 2019-01-01 ENCOUNTER — HOSPITAL ENCOUNTER (OUTPATIENT)
Dept: RADIOLOGY | Facility: MEDICAL CENTER | Age: 79
End: 2019-03-22
Attending: FAMILY MEDICINE
Payer: MEDICARE

## 2019-01-01 ENCOUNTER — HOSPITAL ENCOUNTER (OUTPATIENT)
Dept: RADIOLOGY | Facility: MEDICAL CENTER | Age: 79
End: 2019-09-25
Attending: FAMILY MEDICINE
Payer: MEDICARE

## 2019-01-01 ENCOUNTER — APPOINTMENT (OUTPATIENT)
Dept: RADIOLOGY | Facility: MEDICAL CENTER | Age: 79
End: 2019-01-01
Attending: FAMILY MEDICINE
Payer: MEDICARE

## 2019-01-01 ENCOUNTER — HOSPITAL ENCOUNTER (OUTPATIENT)
Dept: RADIOLOGY | Facility: MEDICAL CENTER | Age: 79
End: 2019-04-11
Attending: FAMILY MEDICINE
Payer: MEDICARE

## 2019-01-01 DIAGNOSIS — J96.01 ACUTE HYPOXEMIC RESPIRATORY FAILURE (HCC): ICD-10-CM

## 2019-01-01 DIAGNOSIS — C34.10 PANCOAST'S SYNDROME, UNSPECIFIED LATERALITY (HCC): ICD-10-CM

## 2019-01-01 DIAGNOSIS — E04.1 THYROID NODULE: ICD-10-CM

## 2019-01-01 DIAGNOSIS — E04.1 NONTOXIC UNINODULAR GOITER: ICD-10-CM

## 2019-01-01 LAB
25(OH)D3 SERPL-MCNC: 42 NG/ML (ref 30–100)
ACTION RANGE TRIGGERED IACRT: NO
ACTION RANGE TRIGGERED IACRT: YES
ALBUMIN SERPL BCP-MCNC: 3.1 G/DL (ref 3.2–4.9)
ALBUMIN SERPL BCP-MCNC: 3.9 G/DL (ref 3.2–4.9)
ALBUMIN SERPL BCP-MCNC: 4.5 G/DL (ref 3.2–4.9)
ALBUMIN/GLOB SERPL: 1.1 G/DL
ALBUMIN/GLOB SERPL: 1.3 G/DL
ALBUMIN/GLOB SERPL: 1.9 G/DL
ALP SERPL-CCNC: 75 U/L (ref 30–99)
ALP SERPL-CCNC: 77 U/L (ref 30–99)
ALP SERPL-CCNC: 81 U/L (ref 30–99)
ALT SERPL-CCNC: 12 U/L (ref 2–50)
ALT SERPL-CCNC: 15 U/L (ref 2–50)
ALT SERPL-CCNC: 18 U/L (ref 2–50)
ANION GAP SERPL CALC-SCNC: 10 MMOL/L (ref 0–11.9)
ANION GAP SERPL CALC-SCNC: 12 MMOL/L (ref 0–11.9)
ANION GAP SERPL CALC-SCNC: 7 MMOL/L (ref 0–11.9)
ANION GAP SERPL CALC-SCNC: 8 MMOL/L (ref 0–11.9)
APPEARANCE UR: CLEAR
AST SERPL-CCNC: 22 U/L (ref 12–45)
AST SERPL-CCNC: 26 U/L (ref 12–45)
AST SERPL-CCNC: 29 U/L (ref 12–45)
BACTERIA #/AREA URNS HPF: NEGATIVE /HPF
BACTERIA SPEC RESP CULT: ABNORMAL
BACTERIA UR CULT: NORMAL
BASE EXCESS BLDA CALC-SCNC: -1 MMOL/L (ref -4–3)
BASE EXCESS BLDA CALC-SCNC: -3 MMOL/L (ref -4–3)
BASE EXCESS BLDA CALC-SCNC: 0 MMOL/L (ref -4–3)
BASE EXCESS BLDA CALC-SCNC: 2 MMOL/L (ref -4–3)
BASOPHILS # BLD AUTO: 0 % (ref 0–1.8)
BASOPHILS # BLD AUTO: 0 % (ref 0–1.8)
BASOPHILS # BLD AUTO: 0.2 % (ref 0–1.8)
BASOPHILS # BLD AUTO: 0.8 % (ref 0–1.8)
BASOPHILS # BLD: 0 K/UL (ref 0–0.12)
BASOPHILS # BLD: 0 K/UL (ref 0–0.12)
BASOPHILS # BLD: 0.03 K/UL (ref 0–0.12)
BASOPHILS # BLD: 0.08 K/UL (ref 0–0.12)
BILIRUB SERPL-MCNC: 0.6 MG/DL (ref 0.1–1.5)
BILIRUB SERPL-MCNC: 0.7 MG/DL (ref 0.1–1.5)
BILIRUB SERPL-MCNC: 0.8 MG/DL (ref 0.1–1.5)
BILIRUB UR QL STRIP.AUTO: ABNORMAL
BODY TEMPERATURE: ABNORMAL DEGREES
BUN SERPL-MCNC: 15 MG/DL (ref 8–22)
BUN SERPL-MCNC: 27 MG/DL (ref 8–22)
BUN SERPL-MCNC: 33 MG/DL (ref 8–22)
BUN SERPL-MCNC: 39 MG/DL (ref 8–22)
CALCIUM SERPL-MCNC: 8.6 MG/DL (ref 8.5–10.5)
CALCIUM SERPL-MCNC: 8.9 MG/DL (ref 8.5–10.5)
CALCIUM SERPL-MCNC: 9.2 MG/DL (ref 8.5–10.5)
CALCIUM SERPL-MCNC: 9.3 MG/DL (ref 8.5–10.5)
CHLORIDE SERPL-SCNC: 103 MMOL/L (ref 96–112)
CHLORIDE SERPL-SCNC: 103 MMOL/L (ref 96–112)
CHLORIDE SERPL-SCNC: 106 MMOL/L (ref 96–112)
CHLORIDE SERPL-SCNC: 106 MMOL/L (ref 96–112)
CHOLEST SERPL-MCNC: 172 MG/DL (ref 100–199)
CO2 BLDA-SCNC: 22 MMOL/L (ref 20–33)
CO2 BLDA-SCNC: 24 MMOL/L (ref 20–33)
CO2 BLDA-SCNC: 27 MMOL/L (ref 20–33)
CO2 BLDA-SCNC: 29 MMOL/L (ref 20–33)
CO2 SERPL-SCNC: 24 MMOL/L (ref 20–33)
CO2 SERPL-SCNC: 24 MMOL/L (ref 20–33)
CO2 SERPL-SCNC: 25 MMOL/L (ref 20–33)
CO2 SERPL-SCNC: 32 MMOL/L (ref 20–33)
COLOR UR: ABNORMAL
CREAT SERPL-MCNC: 0.79 MG/DL (ref 0.5–1.4)
CREAT SERPL-MCNC: 0.81 MG/DL (ref 0.5–1.4)
CREAT SERPL-MCNC: 0.94 MG/DL (ref 0.5–1.4)
CREAT SERPL-MCNC: 1.05 MG/DL (ref 0.5–1.4)
CRP SERPL HS-MCNC: 12.86 MG/DL (ref 0–0.75)
CYTOLOGY REG CYTOL: NORMAL
EKG IMPRESSION: NORMAL
EOSINOPHIL # BLD AUTO: 0 K/UL (ref 0–0.51)
EOSINOPHIL # BLD AUTO: 0.06 K/UL (ref 0–0.51)
EOSINOPHIL NFR BLD: 0 % (ref 0–6.9)
EOSINOPHIL NFR BLD: 0.6 % (ref 0–6.9)
EPI CELLS #/AREA URNS HPF: ABNORMAL /HPF
ERYTHROCYTE [DISTWIDTH] IN BLOOD BY AUTOMATED COUNT: 52.2 FL (ref 35.9–50)
ERYTHROCYTE [DISTWIDTH] IN BLOOD BY AUTOMATED COUNT: 52.6 FL (ref 35.9–50)
ERYTHROCYTE [DISTWIDTH] IN BLOOD BY AUTOMATED COUNT: 52.8 FL (ref 35.9–50)
ERYTHROCYTE [DISTWIDTH] IN BLOOD BY AUTOMATED COUNT: 54.1 FL (ref 35.9–50)
ETEST SENSITIVITY ETEST: NORMAL
FLUAV RNA SPEC QL NAA+PROBE: NEGATIVE
FLUBV RNA SPEC QL NAA+PROBE: NEGATIVE
GLOBULIN SER CALC-MCNC: 2.4 G/DL (ref 1.9–3.5)
GLOBULIN SER CALC-MCNC: 2.8 G/DL (ref 1.9–3.5)
GLOBULIN SER CALC-MCNC: 3.1 G/DL (ref 1.9–3.5)
GLUCOSE SERPL-MCNC: 139 MG/DL (ref 65–99)
GLUCOSE SERPL-MCNC: 173 MG/DL (ref 65–99)
GLUCOSE SERPL-MCNC: 190 MG/DL (ref 65–99)
GLUCOSE SERPL-MCNC: 96 MG/DL (ref 65–99)
GLUCOSE UR STRIP.AUTO-MCNC: NEGATIVE MG/DL
GRAM STN SPEC: ABNORMAL
GRAM STN SPEC: ABNORMAL
HCO3 BLDA-SCNC: 21.3 MMOL/L (ref 17–25)
HCO3 BLDA-SCNC: 23.2 MMOL/L (ref 17–25)
HCO3 BLDA-SCNC: 26.2 MMOL/L (ref 17–25)
HCO3 BLDA-SCNC: 27.4 MMOL/L (ref 17–25)
HCT VFR BLD AUTO: 39 % (ref 42–52)
HCT VFR BLD AUTO: 43.7 % (ref 42–52)
HCT VFR BLD AUTO: 49.9 % (ref 42–52)
HCT VFR BLD AUTO: 50 % (ref 42–52)
HDLC SERPL-MCNC: 60 MG/DL
HGB BLD-MCNC: 13 G/DL (ref 14–18)
HGB BLD-MCNC: 14.3 G/DL (ref 14–18)
HGB BLD-MCNC: 16.2 G/DL (ref 14–18)
HGB BLD-MCNC: 16.4 G/DL (ref 14–18)
HOROWITZ INDEX BLDA+IHG-RTO: 183 MM[HG]
HOROWITZ INDEX BLDA+IHG-RTO: 233 MM[HG]
HOROWITZ INDEX BLDA+IHG-RTO: 233 MM[HG]
HOROWITZ INDEX BLDA+IHG-RTO: 315 MM[HG]
HYALINE CASTS #/AREA URNS LPF: >20 /LPF
IMM GRANULOCYTES # BLD AUTO: 0.02 K/UL (ref 0–0.11)
IMM GRANULOCYTES # BLD AUTO: 0.09 K/UL (ref 0–0.11)
IMM GRANULOCYTES NFR BLD AUTO: 0.2 % (ref 0–0.9)
IMM GRANULOCYTES NFR BLD AUTO: 0.5 % (ref 0–0.9)
INR PPP: 1.13 (ref 0.87–1.13)
INST. QUALIFIED PATIENT IIQPT: YES
KETONES UR STRIP.AUTO-MCNC: NEGATIVE MG/DL
LACTATE BLD-SCNC: 2.5 MMOL/L (ref 0.5–2)
LACTATE BLD-SCNC: 2.7 MMOL/L (ref 0.5–2)
LACTATE BLD-SCNC: 3.1 MMOL/L (ref 0.5–2)
LACTATE BLD-SCNC: 3.5 MMOL/L (ref 0.5–2)
LACTATE BLD-SCNC: 4.4 MMOL/L (ref 0.5–2)
LDLC SERPL CALC-MCNC: 92 MG/DL
LEUKOCYTE ESTERASE UR QL STRIP.AUTO: NEGATIVE
LYMPHOCYTES # BLD AUTO: 0.22 K/UL (ref 1–4.8)
LYMPHOCYTES # BLD AUTO: 0.43 K/UL (ref 1–4.8)
LYMPHOCYTES # BLD AUTO: 0.75 K/UL (ref 1–4.8)
LYMPHOCYTES # BLD AUTO: 2.19 K/UL (ref 1–4.8)
LYMPHOCYTES NFR BLD: 0.9 % (ref 22–41)
LYMPHOCYTES NFR BLD: 2.2 % (ref 22–41)
LYMPHOCYTES NFR BLD: 2.6 % (ref 22–41)
LYMPHOCYTES NFR BLD: 22.2 % (ref 22–41)
MAGNESIUM SERPL-MCNC: 1.9 MG/DL (ref 1.5–2.5)
MAGNESIUM SERPL-MCNC: 2.2 MG/DL (ref 1.5–2.5)
MANUAL DIFF BLD: NORMAL
MANUAL DIFF BLD: NORMAL
MCH RBC QN AUTO: 30.2 PG (ref 27–33)
MCH RBC QN AUTO: 30.7 PG (ref 27–33)
MCH RBC QN AUTO: 30.8 PG (ref 27–33)
MCH RBC QN AUTO: 30.9 PG (ref 27–33)
MCHC RBC AUTO-ENTMCNC: 32.4 G/DL (ref 33.7–35.3)
MCHC RBC AUTO-ENTMCNC: 32.7 G/DL (ref 33.7–35.3)
MCHC RBC AUTO-ENTMCNC: 32.9 G/DL (ref 33.7–35.3)
MCHC RBC AUTO-ENTMCNC: 33.3 G/DL (ref 33.7–35.3)
MCV RBC AUTO: 92.2 FL (ref 81.4–97.8)
MCV RBC AUTO: 93.1 FL (ref 81.4–97.8)
MCV RBC AUTO: 94 FL (ref 81.4–97.8)
MCV RBC AUTO: 94.2 FL (ref 81.4–97.8)
MICRO URNS: ABNORMAL
MONOCYTES # BLD AUTO: 0.41 K/UL (ref 0–0.85)
MONOCYTES # BLD AUTO: 0.72 K/UL (ref 0–0.85)
MONOCYTES # BLD AUTO: 0.75 K/UL (ref 0–0.85)
MONOCYTES # BLD AUTO: 0.78 K/UL (ref 0–0.85)
MONOCYTES NFR BLD AUTO: 1.7 % (ref 0–13.4)
MONOCYTES NFR BLD AUTO: 2.6 % (ref 0–13.4)
MONOCYTES NFR BLD AUTO: 3.7 % (ref 0–13.4)
MONOCYTES NFR BLD AUTO: 7.9 % (ref 0–13.4)
MORPHOLOGY BLD-IMP: NORMAL
MORPHOLOGY BLD-IMP: NORMAL
MUCOUS THREADS #/AREA URNS HPF: ABNORMAL /HPF
NEUTROPHILS # BLD AUTO: 18.11 K/UL (ref 1.82–7.42)
NEUTROPHILS # BLD AUTO: 23.57 K/UL (ref 1.82–7.42)
NEUTROPHILS # BLD AUTO: 27.4 K/UL (ref 1.82–7.42)
NEUTROPHILS # BLD AUTO: 6.74 K/UL (ref 1.82–7.42)
NEUTROPHILS NFR BLD: 68.3 % (ref 44–72)
NEUTROPHILS NFR BLD: 90.4 % (ref 44–72)
NEUTROPHILS NFR BLD: 93.4 % (ref 44–72)
NEUTROPHILS NFR BLD: 97.4 % (ref 44–72)
NEUTS BAND NFR BLD MANUAL: 4.4 % (ref 0–10)
NITRITE UR QL STRIP.AUTO: NEGATIVE
NRBC # BLD AUTO: 0 K/UL
NRBC # BLD AUTO: 0.06 K/UL
NRBC BLD-RTO: 0 /100 WBC
NRBC BLD-RTO: 0.3 /100 WBC
NT-PROBNP SERPL IA-MCNC: 6608 PG/ML (ref 0–125)
NT-PROBNP SERPL IA-MCNC: 6979 PG/ML (ref 0–125)
O2/TOTAL GAS SETTING VFR VENT: 40 %
O2/TOTAL GAS SETTING VFR VENT: 80 %
PCO2 BLDA: 33.3 MMHG (ref 26–37)
PCO2 BLDA: 34.1 MMHG (ref 26–37)
PCO2 BLDA: 39.7 MMHG (ref 26–37)
PCO2 BLDA: 58.4 MMHG (ref 26–37)
PCO2 TEMP ADJ BLDA: 31.2 MMHG (ref 26–37)
PCO2 TEMP ADJ BLDA: 33.4 MMHG (ref 26–37)
PCO2 TEMP ADJ BLDA: 39.2 MMHG (ref 26–37)
PCO2 TEMP ADJ BLDA: 57.8 MMHG (ref 26–37)
PH BLDA: 7.28 [PH] (ref 7.4–7.5)
PH BLDA: 7.41 [PH] (ref 7.4–7.5)
PH BLDA: 7.43 [PH] (ref 7.4–7.5)
PH BLDA: 7.44 [PH] (ref 7.4–7.5)
PH TEMP ADJ BLDA: 7.28 [PH] (ref 7.4–7.5)
PH TEMP ADJ BLDA: 7.43 [PH] (ref 7.4–7.5)
PH TEMP ADJ BLDA: 7.44 [PH] (ref 7.4–7.5)
PH TEMP ADJ BLDA: 7.45 [PH] (ref 7.4–7.5)
PH UR STRIP.AUTO: 5 [PH] (ref 5–8)
PHOSPHATE SERPL-MCNC: 1.9 MG/DL (ref 2.5–4.5)
PHOSPHATE SERPL-MCNC: 2 MG/DL (ref 2.5–4.5)
PLATELET # BLD AUTO: 192 K/UL (ref 164–446)
PLATELET # BLD AUTO: 219 K/UL (ref 164–446)
PLATELET # BLD AUTO: 221 K/UL (ref 164–446)
PLATELET # BLD AUTO: 351 K/UL (ref 164–446)
PLATELET BLD QL SMEAR: NORMAL
PLATELET BLD QL SMEAR: NORMAL
PMV BLD AUTO: 10 FL (ref 9–12.9)
PMV BLD AUTO: 10.6 FL (ref 9–12.9)
PMV BLD AUTO: 11.1 FL (ref 9–12.9)
PMV BLD AUTO: 11.4 FL (ref 9–12.9)
PO2 BLDA: 252 MMHG (ref 64–87)
PO2 BLDA: 73 MMHG (ref 64–87)
PO2 BLDA: 93 MMHG (ref 64–87)
PO2 BLDA: 93 MMHG (ref 64–87)
PO2 TEMP ADJ BLDA: 251 MMHG (ref 64–87)
PO2 TEMP ADJ BLDA: 72 MMHG (ref 64–87)
PO2 TEMP ADJ BLDA: 84 MMHG (ref 64–87)
PO2 TEMP ADJ BLDA: 90 MMHG (ref 64–87)
POTASSIUM SERPL-SCNC: 3.8 MMOL/L (ref 3.6–5.5)
POTASSIUM SERPL-SCNC: 3.9 MMOL/L (ref 3.6–5.5)
POTASSIUM SERPL-SCNC: 4.7 MMOL/L (ref 3.6–5.5)
POTASSIUM SERPL-SCNC: 5.3 MMOL/L (ref 3.6–5.5)
PREALB SERPL-MCNC: 3 MG/DL (ref 18–38)
PROCALCITONIN SERPL-MCNC: 6.72 NG/ML
PROT SERPL-MCNC: 5.9 G/DL (ref 6–8.2)
PROT SERPL-MCNC: 6.9 G/DL (ref 6–8.2)
PROT SERPL-MCNC: 7 G/DL (ref 6–8.2)
PROT UR QL STRIP: 100 MG/DL
PROTHROMBIN TIME: 14.8 SEC (ref 12–14.6)
PSA SERPL-MCNC: 4.73 NG/ML (ref 0–4)
RBC # BLD AUTO: 4.23 M/UL (ref 4.7–6.1)
RBC # BLD AUTO: 4.65 M/UL (ref 4.7–6.1)
RBC # BLD AUTO: 5.3 M/UL (ref 4.7–6.1)
RBC # BLD AUTO: 5.37 M/UL (ref 4.7–6.1)
RBC # URNS HPF: ABNORMAL /HPF
RBC BLD AUTO: NORMAL
RBC BLD AUTO: NORMAL
RBC UR QL AUTO: NEGATIVE
RHODAMINE-AURAMINE STN SPEC: NORMAL
SAO2 % BLDA: 100 % (ref 93–99)
SAO2 % BLDA: 92 % (ref 93–99)
SAO2 % BLDA: 97 % (ref 93–99)
SAO2 % BLDA: 98 % (ref 93–99)
SIGNIFICANT IND 70042: ABNORMAL
SIGNIFICANT IND 70042: ABNORMAL
SIGNIFICANT IND 70042: NORMAL
SIGNIFICANT IND 70042: NORMAL
SITE SITE: ABNORMAL
SITE SITE: ABNORMAL
SITE SITE: NORMAL
SITE SITE: NORMAL
SODIUM SERPL-SCNC: 139 MMOL/L (ref 135–145)
SODIUM SERPL-SCNC: 139 MMOL/L (ref 135–145)
SODIUM SERPL-SCNC: 140 MMOL/L (ref 135–145)
SODIUM SERPL-SCNC: 142 MMOL/L (ref 135–145)
SOURCE SOURCE: ABNORMAL
SOURCE SOURCE: ABNORMAL
SOURCE SOURCE: NORMAL
SOURCE SOURCE: NORMAL
SP GR UR STRIP.AUTO: 1.03
SPECIMEN DRAWN FROM PATIENT: ABNORMAL
TRIGL SERPL-MCNC: 100 MG/DL (ref 0–149)
TRIGL SERPL-MCNC: 98 MG/DL (ref 0–149)
TROPONIN T SERPL-MCNC: 56 NG/L (ref 6–19)
TSH SERPL DL<=0.005 MIU/L-ACNC: 0.61 UIU/ML (ref 0.38–5.33)
UROBILINOGEN UR STRIP.AUTO-MCNC: 1 MG/DL
VIT B12 SERPL-MCNC: 841 PG/ML (ref 211–911)
WBC # BLD AUTO: 19.4 K/UL (ref 4.8–10.8)
WBC # BLD AUTO: 24.2 K/UL (ref 4.8–10.8)
WBC # BLD AUTO: 28.9 K/UL (ref 4.8–10.8)
WBC # BLD AUTO: 9.9 K/UL (ref 4.8–10.8)
WBC #/AREA URNS HPF: ABNORMAL /HPF

## 2019-01-01 PROCEDURE — 31624 DX BRONCHOSCOPE/LAVAGE: CPT | Performed by: INTERNAL MEDICINE

## 2019-01-01 PROCEDURE — 0B9G8ZX DRAINAGE OF LEFT UPPER LUNG LOBE, VIA NATURAL OR ARTIFICIAL OPENING ENDOSCOPIC, DIAGNOSTIC: ICD-10-PCS | Performed by: INTERNAL MEDICINE

## 2019-01-01 PROCEDURE — 87040 BLOOD CULTURE FOR BACTERIA: CPT

## 2019-01-01 PROCEDURE — 85007 BL SMEAR W/DIFF WBC COUNT: CPT

## 2019-01-01 PROCEDURE — 700105 HCHG RX REV CODE 258: Performed by: INTERNAL MEDICINE

## 2019-01-01 PROCEDURE — 82803 BLOOD GASES ANY COMBINATION: CPT

## 2019-01-01 PROCEDURE — 700111 HCHG RX REV CODE 636 W/ 250 OVERRIDE (IP): Performed by: INTERNAL MEDICINE

## 2019-01-01 PROCEDURE — 94003 VENT MGMT INPAT SUBQ DAY: CPT

## 2019-01-01 PROCEDURE — 0B9C8ZX DRAINAGE OF RIGHT UPPER LUNG LOBE, VIA NATURAL OR ARTIFICIAL OPENING ENDOSCOPIC, DIAGNOSTIC: ICD-10-PCS | Performed by: INTERNAL MEDICINE

## 2019-01-01 PROCEDURE — 770022 HCHG ROOM/CARE - ICU (200)

## 2019-01-01 PROCEDURE — 84484 ASSAY OF TROPONIN QUANT: CPT

## 2019-01-01 PROCEDURE — 36415 COLL VENOUS BLD VENIPUNCTURE: CPT

## 2019-01-01 PROCEDURE — 700105 HCHG RX REV CODE 258: Performed by: HOSPITALIST

## 2019-01-01 PROCEDURE — 85025 COMPLETE CBC W/AUTO DIFF WBC: CPT

## 2019-01-01 PROCEDURE — 700101 HCHG RX REV CODE 250: Performed by: HOSPITALIST

## 2019-01-01 PROCEDURE — 3E043XZ INTRODUCTION OF VASOPRESSOR INTO CENTRAL VEIN, PERCUTANEOUS APPROACH: ICD-10-PCS | Performed by: INTERNAL MEDICINE

## 2019-01-01 PROCEDURE — 87102 FUNGUS ISOLATION CULTURE: CPT

## 2019-01-01 PROCEDURE — 0B9D8ZX DRAINAGE OF RIGHT MIDDLE LUNG LOBE, VIA NATURAL OR ARTIFICIAL OPENING ENDOSCOPIC, DIAGNOSTIC: ICD-10-PCS | Performed by: INTERNAL MEDICINE

## 2019-01-01 PROCEDURE — 84145 PROCALCITONIN (PCT): CPT

## 2019-01-01 PROCEDURE — 82607 VITAMIN B-12: CPT

## 2019-01-01 PROCEDURE — 700101 HCHG RX REV CODE 250: Performed by: INTERNAL MEDICINE

## 2019-01-01 PROCEDURE — 700111 HCHG RX REV CODE 636 W/ 250 OVERRIDE (IP): Performed by: HOSPITALIST

## 2019-01-01 PROCEDURE — 71250 CT THORAX DX C-: CPT

## 2019-01-01 PROCEDURE — 94640 AIRWAY INHALATION TREATMENT: CPT

## 2019-01-01 PROCEDURE — 83735 ASSAY OF MAGNESIUM: CPT

## 2019-01-01 PROCEDURE — 94002 VENT MGMT INPAT INIT DAY: CPT

## 2019-01-01 PROCEDURE — 99291 CRITICAL CARE FIRST HOUR: CPT | Performed by: INTERNAL MEDICINE

## 2019-01-01 PROCEDURE — 700101 HCHG RX REV CODE 250

## 2019-01-01 PROCEDURE — 99223 1ST HOSP IP/OBS HIGH 75: CPT | Performed by: HOSPITALIST

## 2019-01-01 PROCEDURE — 93005 ELECTROCARDIOGRAM TRACING: CPT | Performed by: INTERNAL MEDICINE

## 2019-01-01 PROCEDURE — 71045 X-RAY EXAM CHEST 1 VIEW: CPT

## 2019-01-01 PROCEDURE — 84100 ASSAY OF PHOSPHORUS: CPT

## 2019-01-01 PROCEDURE — 85027 COMPLETE CBC AUTOMATED: CPT

## 2019-01-01 PROCEDURE — 302214 HCHG STAT INTUBATION BOX: Performed by: HOSPITALIST

## 2019-01-01 PROCEDURE — 84443 ASSAY THYROID STIM HORMONE: CPT

## 2019-01-01 PROCEDURE — 94150 VITAL CAPACITY TEST: CPT

## 2019-01-01 PROCEDURE — A9270 NON-COVERED ITEM OR SERVICE: HCPCS | Performed by: INTERNAL MEDICINE

## 2019-01-01 PROCEDURE — 87077 CULTURE AEROBIC IDENTIFY: CPT

## 2019-01-01 PROCEDURE — 99292 CRITICAL CARE ADDL 30 MIN: CPT | Performed by: INTERNAL MEDICINE

## 2019-01-01 PROCEDURE — 700102 HCHG RX REV CODE 250 W/ 637 OVERRIDE(OP): Performed by: INTERNAL MEDICINE

## 2019-01-01 PROCEDURE — 80048 BASIC METABOLIC PNL TOTAL CA: CPT

## 2019-01-01 PROCEDURE — 80053 COMPREHEN METABOLIC PANEL: CPT

## 2019-01-01 PROCEDURE — 93005 ELECTROCARDIOGRAM TRACING: CPT | Performed by: HOSPITALIST

## 2019-01-01 PROCEDURE — 700105 HCHG RX REV CODE 258: Performed by: EMERGENCY MEDICINE

## 2019-01-01 PROCEDURE — 99291 CRITICAL CARE FIRST HOUR: CPT

## 2019-01-01 PROCEDURE — 302978 HCHG BRONCHOSCOPY-DIAGNOSTIC

## 2019-01-01 PROCEDURE — 85610 PROTHROMBIN TIME: CPT

## 2019-01-01 PROCEDURE — 36600 WITHDRAWAL OF ARTERIAL BLOOD: CPT

## 2019-01-01 PROCEDURE — 10005 FNA BX W/US GDN 1ST LES: CPT

## 2019-01-01 PROCEDURE — 87181 SC STD AGAR DILUTION PER AGT: CPT

## 2019-01-01 PROCEDURE — 5A1945Z RESPIRATORY VENTILATION, 24-96 CONSECUTIVE HOURS: ICD-10-PCS | Performed by: INTERNAL MEDICINE

## 2019-01-01 PROCEDURE — 02HV33Z INSERTION OF INFUSION DEVICE INTO SUPERIOR VENA CAVA, PERCUTANEOUS APPROACH: ICD-10-PCS | Performed by: INTERNAL MEDICINE

## 2019-01-01 PROCEDURE — 94669 MECHANICAL CHEST WALL OSCILL: CPT

## 2019-01-01 PROCEDURE — A9270 NON-COVERED ITEM OR SERVICE: HCPCS | Performed by: HOSPITALIST

## 2019-01-01 PROCEDURE — 94760 N-INVAS EAR/PLS OXIMETRY 1: CPT

## 2019-01-01 PROCEDURE — 0B9F8ZX DRAINAGE OF RIGHT LOWER LUNG LOBE, VIA NATURAL OR ARTIFICIAL OPENING ENDOSCOPIC, DIAGNOSTIC: ICD-10-PCS | Performed by: INTERNAL MEDICINE

## 2019-01-01 PROCEDURE — 76536 US EXAM OF HEAD AND NECK: CPT

## 2019-01-01 PROCEDURE — A6213 FOAM DRG >16<=48 SQ IN W/BDR: HCPCS | Performed by: INTERNAL MEDICINE

## 2019-01-01 PROCEDURE — 99292 CRITICAL CARE ADDL 30 MIN: CPT | Mod: 25 | Performed by: INTERNAL MEDICINE

## 2019-01-01 PROCEDURE — 83880 ASSAY OF NATRIURETIC PEPTIDE: CPT | Mod: 91

## 2019-01-01 PROCEDURE — 87086 URINE CULTURE/COLONY COUNT: CPT

## 2019-01-01 PROCEDURE — 87116 MYCOBACTERIA CULTURE: CPT

## 2019-01-01 PROCEDURE — 31645 BRNCHSC W/THER ASPIR 1ST: CPT | Performed by: INTERNAL MEDICINE

## 2019-01-01 PROCEDURE — 87502 INFLUENZA DNA AMP PROBE: CPT

## 2019-01-01 PROCEDURE — 36556 INSERT NON-TUNNEL CV CATH: CPT

## 2019-01-01 PROCEDURE — 93005 ELECTROCARDIOGRAM TRACING: CPT | Performed by: EMERGENCY MEDICINE

## 2019-01-01 PROCEDURE — 87205 SMEAR GRAM STAIN: CPT

## 2019-01-01 PROCEDURE — 94667 MNPJ CHEST WALL 1ST: CPT

## 2019-01-01 PROCEDURE — 700102 HCHG RX REV CODE 250 W/ 637 OVERRIDE(OP): Performed by: HOSPITALIST

## 2019-01-01 PROCEDURE — 84153 ASSAY OF PSA TOTAL: CPT | Mod: GA

## 2019-01-01 PROCEDURE — 86140 C-REACTIVE PROTEIN: CPT

## 2019-01-01 PROCEDURE — 87015 SPECIMEN INFECT AGNT CONCNTJ: CPT

## 2019-01-01 PROCEDURE — 80061 LIPID PANEL: CPT

## 2019-01-01 PROCEDURE — 0BH17EZ INSERTION OF ENDOTRACHEAL AIRWAY INTO TRACHEA, VIA NATURAL OR ARTIFICIAL OPENING: ICD-10-PCS | Performed by: INTERNAL MEDICINE

## 2019-01-01 PROCEDURE — 96367 TX/PROPH/DG ADDL SEQ IV INF: CPT

## 2019-01-01 PROCEDURE — 82306 VITAMIN D 25 HYDROXY: CPT

## 2019-01-01 PROCEDURE — 96376 TX/PRO/DX INJ SAME DRUG ADON: CPT

## 2019-01-01 PROCEDURE — 0B9J8ZX DRAINAGE OF LEFT LOWER LUNG LOBE, VIA NATURAL OR ARTIFICIAL OPENING ENDOSCOPIC, DIAGNOSTIC: ICD-10-PCS | Performed by: INTERNAL MEDICINE

## 2019-01-01 PROCEDURE — 93971 EXTREMITY STUDY: CPT | Mod: RT

## 2019-01-01 PROCEDURE — 99291 CRITICAL CARE FIRST HOUR: CPT | Mod: 25 | Performed by: INTERNAL MEDICINE

## 2019-01-01 PROCEDURE — 83605 ASSAY OF LACTIC ACID: CPT | Mod: 91

## 2019-01-01 PROCEDURE — 84134 ASSAY OF PREALBUMIN: CPT

## 2019-01-01 PROCEDURE — 88112 CYTOPATH CELL ENHANCE TECH: CPT

## 2019-01-01 PROCEDURE — 96365 THER/PROPH/DIAG IV INF INIT: CPT

## 2019-01-01 PROCEDURE — 700111 HCHG RX REV CODE 636 W/ 250 OVERRIDE (IP): Performed by: EMERGENCY MEDICINE

## 2019-01-01 PROCEDURE — 62270 DX LMBR SPI PNXR: CPT

## 2019-01-01 PROCEDURE — 74018 RADEX ABDOMEN 1 VIEW: CPT

## 2019-01-01 PROCEDURE — 96375 TX/PRO/DX INJ NEW DRUG ADDON: CPT

## 2019-01-01 PROCEDURE — 31500 INSERT EMERGENCY AIRWAY: CPT

## 2019-01-01 PROCEDURE — 81001 URINALYSIS AUTO W/SCOPE: CPT

## 2019-01-01 PROCEDURE — 87070 CULTURE OTHR SPECIMN AEROBIC: CPT

## 2019-01-01 PROCEDURE — 87206 SMEAR FLUORESCENT/ACID STAI: CPT

## 2019-01-01 PROCEDURE — 93010 ELECTROCARDIOGRAM REPORT: CPT | Performed by: INTERNAL MEDICINE

## 2019-01-01 PROCEDURE — 700111 HCHG RX REV CODE 636 W/ 250 OVERRIDE (IP)

## 2019-01-01 PROCEDURE — 84478 ASSAY OF TRIGLYCERIDES: CPT

## 2019-01-01 PROCEDURE — 31500 INSERT EMERGENCY AIRWAY: CPT | Performed by: INTERNAL MEDICINE

## 2019-01-01 PROCEDURE — 36556 INSERT NON-TUNNEL CV CATH: CPT | Mod: RT | Performed by: INTERNAL MEDICINE

## 2019-01-01 RX ORDER — IPRATROPIUM BROMIDE AND ALBUTEROL SULFATE 2.5; .5 MG/3ML; MG/3ML
3 SOLUTION RESPIRATORY (INHALATION)
Status: DISCONTINUED | OUTPATIENT
Start: 2019-01-01 | End: 2019-01-01

## 2019-01-01 RX ORDER — ROCURONIUM BROMIDE 10 MG/ML
50 INJECTION, SOLUTION INTRAVENOUS ONCE
Status: COMPLETED | OUTPATIENT
Start: 2019-01-01 | End: 2019-01-01

## 2019-01-01 RX ORDER — ONDANSETRON 4 MG/1
4 TABLET, ORALLY DISINTEGRATING ORAL EVERY 4 HOURS PRN
Status: DISCONTINUED | OUTPATIENT
Start: 2019-01-01 | End: 2019-01-01

## 2019-01-01 RX ORDER — PREDNISONE 10 MG/1
40 TABLET ORAL DAILY
Status: DISCONTINUED | OUTPATIENT
Start: 2020-01-01 | End: 2019-01-01

## 2019-01-01 RX ORDER — DILTIAZEM HYDROCHLORIDE 5 MG/ML
0.25 INJECTION INTRAVENOUS ONCE
Status: COMPLETED | OUTPATIENT
Start: 2019-01-01 | End: 2019-01-01

## 2019-01-01 RX ORDER — METHYLPREDNISOLONE SODIUM SUCCINATE 40 MG/ML
40 INJECTION, POWDER, LYOPHILIZED, FOR SOLUTION INTRAMUSCULAR; INTRAVENOUS EVERY 6 HOURS
Status: DISCONTINUED | OUTPATIENT
Start: 2019-01-01 | End: 2019-01-01

## 2019-01-01 RX ORDER — FAMOTIDINE 20 MG/1
20 TABLET, FILM COATED ORAL EVERY 12 HOURS
Status: DISCONTINUED | OUTPATIENT
Start: 2019-01-01 | End: 2019-01-01

## 2019-01-01 RX ORDER — ASPIRIN 81 MG/1
81 TABLET, CHEWABLE ORAL DAILY
Status: DISCONTINUED | OUTPATIENT
Start: 2019-01-01 | End: 2019-01-01

## 2019-01-01 RX ORDER — MEMANTINE HYDROCHLORIDE 10 MG/1
10 TABLET ORAL 2 TIMES DAILY
COMMUNITY
Start: 2019-01-01 | End: 2019-01-01

## 2019-01-01 RX ORDER — ONDANSETRON 2 MG/ML
4 INJECTION INTRAMUSCULAR; INTRAVENOUS EVERY 4 HOURS PRN
Status: DISCONTINUED | OUTPATIENT
Start: 2019-01-01 | End: 2020-01-01 | Stop reason: HOSPADM

## 2019-01-01 RX ORDER — METOPROLOL TARTRATE 1 MG/ML
5 INJECTION, SOLUTION INTRAVENOUS ONCE
Status: COMPLETED | OUTPATIENT
Start: 2019-01-01 | End: 2019-01-01

## 2019-01-01 RX ORDER — SODIUM CHLORIDE, SODIUM LACTATE, POTASSIUM CHLORIDE, AND CALCIUM CHLORIDE .6; .31; .03; .02 G/100ML; G/100ML; G/100ML; G/100ML
1000 INJECTION, SOLUTION INTRAVENOUS ONCE
Status: DISCONTINUED | OUTPATIENT
Start: 2019-01-01 | End: 2019-01-01

## 2019-01-01 RX ORDER — SODIUM CHLORIDE, SODIUM LACTATE, POTASSIUM CHLORIDE, CALCIUM CHLORIDE 600; 310; 30; 20 MG/100ML; MG/100ML; MG/100ML; MG/100ML
INJECTION, SOLUTION INTRAVENOUS CONTINUOUS
Status: DISCONTINUED | OUTPATIENT
Start: 2019-01-01 | End: 2019-01-01

## 2019-01-01 RX ORDER — METHYLPREDNISOLONE SODIUM SUCCINATE 40 MG/ML
40 INJECTION, POWDER, LYOPHILIZED, FOR SOLUTION INTRAMUSCULAR; INTRAVENOUS EVERY 6 HOURS
Status: DISCONTINUED | OUTPATIENT
Start: 2020-01-01 | End: 2020-01-01

## 2019-01-01 RX ORDER — BENZONATATE 100 MG/1
100-200 CAPSULE ORAL EVERY 6 HOURS PRN
COMMUNITY
Start: 2019-01-01 | End: 2019-01-01

## 2019-01-01 RX ORDER — DEXMEDETOMIDINE HYDROCHLORIDE 4 UG/ML
.1-1 INJECTION, SOLUTION INTRAVENOUS CONTINUOUS
Status: DISCONTINUED | OUTPATIENT
Start: 2019-01-01 | End: 2020-01-01

## 2019-01-01 RX ORDER — SODIUM CHLORIDE, SODIUM LACTATE, POTASSIUM CHLORIDE, AND CALCIUM CHLORIDE .6; .31; .03; .02 G/100ML; G/100ML; G/100ML; G/100ML
1000 INJECTION, SOLUTION INTRAVENOUS ONCE
Status: COMPLETED | OUTPATIENT
Start: 2019-01-01 | End: 2019-01-01

## 2019-01-01 RX ORDER — DOXYCYCLINE 100 MG/1
100 TABLET ORAL EVERY 12 HOURS
Status: DISCONTINUED | OUTPATIENT
Start: 2019-01-01 | End: 2019-01-01

## 2019-01-01 RX ORDER — DILTIAZEM HYDROCHLORIDE 60 MG/1
60 TABLET, FILM COATED ORAL EVERY 8 HOURS
Status: DISCONTINUED | OUTPATIENT
Start: 2019-01-01 | End: 2019-01-01

## 2019-01-01 RX ORDER — POLYETHYLENE GLYCOL 3350 17 G/17G
1 POWDER, FOR SOLUTION ORAL
Status: DISCONTINUED | OUTPATIENT
Start: 2019-01-01 | End: 2019-01-01

## 2019-01-01 RX ORDER — SODIUM CHLORIDE 9 MG/ML
INJECTION, SOLUTION INTRAVENOUS CONTINUOUS
Status: DISCONTINUED | OUTPATIENT
Start: 2019-01-01 | End: 2019-01-01

## 2019-01-01 RX ORDER — HYDRALAZINE HYDROCHLORIDE 20 MG/ML
20 INJECTION INTRAMUSCULAR; INTRAVENOUS EVERY 4 HOURS PRN
Status: DISCONTINUED | OUTPATIENT
Start: 2019-01-01 | End: 2020-01-01 | Stop reason: HOSPADM

## 2019-01-01 RX ORDER — SIMVASTATIN 20 MG
10 TABLET ORAL NIGHTLY
Status: DISCONTINUED | OUTPATIENT
Start: 2019-01-01 | End: 2019-01-01

## 2019-01-01 RX ORDER — LEVALBUTEROL INHALATION SOLUTION 1.25 MG/3ML
1.25 SOLUTION RESPIRATORY (INHALATION)
Status: DISCONTINUED | OUTPATIENT
Start: 2019-01-01 | End: 2019-01-01

## 2019-01-01 RX ORDER — LISINOPRIL 2.5 MG/1
2.5 TABLET ORAL 2 TIMES DAILY
Status: ON HOLD | COMMUNITY
Start: 2019-01-01 | End: 2020-01-01

## 2019-01-01 RX ORDER — SODIUM CHLORIDE 9 MG/ML
1000 INJECTION, SOLUTION INTRAVENOUS ONCE
Status: DISCONTINUED | OUTPATIENT
Start: 2019-01-01 | End: 2019-01-01

## 2019-01-01 RX ORDER — SODIUM CHLORIDE, SODIUM LACTATE, POTASSIUM CHLORIDE, CALCIUM CHLORIDE 600; 310; 30; 20 MG/100ML; MG/100ML; MG/100ML; MG/100ML
1000 INJECTION, SOLUTION INTRAVENOUS ONCE
Status: COMPLETED | OUTPATIENT
Start: 2019-01-01 | End: 2019-01-01

## 2019-01-01 RX ORDER — PHENYLEPHRINE HCL IN 0.9% NACL 0.5 MG/5ML
SYRINGE (ML) INTRAVENOUS
Status: COMPLETED
Start: 2019-01-01 | End: 2019-01-01

## 2019-01-01 RX ORDER — METHYLPREDNISOLONE SODIUM SUCCINATE 125 MG/2ML
125 INJECTION, POWDER, LYOPHILIZED, FOR SOLUTION INTRAMUSCULAR; INTRAVENOUS ONCE
Status: COMPLETED | OUTPATIENT
Start: 2019-01-01 | End: 2019-01-01

## 2019-01-01 RX ORDER — SODIUM CHLORIDE, SODIUM LACTATE, POTASSIUM CHLORIDE, AND CALCIUM CHLORIDE .6; .31; .03; .02 G/100ML; G/100ML; G/100ML; G/100ML
30 INJECTION, SOLUTION INTRAVENOUS
Status: COMPLETED | OUTPATIENT
Start: 2019-01-01 | End: 2019-01-01

## 2019-01-01 RX ORDER — SODIUM CHLORIDE 9 MG/ML
500 INJECTION, SOLUTION INTRAVENOUS ONCE
Status: COMPLETED | OUTPATIENT
Start: 2019-01-01 | End: 2019-01-01

## 2019-01-01 RX ORDER — AZITHROMYCIN 500 MG/1
500 INJECTION, POWDER, LYOPHILIZED, FOR SOLUTION INTRAVENOUS ONCE
Status: COMPLETED | OUTPATIENT
Start: 2019-01-01 | End: 2019-01-01

## 2019-01-01 RX ORDER — METHYLPREDNISOLONE SODIUM SUCCINATE 125 MG/2ML
62.5 INJECTION, POWDER, LYOPHILIZED, FOR SOLUTION INTRAMUSCULAR; INTRAVENOUS EVERY 6 HOURS
Status: DISCONTINUED | OUTPATIENT
Start: 2019-01-01 | End: 2019-01-01

## 2019-01-01 RX ORDER — LABETALOL HYDROCHLORIDE 5 MG/ML
INJECTION, SOLUTION INTRAVENOUS
Status: COMPLETED
Start: 2019-01-01 | End: 2019-01-01

## 2019-01-01 RX ORDER — LABETALOL HYDROCHLORIDE 5 MG/ML
20 INJECTION, SOLUTION INTRAVENOUS EVERY 4 HOURS PRN
Status: DISCONTINUED | OUTPATIENT
Start: 2019-01-01 | End: 2020-01-01 | Stop reason: HOSPADM

## 2019-01-01 RX ORDER — BUDESONIDE 0.5 MG/2ML
0.5 INHALANT ORAL
Status: DISCONTINUED | OUTPATIENT
Start: 2019-01-01 | End: 2019-01-01

## 2019-01-01 RX ORDER — AMOXICILLIN 250 MG
2 CAPSULE ORAL 2 TIMES DAILY
Status: DISCONTINUED | OUTPATIENT
Start: 2019-01-01 | End: 2019-01-01

## 2019-01-01 RX ORDER — LEVALBUTEROL INHALATION SOLUTION 1.25 MG/3ML
SOLUTION RESPIRATORY (INHALATION)
Status: COMPLETED
Start: 2019-01-01 | End: 2019-01-01

## 2019-01-01 RX ORDER — DIGOXIN 0.25 MG/ML
250 INJECTION INTRAMUSCULAR; INTRAVENOUS EVERY 6 HOURS
Status: DISPENSED | OUTPATIENT
Start: 2019-01-01 | End: 2020-01-01

## 2019-01-01 RX ORDER — AMIODARONE HYDROCHLORIDE 200 MG/1
200 TABLET ORAL DAILY
Status: DISCONTINUED | OUTPATIENT
Start: 2019-01-01 | End: 2019-01-01

## 2019-01-01 RX ORDER — IPRATROPIUM BROMIDE AND ALBUTEROL SULFATE 2.5; .5 MG/3ML; MG/3ML
3 SOLUTION RESPIRATORY (INHALATION)
Status: DISCONTINUED | OUTPATIENT
Start: 2019-01-01 | End: 2020-01-01

## 2019-01-01 RX ORDER — DILTIAZEM HYDROCHLORIDE 180 MG/1
180 CAPSULE, COATED, EXTENDED RELEASE ORAL DAILY
Status: DISCONTINUED | OUTPATIENT
Start: 2019-01-01 | End: 2019-01-01

## 2019-01-01 RX ORDER — SODIUM CHLORIDE, SODIUM LACTATE, POTASSIUM CHLORIDE, CALCIUM CHLORIDE 600; 310; 30; 20 MG/100ML; MG/100ML; MG/100ML; MG/100ML
2000 INJECTION, SOLUTION INTRAVENOUS ONCE
Status: COMPLETED | OUTPATIENT
Start: 2019-01-01 | End: 2019-01-01

## 2019-01-01 RX ORDER — ONDANSETRON 4 MG/1
4 TABLET, ORALLY DISINTEGRATING ORAL EVERY 4 HOURS PRN
COMMUNITY
Start: 2019-01-01 | End: 2019-01-01

## 2019-01-01 RX ORDER — BISACODYL 10 MG
10 SUPPOSITORY, RECTAL RECTAL
Status: DISCONTINUED | OUTPATIENT
Start: 2019-01-01 | End: 2019-01-01

## 2019-01-01 RX ORDER — MAGNESIUM SULFATE HEPTAHYDRATE 40 MG/ML
2 INJECTION, SOLUTION INTRAVENOUS ONCE
Status: COMPLETED | OUTPATIENT
Start: 2019-01-01 | End: 2019-01-01

## 2019-01-01 RX ORDER — PROPOFOL 10 MG/ML
100 INJECTION, EMULSION INTRAVENOUS ONCE
Status: COMPLETED | OUTPATIENT
Start: 2019-01-01 | End: 2019-01-01

## 2019-01-01 RX ORDER — METOPROLOL TARTRATE 1 MG/ML
5 INJECTION, SOLUTION INTRAVENOUS
Status: COMPLETED | OUTPATIENT
Start: 2019-01-01 | End: 2019-01-01

## 2019-01-01 RX ORDER — DIGOXIN 0.25 MG/ML
250 INJECTION INTRAMUSCULAR; INTRAVENOUS EVERY 6 HOURS
Status: DISCONTINUED | OUTPATIENT
Start: 2019-01-01 | End: 2019-01-01

## 2019-01-01 RX ORDER — ROCURONIUM BROMIDE 10 MG/ML
40 INJECTION, SOLUTION INTRAVENOUS ONCE
Status: COMPLETED | OUTPATIENT
Start: 2019-01-01 | End: 2019-01-01

## 2019-01-01 RX ADMIN — POTASSIUM PHOSPHATE, MONOBASIC AND POTASSIUM PHOSPHATE, DIBASIC 15 MMOL: 224; 236 INJECTION, SOLUTION, CONCENTRATE INTRAVENOUS at 09:27

## 2019-01-01 RX ADMIN — IPRATROPIUM BROMIDE AND ALBUTEROL SULFATE 3 ML: .5; 3 SOLUTION RESPIRATORY (INHALATION) at 22:50

## 2019-01-01 RX ADMIN — AMIODARONE HYDROCHLORIDE 0.5 MG/MIN: 50 INJECTION, SOLUTION INTRAVENOUS at 21:52

## 2019-01-01 RX ADMIN — DILTIAZEM HYDROCHLORIDE 60 MG: 60 TABLET, FILM COATED ORAL at 10:30

## 2019-01-01 RX ADMIN — FENTANYL CITRATE 400 MCG: 0.05 INJECTION, SOLUTION INTRAMUSCULAR; INTRAVENOUS at 16:09

## 2019-01-01 RX ADMIN — DOXYCYCLINE 100 MG: 100 TABLET, FILM COATED ORAL at 05:15

## 2019-01-01 RX ADMIN — PROPOFOL 100 MG: 10 INJECTION, EMULSION INTRAVENOUS at 15:27

## 2019-01-01 RX ADMIN — METHYLPREDNISOLONE SODIUM SUCCINATE 125 MG: 125 INJECTION, POWDER, FOR SOLUTION INTRAMUSCULAR; INTRAVENOUS at 22:08

## 2019-01-01 RX ADMIN — PROPOFOL 40 MCG/KG/MIN: 10 INJECTION, EMULSION INTRAVENOUS at 15:53

## 2019-01-01 RX ADMIN — FENTANYL CITRATE 200 MCG: 0.05 INJECTION, SOLUTION INTRAMUSCULAR; INTRAVENOUS at 12:51

## 2019-01-01 RX ADMIN — ROCURONIUM BROMIDE 50 MG: 10 INJECTION, SOLUTION INTRAVENOUS at 16:32

## 2019-01-01 RX ADMIN — METHYLPREDNISOLONE SODIUM SUCCINATE 62.5 MG: 125 INJECTION, POWDER, FOR SOLUTION INTRAMUSCULAR; INTRAVENOUS at 17:04

## 2019-01-01 RX ADMIN — FENTANYL CITRATE 50 MCG: 50 INJECTION, SOLUTION INTRAMUSCULAR; INTRAVENOUS at 08:37

## 2019-01-01 RX ADMIN — CEFTRIAXONE SODIUM 2 G: 2 INJECTION, POWDER, FOR SOLUTION INTRAMUSCULAR; INTRAVENOUS at 06:40

## 2019-01-01 RX ADMIN — SODIUM CHLORIDE, POTASSIUM CHLORIDE, SODIUM LACTATE AND CALCIUM CHLORIDE 2115 ML: 600; 310; 30; 20 INJECTION, SOLUTION INTRAVENOUS at 01:17

## 2019-01-01 RX ADMIN — CEFTRIAXONE SODIUM 2 G: 2 INJECTION, POWDER, FOR SOLUTION INTRAMUSCULAR; INTRAVENOUS at 05:59

## 2019-01-01 RX ADMIN — METOPROLOL TARTRATE 5 MG: 5 INJECTION, SOLUTION INTRAVENOUS at 04:11

## 2019-01-01 RX ADMIN — ONDANSETRON 4 MG: 2 INJECTION INTRAMUSCULAR; INTRAVENOUS at 10:27

## 2019-01-01 RX ADMIN — SODIUM CHLORIDE, POTASSIUM CHLORIDE, SODIUM LACTATE AND CALCIUM CHLORIDE: 600; 310; 30; 20 INJECTION, SOLUTION INTRAVENOUS at 10:12

## 2019-01-01 RX ADMIN — FAMOTIDINE 20 MG: 20 TABLET, FILM COATED ORAL at 05:15

## 2019-01-01 RX ADMIN — IPRATROPIUM BROMIDE AND ALBUTEROL SULFATE 3 ML: .5; 3 SOLUTION RESPIRATORY (INHALATION) at 09:35

## 2019-01-01 RX ADMIN — HYDRALAZINE HYDROCHLORIDE 10 MG: 20 INJECTION INTRAMUSCULAR; INTRAVENOUS at 23:15

## 2019-01-01 RX ADMIN — IPRATROPIUM BROMIDE AND ALBUTEROL SULFATE 3 ML: .5; 3 SOLUTION RESPIRATORY (INHALATION) at 07:05

## 2019-01-01 RX ADMIN — AMIODARONE HYDROCHLORIDE 1 MG/MIN: 50 INJECTION, SOLUTION INTRAVENOUS at 05:54

## 2019-01-01 RX ADMIN — FAMOTIDINE 20 MG: 10 INJECTION, SOLUTION INTRAVENOUS at 17:03

## 2019-01-01 RX ADMIN — DIGOXIN 250 MCG: 0.25 INJECTION INTRAMUSCULAR; INTRAVENOUS at 15:39

## 2019-01-01 RX ADMIN — DOXYCYCLINE 100 MG: 100 TABLET, FILM COATED ORAL at 18:08

## 2019-01-01 RX ADMIN — SENNOSIDES AND DOCUSATE SODIUM 2 TABLET: 8.6; 5 TABLET ORAL at 05:15

## 2019-01-01 RX ADMIN — AMPICILLIN SODIUM AND SULBACTAM SODIUM 3 G: 2; 1 INJECTION, POWDER, FOR SOLUTION INTRAMUSCULAR; INTRAVENOUS at 01:37

## 2019-01-01 RX ADMIN — AZITHROMYCIN MONOHYDRATE 500 MG: 500 INJECTION, POWDER, LYOPHILIZED, FOR SOLUTION INTRAVENOUS at 03:18

## 2019-01-01 RX ADMIN — SIMVASTATIN 10 MG: 20 TABLET, FILM COATED ORAL at 20:25

## 2019-01-01 RX ADMIN — DILTIAZEM HYDROCHLORIDE 5 MG/HR: 5 INJECTION INTRAVENOUS at 13:16

## 2019-01-01 RX ADMIN — POLYETHYLENE GLYCOL 3350 1 PACKET: 17 POWDER, FOR SOLUTION ORAL at 05:16

## 2019-01-01 RX ADMIN — METHYLPREDNISOLONE SODIUM SUCCINATE 62.5 MG: 125 INJECTION, POWDER, FOR SOLUTION INTRAMUSCULAR; INTRAVENOUS at 00:10

## 2019-01-01 RX ADMIN — ASPIRIN 81 MG: 81 TABLET, COATED ORAL at 06:39

## 2019-01-01 RX ADMIN — Medication 1000 MCG: at 15:45

## 2019-01-01 RX ADMIN — SODIUM CHLORIDE, POTASSIUM CHLORIDE, SODIUM LACTATE AND CALCIUM CHLORIDE 1000 ML: 600; 310; 30; 20 INJECTION, SOLUTION INTRAVENOUS at 09:52

## 2019-01-01 RX ADMIN — SENNOSIDES AND DOCUSATE SODIUM 2 TABLET: 8.6; 5 TABLET ORAL at 18:08

## 2019-01-01 RX ADMIN — POTASSIUM PHOSPHATE, MONOBASIC AND POTASSIUM PHOSPHATE, DIBASIC 30 MMOL: 224; 236 INJECTION, SOLUTION, CONCENTRATE INTRAVENOUS at 08:19

## 2019-01-01 RX ADMIN — FENTANYL CITRATE 200 MCG: 0.05 INJECTION, SOLUTION INTRAMUSCULAR; INTRAVENOUS at 19:28

## 2019-01-01 RX ADMIN — METHYLPREDNISOLONE SODIUM SUCCINATE 62.5 MG: 125 INJECTION, POWDER, FOR SOLUTION INTRAMUSCULAR; INTRAVENOUS at 06:11

## 2019-01-01 RX ADMIN — FENTANYL CITRATE 400 MCG: 0.05 INJECTION, SOLUTION INTRAMUSCULAR; INTRAVENOUS at 16:28

## 2019-01-01 RX ADMIN — METHYLPREDNISOLONE SODIUM SUCCINATE 62.5 MG: 125 INJECTION, POWDER, FOR SOLUTION INTRAMUSCULAR; INTRAVENOUS at 12:54

## 2019-01-01 RX ADMIN — METOPROLOL TARTRATE 5 MG: 5 INJECTION, SOLUTION INTRAVENOUS at 10:33

## 2019-01-01 RX ADMIN — NOREPINEPHRINE BITARTRATE 10 MCG/MIN: 1 INJECTION INTRAVENOUS at 15:39

## 2019-01-01 RX ADMIN — BUDESONIDE 0.5 MG: 0.5 SUSPENSION RESPIRATORY (INHALATION) at 07:30

## 2019-01-01 RX ADMIN — CEFTRIAXONE SODIUM 2 G: 2 INJECTION, POWDER, FOR SOLUTION INTRAMUSCULAR; INTRAVENOUS at 05:16

## 2019-01-01 RX ADMIN — FAMOTIDINE 20 MG: 10 INJECTION, SOLUTION INTRAVENOUS at 17:05

## 2019-01-01 RX ADMIN — LABETALOL HYDROCHLORIDE 10 MG: 5 INJECTION INTRAVENOUS at 20:20

## 2019-01-01 RX ADMIN — SODIUM CHLORIDE, POTASSIUM CHLORIDE, SODIUM LACTATE AND CALCIUM CHLORIDE 1000 ML: 600; 310; 30; 20 INJECTION, SOLUTION INTRAVENOUS at 19:11

## 2019-01-01 RX ADMIN — IPRATROPIUM BROMIDE AND ALBUTEROL SULFATE 3 ML: .5; 3 SOLUTION RESPIRATORY (INHALATION) at 16:03

## 2019-01-01 RX ADMIN — DILTIAZEM HYDROCHLORIDE 180 MG: 180 CAPSULE, COATED, EXTENDED RELEASE ORAL at 08:01

## 2019-01-01 RX ADMIN — METHYLPREDNISOLONE SODIUM SUCCINATE 62.5 MG: 125 INJECTION, POWDER, FOR SOLUTION INTRAMUSCULAR; INTRAVENOUS at 12:35

## 2019-01-01 RX ADMIN — MAGNESIUM SULFATE IN WATER 2 G: 40 INJECTION, SOLUTION INTRAVENOUS at 09:07

## 2019-01-01 RX ADMIN — LABETALOL HYDROCHLORIDE 10 MG: 5 INJECTION, SOLUTION INTRAVENOUS at 20:20

## 2019-01-01 RX ADMIN — IPRATROPIUM BROMIDE AND ALBUTEROL SULFATE 3 ML: .5; 3 SOLUTION RESPIRATORY (INHALATION) at 19:26

## 2019-01-01 RX ADMIN — METHYLNALTREXONE BROMIDE 12 MG: 12 INJECTION, SOLUTION SUBCUTANEOUS at 13:21

## 2019-01-01 RX ADMIN — METHYLPREDNISOLONE SODIUM SUCCINATE 62.5 MG: 125 INJECTION, POWDER, FOR SOLUTION INTRAMUSCULAR; INTRAVENOUS at 18:08

## 2019-01-01 RX ADMIN — DILTIAZEM HYDROCHLORIDE 60 MG: 60 TABLET, FILM COATED ORAL at 22:11

## 2019-01-01 RX ADMIN — IPRATROPIUM BROMIDE AND ALBUTEROL SULFATE 3 ML: .5; 3 SOLUTION RESPIRATORY (INHALATION) at 01:57

## 2019-01-01 RX ADMIN — SODIUM CHLORIDE 1000 ML: 9 INJECTION, SOLUTION INTRAVENOUS at 04:11

## 2019-01-01 RX ADMIN — SODIUM CHLORIDE 500 ML: 9 INJECTION, SOLUTION INTRAVENOUS at 04:45

## 2019-01-01 RX ADMIN — DILTIAZEM HYDROCHLORIDE 24.75 MG: 5 INJECTION INTRAVENOUS at 13:50

## 2019-01-01 RX ADMIN — IPRATROPIUM BROMIDE AND ALBUTEROL SULFATE 3 ML: .5; 3 SOLUTION RESPIRATORY (INHALATION) at 06:18

## 2019-01-01 RX ADMIN — FAMOTIDINE 20 MG: 10 INJECTION, SOLUTION INTRAVENOUS at 18:09

## 2019-01-01 RX ADMIN — ASPIRIN 81 MG 81 MG: 81 TABLET ORAL at 05:15

## 2019-01-01 RX ADMIN — SODIUM CHLORIDE: 9 INJECTION, SOLUTION INTRAVENOUS at 21:48

## 2019-01-01 RX ADMIN — CEFTRIAXONE SODIUM 2 G: 2 INJECTION, POWDER, FOR SOLUTION INTRAMUSCULAR; INTRAVENOUS at 18:07

## 2019-01-01 RX ADMIN — IPRATROPIUM BROMIDE AND ALBUTEROL SULFATE 3 ML: .5; 3 SOLUTION RESPIRATORY (INHALATION) at 20:12

## 2019-01-01 RX ADMIN — AMIODARONE HYDROCHLORIDE 150 MG: 1.5 INJECTION, SOLUTION INTRAVENOUS at 21:41

## 2019-01-01 RX ADMIN — METHYLPREDNISOLONE SODIUM SUCCINATE 62.5 MG: 125 INJECTION, POWDER, FOR SOLUTION INTRAMUSCULAR; INTRAVENOUS at 05:16

## 2019-01-01 RX ADMIN — METOPROLOL TARTRATE 5 MG: 5 INJECTION, SOLUTION INTRAVENOUS at 10:39

## 2019-01-01 RX ADMIN — PROPOFOL 30 MCG/KG/MIN: 10 INJECTION, EMULSION INTRAVENOUS at 21:45

## 2019-01-01 RX ADMIN — LEVALBUTEROL 1.25 MG: 1.25 SOLUTION RESPIRATORY (INHALATION) at 07:03

## 2019-01-01 RX ADMIN — METOPROLOL TARTRATE 5 MG: 5 INJECTION, SOLUTION INTRAVENOUS at 10:45

## 2019-01-01 RX ADMIN — SODIUM CHLORIDE, POTASSIUM CHLORIDE, SODIUM LACTATE AND CALCIUM CHLORIDE 2000 ML: 600; 310; 30; 20 INJECTION, SOLUTION INTRAVENOUS at 15:44

## 2019-01-01 RX ADMIN — ROCURONIUM BROMIDE 40 MG: 10 INJECTION, SOLUTION INTRAVENOUS at 15:28

## 2019-01-01 RX ADMIN — FENTANYL CITRATE 100 MCG/HR: 50 INJECTION, SOLUTION INTRAMUSCULAR; INTRAVENOUS at 19:38

## 2019-01-01 RX ADMIN — IPRATROPIUM BROMIDE AND ALBUTEROL SULFATE 3 ML: .5; 3 SOLUTION RESPIRATORY (INHALATION) at 15:35

## 2019-01-01 RX ADMIN — FAMOTIDINE 20 MG: 10 INJECTION, SOLUTION INTRAVENOUS at 06:11

## 2019-01-01 RX ADMIN — PROPOFOL 30 MCG/KG/MIN: 10 INJECTION, EMULSION INTRAVENOUS at 02:44

## 2019-01-01 RX ADMIN — AMIODARONE HYDROCHLORIDE 150 MG: 1.5 INJECTION, SOLUTION INTRAVENOUS at 05:48

## 2019-01-01 RX ADMIN — METHYLPREDNISOLONE SODIUM SUCCINATE 40 MG: 40 INJECTION, POWDER, FOR SOLUTION INTRAMUSCULAR; INTRAVENOUS at 03:22

## 2019-01-01 RX ADMIN — DEXMEDETOMIDINE HYDROCHLORIDE 0.2 MCG/KG/HR: 100 INJECTION, SOLUTION INTRAVENOUS at 22:35

## 2019-01-01 RX ADMIN — DILTIAZEM HYDROCHLORIDE 60 MG: 60 TABLET, FILM COATED ORAL at 05:15

## 2019-01-01 RX ADMIN — METHYLPREDNISOLONE SODIUM SUCCINATE 62.5 MG: 125 INJECTION, POWDER, FOR SOLUTION INTRAMUSCULAR; INTRAVENOUS at 22:00

## 2019-01-01 RX ADMIN — DIGOXIN 250 MCG: 0.25 INJECTION INTRAMUSCULAR; INTRAVENOUS at 22:11

## 2019-01-01 RX ADMIN — SODIUM CHLORIDE: 9 INJECTION, SOLUTION INTRAVENOUS at 08:28

## 2019-01-01 RX ADMIN — LEVALBUTEROL 1.25 MG: 1.25 SOLUTION RESPIRATORY (INHALATION) at 02:11

## 2019-01-01 RX ADMIN — IPRATROPIUM BROMIDE AND ALBUTEROL SULFATE 3 ML: .5; 3 SOLUTION RESPIRATORY (INHALATION) at 11:35

## 2019-01-01 RX ADMIN — AMIODARONE HYDROCHLORIDE 200 MG: 200 TABLET ORAL at 06:38

## 2019-01-01 RX ADMIN — IPRATROPIUM BROMIDE AND ALBUTEROL SULFATE 3 ML: .5; 3 SOLUTION RESPIRATORY (INHALATION) at 21:44

## 2019-01-01 RX ADMIN — IPRATROPIUM BROMIDE AND ALBUTEROL SULFATE 3 ML: .5; 3 SOLUTION RESPIRATORY (INHALATION) at 19:48

## 2019-01-01 RX ADMIN — METHYLPREDNISOLONE SODIUM SUCCINATE 40 MG: 40 INJECTION, POWDER, FOR SOLUTION INTRAMUSCULAR; INTRAVENOUS at 06:39

## 2019-01-01 ASSESSMENT — COPD QUESTIONNAIRES
DO YOU EVER COUGH UP ANY MUCUS OR PHLEGM?: NO/ONLY WITH OCCASIONAL COLDS OR INFECTIONS
COPD SCREENING SCORE: 6
DURING THE PAST 4 WEEKS HOW MUCH DID YOU FEEL SHORT OF BREATH: SOME OF THE TIME
HAVE YOU SMOKED AT LEAST 100 CIGARETTES IN YOUR ENTIRE LIFE: YES
DURING THE PAST 4 WEEKS HOW MUCH DID YOU FEEL SHORT OF BREATH: MOST  OR ALL OF THE TIME
DO YOU EVER COUGH UP ANY MUCUS OR PHLEGM?: NO/ONLY WITH OCCASIONAL COLDS OR INFECTIONS
DURING THE PAST 4 WEEKS HOW MUCH DID YOU FEEL SHORT OF BREATH: SOME OF THE TIME
DO YOU EVER COUGH UP ANY MUCUS OR PHLEGM?: YES, EVERY DAY
COPD SCREENING SCORE: 6
HAVE YOU SMOKED AT LEAST 100 CIGARETTES IN YOUR ENTIRE LIFE: YES
HAVE YOU SMOKED AT LEAST 100 CIGARETTES IN YOUR ENTIRE LIFE: YES
COPD SCREENING SCORE: 10

## 2019-01-01 ASSESSMENT — COGNITIVE AND FUNCTIONAL STATUS - GENERAL
MOBILITY SCORE: 18
DRESSING REGULAR LOWER BODY CLOTHING: A LITTLE
HELP NEEDED FOR BATHING: A LITTLE
MOVING FROM LYING ON BACK TO SITTING ON SIDE OF FLAT BED: A LITTLE
STANDING UP FROM CHAIR USING ARMS: A LITTLE
CLIMB 3 TO 5 STEPS WITH RAILING: A LITTLE
DRESSING REGULAR UPPER BODY CLOTHING: A LITTLE
WALKING IN HOSPITAL ROOM: A LITTLE
SUGGESTED CMS G CODE MODIFIER MOBILITY: CK
MOVING TO AND FROM BED TO CHAIR: A LITTLE
TURNING FROM BACK TO SIDE WHILE IN FLAT BAD: A LITTLE
DAILY ACTIVITIY SCORE: 21
SUGGESTED CMS G CODE MODIFIER DAILY ACTIVITY: CJ

## 2019-01-01 ASSESSMENT — ENCOUNTER SYMPTOMS
SENSORY CHANGE: 0
SPEECH CHANGE: 0
HEMOPTYSIS: 0
BACK PAIN: 0
HEARTBURN: 0
MYALGIAS: 0
VOMITING: 0
DEPRESSION: 0
NECK PAIN: 0
BLOOD IN STOOL: 0
DIAPHORESIS: 0
FOCAL WEAKNESS: 0
WHEEZING: 0
PHOTOPHOBIA: 0
DIZZINESS: 0
COUGH: 1
PALPITATIONS: 0
STRIDOR: 0
SHORTNESS OF BREATH: 1
FLANK PAIN: 0
WEIGHT LOSS: 0
SPUTUM PRODUCTION: 1
HEADACHES: 0
POLYDIPSIA: 0
FEVER: 0
CHILLS: 0
WEAKNESS: 0
CHILLS: 1
TINGLING: 0
EYE DISCHARGE: 0
DOUBLE VISION: 0
FEVER: 1
SINUS PAIN: 0
NERVOUS/ANXIOUS: 0
ABDOMINAL PAIN: 0
BRUISES/BLEEDS EASILY: 0
BLURRED VISION: 0
HALLUCINATIONS: 0
NAUSEA: 0

## 2019-01-01 ASSESSMENT — LIFESTYLE VARIABLES
EVER_SMOKED: YES
HAVE PEOPLE ANNOYED YOU BY CRITICIZING YOUR DRINKING: NO
ON A TYPICAL DAY WHEN YOU DRINK ALCOHOL HOW MANY DRINKS DO YOU HAVE: 0
AVERAGE NUMBER OF DAYS PER WEEK YOU HAVE A DRINK CONTAINING ALCOHOL: 0
SUBSTANCE_ABUSE: 0
DOES PATIENT WANT TO STOP DRINKING: NO
TOTAL SCORE: 0
ALCOHOL_USE: YES
EVER FELT BAD OR GUILTY ABOUT YOUR DRINKING: NO
EVER_SMOKED: YES
EVER_SMOKED: YES
HOW MANY TIMES IN THE PAST YEAR HAVE YOU HAD 5 OR MORE DRINKS IN A DAY: 0
EVER_SMOKED: YES
EVER HAD A DRINK FIRST THING IN THE MORNING TO STEADY YOUR NERVES TO GET RID OF A HANGOVER: NO
HAVE YOU EVER FELT YOU SHOULD CUT DOWN ON YOUR DRINKING: NO
TOTAL SCORE: 0
TOTAL SCORE: 0
CONSUMPTION TOTAL: NEGATIVE

## 2019-01-01 ASSESSMENT — CHA2DS2 SCORE
CHA2DS2 VASC SCORE: 4
AGE 75 OR GREATER: YES
VASCULAR DISEASE: YES
DIABETES: NO
AGE 65 TO 74: NO
CHF OR LEFT VENTRICULAR DYSFUNCTION: NO
HYPERTENSION: YES
SEX: MALE
PRIOR STROKE OR TIA OR THROMBOEMBOLISM: NO

## 2019-01-01 ASSESSMENT — PATIENT HEALTH QUESTIONNAIRE - PHQ9
SUM OF ALL RESPONSES TO PHQ9 QUESTIONS 1 AND 2: 0
2. FEELING DOWN, DEPRESSED, IRRITABLE, OR HOPELESS: NOT AT ALL
1. LITTLE INTEREST OR PLEASURE IN DOING THINGS: NOT AT ALL

## 2019-01-01 ASSESSMENT — PULMONARY FUNCTION TESTS: FVC: .83

## 2019-04-11 NOTE — PROGRESS NOTES
US guided RIGHT thyroid nodule fine needle aspiration done by Dr. LEE; RIGHT anterior aspect of neck access site; 1 jar of cytolyt obtained and sent to pathology lab; pt tolerated the procedure well; pt hemodynamically stable pre/intra/post procedure; all appropriate patient education provided for procedure, pt verbalizes understanding all questions and concerns answered prior to being d/c; pt d/c home

## 2019-12-29 PROBLEM — J44.1 ACUTE EXACERBATION OF CHRONIC OBSTRUCTIVE PULMONARY DISEASE (COPD) (HCC): Status: ACTIVE | Noted: 2019-01-01

## 2019-12-29 PROBLEM — I48.91 AF (ATRIAL FIBRILLATION) (HCC): Status: ACTIVE | Noted: 2019-01-01

## 2019-12-29 PROBLEM — J96.21 ACUTE ON CHRONIC RESPIRATORY FAILURE WITH HYPOXEMIA (HCC): Status: ACTIVE | Noted: 2019-01-01

## 2019-12-29 PROBLEM — R79.89 ELEVATED TROPONIN: Status: ACTIVE | Noted: 2019-01-01

## 2019-12-29 PROBLEM — A41.9 SEPSIS DUE TO PNEUMONIA (HCC): Status: ACTIVE | Noted: 2019-01-01

## 2019-12-29 PROBLEM — J96.01 ACUTE HYPOXEMIC RESPIRATORY FAILURE (HCC): Status: ACTIVE | Noted: 2019-01-01

## 2019-12-29 PROBLEM — E87.20 LACTIC ACIDOSIS: Status: ACTIVE | Noted: 2019-01-01

## 2019-12-29 PROBLEM — J18.9 CAP (COMMUNITY ACQUIRED PNEUMONIA): Status: ACTIVE | Noted: 2019-01-01

## 2019-12-29 PROBLEM — R79.9 ELEVATED BUN: Status: ACTIVE | Noted: 2019-01-01

## 2019-12-29 PROBLEM — J18.9 SEPSIS DUE TO PNEUMONIA (HCC): Status: ACTIVE | Noted: 2019-01-01

## 2019-12-29 NOTE — ED PROVIDER NOTES
ED Provider Note    ED Provider Note      Primary care provider: Randall Jama M.D.    CHIEF COMPLAINT  Chief Complaint   Patient presents with   • Shortness of Breath   • Atrial Fibrillation     RVR       HPI  Humphrey Cano Jr. is a 79 y.o. male who presents to the Emergency Department with chief complaint of shortness of breath.  Patient reports that he has been suffering from shortness of breath for the last couple days progressively worse throughout this evening where he felt as though he could not breathe at all.  He also reports some palpitations and some tightness through his chest.  He has had subjective fevers and chills he said harsh cough and what he reports is a rattle in his chest but has not been able to bring up phlegm with cough.  No headache no altered mental status no abdominal pain no pain in extremities no skin changes no problems with urination or bowel movements no other acute symptoms or concerns at this time.    REVIEW OF SYSTEMS  10 systems reviewed and otherwise negative, pertinent positives and negatives listed in the history of present illness.    PAST MEDICAL HISTORY   has a past medical history of Backpain (occasional), Cancer (HCC) (2017), CATARACT, COPD, Coughing blood (17), High cholesterol, Hyperlipidemia, Hypertension, Indigestion, Myocardial infarct (HCC) (with stent), and Pneumonia.    SURGICAL HISTORY   has a past surgical history that includes other; other cardiac surgery; splenectomy (2010); and thoracoscopy (Left, 2017).    SOCIAL HISTORY  Social History     Tobacco Use   • Smoking status: Former Smoker     Packs/day: 1.00     Years: 60.00     Pack years: 60.00     Types: Cigarettes     Last attempt to quit: 2017     Years since quittin.5   • Smokeless tobacco: Never Used   • Tobacco comment: 1 pk a day   Substance Use Topics   • Alcohol use: Yes     Comment: Rarely   • Drug use: No      Social History     Substance and Sexual Activity  "  Drug Use No       FAMILY HISTORY  Non-Contributory    CURRENT MEDICATIONS  Home Medications     Reviewed by Joni Camarena R.N. (Registered Nurse) on 12/28/19 at 2355  Med List Status: <None>   Medication Last Dose Status   amiodarone (PACERONE) 400 MG tablet  Active   Ascorbic Acid (VITAMIN C PO)  Active   aspirin 81 MG EC tablet  Active   calcium carbonate (OS-JACKIE 500) 500 MG Tab  Active   Coenzyme Q10 (COQ-10) 100 MG Cap  Active   diltiazem CD (CARDIZEM CD) 180 MG CAPSULE SR 24 HR  Active   lisinopril (PRINIVIL) 5 MG Tab  Active   Non Formulary Request  Active   rivaroxaban (XARELTO) 15 MG Tab tablet  Active   simvastatin (ZOCOR) 10 MG Tab  Active   vitamin D (CHOLECALCIFEROL) 1000 UNIT Tab  Active   vitamin e (VITAMIN E) 400 UNIT Cap  Active                ALLERGIES  No Known Allergies    PHYSICAL EXAM  VITAL SIGNS: /59   Pulse (!) 146   Temp 37.4 °C (99.4 °F)   Resp (!) 26   Ht 1.727 m (5' 8\")   Wt 70.5 kg (155 lb 6.8 oz)   SpO2 91%   BMI 23.63 kg/m²   Pulse ox interpretation: I interpret this pulse ox as normal.  Constitutional: Alert and oriented x 3, moderate distress  HEENT: Atraumatic normocephalic, pupils are equal round reactive to light extraocular movements are intact. The nares is clear, external ears are normal, mouth shows moist mucous membranes  Neck: Supple, no JVD no tracheal deviation  Cardiovascular: Tachycardic no murmur rub or gallop 2+ pulses peripherally x4  Thorax & Lungs: Tachypneic rhonchi in bilateral lower lobes  GI: Soft nontender nondistended positive bowel sounds, no peritoneal signs  Skin: Warm dry no acute rash or lesion  Musculoskeletal: Moving all extremities with full range and 5 of 5 strength, no acute  deformity  Neurologic: Cranial nerves III through XII are grossly intact, no sensory deficit, no cerebellar dysfunction   Psychiatric: Appropriate affect for situation at this time      DIAGNOSTIC STUDIES / PROCEDURES  LABS      Results for orders placed or " performed during the hospital encounter of 12/28/19   CBC WITH DIFFERENTIAL   Result Value Ref Range    WBC 28.9 (H) 4.8 - 10.8 K/uL    RBC 5.30 4.70 - 6.10 M/uL    Hemoglobin 16.4 14.0 - 18.0 g/dL    Hematocrit 49.9 42.0 - 52.0 %    MCV 94.2 81.4 - 97.8 fL    MCH 30.9 27.0 - 33.0 pg    MCHC 32.9 (L) 33.7 - 35.3 g/dL    RDW 52.6 (H) 35.9 - 50.0 fL    Platelet Count 221 164 - 446 K/uL    MPV 11.4 9.0 - 12.9 fL    Neutrophils-Polys 90.40 (H) 44.00 - 72.00 %    Lymphocytes 2.60 (L) 22.00 - 41.00 %    Monocytes 2.60 0.00 - 13.40 %    Eosinophils 0.00 0.00 - 6.90 %    Basophils 0.00 0.00 - 1.80 %    Nucleated RBC 0.00 /100 WBC    Neutrophils (Absolute) 27.40 (H) 1.82 - 7.42 K/uL    Lymphs (Absolute) 0.75 (L) 1.00 - 4.80 K/uL    Monos (Absolute) 0.75 0.00 - 0.85 K/uL    Eos (Absolute) 0.00 0.00 - 0.51 K/uL    Baso (Absolute) 0.00 0.00 - 0.12 K/uL    NRBC (Absolute) 0.00 K/uL   COMP METABOLIC PANEL   Result Value Ref Range    Sodium 139 135 - 145 mmol/L    Potassium 4.7 3.6 - 5.5 mmol/L    Chloride 103 96 - 112 mmol/L    Co2 24 20 - 33 mmol/L    Anion Gap 12.0 (H) 0.0 - 11.9    Glucose 139 (H) 65 - 99 mg/dL    Bun 27 (H) 8 - 22 mg/dL    Creatinine 1.05 0.50 - 1.40 mg/dL    Calcium 9.2 8.5 - 10.5 mg/dL    AST(SGOT) 29 12 - 45 U/L    ALT(SGPT) 12 2 - 50 U/L    Alkaline Phosphatase 77 30 - 99 U/L    Total Bilirubin 0.8 0.1 - 1.5 mg/dL    Albumin 3.9 3.2 - 4.9 g/dL    Total Protein 7.0 6.0 - 8.2 g/dL    Globulin 3.1 1.9 - 3.5 g/dL    A-G Ratio 1.3 g/dL   TROPONIN   Result Value Ref Range    Troponin T 56 (H) 6 - 19 ng/L   proBrain Natriuretic Peptide, NT   Result Value Ref Range    NT-proBNP 6979 (H) 0 - 125 pg/mL   LACTIC ACID   Result Value Ref Range    Lactic Acid 3.5 (H) 0.5 - 2.0 mmol/L   LACTIC ACID   Result Value Ref Range    Lactic Acid 4.4 (HH) 0.5 - 2.0 mmol/L   URINALYSIS   Result Value Ref Range    Color DK Yellow     Character Clear     Specific Gravity 1.034 <1.035    Ph 5.0 5.0 - 8.0    Glucose Negative Negative  mg/dL    Ketones Negative Negative mg/dL    Protein 100 (A) Negative mg/dL    Bilirubin Small (A) Negative    Urobilinogen, Urine 1.0 Negative    Nitrite Negative Negative    Leukocyte Esterase Negative Negative    Occult Blood Negative Negative    Micro Urine Req Microscopic    Influenza A/B By PCR (Adult - Flu Only)   Result Value Ref Range    Influenza virus A RNA Negative Negative    Influenza virus B, PCR Negative Negative   proBrain Natriuretic Peptide, NT   Result Value Ref Range    NT-proBNP 6608 (H) 0 - 125 pg/mL   ESTIMATED GFR   Result Value Ref Range    GFR If African American >60 >60 mL/min/1.73 m 2    GFR If Non African American >60 >60 mL/min/1.73 m 2   PERIPHERAL SMEAR REVIEW   Result Value Ref Range    Peripheral Smear Review see below    PLATELET ESTIMATE   Result Value Ref Range    Plt Estimation Normal    MORPHOLOGY   Result Value Ref Range    RBC Morphology Normal    DIFFERENTIAL MANUAL   Result Value Ref Range    Bands-Stabs 4.40 0.00 - 10.00 %    Manual Diff Status PERFORMED    Lactic Acid Every four hours after STAT order   Result Value Ref Range    Lactic Acid 3.1 (H) 0.5 - 2.0 mmol/L   Prothrombin time (INR)   Result Value Ref Range    PT 14.8 (H) 12.0 - 14.6 sec    INR 1.13 0.87 - 1.13   PROCALCITONIN   Result Value Ref Range    Procalcitonin 6.72 (H) <0.25 ng/mL   URINE MICROSCOPIC (W/UA)   Result Value Ref Range    WBC 5-10 (A) /hpf    RBC 2-5 (A) /hpf    Bacteria Negative None /hpf    Epithelial Cells Few /hpf    Mucous Threads Many /hpf    Hyaline Cast >20 (A) /lpf   EKG   Result Value Ref Range    Report       [facility] Emergency Dept.    Test Date:  2019  Pt Name:    JOSE DE JESUS HOPPER               Department: ER  MRN:        [id number]                      Room:       RD 05  Gender:     Male                         Technician: [id number]  :        1940                   Requested By:ER TRIAGE PROTOCOL  Order #:    [id number]                    Naty HERRERA:  LORE MUIR MD    Measurements  Intervals                                Axis  Rate:       137                          P:          87  NV:         104                          QRS:        37  QRSD:       94                           T:  QT:         304  QTc:        459    Interpretive Statements  SINUS TACHYCARDIA  RSR' IN V1 OR V2, RIGHT VCD OR RVH  BORDERLINE T ABNORMALITIES, INFERIOR LEADS  BASELINE WANDER IN LEAD(S) V1,V2  Compared to ECG 2017 09:31:53  Sinus bradycardia no longer present  T-wave abnormality still present  Electronically Signed On 2019 2:19:48 PST by LORE MUIR MD     EKG   Result Value Ref Range    Report       [facility] Emergency Dept.    Test Date:  2019  Pt Name:    JOSE DE JESUS HOPPER               Department: ER  MRN:        [id number]                      Room:        05  Gender:     Male                         Technician: [id number]  :        1940                   Requested By:ROSENDO MORRIS  Order #:    [id number]                    Reading MD:    Measurements  Intervals                                Axis  Rate:       138                          P:  NV:                                      QRS:        28  QRSD:       96                           T:          76  QT:         300  QTc:        455    Interpretive Statements  ATRIAL FLUTTER WITH 2:1 AV BLOCK  RSR' IN V1 OR V2, RIGHT VCD OR RVH  Compared to ECG 2019 00:28:07  2:1 AV block now present  Sinus tachycardia no longer present  T-wave abnormality no longer present         All labs reviewed by me.      RADIOLOGY  DX-CHEST-PORTABLE (1 VIEW)   Final Result         Airspace opacity in the left lower lobe with associated left pleural effusion, concerning for infection        The radiologist's interpretation of all radiological studies have been reviewed by me.    COURSE & MEDICAL DECISION MAKING  Pertinent Labs & Imaging studies reviewed. (See chart for details)    12:29 AM  "- Patient seen and examined at bedside.         Patient noted to have slightly elevated blood pressure likely circumstantial secondary to presenting complaint. Referred to primary care physician for further evaluation.     Patient was given IV fluids based on tachycardia dry membranes concern for sepsis, oral hydration was not attempted due to insufficiency for hydration, after fluids had improvement of symptoms and vital signs    Medical Decision Making: Sepsis protocol initiated.  Tachycardia which on several EKGs appeared sinus slightly improved with fluids high leukocytosis slightly elevated lactic acid and pneumonia on x-ray patient was given Unasyn and azithromycin patient will be admitted to the hospitalist service for further evaluation and treatment admitted in guarded condition.    /72   Pulse 100   Temp 36.9 °C (98.5 °F) (Oral)   Resp (!) 24   Ht 1.727 m (5' 8\")   Wt 70.5 kg (155 lb 6.8 oz)   SpO2 96%   BMI 23.63 kg/m²             FINAL IMPRESSION  1. Acute hypoxemic respiratory failure (HCC)    2.  Sepsis  3.  Pneumonia  4.  Tachycardia, possible paroxysmal atrial fib  5.  Leukocytosis  6.  Lactic acidosis    This dictation has been created using voice recognition software and/or scribes. The accuracy of the dictation is limited by the abilities of the software and the expertise of the scribes. I expect there may be some errors of grammar and possibly content. I made every attempt to manually correct the errors within my dictation. However, errors related to voice recognition software and/or scribes may still exist and should be interpreted within the appropriate context.            "

## 2019-12-29 NOTE — ASSESSMENT & PLAN NOTE
Strep pneumo: complete 10 day course of Abx's, can transition to po if ready for DC  Cont O2/RT protocols  Now off steroids  Exacerbation resolved

## 2019-12-29 NOTE — PROGRESS NOTES
0946 - Patient converted back into to AFlutter rate of 130s-140s. BP 150s. MD Angela notified.     1050 - Patient converted back to SR after metoprolol push x3 (see MAR)

## 2019-12-29 NOTE — PROGRESS NOTES
Bedside report received from ER RN. Patient transported to T606 with 2 CIC RNs. Patient and wife oriented to room and plan of care. 2 RN skin check performed, see note.

## 2019-12-29 NOTE — ASSESSMENT & PLAN NOTE
Now back in sinus on diltiazem 120 TID  Prn IV Metoprolol for break through  Dig  Tele  Holding rivaroxaban due to gross hematuria  1/8: Hematuria has resolved.  Patient's wife stated that they cannot afford Xarelto as they were paying almost $600 a month for it.  She is also not interested in something cheaper like Coumadin.  She stated she wants to check with her friends to see which medicine will be more affordable.  I notified  discussed that with the patient.  Xarelto has been restarted     1/14 on xarelto, rate controlled

## 2019-12-29 NOTE — ED NOTES
Med Rec complete per Pt and Pt's wife at bedside  Ok per Pt to discuss medications with visitor/s present    Allergies Reviewed  No ABX in the last 14 days

## 2019-12-29 NOTE — ED NOTES
Tech from Lab called with critical result of Lactic acid 4.4 at 0343. Critical lab result read back to Tech.   Dr. Pradhan notified of critical lab result at 0345.  Critical lab result read back by Dr. Pradhan.

## 2019-12-29 NOTE — ASSESSMENT & PLAN NOTE
Extubated 1/2  Tolerating well  moblize   Cont to address AECOPD and CAP  Po lasix  Follow BMP   Wean down oxygen as tolerated

## 2019-12-29 NOTE — ED TRIAGE NOTES
Wife reported pt to be in Resp distress with increase sob that started today. Pt has had cough for 24hours. Pt received duoneb x2, albuterol x 1. 12 lead from ems shows A-fib with RVR. Pt is A&ox3 BG-114. Pt presents with skin pale warm and dry moderate resp distress. No chest pain. Pt received NS 1l in ambulance.

## 2019-12-29 NOTE — ASSESSMENT & PLAN NOTE
Now on dig iv, dilt was stopped, was on amnio, consider cardiology consult    Cards consulted- recommending po aminiodirone and to expect significant rate break though, especially while sick.

## 2019-12-29 NOTE — H&P
Hospital Medicine History & Physical Note    Date of Service  12/29/2019    Primary Care Physician  Randall Jama M.D.    Consultants  none    Code Status  full    Chief Complaint  Cough, shortness of breath     History of Presenting Illness  79 y.o. male who presented 12/28/2019 with a past medical history of COPD on 3 L of oxygen at baseline, hypertension, coronary artery disease who presented with shortness of breath and cough.  This patient's had worsening shortness of breath that started earlier today.  And cough present for the last 24 hours.  He received 2 duo nebs and 1 albuterol treatment without improvement of his symptoms.  Otherwise no known alleviating or exacerbating factors to his symptoms.  Also subjective fevers cough productive of yellow-green sputum shortness of breath worse with exertion.  Progressively worsening over the last 24 hours.  Found to be in COPD exacerbation with sepsis and pneumonia will be admitted to the hospital for further management of his symptoms.    Review of Systems  Review of Systems   Constitutional: Positive for chills, fever and malaise/fatigue.   HENT: Negative for congestion, hearing loss and tinnitus.    Eyes: Negative for blurred vision, double vision and discharge.   Respiratory: Positive for cough, sputum production and shortness of breath. Negative for hemoptysis.    Cardiovascular: Negative for chest pain, palpitations and leg swelling.   Gastrointestinal: Negative for abdominal pain, heartburn, nausea and vomiting.   Genitourinary: Negative for dysuria and flank pain.   Musculoskeletal: Negative for joint pain and myalgias.   Skin: Negative for rash.   Neurological: Negative for dizziness, sensory change, speech change, focal weakness and weakness.   Endo/Heme/Allergies: Negative for environmental allergies. Does not bruise/bleed easily.   Psychiatric/Behavioral: Negative for depression, hallucinations and substance abuse.       Past Medical History   has a  past medical history of Backpain (occasional), Cancer (HCC) (6/2017), CATARACT, COPD, Coughing blood (7/5/17), High cholesterol, Hyperlipidemia, Hypertension, Indigestion, Myocardial infarct (HCC) (with stent), and Pneumonia.    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     Family History  Reviewed and not pertinent     Social History   reports that he quit smoking about 2 years ago. His smoking use included cigarettes. He has a 60.00 pack-year smoking history. He has never used smokeless tobacco. He reports current alcohol use. He reports that he does not use drugs.    Allergies  No Known Allergies    Medications  Prior to Admission Medications   Prescriptions Last Dose Informant Patient Reported? Taking?   Ascorbic Acid (VITAMIN C PO)  Family Member Yes No   Sig: Take 2,000 mg by mouth every day.   Coenzyme Q10 (COQ-10) 100 MG Cap  Family Member Yes No   Sig: Take  by mouth every day.   Non Formulary Request  Family Member Yes No   Sig: BLACK CUMIN OIL  1 TABLESPOON TWICE A DAY   amiodarone (PACERONE) 400 MG tablet   No No   Sig: Take 0.5 Tabs by mouth every day.   aspirin 81 MG EC tablet   No No   Sig: Take 1 Tab by mouth every day.   calcium carbonate (OS-JACKIE 500) 500 MG Tab  Family Member Yes No   Sig: Take 500 mg by mouth 2 times a day, with meals.   diltiazem CD (CARDIZEM CD) 180 MG CAPSULE SR 24 HR   No No   Sig: Take 1 Cap by mouth every day.   lisinopril (PRINIVIL) 5 MG Tab  Family Member Yes No   Sig: Take 5 mg by mouth every day.   rivaroxaban (XARELTO) 15 MG Tab tablet   No No   Sig: Take 1 Tab by mouth with dinner.   simvastatin (ZOCOR) 10 MG Tab  Family Member Yes No   Sig: Take 10 mg by mouth every evening.   vitamin D (CHOLECALCIFEROL) 1000 UNIT Tab  Family Member Yes No   Sig: Take 1,000 Units by mouth 2 Times a Day.   vitamin e (VITAMIN E) 400 UNIT Cap  Family Member Yes No   Sig: Take 400  Units by mouth 2 times a day.      Facility-Administered Medications: None       Physical Exam  Temp:  [37.4 °C (99.4 °F)] 37.4 °C (99.4 °F)  Pulse:  [137-146] 138  Resp:  [26-29] 29  BP: (129-142)/(59-72) 138/72  SpO2:  [91 %-93 %] 93 %    Physical Exam  Vitals signs reviewed.   Constitutional:       General: He is not in acute distress.     Appearance: He is ill-appearing and diaphoretic.   HENT:      Head: Normocephalic and atraumatic.      Nose: No congestion.      Mouth/Throat:      Mouth: Mucous membranes are dry.   Eyes:      Extraocular Movements: Extraocular movements intact.      Pupils: Pupils are equal, round, and reactive to light.   Neck:      Musculoskeletal: Neck supple.   Cardiovascular:      Rate and Rhythm: Normal rate and regular rhythm.      Pulses: Normal pulses.      Heart sounds: Normal heart sounds.   Pulmonary:      Effort: Respiratory distress present.      Breath sounds: Wheezing present.   Abdominal:      General: Bowel sounds are normal. There is no distension.      Palpations: Abdomen is soft.      Tenderness: There is no tenderness.   Musculoskeletal:         General: No swelling.   Skin:     General: Skin is warm.      Capillary Refill: Capillary refill takes 2 to 3 seconds.   Neurological:      General: No focal deficit present.      Mental Status: He is alert and oriented to person, place, and time. Mental status is at baseline.   Psychiatric:         Mood and Affect: Mood normal.         Behavior: Behavior normal.         Thought Content: Thought content normal.         Judgment: Judgment normal.         Laboratory:  Recent Labs     12/29/19 0043   WBC 28.9*   RBC 5.30   HEMOGLOBIN 16.4   HEMATOCRIT 49.9   MCV 94.2   MCH 30.9   MCHC 32.9*   RDW 52.6*   PLATELETCT 221   MPV 11.4     Recent Labs     12/29/19 0043   SODIUM 139   POTASSIUM 4.7   CHLORIDE 103   CO2 24   GLUCOSE 139*   BUN 27*   CREATININE 1.05   CALCIUM 9.2     Recent Labs     12/29/19 0043   ALTSGPT 12   ASTSGOT 29    ALKPHOSPHAT 77   TBILIRUBIN 0.8   GLUCOSE 139*         Recent Labs     12/29/19  0043   NTPROBNP 6608*         Recent Labs     12/29/19  0043   TROPONINT 56*       Urinalysis:    No results found     Imaging:  DX-CHEST-PORTABLE (1 VIEW)   Final Result         Airspace opacity in the left lower lobe with associated left pleural effusion, concerning for infection            Assessment/Plan:  I anticipate this patient will require at least two midnights for appropriate medical management, necessitating inpatient admission.    * CAP (community acquired pneumonia)  Assessment & Plan  Meets sepsis criteria also with COPD exacerbation   Con with IV rocpehin and doxy   Procal, sputum cutlures   Blood cultures   descilate therapy as apropriate    AF (atrial fibrillation) ([facility])  Assessment & Plan  Hx of, currently appears sinus tachycardia   Resume home amidoarine and cardizem, xarelto     Elevated BUN  Assessment & Plan  IVF and trend     Acute exacerbation of chronic obstructive pulmonary disease (COPD) ([facility])  Assessment & Plan  Suspect exacerabted by PNA   Cont with empiric IV abx for pneumonia   Xopenex given tachycardia  Pulmicort  IV steroids   Wean 02 as tolerated       Acute on chronic respiratory failure with hypoxemia ([facility])  Assessment & Plan  Wean 02 as tolerated on 3 L baseline   Keep sats 88-92%    Lactic acidosis  Assessment & Plan  IVF and trend    Elevated troponin  Assessment & Plan  Due to demand from hypoxia   Cont cardiac monitoring   Serial troponin     Sepsis due to pneumonia ([facility])  Assessment & Plan  This is Sepsis Present on admission  SIRS criteria identified on my evaluation include: Tachycardia, with heart rate greater than 90 BPM, Tachypnea, with respirations greater than 20 per minute and Leukocyosis, with WBC greater than 12,000  Source is pneumonia  Sepsis protocol initiated  Fluid resuscitation ordered per protocol  IV antibiotics as appropriate for source of sepsis  While organ dysfunction  may be noted elsewhere in this problem list or in the chart, degree of organ dysfunction does not meet CMS criteria for severe sepsis          Dyslipidemia- (present on admission)  Assessment & Plan  On statin therapy     Hypertension- (present on admission)  Assessment & Plan  Resume home anti hypertensive medications     CAD (coronary artery disease)- (present on admission)  Assessment & Plan  On asa, not on BB       VTE prophylaxis: xarelto

## 2019-12-29 NOTE — CONSULTS
Critical Care Consultation    Date of consult: 12/29/2019    Referring Physician  Richard Rushing M.D.    Reason for Consultation  Hypoxic respiratory failure copd exac and pneumonia      History of Presenting Illness  79 y.o. male who presented 12/28/2019 with shortness of breath. Recent cough with yellow sputum. Hx copd on 3 L oxygen at home. [place].  On high flow 40 L 40%.  High work of breathing with paradoxical pattern.  RR to 30s.  + wheezing/poor air movement.  On amiodarone for hx of afib.  Hx cad w/ prior stent, dyslipidemia, hypertension and hypoxic respiratory failure.  afib rvr, received beta blocker this am and improved to low 100s.  Ns at 100 ml/hr. Home xarelto.  Amiodarone started this am.      Reassessment:   Patient more comfortable, on 30 L and 40%, continue for now  RR 30-40s  ABG pending, may require bipap or intubation    Addendum  Patient tired out, high respiratory rate, small volume breaths and unable to speak more than 2-3 words at a time  Intubated  Hypotension/shock  IVF 2 L LR  Levophed for map > 65 and sbp > 90  Central line in place  Propofol drip and fentanyl drip, fent prn  Discussed with wife, consented to procedure including intubation, bronchoscopy and central line  Patient more alterd and confused    Code Status  Full Code    Review of Systems  Review of Systems   Constitutional: Positive for malaise/fatigue. Negative for chills, diaphoresis, fever and weight loss.   HENT: Negative for congestion and sinus pain.    Eyes: Negative for blurred vision, double vision and photophobia.   Respiratory: Positive for cough, sputum production and shortness of breath. Negative for hemoptysis, wheezing and stridor.    Cardiovascular: Negative for chest pain, palpitations and leg swelling.   Gastrointestinal: Negative for blood in stool, heartburn, melena and vomiting.   Genitourinary: Negative for dysuria and urgency.   Musculoskeletal: Negative for back pain,  myalgias and neck pain.   Skin: Negative for itching and rash.   Neurological: Negative for dizziness, tingling, sensory change, speech change, focal weakness and headaches.   Endo/Heme/Allergies: Negative for polydipsia. Does not bruise/bleed easily.   Psychiatric/Behavioral: Negative for depression. The patient is not nervous/anxious.        Past Medical History   has a past medical history of Backpain (occasional), Cancer (HCC) (6/2017), CATARACT, COPD, Coughing blood (7/5/17), High cholesterol, Hyperlipidemia, Hypertension, Indigestion, Myocardial infarct (HCC) (with stent), and Pneumonia.    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    Family History  family history is not on file.    Social History   reports that he quit smoking about 2 years ago. His smoking use included cigarettes. He has a 60.00 pack-year smoking history. He has never used smokeless tobacco. He reports current alcohol use. He reports that he does not use drugs.    Medications  Home Medications     Reviewed by Joni Camarena R.N. (Registered Nurse) on 12/28/19 at 2355  Med List Status: <None>   Medication Last Dose Status   amiodarone (PACERONE) 400 MG tablet  Active   Ascorbic Acid (VITAMIN C PO)  Active   aspirin 81 MG EC tablet  Active   calcium carbonate (OS-JACKIE 500) 500 MG Tab  Active   Coenzyme Q10 (COQ-10) 100 MG Cap  Active   diltiazem CD (CARDIZEM CD) 180 MG CAPSULE SR 24 HR  Active   lisinopril (PRINIVIL) 5 MG Tab  Active   Non Formulary Request  Active   rivaroxaban (XARELTO) 15 MG Tab tablet  Active   simvastatin (ZOCOR) 10 MG Tab  Active   vitamin D (CHOLECALCIFEROL) 1000 UNIT Tab  Active   vitamin e (VITAMIN E) 400 UNIT Cap  Active              Current Facility-Administered Medications   Medication Dose Route Frequency Provider Last Rate Last Dose   • azithromycin (ZITHROMAX) injection 500 mg  500 mg Intravenous  Once Richard Rushing M.D.   500 mg at 12/29/19 0318   • cefTRIAXone (ROCEPHIN) 2 g in  mL IVPB  2 g Intravenous Q24HRS Lizett Pradhan M.D.       • amiodarone (CORDARONE) tablet 200 mg  200 mg Oral DAILY Lizett Pradhan M.D.       • aspirin EC (ECOTRIN) tablet 81 mg  81 mg Oral DAILY Lizett Pradhan M.D.       • DILTIAZem CD (CARDIZEM CD) capsule 180 mg  180 mg Oral DAILY Lizett Pradhan M.D.       • rivaroxaban (XARELTO) tablet 20 mg  20 mg Oral PM MEAL Lizett Pradhan M.D.       • simvastatin (ZOCOR) tablet 10 mg  10 mg Oral Nightly Lizett Pradhan M.D.       • senna-docusate (PERICOLACE or SENOKOT S) 8.6-50 MG per tablet 2 Tab  2 Tab Oral BID Lizett Pradhan M.D.        And   • polyethylene glycol/lytes (MIRALAX) PACKET 1 Packet  1 Packet Oral QDAY PRN Lizett Pradhan M.D.        And   • magnesium hydroxide (MILK OF MAGNESIA) suspension 30 mL  30 mL Oral QDAY PRN Lizett Pradhan M.D.        And   • bisacodyl (DULCOLAX) suppository 10 mg  10 mg Rectal QDAY PRN Lizett Pradhan M.D.       • NS infusion   Intravenous Continuous Lizett Pradhan M.D.       • ondansetron (ZOFRAN) syringe/vial injection 4 mg  4 mg Intravenous Q4HRS PRN Lizett Pradhan M.D.       • ondansetron (ZOFRAN ODT) dispertab 4 mg  4 mg Oral Q4HRS PRN Lizett Pradhan M.D.       • Respiratory Care per Protocol   Nebulization Continuous RT Lizett Pradhan M.D.       • methylPREDNISolone (SOLU-MEDROL) 40 MG injection 40 mg  40 mg Intravenous Q6HRS Lizett Pradhan M.D.   40 mg at 12/29/19 0322   • budesonide (PULMICORT) neb susp 0.5 mg  0.5 mg Nebulization BID (RT) Lizett Pradhan M.D.   Stopped at 12/29/19 0230   • levalbuterol (XOPENEX) 1.25 MG/3ML nebulizer solution 1.25 mg  1.25 mg Nebulization Q4HRS (RT) Lizett Pradhan M.D.   1.25 mg at 12/29/19 0211   • LEVALBUTEROL HCL 1.25 MG/3ML INH NEBU            • doxycycline monohydrate (ADOXA) tablet 100 mg  100 mg Oral Q12HRS Kang Vicente M.D.       •  Metoprolol Tartrate (LOPRESSOR) injection 5 mg  5 mg Intravenous Once Lizett Pradhan M.D.         Current Outpatient Medications   Medication Sig Dispense Refill   • amiodarone (PACERONE) 400 MG tablet Take 0.5 Tabs by mouth every day. 45 Tab 1   • diltiazem CD (CARDIZEM CD) 180 MG CAPSULE SR 24 HR Take 1 Cap by mouth every day. 90 Cap 3   • rivaroxaban (XARELTO) 15 MG Tab tablet Take 1 Tab by mouth with dinner. 30 Tab 3   • aspirin 81 MG EC tablet Take 1 Tab by mouth every day. 30 Tab 2   • Ascorbic Acid (VITAMIN C PO) Take 2,000 mg by mouth every day.     • Coenzyme Q10 (COQ-10) 100 MG Cap Take  by mouth every day.     • lisinopril (PRINIVIL) 5 MG Tab Take 5 mg by mouth every day.     • Non Formulary Request BLACK CUMIN OIL  1 TABLESPOON TWICE A DAY     • simvastatin (ZOCOR) 10 MG Tab Take 10 mg by mouth every evening.     • vitamin D (CHOLECALCIFEROL) 1000 UNIT Tab Take 1,000 Units by mouth 2 Times a Day.     • calcium carbonate (OS-JACKIE 500) 500 MG Tab Take 500 mg by mouth 2 times a day, with meals.     • vitamin e (VITAMIN E) 400 UNIT Cap Take 400 Units by mouth 2 times a day.         Allergies  No Known Allergies    Vital Signs last 24 hours  Temp:  [37 °C (98.6 °F)-37.4 °C (99.4 °F)] 37 °C (98.6 °F)  Pulse:  [134-146] 138  Resp:  [22-29] 27  BP: (129-153)/(59-90) 153/90  SpO2:  [91 %-93 %] 92 %    Physical Exam  Physical Exam  Vitals signs and nursing note reviewed.   Constitutional:       General: He is in acute distress.      Appearance: He is ill-appearing and toxic-appearing. He is not diaphoretic.      Comments: thin   HENT:      Head: Normocephalic and atraumatic.      Right Ear: Tympanic membrane and external ear normal.      Left Ear: Tympanic membrane and external ear normal.      Nose: No congestion or rhinorrhea.      Mouth/Throat:      Mouth: Mucous membranes are moist.      Pharynx: Oropharynx is clear. No oropharyngeal exudate or posterior oropharyngeal erythema.   Eyes:      General: No  scleral icterus.        Right eye: No discharge.         Left eye: No discharge.      Extraocular Movements: Extraocular movements intact.      Conjunctiva/sclera: Conjunctivae normal.      Pupils: Pupils are equal, round, and reactive to light.   Neck:      Musculoskeletal: Normal range of motion. No neck rigidity or muscular tenderness.   Cardiovascular:      Rate and Rhythm: Normal rate and regular rhythm.      Pulses: Normal pulses.      Heart sounds: Normal heart sounds. No murmur.   Pulmonary:      Effort: Respiratory distress present.      Breath sounds: No stridor. Wheezing and rhonchi present. No rales.   Chest:      Chest wall: No tenderness.   Abdominal:      General: There is no distension.      Palpations: There is no mass.      Tenderness: There is no tenderness. There is no guarding.   Musculoskeletal:         General: No swelling, tenderness or deformity.      Right lower leg: No edema.      Left lower leg: No edema.   Lymphadenopathy:      Cervical: No cervical adenopathy.   Skin:     Coloration: Skin is not jaundiced or pale.      Findings: No bruising, erythema, lesion or rash.   Neurological:      General: No focal deficit present.      Mental Status: He is alert and oriented to person, place, and time. Mental status is at baseline.      Cranial Nerves: No cranial nerve deficit.      Sensory: No sensory deficit.      Motor: No weakness.      Coordination: Coordination normal.      Gait: Gait normal.   Psychiatric:         Mood and Affect: Mood normal.         Behavior: Behavior normal.         Thought Content: Thought content normal.         Judgment: Judgment normal.         Fluids    Intake/Output Summary (Last 24 hours) at 12/29/2019 0358  Last data filed at 12/29/2019 0309  Gross per 24 hour   Intake 100 ml   Output --   Net 100 ml       Laboratory  Recent Results (from the past 48 hour(s))   EKG    Collection Time: 12/29/19 12:28 AM   Result Value Ref Range    Report       [facility]  [facility] Emergency Dept.    Test Date:  2019  Pt Name:    JOSE DE JESUS HOPPER               Department: ER  MRN:        [id number]                      Room:       RD 05  Gender:     Male                         Technician: [id number]  :        1940                   Requested By:ER TRIAGE PROTOCOL  Order #:    [id number]                    Reading MD: LORE MUIR MD    Measurements  Intervals                                Axis  Rate:       137                          P:          87  WV:         104                          QRS:        37  QRSD:       94                           T:  QT:         304  QTc:        459    Interpretive Statements  SINUS TACHYCARDIA  RSR' IN V1 OR V2, RIGHT VCD OR RVH  BORDERLINE T ABNORMALITIES, INFERIOR LEADS  BASELINE WANDER IN LEAD(S) V1,V2  Compared to ECG 2017 09:31:53  Sinus bradycardia no longer present  T-wave abnormality still present  Electronically Signed On 2019 2:19:48 PST by LORE MUIR MD     CBC WITH DIFFERENTIAL    Collection Time: 19 12:43 AM   Result Value Ref Range    WBC 28.9 (H) 4.8 - 10.8 K/uL    RBC 5.30 4.70 - 6.10 M/uL    Hemoglobin 16.4 14.0 - 18.0 g/dL    Hematocrit 49.9 42.0 - 52.0 %    MCV 94.2 81.4 - 97.8 fL    MCH 30.9 27.0 - 33.0 pg    MCHC 32.9 (L) 33.7 - 35.3 g/dL    RDW 52.6 (H) 35.9 - 50.0 fL    Platelet Count 221 164 - 446 K/uL    MPV 11.4 9.0 - 12.9 fL    Neutrophils-Polys 90.40 (H) 44.00 - 72.00 %    Lymphocytes 2.60 (L) 22.00 - 41.00 %    Monocytes 2.60 0.00 - 13.40 %    Eosinophils 0.00 0.00 - 6.90 %    Basophils 0.00 0.00 - 1.80 %    Nucleated RBC 0.00 /100 WBC    Neutrophils (Absolute) 27.40 (H) 1.82 - 7.42 K/uL    Lymphs (Absolute) 0.75 (L) 1.00 - 4.80 K/uL    Monos (Absolute) 0.75 0.00 - 0.85 K/uL    Eos (Absolute) 0.00 0.00 - 0.51 K/uL    Baso (Absolute) 0.00 0.00 - 0.12 K/uL    NRBC (Absolute) 0.00 K/uL   COMP METABOLIC PANEL    Collection Time: 19 12:43 AM   Result Value Ref Range    Sodium  139 135 - 145 mmol/L    Potassium 4.7 3.6 - 5.5 mmol/L    Chloride 103 96 - 112 mmol/L    Co2 24 20 - 33 mmol/L    Anion Gap 12.0 (H) 0.0 - 11.9    Glucose 139 (H) 65 - 99 mg/dL    Bun 27 (H) 8 - 22 mg/dL    Creatinine 1.05 0.50 - 1.40 mg/dL    Calcium 9.2 8.5 - 10.5 mg/dL    AST(SGOT) 29 12 - 45 U/L    ALT(SGPT) 12 2 - 50 U/L    Alkaline Phosphatase 77 30 - 99 U/L    Total Bilirubin 0.8 0.1 - 1.5 mg/dL    Albumin 3.9 3.2 - 4.9 g/dL    Total Protein 7.0 6.0 - 8.2 g/dL    Globulin 3.1 1.9 - 3.5 g/dL    A-G Ratio 1.3 g/dL   TROPONIN    Collection Time: 12/29/19 12:43 AM   Result Value Ref Range    Troponin T 56 (H) 6 - 19 ng/L   LACTIC ACID    Collection Time: 12/29/19 12:43 AM   Result Value Ref Range    Lactic Acid 3.5 (H) 0.5 - 2.0 mmol/L   proBrain Natriuretic Peptide, NT    Collection Time: 12/29/19 12:43 AM   Result Value Ref Range    NT-proBNP 6608 (H) 0 - 125 pg/mL   ESTIMATED GFR    Collection Time: 12/29/19 12:43 AM   Result Value Ref Range    GFR If African American >60 >60 mL/min/1.73 m 2    GFR If Non African American >60 >60 mL/min/1.73 m 2   PERIPHERAL SMEAR REVIEW    Collection Time: 12/29/19 12:43 AM   Result Value Ref Range    Peripheral Smear Review see below    PLATELET ESTIMATE    Collection Time: 12/29/19 12:43 AM   Result Value Ref Range    Plt Estimation Normal    MORPHOLOGY    Collection Time: 12/29/19 12:43 AM   Result Value Ref Range    RBC Morphology Normal    DIFFERENTIAL MANUAL    Collection Time: 12/29/19 12:43 AM   Result Value Ref Range    Bands-Stabs 4.40 0.00 - 10.00 %    Manual Diff Status PERFORMED    Prothrombin time (INR)    Collection Time: 12/29/19 12:43 AM   Result Value Ref Range    PT 14.8 (H) 12.0 - 14.6 sec    INR 1.13 0.87 - 1.13   PROCALCITONIN    Collection Time: 12/29/19 12:43 AM   Result Value Ref Range    Procalcitonin 6.72 (H) <0.25 ng/mL   Influenza A/B By PCR (Adult - Flu Only)    Collection Time: 12/29/19  1:36 AM   Result Value Ref Range    Influenza virus A RNA  Negative Negative    Influenza virus B, PCR Negative Negative   LACTIC ACID    Collection Time: 19  3:20 AM   Result Value Ref Range    Lactic Acid 4.4 (HH) 0.5 - 2.0 mmol/L   EKG    Collection Time: 19  3:42 AM   Result Value Ref Range    Report       [facility] Emergency Dept.    Test Date:  2019  Pt Name:    JOSE DE JESUS HOPPER               Department: ER  MRN:        [id number]                      Room:       [facility]  Gender:     Male                         Technician: [id number]  :        1940                   Requested By:ROSENDO MORRIS  Order #:    [id number]                    Reading MD:    Measurements  Intervals                                Axis  Rate:       138                          P:  OK:                                      QRS:        28  QRSD:       96                           T:          76  QT:         300  QTc:        455    Interpretive Statements  ATRIAL FLUTTER WITH 2:1 AV BLOCK  RSR' IN V1 OR V2, RIGHT VCD OR RVH  Compared to ECG 2019 00:28:07  2:1 AV block now present  Sinus tachycardia no longer present  T-wave abnormality no longer present         Imaging  DX-CHEST-PORTABLE (1 VIEW)   Final Result         Airspace opacity in the left lower lobe with associated left pleural effusion, concerning for infection          Assessment/Plan  * CAP (community acquired pneumonia)  Assessment & Plan  Ceftriaxone and doxycycline  Neg flu, wife had flu this week    AF (atrial fibrillation) ([facility])  Assessment & Plan  Stopped amiodarone started today, priro on and a physician had stopped.  On xarelto  Metoprolol IV received, repeat up to 3 doses  Start po dilt from home  May need IV dilt if bb not working or diltiazem drip    Acute exacerbation of chronic obstructive pulmonary disease (COPD) ([facility])  Assessment & Plan  duonebs scheduled  Steroids scheduled    Acute on chronic respiratory failure with hypoxemia ([facility])  Assessment & Plan  On oxygen at home  On  high flow nc  May need intubation  Did not tolerate bipap well  abg  duonebs scheduled  Solumedrol scheduled  Ab for pneumonia    Lactic acidosis  Assessment & Plan  Volume resuscitate as tolerated    Elevated troponin  Assessment & Plan  Hx cad  Likely demand ischemia from hypoxic respiratory failure    Sepsis due to pneumonia (HCC)  Assessment & Plan  This is Severe Sepsis Present on admission  SIRS criteria identified on my evaluation include: Fever, with temperature greater than 101 deg F, Tachycardia, with heart rate greater than 90 BPM, Tachypnea, with respirations greater than 20 per minute and Leukocyosis, with WBC greater than 12,000  Source of infection is Pnuemonia  Clinical indicators of end organ dysfunction include Acute respiratory failure as evidenced by a new need for invasive or non-invasive mechanical ventilation (Ventilator or BiPap)   Sepsis protocol initiated  Fluid resuscitation ordered per protocol  IV antibiotics as appropriate for source of sepsis  Reassessment: I have reassessed the patient's hemodynamic status  End organ dysfunction include(s):  Acute respiratory failure   On high flow nc  May need bipap or intubation, did not tolerate bipap prior        Dyslipidemia- (present on admission)  Assessment & Plan  Continue statin    Hypertension- (present on admission)  Assessment & Plan  Keep sbp < 160  On lisinopril outpt    CAD (coronary artery disease)- (present on admission)  Assessment & Plan  Hx stents      Discussed patient condition and risk of morbidity and/or mortality with Hospitalist, Family, RN, RT, Pharmacy, Code status disscussed and Patient.      The patient remains critically ill.  Requiring high flow nasal cannula 40% and 40 L for sao2 70-90s and high work of breathing with respiratory rate 30-40s.  Failed Non invasive ventilation, intubated for respiratory failure and on mechanical ventilator. Propofol and fentanyl drips for sedation. Levophed for map > 65 and sbp > 90.   Critical care time = 110 minutes in directly providing and coordinating critical care and extensive data review.  No time overlap and excludes procedures.

## 2019-12-29 NOTE — ASSESSMENT & PLAN NOTE
selina scheduled  Steroids scheduled    Hypoxia predominantly pneumonia but also has CO2 retention related to underlying COPD

## 2019-12-29 NOTE — PROGRESS NOTES
2 RN Skin Check    2 RN skin check completed with AMRYA Randall  Devices in place: SCDs, HFNC, BP cuff, pulse ox, tele leads, PIV x2.  Skin assessed under devices: yes.  Confirmed pressure ulcers found on: N/A.  New potential pressure ulcers noted on N/A. Wound consult placed N/A.  Redness on heels IAD appearing redness noted on sacrum. RN wound protocol ordered.  The following interventions in place Pillows, Mepilex on heels and Waffle bed overlay, Q2 turns.

## 2019-12-29 NOTE — PROGRESS NOTES
Hospital Medicine Daily Progress Note    Date of Service  12/29/2019    Chief Complaint  79 y.o. male admitted 12/28/2019 with COPD exacerbation A. fib    Hospital Course          Interval Problem Update      Converted to SR    On HFNC  30L 40%  On ceft/doxy  On Cardizem and Xarelto       Consultants/Specialty  Critical care    Code Status  Full code    Disposition  To be determined    Review of Systems  Review of Systems   Respiratory: Positive for cough, sputum production and shortness of breath.    Cardiovascular: Negative for chest pain.        Physical Exam  Temp:  [36.9 °C (98.5 °F)-37.4 °C (99.4 °F)] 36.9 °C (98.5 °F)  Pulse:  [] 102  Resp:  [22-37] 37  BP: (116-153)/(59-90) 143/73  SpO2:  [91 %-97 %] 97 %    Physical Exam  Pulmonary:      Effort: Tachypnea present.      Breath sounds: Decreased breath sounds and wheezing present.         Fluids    Intake/Output Summary (Last 24 hours) at 12/29/2019 0911  Last data filed at 12/29/2019 0716  Gross per 24 hour   Intake 2300 ml   Output --   Net 2300 ml       Laboratory  Recent Labs     12/29/19  0043   WBC 28.9*   RBC 5.30   HEMOGLOBIN 16.4   HEMATOCRIT 49.9   MCV 94.2   MCH 30.9   MCHC 32.9*   RDW 52.6*   PLATELETCT 221   MPV 11.4     Recent Labs     12/29/19  0043   SODIUM 139   POTASSIUM 4.7   CHLORIDE 103   CO2 24   GLUCOSE 139*   BUN 27*   CREATININE 1.05   CALCIUM 9.2     Recent Labs     12/29/19  0043   INR 1.13               Imaging  DX-CHEST-PORTABLE (1 VIEW)   Final Result         Airspace opacity in the left lower lobe with associated left pleural effusion, concerning for infection           Assessment/Plan  * CAP (community acquired pneumonia)  Assessment & Plan  Continue IV ceftriaxone and doxycycline and follow-up on cultures    AF (atrial fibrillation) ([facility])  Assessment & Plan  Continue Cardizem and Xarelto    Acute exacerbation of chronic obstructive pulmonary disease (COPD) ([facility])  Assessment & Plan  Continue antibiotics steroids and  bronchodilators per RT protocol      Acute on chronic respiratory failure with hypoxemia (HCC)  Assessment & Plan  On high flow nasal cannula wean off as tolerated  RT protocol  Discussed with Dr. Angela    Lactic acidosis  Assessment & Plan  Improved    Sepsis due to pneumonia (HCC)  Assessment & Plan  Continue current antibiotics and close clinical monitoring        Dyslipidemia- (present on admission)  Assessment & Plan  Continue  simvastatin    Hypertension- (present on admission)  Assessment & Plan  Monitor blood pressure with Cardizem    CAD (coronary artery disease)- (present on admission)  Assessment & Plan  Continue medical therapy     Plan of care reviewed with patient and wife at bedside and all questions answered  Continue close monitoring in the ICU patient at high risk of decompensating requiring intubation  Discussed with Dr. Angela pharmacist and nursing staff    VTE prophylaxis: xarelto

## 2019-12-29 NOTE — ED NOTES
Pt resting quietly in bed with family at bedside. Respirations even and labored. Pt has no complaints at this time. Situated in bed with pillows behind back for comfort. ST on monitor. Will continue to monitor.

## 2019-12-29 NOTE — ASSESSMENT & PLAN NOTE
Ceftriaxone and doxycycline  Neg flu, wife had flu this week    Will treat 14 days for strep pneumonia with strep bacteremia. May not require IV antibiotics for entire course

## 2019-12-30 NOTE — CARE PLAN
Problem: Fluid Volume:  Goal: Will maintain balanced intake and output  Outcome: PROGRESSING SLOWER THAN EXPECTED  Note:   4.5L bolus today; mendoza placed for accurate I/Os; vasopressors in use.     Problem: Venous Thromboembolism (VTW)/Deep Vein Thrombosis (DVT) Prevention:  Goal: Patient will participate in Venous Thrombosis (VTE)/Deep Vein Thrombosis (DVT)Prevention Measures  Outcome: PROGRESSING AS EXPECTED  12/29/2019 1902 by Cony Freeman RLILI  Flowsheets (Taken 12/29/2019 1535)  Mechanical Prophylaxis : SCDs, Sequential Compression Device  SCDs, Sequential Compression Device: On

## 2019-12-30 NOTE — DIETARY
Nutrition Support/TF Assessment:  Day 1 of admit.  Humphrey Cano Jr. is a 79 y.o. male with admitting DX of Sepsis due to PNA.      Current problem list:  1. CAP   2. Sepsis due to pneumonia   3. Elevated troponin   4. Lactic acidosis   5. Acute on chronic respiratory failure with hypoxemia   6. Acute exacerbation of chronic obstructive pulmonary disease   7. Elevated BUN  8. AF   9. CAD   10. Hypertension  11. Dyslipidemia    Pertinent Medical Hx: Lung Ca, Tobacco dependence, Bronchitis with chronic airway obstruction, COPD, Malnutrition of mild degree, Laceration of spleen.      Assessment:  Estimated Nutritional Needs based on:   Height: 182.9 cm (6')(per pt's ID)  Weight: 89.1 kg (196 lb 6.9 oz)  Weight to Use in Calculations: 79 kg (174 lb 2.6 oz)(per bedscale on , pt's wt per ID is 170#)  Ideal Body Weight: 80.7 kg (178 lb)  Percent Ideal Body Weight: 110.4  BMI is 23.8, Normal.     Calculation/Equation: MSJ X 1.2= 1865; [employer] Equation (PSU) [id number]  Total Calories / day: 9164-9271  (Calories / kg: ~24-26)  Total Grams Protein / day: 95 - 111 (Grams Protein / k.2 - 1.4)  Total Water/day: 3330-4543 ml/day (25-30 ml/kg)     Evaluation:   1. TF consult received, pt on mechanical ventilation.   2. KUB on :  Feeding tube placement with the tip projecting over the stomach body.  3. Pertinent Labs: am Glu 173, BUN 33, Phos 2.2  4. Pertinent Meds: Coradrone, Rocephin, ICU electrolyte replacement per pharmacy, Solumedrol, Levophed @ 2 mcg/min, NS @ 100 ml/hr, Zofran PRN, Potassium Phosphate, Propofol @ 7.1 ml/hr (187 kcals/day), Bowel protocol.  5. Pt with high am Glu, no hx of DM-likely related to steroids. Will provide carb controlled formula for tight glucose control.      Malnutrition Risk: limited information.     Recommendations/Plan:  1. Begin TF with Diabetisource AC @ 25 ml/hr and advance per TF protocol to goal rate of 65 ml/hr (with and without of propofol) to provide 1872  kcals/day (24 kcal/kg), 94 g pro/day (1.2 g pro/kg), and 1273 ml free water/day. TF + propofol providing 2058 kcals/day.   2. Fluids per RD.  3. RD to monitor Renal/BUN labs and adjust TF as needed.     RD following.

## 2019-12-30 NOTE — PROGRESS NOTES
Monitor summary     Aflutter 110s-140s, see prior note.   SB 40s - SR 70s, occasional PVCs    .14/.08/.38

## 2019-12-30 NOTE — PROCEDURES
Procedure: right subclavian central line insertion, us guided    : Dr Angela    Reason: Septic shock and hypoxic respiratory failure requiring pressors    The patient's right shoulder region was prepped and draped in sterile fashion using chlorhexidine scrub. Anesthesia was achieved with 1% lidocaine. The right subclavian jugular vein was accessed under ultrasound guidance using a finder needle Venous blood was withdrawn and the needle was withdrawn after a guidewire was advanced through the needle catheter. A small incision was made with a blade scalpel and the needle was exchanged for a dilator over the guidewire until appropriate dilation was obtained. The dilator was removed and a central venous triple-lumen catheter was advanced over the guidewire and secured into place with 2 sutures at 15 cm. At time of procedure completion, all ports aspirated and flushed properly. Post-procedure x-ray adequate placement and no pneumothorax.  Sterile procedure done throughout entire procedure by all participating.    Complications: none    Blood loss: < 3 cc

## 2019-12-30 NOTE — FLOWSHEET NOTE
12/30/19 0419   Weaning Parameters   RR (bpm) 28   #FVC / Vital Capacity (liters)    (Patient not following)   NIF (cm H2O)    (pt not following)   Rapid Shallow Breathing Index (RR/VT) 76   Spontaneous VE 11.8   Spontaneous    Weaning Trial   Weaning Trial Initiated Yes   Weaning Trial Stopped due to: Hemodynamically unstable/new or worsening arrythmia/SBP <80 or >180 mmHg  (A-fib onset along with shivering)   Length of Weaning Trial (Hours) 45 min   Vent Weaning Smart Text completed? Yes

## 2019-12-30 NOTE — PROGRESS NOTES
Night intensivist note    Called by bedside nurse for afib with RVR with HR in 140s. No hypotension.   Pt was recently intubated yesterday.   Had episodes of aflutter that was abated by metoprolol 5mg IV pushes x3.   This evening, pt's HR went up to 140s.   No hypotension  No change in vent setting.   Will given amiodarone bolus then gtt.     Nick Kumar, DO

## 2019-12-30 NOTE — PROGRESS NOTES
Pt intubated on mechanical ventilation under the care of dr Brown, hospitalist service will sign off.

## 2019-12-30 NOTE — PROCEDURES
Procedures  DATE OF OPERATION: 12/29/2019     PREOPERATIVE DIAGNOSIS: purulent secretions, increase in oxygen requirement and chest x-ray infiltrate     POSTOPERATIVE DIAGNOSIS: purulent secretions and same, mucus plug     PROCEDURE PERFORMED: Fiberoptic bronchoscopy with bronchoalveolar lavage    SURGEON: Sascha Angela M.D.    ANESTHESIA: Intravenous sedation, analgesia, and pharmacologic restraint     INDICATIONS: The patient is a 79 y.o. male with acute respiratory failure, pneumonia.     FINDINGS: Large mucus plug main airway on initial inspection with brown pus, thick.  Erythema of airways.  RLL and LLL thick secretions, cloudy aspirate on lavage.  Moderate secretions RUL on lavage.  JENNIFER resected lung some mucus present.  Suctioned with ns until all airways clear.  Some blood tinged secretions on small airway suctioning RLL and LLL.    SPECIMEN: Bronchoalveolar lavage for cultures    PROCEDURE: Following informed consent from wife, the patient was properly identified and optimally positioned in bed. He was preoxygenated with 100% oxygen and placed on a regular ventilatory rate. Intravenous sedation, analgesia, and pharmacologic restraint was administered.    The fiberoptic bronchoscope was advanced through the indwelling endotracheal tube.  The upper airways were suctioned. The airways were systematically and sequentially inspected and lavaged including RUL, RML, RLL, LLL and JENNIFER. The effluent from a BAL of a segment of the RLL and segment of LLL were combined in a sterile trap and submitted for cultures.     The patient tolerated the procedure well. There were no apparent complications.    ____________________________________   Sascha Angela M.D.    DD: 12/29/2019  4:48 PM

## 2019-12-30 NOTE — PROCEDURES
Procedures  Procedure: endotracheal intubation    : Dr Angela    Reason: Acute respiratory failure with hypoxia and pneumonia    Permit was implied secondary to emergent situation. A MAC 4 blade was inserted into the oropharynx at which time the vocal cords were visualized. A 8.0-Armenian endotracheal tube was inserted and visualized going through the vocal cords. The stylette was removed. Colorimetric change was visualized on the CO2 meter. Breath sounds were heard in both lung fields equally. The endotracheal tube was placed at 24 cm, measured at the teeth.  Post procedure cxray adequate placement and no pneumothorax.     Complications: none

## 2019-12-30 NOTE — CARE PLAN
Problem: Ventilation Defect:  Goal: Ability to achieve and maintain unassisted ventilation or tolerate decreased levels of ventilator support  Outcome: PROGRESSING AS EXPECTED   Vent day 2. Spont trial @1242-1293.

## 2019-12-30 NOTE — PROGRESS NOTES
1500 - patient expressed increased WOB and fatigue. Per Dr. Angela plan to intubate, bronch, place central line.     1530 - intubated; patient became hypotensive to 60s systolic.  1531 - 300 neosynephrine  1532 - 200 neosynephrine  1535 - 300 neosynephrine  1536 - 200 neosynephrine  1539 - norepi gtt started (see MAR)    1600 - central line placed; per Dr Angela, okay to use central line before x-ray verification; norepi gtt moved from PIV to central line.

## 2019-12-31 PROBLEM — K56.7 ILEUS (HCC): Status: ACTIVE | Noted: 2019-01-01

## 2019-12-31 NOTE — CARE PLAN
Problem: Ventilation Defect:  Goal: Ability to achieve and maintain unassisted ventilation or tolerate decreased levels of ventilator support  Outcome: NOT MET   Vent day 3. SBT 1 hour.

## 2019-12-31 NOTE — CARE PLAN
Problem: Respiratory:  Goal: Respiratory status will improve  12/30/2019 2358 by Angsu Yusuf R.N.  Outcome: PROGRESSING AS EXPECTED  12/30/2019 2343 by Angus Yusuf R.N.  Outcome: PROGRESSING AS EXPECTED  Intervention: Assess and monitor pulmonary status  12/30/2019 2358 by Angus Yusuf R.N.  Flowsheets  Taken 12/30/2019 2200 by Angus Yusuf R.N.  Resp: 24 !  Taken 12/30/2019 2204 by Ti Squires, VALERIA  SpO2: 98 %  Work Of Breathing / Effort: Mild  Pre/Post Intervention: Pre Intervention Assessment  Taken 12/30/2019 2000 by Angus Yusuf R.N.  Respiratory Pattern: Vented  Cough: Productive  Taken 12/30/2019 2358 by Angus Yusuf R.N.  RUL Breath Sounds: Clear  RML Breath Sounds: Clear  RLL Breath Sounds: Diminished  JENNIFER Breath Sounds: Clear  LLL Breath Sounds: Diminished  12/30/2019 2343 by Angus Yusuf R.N.  Flowsheets  Taken 12/30/2019 2200 by Angus Yusuf R.N.  Resp: 24 !  Taken 12/30/2019 2204 by Ti Squires, RRT  SpO2: 98 %  Work Of Breathing / Effort: Mild  Taken 12/30/2019 1600 by Cony Mccoy R.N.  Respiratory Pattern: Vented  Taken 12/30/2019 1926 by Ti Squires, RRT  Cough: Productive  Intervention: Administer and titrate oxygen therapy  Note:   Collaborated with RT  Intervention: Educate and encourage coughing and deep breathing  Note:   N/A  Intervention: Educate and encourage incentive spirometry usage  Note:   N/A  Intervention: Collaborate with respiratory therapist and Interdisciplinary Team on treatment measures to improve respiratory function  Note:   Collaborated with RT     Problem: Fluid Volume:  Goal: Will maintain balanced intake and output  Outcome: PROGRESSING AS EXPECTED  Note:   Pt received 1000 mL bolus at beginning of shift  Intervention: Monitor, educate, and encourage compliance with therapeutic intake of liquids  Note:   N/A

## 2019-12-31 NOTE — FLOWSHEET NOTE
12/31/19 0420 12/31/19 0527   Weaning Parameters   RR (bpm) 24  --    #FVC / Vital Capacity (liters)  0.83  --    NIF (cm H2O)  -40  (> -40)  --    Rapid Shallow Breathing Index (RR/VT) 19  --    Spontaneous VE 6.2  --    Spontaneous   --    Weaning Trial   Weaning Trial Initiated Yes  --    Weaning Trial Stopped due to:  --  Pt weaned for 1 hour and returned to rest settings per protocol   Length of Weaning Trial (Hours)  --  1 hour   Vent Weaning Smart Text completed?  --  Yes

## 2019-12-31 NOTE — RESPIRATORY CARE
Vent Day: 2  Vent: APVCMV 24/480/+8/40%  ETT: 5.0@25    Pt with small amount of clear tan thin secretions.     Pt SBT in AM but had new onset AFIB-RVR thus Spont ended :45min into SBT    No further remarkable respiratory events noteworthy

## 2019-12-31 NOTE — PROGRESS NOTES
Critical Care Progress Note    Date of admission  12/28/2019    Chief Complaint  79 y.o. male admitted 12/28/2019 with acute hypoxic respiratory failure requiring intubation/ mechanical ventilation    Hospital Course  79 y.o. male who presented 12/28/2019 with shortness of breath. Recent cough with yellow sputum. Hx copd on 3 L oxygen at home. [place].  On high flow 40 L 40%.  High work of breathing with paradoxical pattern.  RR to 30s.  + wheezing/poor air movement.  On amiodarone for hx of afib.  Hx cad w/ prior stent, dyslipidemia, hypertension and hypoxic respiratory failure.  afib rvr, received beta blocker this am and improved to low 100s.  Ns at 100 ml/hr. Home xarelto.  Amiodarone started this am.    Interval Problem Update  Reviewed last 24 hour events:  Amnio started last night, stopped today  Dilt given per Core-track with good response  On vent day 2  Poorly responsive but on sedation  Jerking movement atypical for seizure intermittent  Unsuccessful attempt to do LP today  Levophed 2 and weaned today to off  CXR LLL consolodation  Antibiotics conitnued today secondary to elevated WBC, fever and focal consolidation on CXR and probable Strep in blood (sensitivities/ speciation pending)  Cannot do ROS secondary to being on vent and sedated      Review of Systems       Vital Signs for last 24 hours   Temp:  [36.9 °C (98.4 °F)-37.5 °C (99.5 °F)] 36.9 °C (98.4 °F)  Pulse:  [] 121  Resp:  [15-24] 24  BP: ()/(47-84) 108/50  SpO2:  [92 %-100 %] 98 %    Hemodynamic parameters for last 24 hours       Respiratory Information for the last 24 hours  Cano Vent Mode: APVCMV  Rate (breaths/min): 24  Vt Target (mL): 480  PEEP/CPAP: 8  FiO2: 40  P Support: 5  P MEAN: 14  Length of Weaning Trial (Hours): 45 min  Control VTE (exp VT): 480    Physical Exam   Physical Exam  Vitals reviewed: Critically ill-appearing on mechanical ventilation and sedated.   HENT:      Head:  Atraumatic.      Nose: Nose normal.   Eyes:      Extraocular Movements: Extraocular movements intact.   Cardiovascular:      Rate and Rhythm: Normal rate and regular rhythm.      Pulses: Normal pulses.   Abdominal:      General: Abdomen is flat. Bowel sounds are normal.      Palpations: Abdomen is soft.   Skin:     Comments: Slightly cool extremities hands and feet   Neurological:      General: No focal deficit present.         Medications  Current Facility-Administered Medications   Medication Dose Route Frequency Provider Last Rate Last Dose   • cefTRIAXone (ROCEPHIN) 2 g in  mL IVPB  2 g Intravenous Q12HRS Musa Brown M.D.   Stopped at 12/30/19 1837   • DILTIAZem (CARDIZEM) tablet 60 mg  60 mg Enteral Tube Q8HRS Musa Brown M.D.   Stopped at 12/30/19 1400   • Pharmacy Consult: Enteral tube insertion - review meds/change route/product selection  1 Each Other PHARMACY TO DOSE Musa Brown M.D.       • fentaNYL (SUBLIMAZE) injection 200 mcg  200 mcg Intravenous Q15 MIN PRN Musa Brown M.D.   200 mcg at 12/30/19 1928    And   • fentaNYL (SUBLIMAZE) injection 400 mcg  400 mcg Intravenous Q15 MIN PRN Musa Brown M.D.   400 mcg at 12/30/19 1628    And   • fentaNYL (SUBLIMAZE) 50 mcg/mL in 50mL (Continuous Infusion)   Intravenous Continuous Musa Brown M.D. 2 mL/hr at 12/30/19 1938 100 mcg/hr at 12/30/19 1938   • lactated ringer BOLUS infusion  1,000 mL Intravenous Once Musa Brown M.D. 1,000 mL/hr at 12/30/19 1911 1,000 mL at 12/30/19 1911   • ondansetron (ZOFRAN) syringe/vial injection 4 mg  4 mg Intravenous Q4HRS PRN Lizett Pradhan M.D.       • methylPREDNISolone sod succ (SOLU-MEDROL) 125 MG injection 62.5 mg  62.5 mg Intravenous Q6HRS Sascha Angela M.D.   62.5 mg at 12/30/19 1808   • ipratropium-albuterol (DUONEB) nebulizer solution  3 mL Nebulization Q4HRS (RT) Sascha Angela M.D.   3 mL at 12/30/19 1926   • Respiratory Care per Protocol   Nebulization Continuous  RT Sascha Angela M.D.       • ipratropium-albuterol (DUONEB) nebulizer solution  3 mL Nebulization Q2HRS PRN (RT) Sascha Angela M.D.       • famotidine (PEPCID) tablet 20 mg  20 mg Enteral Tube Q12HRS Sascha Angela M.D.        Or   • famotidine (PEPCID) injection 20 mg  20 mg Intravenous Q12HRS Sascha Angela M.D.   20 mg at 12/30/19 1809   • MD Alert...ICU Electrolyte Replacement per Pharmacy   Other PHARMACY TO DOSE Sascha Angela M.D.       • lidocaine (XYLOCAINE) 1 % injection 1-2 mL  1-2 mL Tracheal Tube Q30 MIN PRN Sascha Angela M.D.       • norepinephrine (LEVOPHED) 8 mg in  mL Infusion  0-30 mcg/min Intravenous Continuous Sascha Angela M.D.   Stopped at 12/30/19 0902   • aspirin (ASA) chewable tab 81 mg  81 mg Enteral Tube DAILY Sascha Angela M.D.   Stopped at 12/30/19 0600   • doxycycline monohydrate (ADOXA) tablet 100 mg  100 mg Enteral Tube Q12HRS Sascha Angela M.D.   100 mg at 12/30/19 1808   • senna-docusate (PERICOLACE or SENOKOT S) 8.6-50 MG per tablet 2 Tab  2 Tab Enteral Tube BID Sascha Angela M.D.   2 Tab at 12/30/19 1808    And   • polyethylene glycol/lytes (MIRALAX) PACKET 1 Packet  1 Packet Enteral Tube QDAY PRN Sascha Angela M.D.        And   • magnesium hydroxide (MILK OF MAGNESIA) suspension 30 mL  30 mL Enteral Tube QDAY PRN Sascha Angela M.D.        And   • bisacodyl (DULCOLAX) suppository 10 mg  10 mg Rectal QDAY PRN Sascha Angela M.D.       • simvastatin (ZOCOR) tablet 10 mg  10 mg Enteral Tube Nightly Sascha Angela M.D.   Stopped at 12/29/19 2146   • ondansetron (ZOFRAN ODT) dispertab 4 mg  4 mg Enteral Tube Q4HRS PRN Sascha K Coreen, M.D.           Fluids    Intake/Output Summary (Last 24 hours) at 12/30/2019 2022  Last data filed at 12/30/2019 1945  Gross per 24 hour   Intake 2422.33 ml   Output 510 ml   Net 1912.33 ml       Laboratory  Recent Labs     12/29/19  1240 12/29/19  1649 12/30/19  0346   ISTATAPH 7.279* 7.427 7.414   ISTATAPCO2 58.4* 39.7* 33.3   ISTATAPO2 73  252* 93*   ISTATATCO2 29 27 22   [id number] 92* 100* 97   ISTATARTHCO3 27.4* 26.2* 21.3   ISTATARTBE -1 2 -3   ISTATTEMP 36.8 C 36.7 C 35.5 C   ISTATFIO2 40 80 40   ISTATSPEC Arterial Arterial Arterial   ISTATAPHTC 7.282* 7.432 7.437   [id number] 72 251* 84         Recent Labs     12/29/19  0043 12/30/19  0504   SODIUM 139 140   POTASSIUM 4.7 3.8   CHLORIDE 103 106   CO2 24 24   BUN 27* 33*   CREATININE 1.05 0.79   MAGNESIUM  --  1.9   PHOSPHORUS  --  2.0*   CALCIUM 9.2 8.9     Recent Labs     12/29/19  0043 12/30/19  0504   ALTSGPT 12 18   ASTSGOT 29 22   ALKPHOSPHAT 77 75   TBILIRUBIN 0.8 0.6   GLUCOSE 139* 173*     Recent Labs     12/29/19  0043 12/30/19  0504   WBC 28.9* 24.2*   NEUTSPOLYS 90.40* 97.40*   LYMPHOCYTES 2.60* 0.90*   MONOCYTES 2.60 1.70   EOSINOPHILS 0.00 0.00   BASOPHILS 0.00 0.00   ASTSGOT 29 22   ALTSGPT 12 18   ALKPHOSPHAT 77 75   TBILIRUBIN 0.8 0.6     Recent Labs     12/29/19  0043 12/30/19  0504   RBC 5.30 4.65*   HEMOGLOBIN 16.4 14.3   HEMATOCRIT 49.9 43.7   PLATELETCT 221 219   PROTHROMBTM 14.8*  --    INR 1.13  --        Imaging  X-Ray:  I have personally reviewed the images and compared with prior images.    Assessment/Plan  * CAP (community acquired pneumonia)  Assessment & Plan  Ceftriaxone and doxycycline  Neg flu, wife had flu this week    AF (atrial fibrillation) ([facility])  Assessment & Plan  Stopped amiodarone started today, priro on and a physician had stopped.  On xarelto  Metoprolol IV received, repeat up to 3 doses  Start po dilt from home  May need IV dilt if bb not working or diltiazem drip    Acute exacerbation of chronic obstructive pulmonary disease (COPD) ([facility])  Assessment & Plan  duonebs scheduled  Steroids scheduled    Acute on chronic respiratory failure with hypoxemia (HCC)  Assessment & Plan  On oxygen at home  On high flow nc  May need intubation  Did not tolerate bipap well  abg  duonebs scheduled  Solumedrol scheduled  Ab for pneumonia    Lactic acidosis  Assessment  & Plan  Volume resuscitate as tolerated    Elevated troponin  Assessment & Plan  Hx cad  Likely demand ischemia from hypoxic respiratory failure    Sepsis due to pneumonia (HCC)  Assessment & Plan  This is Severe Sepsis Present on admission  SIRS criteria identified on my evaluation include: Fever, with temperature greater than 101 deg F, Tachycardia, with heart rate greater than 90 BPM, Tachypnea, with respirations greater than 20 per minute and Leukocyosis, with WBC greater than 12,000  Source of infection is Pnuemonia  Clinical indicators of end organ dysfunction include Acute respiratory failure as evidenced by a new need for invasive or non-invasive mechanical ventilation (Ventilator or BiPap)   Sepsis protocol initiated  Fluid resuscitation ordered per protocol  IV antibiotics as appropriate for source of sepsis  Reassessment: I have reassessed the patient's hemodynamic status  End organ dysfunction include(s):  Acute respiratory failure   On high flow nc  May need bipap or intubation, did not tolerate bipap prior        Dyslipidemia- (present on admission)  Assessment & Plan  Continue statin    Hypertension- (present on admission)  Assessment & Plan  Keep sbp < 160  On lisinopril outpt    CAD (coronary artery disease)- (present on admission)  Assessment & Plan  Hx stents         VTE:  Heparin  Ulcer: H2 Antagonist  Lines: Central Line  Ongoing indication addressed and Alvarado Catheter  Ongoing indication addressed    I have performed a physical exam and reviewed and updated ROS and Plan today (12/30/2019). In review of yesterday's note (12/29/2019), there are no changes except as documented above.     Discussed patient condition and risk of morbidity and/or mortality with Hospitalist, Family, RN, RT, Therapies, Pharmacy and Charge nurse / hot rounds  The patient remains critically ill.  Critical care time = 80 minutes in directly providing and coordinating critical care and extensive data review.  No time  overlap and excludes procedures.

## 2019-12-31 NOTE — FLOWSHEET NOTE
Extubation    Cuff leak noted yes  Stridor present no    Patient toleration no complications  RCP Complete? yes  Events/Summary/Plan: Patient extubated per Dr Brown. No complications at this time (12/31/19 1032)

## 2019-12-31 NOTE — PROGRESS NOTES
Pt converted back into A-fib, rate controlled and asymptomatic, at 1930. Confirmed with 12-lead EKG. Dr. Patterson updated. New orders for amio bolus and amio continuous infusion. Per Dr. Patterson, to reassess in AM.

## 2020-01-01 ENCOUNTER — APPOINTMENT (OUTPATIENT)
Dept: RADIOLOGY | Facility: MEDICAL CENTER | Age: 80
DRG: 871 | End: 2020-01-01
Attending: INTERNAL MEDICINE
Payer: MEDICARE

## 2020-01-01 ENCOUNTER — HOME CARE VISIT (OUTPATIENT)
Dept: HOSPICE | Facility: HOSPICE | Age: 80
End: 2020-01-01
Payer: MEDICARE

## 2020-01-01 ENCOUNTER — HOSPICE ADMISSION (OUTPATIENT)
Dept: HOSPICE | Facility: HOSPICE | Age: 80
End: 2020-01-01
Payer: MEDICARE

## 2020-01-01 ENCOUNTER — APPOINTMENT (OUTPATIENT)
Dept: CARDIOLOGY | Facility: MEDICAL CENTER | Age: 80
DRG: 871 | End: 2020-01-01
Attending: INTERNAL MEDICINE
Payer: MEDICARE

## 2020-01-01 ENCOUNTER — APPOINTMENT (OUTPATIENT)
Dept: RADIOLOGY | Facility: MEDICAL CENTER | Age: 80
DRG: 871 | End: 2020-01-01
Attending: EMERGENCY MEDICINE
Payer: MEDICARE

## 2020-01-01 ENCOUNTER — PATIENT OUTREACH (OUTPATIENT)
Dept: HEALTH INFORMATION MANAGEMENT | Facility: OTHER | Age: 80
End: 2020-01-01

## 2020-01-01 ENCOUNTER — APPOINTMENT (OUTPATIENT)
Dept: RADIOLOGY | Facility: MEDICAL CENTER | Age: 80
DRG: 871 | End: 2020-01-01
Attending: HOSPITALIST
Payer: MEDICARE

## 2020-01-01 ENCOUNTER — HOSPITAL ENCOUNTER (INPATIENT)
Facility: MEDICAL CENTER | Age: 80
LOS: 9 days | DRG: 871 | End: 2020-01-31
Attending: EMERGENCY MEDICINE | Admitting: INTERNAL MEDICINE
Payer: MEDICARE

## 2020-01-01 VITALS
HEART RATE: 103 BPM | TEMPERATURE: 97.8 F | DIASTOLIC BLOOD PRESSURE: 72 MMHG | BODY MASS INDEX: 24.01 KG/M2 | WEIGHT: 177.25 LBS | OXYGEN SATURATION: 88 % | SYSTOLIC BLOOD PRESSURE: 160 MMHG | HEIGHT: 72 IN | RESPIRATION RATE: 24 BRPM

## 2020-01-01 VITALS
HEART RATE: 60 BPM | HEIGHT: 72 IN | WEIGHT: 165.57 LBS | OXYGEN SATURATION: 94 % | RESPIRATION RATE: 18 BRPM | TEMPERATURE: 97.5 F | DIASTOLIC BLOOD PRESSURE: 50 MMHG | SYSTOLIC BLOOD PRESSURE: 150 MMHG | BODY MASS INDEX: 22.43 KG/M2

## 2020-01-01 VITALS — DIASTOLIC BLOOD PRESSURE: 90 MMHG | SYSTOLIC BLOOD PRESSURE: 170 MMHG | HEART RATE: 112 BPM | RESPIRATION RATE: 36 BRPM

## 2020-01-01 DIAGNOSIS — J96.22 ACUTE ON CHRONIC RESPIRATORY FAILURE WITH HYPOXIA AND HYPERCAPNIA (HCC): ICD-10-CM

## 2020-01-01 DIAGNOSIS — C34.12 MALIGNANT NEOPLASM OF UPPER LOBE OF LEFT LUNG (HCC): ICD-10-CM

## 2020-01-01 DIAGNOSIS — J44.1 CHRONIC OBSTRUCTIVE PULMONARY DISEASE WITH ACUTE EXACERBATION (HCC): ICD-10-CM

## 2020-01-01 DIAGNOSIS — J18.9 PNEUMONIA DUE TO INFECTIOUS ORGANISM, UNSPECIFIED LATERALITY, UNSPECIFIED PART OF LUNG: ICD-10-CM

## 2020-01-01 DIAGNOSIS — J96.21 ACUTE ON CHRONIC RESPIRATORY FAILURE WITH HYPOXIA AND HYPERCAPNIA (HCC): ICD-10-CM

## 2020-01-01 PROBLEM — R41.0 ACUTE DELIRIUM: Status: ACTIVE | Noted: 2020-01-01

## 2020-01-01 LAB
ACTION RANGE TRIGGERED IACRT: NO
ACTION RANGE TRIGGERED IACRT: NO
ACTION RANGE TRIGGERED IACRT: YES
ALBUMIN SERPL BCP-MCNC: 2.4 G/DL (ref 3.2–4.9)
ALBUMIN SERPL BCP-MCNC: 2.5 G/DL (ref 3.2–4.9)
ALBUMIN SERPL BCP-MCNC: 2.6 G/DL (ref 3.2–4.9)
ALBUMIN SERPL BCP-MCNC: 3.2 G/DL (ref 3.2–4.9)
ALBUMIN/GLOB SERPL: 0.9 G/DL
ALBUMIN/GLOB SERPL: 1.1 G/DL
ALBUMIN/GLOB SERPL: 1.1 G/DL
ALBUMIN/GLOB SERPL: 1.5 G/DL
ALP SERPL-CCNC: 63 U/L (ref 30–99)
ALP SERPL-CCNC: 64 U/L (ref 30–99)
ALP SERPL-CCNC: 64 U/L (ref 30–99)
ALP SERPL-CCNC: 66 U/L (ref 30–99)
ALT SERPL-CCNC: 18 U/L (ref 2–50)
ALT SERPL-CCNC: 19 U/L (ref 2–50)
ALT SERPL-CCNC: 21 U/L (ref 2–50)
ALT SERPL-CCNC: 33 U/L (ref 2–50)
AMORPH CRY #/AREA URNS HPF: PRESENT /HPF
ANION GAP SERPL CALC-SCNC: 0 MMOL/L (ref 0–11.9)
ANION GAP SERPL CALC-SCNC: 11 MMOL/L (ref 0–11.9)
ANION GAP SERPL CALC-SCNC: 2 MMOL/L (ref 0–11.9)
ANION GAP SERPL CALC-SCNC: 2 MMOL/L (ref 0–11.9)
ANION GAP SERPL CALC-SCNC: 3 MMOL/L (ref 0–11.9)
ANION GAP SERPL CALC-SCNC: 4 MMOL/L (ref 0–11.9)
ANION GAP SERPL CALC-SCNC: 5 MMOL/L (ref 0–11.9)
ANION GAP SERPL CALC-SCNC: 6 MMOL/L (ref 0–11.9)
ANION GAP SERPL CALC-SCNC: 8 MMOL/L (ref 0–11.9)
ANISOCYTOSIS BLD QL SMEAR: ABNORMAL
APPEARANCE FLD: NORMAL
APPEARANCE UR: ABNORMAL
APPEARANCE UR: CLEAR
APTT PPP: 31.9 SEC (ref 24.7–36)
AST SERPL-CCNC: 17 U/L (ref 12–45)
AST SERPL-CCNC: 19 U/L (ref 12–45)
AST SERPL-CCNC: 22 U/L (ref 12–45)
AST SERPL-CCNC: 30 U/L (ref 12–45)
BACTERIA #/AREA URNS HPF: ABNORMAL /HPF
BACTERIA BLD CULT: ABNORMAL
BACTERIA BLD CULT: ABNORMAL
BACTERIA BLD CULT: NORMAL
BACTERIA SPEC RESP CULT: ABNORMAL
BACTERIA SPEC RESP CULT: ABNORMAL
BACTERIA UR CULT: NORMAL
BASE EXCESS BLDA CALC-SCNC: -26 MMOL/L (ref -4–3)
BASE EXCESS BLDA CALC-SCNC: 10 MMOL/L (ref -4–3)
BASE EXCESS BLDA CALC-SCNC: 11 MMOL/L (ref -4–3)
BASE EXCESS BLDA CALC-SCNC: 14 MMOL/L (ref -4–3)
BASE EXCESS BLDA CALC-SCNC: 2 MMOL/L (ref -4–3)
BASE EXCESS BLDA CALC-SCNC: 7 MMOL/L (ref -4–3)
BASOPHILS # BLD AUTO: 0 % (ref 0–1.8)
BASOPHILS # BLD AUTO: 0.1 % (ref 0–1.8)
BASOPHILS # BLD AUTO: 0.2 % (ref 0–1.8)
BASOPHILS # BLD AUTO: 0.3 % (ref 0–1.8)
BASOPHILS # BLD AUTO: 0.4 % (ref 0–1.8)
BASOPHILS # BLD AUTO: 0.4 % (ref 0–1.8)
BASOPHILS # BLD AUTO: 0.9 % (ref 0–1.8)
BASOPHILS # BLD: 0 K/UL (ref 0–0.12)
BASOPHILS # BLD: 0.01 K/UL (ref 0–0.12)
BASOPHILS # BLD: 0.01 K/UL (ref 0–0.12)
BASOPHILS # BLD: 0.02 K/UL (ref 0–0.12)
BASOPHILS # BLD: 0.03 K/UL (ref 0–0.12)
BASOPHILS # BLD: 0.04 K/UL (ref 0–0.12)
BASOPHILS # BLD: 0.05 K/UL (ref 0–0.12)
BASOPHILS # BLD: 0.05 K/UL (ref 0–0.12)
BASOPHILS # BLD: 0.07 K/UL (ref 0–0.12)
BASOPHILS # BLD: 0.09 K/UL (ref 0–0.12)
BASOPHILS # BLD: 0.09 K/UL (ref 0–0.12)
BILIRUB SERPL-MCNC: 0.4 MG/DL (ref 0.1–1.5)
BILIRUB SERPL-MCNC: 0.8 MG/DL (ref 0.1–1.5)
BILIRUB UR QL STRIP.AUTO: NEGATIVE
BILIRUB UR QL STRIP.AUTO: NEGATIVE
BODY FLD TYPE: NORMAL
BODY TEMPERATURE: ABNORMAL DEGREES
BUN SERPL-MCNC: 11 MG/DL (ref 8–22)
BUN SERPL-MCNC: 23 MG/DL (ref 8–22)
BUN SERPL-MCNC: 24 MG/DL (ref 8–22)
BUN SERPL-MCNC: 26 MG/DL (ref 8–22)
BUN SERPL-MCNC: 28 MG/DL (ref 8–22)
BUN SERPL-MCNC: 29 MG/DL (ref 8–22)
BUN SERPL-MCNC: 31 MG/DL (ref 8–22)
BUN SERPL-MCNC: 32 MG/DL (ref 8–22)
BUN SERPL-MCNC: 35 MG/DL (ref 8–22)
BUN SERPL-MCNC: 35 MG/DL (ref 8–22)
BUN SERPL-MCNC: 36 MG/DL (ref 8–22)
BUN SERPL-MCNC: 36 MG/DL (ref 8–22)
BUN SERPL-MCNC: 43 MG/DL (ref 8–22)
BUN SERPL-MCNC: 44 MG/DL (ref 8–22)
BUN SERPL-MCNC: 46 MG/DL (ref 8–22)
BUN SERPL-MCNC: 52 MG/DL (ref 8–22)
BUN SERPL-MCNC: 57 MG/DL (ref 8–22)
BUN SERPL-MCNC: 60 MG/DL (ref 8–22)
BURR CELLS BLD QL SMEAR: NORMAL
CALCIUM SERPL-MCNC: 7.9 MG/DL (ref 8.5–10.5)
CALCIUM SERPL-MCNC: 7.9 MG/DL (ref 8.5–10.5)
CALCIUM SERPL-MCNC: 8 MG/DL (ref 8.5–10.5)
CALCIUM SERPL-MCNC: 8.2 MG/DL (ref 8.5–10.5)
CALCIUM SERPL-MCNC: 8.4 MG/DL (ref 8.5–10.5)
CALCIUM SERPL-MCNC: 8.5 MG/DL (ref 8.5–10.5)
CALCIUM SERPL-MCNC: 8.5 MG/DL (ref 8.5–10.5)
CALCIUM SERPL-MCNC: 8.6 MG/DL (ref 8.5–10.5)
CALCIUM SERPL-MCNC: 8.7 MG/DL (ref 8.5–10.5)
CALCIUM SERPL-MCNC: 8.7 MG/DL (ref 8.5–10.5)
CALCIUM SERPL-MCNC: 9 MG/DL (ref 8.5–10.5)
CALCIUM SERPL-MCNC: 9 MG/DL (ref 8.5–10.5)
CALCIUM SERPL-MCNC: 9.1 MG/DL (ref 8.5–10.5)
CALCIUM SERPL-MCNC: 9.2 MG/DL (ref 8.5–10.5)
CALCIUM SERPL-MCNC: 9.2 MG/DL (ref 8.5–10.5)
CALCIUM SERPL-MCNC: 9.3 MG/DL (ref 8.5–10.5)
CHLORIDE SERPL-SCNC: 100 MMOL/L (ref 96–112)
CHLORIDE SERPL-SCNC: 101 MMOL/L (ref 96–112)
CHLORIDE SERPL-SCNC: 102 MMOL/L (ref 96–112)
CHLORIDE SERPL-SCNC: 104 MMOL/L (ref 96–112)
CHLORIDE SERPL-SCNC: 106 MMOL/L (ref 96–112)
CHLORIDE SERPL-SCNC: 109 MMOL/L (ref 96–112)
CHLORIDE SERPL-SCNC: 96 MMOL/L (ref 96–112)
CHLORIDE SERPL-SCNC: 98 MMOL/L (ref 96–112)
CHLORIDE SERPL-SCNC: 99 MMOL/L (ref 96–112)
CO2 BLDA-SCNC: 29 MMOL/L (ref 20–33)
CO2 BLDA-SCNC: 34 MMOL/L (ref 20–33)
CO2 BLDA-SCNC: 36 MMOL/L (ref 20–33)
CO2 BLDA-SCNC: 42 MMOL/L (ref 20–33)
CO2 BLDA-SCNC: 42 MMOL/L (ref 20–33)
CO2 BLDA-SCNC: <10 MMOL/L (ref 20–33)
CO2 SERPL-SCNC: 27 MMOL/L (ref 20–33)
CO2 SERPL-SCNC: 31 MMOL/L (ref 20–33)
CO2 SERPL-SCNC: 33 MMOL/L (ref 20–33)
CO2 SERPL-SCNC: 34 MMOL/L (ref 20–33)
CO2 SERPL-SCNC: 34 MMOL/L (ref 20–33)
CO2 SERPL-SCNC: 35 MMOL/L (ref 20–33)
CO2 SERPL-SCNC: 36 MMOL/L (ref 20–33)
CO2 SERPL-SCNC: 37 MMOL/L (ref 20–33)
CO2 SERPL-SCNC: 38 MMOL/L (ref 20–33)
CO2 SERPL-SCNC: 40 MMOL/L (ref 20–33)
CO2 SERPL-SCNC: 40 MMOL/L (ref 20–33)
CO2 SERPL-SCNC: 41 MMOL/L (ref 20–33)
CO2 SERPL-SCNC: 41 MMOL/L (ref 20–33)
CO2 SERPL-SCNC: 43 MMOL/L (ref 20–33)
COLOR FLD: NORMAL
COLOR UR: ABNORMAL
COLOR UR: YELLOW
CREAT SERPL-MCNC: 0.61 MG/DL (ref 0.5–1.4)
CREAT SERPL-MCNC: 0.65 MG/DL (ref 0.5–1.4)
CREAT SERPL-MCNC: 0.65 MG/DL (ref 0.5–1.4)
CREAT SERPL-MCNC: 0.66 MG/DL (ref 0.5–1.4)
CREAT SERPL-MCNC: 0.7 MG/DL (ref 0.5–1.4)
CREAT SERPL-MCNC: 0.7 MG/DL (ref 0.5–1.4)
CREAT SERPL-MCNC: 0.74 MG/DL (ref 0.5–1.4)
CREAT SERPL-MCNC: 0.75 MG/DL (ref 0.5–1.4)
CREAT SERPL-MCNC: 0.77 MG/DL (ref 0.5–1.4)
CREAT SERPL-MCNC: 0.77 MG/DL (ref 0.5–1.4)
CREAT SERPL-MCNC: 0.78 MG/DL (ref 0.5–1.4)
CREAT SERPL-MCNC: 0.79 MG/DL (ref 0.5–1.4)
CREAT SERPL-MCNC: 0.8 MG/DL (ref 0.5–1.4)
CREAT SERPL-MCNC: 0.88 MG/DL (ref 0.5–1.4)
CREAT SERPL-MCNC: 0.94 MG/DL (ref 0.5–1.4)
CREAT SERPL-MCNC: 0.94 MG/DL (ref 0.5–1.4)
CREAT SERPL-MCNC: 0.96 MG/DL (ref 0.5–1.4)
CREAT SERPL-MCNC: 0.96 MG/DL (ref 0.5–1.4)
DIGOXIN SERPL-MCNC: 0.9 NG/ML (ref 0.8–2)
EKG IMPRESSION: NORMAL
EOSINOPHIL # BLD AUTO: 0 K/UL (ref 0–0.51)
EOSINOPHIL # BLD AUTO: 0.01 K/UL (ref 0–0.51)
EOSINOPHIL # BLD AUTO: 0.07 K/UL (ref 0–0.51)
EOSINOPHIL # BLD AUTO: 0.1 K/UL (ref 0–0.51)
EOSINOPHIL # BLD AUTO: 0.27 K/UL (ref 0–0.51)
EOSINOPHIL NFR BLD: 0 % (ref 0–6.9)
EOSINOPHIL NFR BLD: 0.1 % (ref 0–6.9)
EOSINOPHIL NFR BLD: 0.1 % (ref 0–6.9)
EOSINOPHIL NFR BLD: 0.5 % (ref 0–6.9)
EOSINOPHIL NFR BLD: 0.8 % (ref 0–6.9)
EOSINOPHIL NFR BLD: 2 % (ref 0–6.9)
EPI CELLS #/AREA URNS HPF: ABNORMAL /HPF
EPI CELLS #/AREA URNS HPF: ABNORMAL /HPF
ERYTHROCYTE [DISTWIDTH] IN BLOOD BY AUTOMATED COUNT: 48.9 FL (ref 35.9–50)
ERYTHROCYTE [DISTWIDTH] IN BLOOD BY AUTOMATED COUNT: 50.4 FL (ref 35.9–50)
ERYTHROCYTE [DISTWIDTH] IN BLOOD BY AUTOMATED COUNT: 50.4 FL (ref 35.9–50)
ERYTHROCYTE [DISTWIDTH] IN BLOOD BY AUTOMATED COUNT: 51.6 FL (ref 35.9–50)
ERYTHROCYTE [DISTWIDTH] IN BLOOD BY AUTOMATED COUNT: 51.7 FL (ref 35.9–50)
ERYTHROCYTE [DISTWIDTH] IN BLOOD BY AUTOMATED COUNT: 51.8 FL (ref 35.9–50)
ERYTHROCYTE [DISTWIDTH] IN BLOOD BY AUTOMATED COUNT: 51.9 FL (ref 35.9–50)
ERYTHROCYTE [DISTWIDTH] IN BLOOD BY AUTOMATED COUNT: 52 FL (ref 35.9–50)
ERYTHROCYTE [DISTWIDTH] IN BLOOD BY AUTOMATED COUNT: 52.2 FL (ref 35.9–50)
ERYTHROCYTE [DISTWIDTH] IN BLOOD BY AUTOMATED COUNT: 52.3 FL (ref 35.9–50)
ERYTHROCYTE [DISTWIDTH] IN BLOOD BY AUTOMATED COUNT: 52.3 FL (ref 35.9–50)
ERYTHROCYTE [DISTWIDTH] IN BLOOD BY AUTOMATED COUNT: 53.4 FL (ref 35.9–50)
ERYTHROCYTE [DISTWIDTH] IN BLOOD BY AUTOMATED COUNT: 54.5 FL (ref 35.9–50)
ERYTHROCYTE [DISTWIDTH] IN BLOOD BY AUTOMATED COUNT: 55.1 FL (ref 35.9–50)
ERYTHROCYTE [DISTWIDTH] IN BLOOD BY AUTOMATED COUNT: 55.1 FL (ref 35.9–50)
ERYTHROCYTE [DISTWIDTH] IN BLOOD BY AUTOMATED COUNT: 59.5 FL (ref 35.9–50)
FLUAV RNA SPEC QL NAA+PROBE: NEGATIVE
FLUBV RNA SPEC QL NAA+PROBE: NEGATIVE
FUNGUS SPEC CULT: NORMAL
GLOBULIN SER CALC-MCNC: 2.1 G/DL (ref 1.9–3.5)
GLOBULIN SER CALC-MCNC: 2.2 G/DL (ref 1.9–3.5)
GLOBULIN SER CALC-MCNC: 2.4 G/DL (ref 1.9–3.5)
GLOBULIN SER CALC-MCNC: 2.8 G/DL (ref 1.9–3.5)
GLUCOSE SERPL-MCNC: 103 MG/DL (ref 65–99)
GLUCOSE SERPL-MCNC: 109 MG/DL (ref 65–99)
GLUCOSE SERPL-MCNC: 111 MG/DL (ref 65–99)
GLUCOSE SERPL-MCNC: 112 MG/DL (ref 65–99)
GLUCOSE SERPL-MCNC: 118 MG/DL (ref 65–99)
GLUCOSE SERPL-MCNC: 130 MG/DL (ref 65–99)
GLUCOSE SERPL-MCNC: 131 MG/DL (ref 65–99)
GLUCOSE SERPL-MCNC: 137 MG/DL (ref 65–99)
GLUCOSE SERPL-MCNC: 151 MG/DL (ref 65–99)
GLUCOSE SERPL-MCNC: 153 MG/DL (ref 65–99)
GLUCOSE SERPL-MCNC: 160 MG/DL (ref 65–99)
GLUCOSE SERPL-MCNC: 162 MG/DL (ref 65–99)
GLUCOSE SERPL-MCNC: 174 MG/DL (ref 65–99)
GLUCOSE SERPL-MCNC: 174 MG/DL (ref 65–99)
GLUCOSE SERPL-MCNC: 186 MG/DL (ref 65–99)
GLUCOSE SERPL-MCNC: 190 MG/DL (ref 65–99)
GLUCOSE SERPL-MCNC: 203 MG/DL (ref 65–99)
GLUCOSE SERPL-MCNC: 90 MG/DL (ref 65–99)
GLUCOSE UR STRIP.AUTO-MCNC: NEGATIVE MG/DL
GLUCOSE UR STRIP.AUTO-MCNC: NEGATIVE MG/DL
GRAM STN SPEC: ABNORMAL
GRAM STN SPEC: NORMAL
HCO3 BLDA-SCNC: 27.4 MMOL/L (ref 17–25)
HCO3 BLDA-SCNC: 3.1 MMOL/L (ref 17–25)
HCO3 BLDA-SCNC: 32.7 MMOL/L (ref 17–25)
HCO3 BLDA-SCNC: 34.6 MMOL/L (ref 17–25)
HCO3 BLDA-SCNC: 39.9 MMOL/L (ref 17–25)
HCO3 BLDA-SCNC: 40 MMOL/L (ref 17–25)
HCT VFR BLD AUTO: 34.2 % (ref 42–52)
HCT VFR BLD AUTO: 35.6 % (ref 42–52)
HCT VFR BLD AUTO: 36.4 % (ref 42–52)
HCT VFR BLD AUTO: 36.9 % (ref 42–52)
HCT VFR BLD AUTO: 36.9 % (ref 42–52)
HCT VFR BLD AUTO: 40.7 % (ref 42–52)
HCT VFR BLD AUTO: 41 % (ref 42–52)
HCT VFR BLD AUTO: 41.3 % (ref 42–52)
HCT VFR BLD AUTO: 42.4 % (ref 42–52)
HCT VFR BLD AUTO: 42.6 % (ref 42–52)
HCT VFR BLD AUTO: 42.8 % (ref 42–52)
HCT VFR BLD AUTO: 42.9 % (ref 42–52)
HCT VFR BLD AUTO: 44.9 % (ref 42–52)
HCT VFR BLD AUTO: 45.8 % (ref 42–52)
HCT VFR BLD AUTO: 46.4 % (ref 42–52)
HCT VFR BLD AUTO: 46.6 % (ref 42–52)
HGB BLD-MCNC: 10.8 G/DL (ref 14–18)
HGB BLD-MCNC: 11.6 G/DL (ref 14–18)
HGB BLD-MCNC: 11.7 G/DL (ref 14–18)
HGB BLD-MCNC: 11.8 G/DL (ref 14–18)
HGB BLD-MCNC: 11.9 G/DL (ref 14–18)
HGB BLD-MCNC: 12.6 G/DL (ref 14–18)
HGB BLD-MCNC: 12.8 G/DL (ref 14–18)
HGB BLD-MCNC: 13.2 G/DL (ref 14–18)
HGB BLD-MCNC: 13.4 G/DL (ref 14–18)
HGB BLD-MCNC: 13.6 G/DL (ref 14–18)
HGB BLD-MCNC: 14 G/DL (ref 14–18)
HGB BLD-MCNC: 14.1 G/DL (ref 14–18)
HGB BLD-MCNC: 14.1 G/DL (ref 14–18)
HGB BLD-MCNC: 15.1 G/DL (ref 14–18)
HOROWITZ INDEX BLDA+IHG-RTO: 183 MM[HG]
HOROWITZ INDEX BLDA+IHG-RTO: 190 MM[HG]
HOROWITZ INDEX BLDA+IHG-RTO: 200 MM[HG]
HOROWITZ INDEX BLDA+IHG-RTO: 253 MM[HG]
HOROWITZ INDEX BLDA+IHG-RTO: 51 MM[HG]
HOROWITZ INDEX BLDA+IHG-RTO: 83 MM[HG]
HYALINE CASTS #/AREA URNS LPF: ABNORMAL /LPF
IMM GRANULOCYTES # BLD AUTO: 0.05 K/UL (ref 0–0.11)
IMM GRANULOCYTES # BLD AUTO: 0.06 K/UL (ref 0–0.11)
IMM GRANULOCYTES # BLD AUTO: 0.07 K/UL (ref 0–0.11)
IMM GRANULOCYTES # BLD AUTO: 0.07 K/UL (ref 0–0.11)
IMM GRANULOCYTES # BLD AUTO: 0.1 K/UL (ref 0–0.11)
IMM GRANULOCYTES # BLD AUTO: 0.11 K/UL (ref 0–0.11)
IMM GRANULOCYTES # BLD AUTO: 0.11 K/UL (ref 0–0.11)
IMM GRANULOCYTES # BLD AUTO: 0.12 K/UL (ref 0–0.11)
IMM GRANULOCYTES # BLD AUTO: 0.13 K/UL (ref 0–0.11)
IMM GRANULOCYTES # BLD AUTO: 0.13 K/UL (ref 0–0.11)
IMM GRANULOCYTES # BLD AUTO: 0.15 K/UL (ref 0–0.11)
IMM GRANULOCYTES # BLD AUTO: 0.15 K/UL (ref 0–0.11)
IMM GRANULOCYTES # BLD AUTO: 0.2 K/UL (ref 0–0.11)
IMM GRANULOCYTES # BLD AUTO: 0.31 K/UL (ref 0–0.11)
IMM GRANULOCYTES NFR BLD AUTO: 0.4 % (ref 0–0.9)
IMM GRANULOCYTES NFR BLD AUTO: 0.5 % (ref 0–0.9)
IMM GRANULOCYTES NFR BLD AUTO: 0.6 % (ref 0–0.9)
IMM GRANULOCYTES NFR BLD AUTO: 0.7 % (ref 0–0.9)
IMM GRANULOCYTES NFR BLD AUTO: 0.7 % (ref 0–0.9)
IMM GRANULOCYTES NFR BLD AUTO: 0.8 % (ref 0–0.9)
IMM GRANULOCYTES NFR BLD AUTO: 0.8 % (ref 0–0.9)
IMM GRANULOCYTES NFR BLD AUTO: 1 % (ref 0–0.9)
IMM GRANULOCYTES NFR BLD AUTO: 1 % (ref 0–0.9)
IMM GRANULOCYTES NFR BLD AUTO: 1.4 % (ref 0–0.9)
INR PPP: 1.08 (ref 0.87–1.13)
INST. QUALIFIED PATIENT IIQPT: YES
KETONES UR STRIP.AUTO-MCNC: 15 MG/DL
KETONES UR STRIP.AUTO-MCNC: NEGATIVE MG/DL
LACTATE BLD-SCNC: 1 MMOL/L (ref 0.5–2)
LACTATE BLD-SCNC: 2.1 MMOL/L (ref 0.5–2)
LACTATE BLD-SCNC: 2.1 MMOL/L (ref 0.5–2)
LACTATE BLD-SCNC: 2.5 MMOL/L (ref 0.5–2)
LACTATE BLD-SCNC: 4.2 MMOL/L (ref 0.5–2)
LACTATE BLD-SCNC: 4.6 MMOL/L (ref 0.5–2)
LEUKOCYTE ESTERASE UR QL STRIP.AUTO: ABNORMAL
LEUKOCYTE ESTERASE UR QL STRIP.AUTO: NEGATIVE
LV EJECT FRACT  99904: 60
LV EJECT FRACT MOD 2C 99903: 65.77
LV EJECT FRACT MOD 4C 99902: 53.88
LYMPHOCYTES # BLD AUTO: 0.09 K/UL (ref 1–4.8)
LYMPHOCYTES # BLD AUTO: 0.42 K/UL (ref 1–4.8)
LYMPHOCYTES # BLD AUTO: 0.5 K/UL (ref 1–4.8)
LYMPHOCYTES # BLD AUTO: 0.51 K/UL (ref 1–4.8)
LYMPHOCYTES # BLD AUTO: 0.54 K/UL (ref 1–4.8)
LYMPHOCYTES # BLD AUTO: 0.75 K/UL (ref 1–4.8)
LYMPHOCYTES # BLD AUTO: 0.76 K/UL (ref 1–4.8)
LYMPHOCYTES # BLD AUTO: 0.85 K/UL (ref 1–4.8)
LYMPHOCYTES # BLD AUTO: 0.88 K/UL (ref 1–4.8)
LYMPHOCYTES # BLD AUTO: 0.93 K/UL (ref 1–4.8)
LYMPHOCYTES # BLD AUTO: 1.09 K/UL (ref 1–4.8)
LYMPHOCYTES # BLD AUTO: 1.26 K/UL (ref 1–4.8)
LYMPHOCYTES # BLD AUTO: 1.26 K/UL (ref 1–4.8)
LYMPHOCYTES # BLD AUTO: 1.29 K/UL (ref 1–4.8)
LYMPHOCYTES # BLD AUTO: 1.5 K/UL (ref 1–4.8)
LYMPHOCYTES # BLD AUTO: 1.57 K/UL (ref 1–4.8)
LYMPHOCYTES NFR BLD: 0.9 % (ref 22–41)
LYMPHOCYTES NFR BLD: 10 % (ref 22–41)
LYMPHOCYTES NFR BLD: 10.9 % (ref 22–41)
LYMPHOCYTES NFR BLD: 2.6 % (ref 22–41)
LYMPHOCYTES NFR BLD: 2.9 % (ref 22–41)
LYMPHOCYTES NFR BLD: 3 % (ref 22–41)
LYMPHOCYTES NFR BLD: 3.2 % (ref 22–41)
LYMPHOCYTES NFR BLD: 4.3 % (ref 22–41)
LYMPHOCYTES NFR BLD: 4.8 % (ref 22–41)
LYMPHOCYTES NFR BLD: 5.6 % (ref 22–41)
LYMPHOCYTES NFR BLD: 6 % (ref 22–41)
LYMPHOCYTES NFR BLD: 6.3 % (ref 22–41)
LYMPHOCYTES NFR BLD: 6.7 % (ref 22–41)
LYMPHOCYTES NFR BLD: 7 % (ref 22–41)
LYMPHOCYTES NFR BLD: 8.1 % (ref 22–41)
LYMPHOCYTES NFR BLD: 8.7 % (ref 22–41)
MACROCYTES BLD QL SMEAR: ABNORMAL
MAGNESIUM SERPL-MCNC: 1.7 MG/DL (ref 1.5–2.5)
MAGNESIUM SERPL-MCNC: 1.8 MG/DL (ref 1.5–2.5)
MAGNESIUM SERPL-MCNC: 1.9 MG/DL (ref 1.5–2.5)
MAGNESIUM SERPL-MCNC: 1.9 MG/DL (ref 1.5–2.5)
MAGNESIUM SERPL-MCNC: 2.1 MG/DL (ref 1.5–2.5)
MAGNESIUM SERPL-MCNC: 2.2 MG/DL (ref 1.5–2.5)
MANUAL DIFF BLD: NORMAL
MANUAL DIFF BLD: NORMAL
MCH RBC QN AUTO: 29.4 PG (ref 27–33)
MCH RBC QN AUTO: 29.4 PG (ref 27–33)
MCH RBC QN AUTO: 29.8 PG (ref 27–33)
MCH RBC QN AUTO: 30.1 PG (ref 27–33)
MCH RBC QN AUTO: 30.2 PG (ref 27–33)
MCH RBC QN AUTO: 30.3 PG (ref 27–33)
MCH RBC QN AUTO: 30.4 PG (ref 27–33)
MCH RBC QN AUTO: 30.5 PG (ref 27–33)
MCH RBC QN AUTO: 30.6 PG (ref 27–33)
MCH RBC QN AUTO: 30.7 PG (ref 27–33)
MCH RBC QN AUTO: 30.8 PG (ref 27–33)
MCH RBC QN AUTO: 30.8 PG (ref 27–33)
MCHC RBC AUTO-ENTMCNC: 29.7 G/DL (ref 33.7–35.3)
MCHC RBC AUTO-ENTMCNC: 30.3 G/DL (ref 33.7–35.3)
MCHC RBC AUTO-ENTMCNC: 30.3 G/DL (ref 33.7–35.3)
MCHC RBC AUTO-ENTMCNC: 30.8 G/DL (ref 33.7–35.3)
MCHC RBC AUTO-ENTMCNC: 31 G/DL (ref 33.7–35.3)
MCHC RBC AUTO-ENTMCNC: 31.2 G/DL (ref 33.7–35.3)
MCHC RBC AUTO-ENTMCNC: 31.6 G/DL (ref 33.7–35.3)
MCHC RBC AUTO-ENTMCNC: 31.7 G/DL (ref 33.7–35.3)
MCHC RBC AUTO-ENTMCNC: 31.9 G/DL (ref 33.7–35.3)
MCHC RBC AUTO-ENTMCNC: 31.9 G/DL (ref 33.7–35.3)
MCHC RBC AUTO-ENTMCNC: 32.2 G/DL (ref 33.7–35.3)
MCHC RBC AUTO-ENTMCNC: 32.4 G/DL (ref 33.7–35.3)
MCHC RBC AUTO-ENTMCNC: 32.5 G/DL (ref 33.7–35.3)
MCHC RBC AUTO-ENTMCNC: 32.9 G/DL (ref 33.7–35.3)
MCHC RBC AUTO-ENTMCNC: 33 G/DL (ref 33.7–35.3)
MCHC RBC AUTO-ENTMCNC: 33.1 G/DL (ref 33.7–35.3)
MCV RBC AUTO: 103.9 FL (ref 81.4–97.8)
MCV RBC AUTO: 91.8 FL (ref 81.4–97.8)
MCV RBC AUTO: 92.7 FL (ref 81.4–97.8)
MCV RBC AUTO: 93.1 FL (ref 81.4–97.8)
MCV RBC AUTO: 93.2 FL (ref 81.4–97.8)
MCV RBC AUTO: 93.4 FL (ref 81.4–97.8)
MCV RBC AUTO: 93.7 FL (ref 81.4–97.8)
MCV RBC AUTO: 94.3 FL (ref 81.4–97.8)
MCV RBC AUTO: 95.1 FL (ref 81.4–97.8)
MCV RBC AUTO: 95.3 FL (ref 81.4–97.8)
MCV RBC AUTO: 96.3 FL (ref 81.4–97.8)
MCV RBC AUTO: 96.7 FL (ref 81.4–97.8)
MCV RBC AUTO: 97.2 FL (ref 81.4–97.8)
MCV RBC AUTO: 97.4 FL (ref 81.4–97.8)
MCV RBC AUTO: 97.6 FL (ref 81.4–97.8)
MCV RBC AUTO: 98.5 FL (ref 81.4–97.8)
METAMYELOCYTES NFR BLD MANUAL: 0.9 %
MICRO URNS: ABNORMAL
MICRO URNS: ABNORMAL
MONOCYTES # BLD AUTO: 0.26 K/UL (ref 0–0.85)
MONOCYTES # BLD AUTO: 0.62 K/UL (ref 0–0.85)
MONOCYTES # BLD AUTO: 0.65 K/UL (ref 0–0.85)
MONOCYTES # BLD AUTO: 0.73 K/UL (ref 0–0.85)
MONOCYTES # BLD AUTO: 0.75 K/UL (ref 0–0.85)
MONOCYTES # BLD AUTO: 0.83 K/UL (ref 0–0.85)
MONOCYTES # BLD AUTO: 0.92 K/UL (ref 0–0.85)
MONOCYTES # BLD AUTO: 1.09 K/UL (ref 0–0.85)
MONOCYTES # BLD AUTO: 1.11 K/UL (ref 0–0.85)
MONOCYTES # BLD AUTO: 1.19 K/UL (ref 0–0.85)
MONOCYTES # BLD AUTO: 1.2 K/UL (ref 0–0.85)
MONOCYTES # BLD AUTO: 1.34 K/UL (ref 0–0.85)
MONOCYTES # BLD AUTO: 1.36 K/UL (ref 0–0.85)
MONOCYTES # BLD AUTO: 2.08 K/UL (ref 0–0.85)
MONOCYTES # BLD AUTO: 2.21 K/UL (ref 0–0.85)
MONOCYTES # BLD AUTO: 2.23 K/UL (ref 0–0.85)
MONOCYTES NFR BLD AUTO: 10.2 % (ref 0–13.4)
MONOCYTES NFR BLD AUTO: 11.2 % (ref 0–13.4)
MONOCYTES NFR BLD AUTO: 14.1 % (ref 0–13.4)
MONOCYTES NFR BLD AUTO: 2.6 % (ref 0–13.4)
MONOCYTES NFR BLD AUTO: 4.2 % (ref 0–13.4)
MONOCYTES NFR BLD AUTO: 4.9 % (ref 0–13.4)
MONOCYTES NFR BLD AUTO: 5.2 % (ref 0–13.4)
MONOCYTES NFR BLD AUTO: 6.1 % (ref 0–13.4)
MONOCYTES NFR BLD AUTO: 7 % (ref 0–13.4)
MONOCYTES NFR BLD AUTO: 7 % (ref 0–13.4)
MONOCYTES NFR BLD AUTO: 7.6 % (ref 0–13.4)
MONOCYTES NFR BLD AUTO: 8.8 % (ref 0–13.4)
MONOCYTES NFR BLD AUTO: 8.9 % (ref 0–13.4)
MONOCYTES NFR BLD AUTO: 9.3 % (ref 0–13.4)
MONONUC CELLS NFR FLD: 1 %
MORPHOLOGY BLD-IMP: NORMAL
MORPHOLOGY BLD-IMP: NORMAL
MUCOUS THREADS #/AREA URNS HPF: ABNORMAL /HPF
MUCOUS THREADS #/AREA URNS HPF: ABNORMAL /HPF
NEUTROPHILS # BLD AUTO: 10.78 K/UL (ref 1.82–7.42)
NEUTROPHILS # BLD AUTO: 11.69 K/UL (ref 1.82–7.42)
NEUTROPHILS # BLD AUTO: 12 K/UL (ref 1.82–7.42)
NEUTROPHILS # BLD AUTO: 13.01 K/UL (ref 1.82–7.42)
NEUTROPHILS # BLD AUTO: 13.24 K/UL (ref 1.82–7.42)
NEUTROPHILS # BLD AUTO: 13.75 K/UL (ref 1.82–7.42)
NEUTROPHILS # BLD AUTO: 15.19 K/UL (ref 1.82–7.42)
NEUTROPHILS # BLD AUTO: 15.72 K/UL (ref 1.82–7.42)
NEUTROPHILS # BLD AUTO: 16.07 K/UL (ref 1.82–7.42)
NEUTROPHILS # BLD AUTO: 16.09 K/UL (ref 1.82–7.42)
NEUTROPHILS # BLD AUTO: 16.45 K/UL (ref 1.82–7.42)
NEUTROPHILS # BLD AUTO: 18.32 K/UL (ref 1.82–7.42)
NEUTROPHILS # BLD AUTO: 18.4 K/UL (ref 1.82–7.42)
NEUTROPHILS # BLD AUTO: 8.51 K/UL (ref 1.82–7.42)
NEUTROPHILS # BLD AUTO: 9.14 K/UL (ref 1.82–7.42)
NEUTROPHILS # BLD AUTO: 9.46 K/UL (ref 1.82–7.42)
NEUTROPHILS NFR BLD: 74.7 % (ref 44–72)
NEUTROPHILS NFR BLD: 77.4 % (ref 44–72)
NEUTROPHILS NFR BLD: 80.4 % (ref 44–72)
NEUTROPHILS NFR BLD: 81 % (ref 44–72)
NEUTROPHILS NFR BLD: 82.2 % (ref 44–72)
NEUTROPHILS NFR BLD: 84.3 % (ref 44–72)
NEUTROPHILS NFR BLD: 84.5 % (ref 44–72)
NEUTROPHILS NFR BLD: 85 % (ref 44–72)
NEUTROPHILS NFR BLD: 85.6 % (ref 44–72)
NEUTROPHILS NFR BLD: 87.3 % (ref 44–72)
NEUTROPHILS NFR BLD: 89.6 % (ref 44–72)
NEUTROPHILS NFR BLD: 91.2 % (ref 44–72)
NEUTROPHILS NFR BLD: 91.2 % (ref 44–72)
NEUTROPHILS NFR BLD: 91.8 % (ref 44–72)
NEUTROPHILS NFR BLD: 92.2 % (ref 44–72)
NEUTROPHILS NFR BLD: 92.2 % (ref 44–72)
NEUTROPHILS NFR FLD: 99 %
NEUTS BAND NFR BLD MANUAL: 4.4 % (ref 0–10)
NITRITE UR QL STRIP.AUTO: NEGATIVE
NITRITE UR QL STRIP.AUTO: POSITIVE
NRBC # BLD AUTO: 0 K/UL
NRBC # BLD AUTO: 0.03 K/UL
NRBC # BLD AUTO: 0.04 K/UL
NRBC # BLD AUTO: 0.05 K/UL
NRBC # BLD AUTO: 0.06 K/UL
NRBC # BLD AUTO: 0.09 K/UL
NRBC # BLD AUTO: 0.09 K/UL
NRBC BLD-RTO: 0 /100 WBC
NRBC BLD-RTO: 0.2 /100 WBC
NRBC BLD-RTO: 0.2 /100 WBC
NRBC BLD-RTO: 0.3 /100 WBC
NRBC BLD-RTO: 0.4 /100 WBC
NRBC BLD-RTO: 0.5 /100 WBC
NRBC BLD-RTO: 0.7 /100 WBC
NT-PROBNP SERPL IA-MCNC: 1166 PG/ML (ref 0–125)
O2/TOTAL GAS SETTING VFR VENT: 100 %
O2/TOTAL GAS SETTING VFR VENT: 100 %
O2/TOTAL GAS SETTING VFR VENT: 40 %
O2/TOTAL GAS SETTING VFR VENT: 45 %
PCO2 BLDA: 15.4 MMHG (ref 26–37)
PCO2 BLDA: 42.8 MMHG (ref 26–37)
PCO2 BLDA: 47.3 MMHG (ref 26–37)
PCO2 BLDA: 50.6 MMHG (ref 26–37)
PCO2 BLDA: 52.9 MMHG (ref 26–37)
PCO2 BLDA: 79.2 MMHG (ref 26–37)
PCO2 TEMP ADJ BLDA: 15.4 MMHG (ref 26–37)
PCO2 TEMP ADJ BLDA: 43.1 MMHG (ref 26–37)
PCO2 TEMP ADJ BLDA: 47.9 MMHG (ref 26–37)
PCO2 TEMP ADJ BLDA: 50.4 MMHG (ref 26–37)
PCO2 TEMP ADJ BLDA: 52.9 MMHG (ref 26–37)
PCO2 TEMP ADJ BLDA: 79.5 MMHG (ref 26–37)
PH BLDA: 6.92 [PH] (ref 7.4–7.5)
PH BLDA: 7.31 [PH] (ref 7.4–7.5)
PH BLDA: 7.41 [PH] (ref 7.4–7.5)
PH BLDA: 7.42 [PH] (ref 7.4–7.5)
PH BLDA: 7.47 [PH] (ref 7.4–7.5)
PH BLDA: 7.49 [PH] (ref 7.4–7.5)
PH TEMP ADJ BLDA: 6.92 [PH] (ref 7.4–7.5)
PH TEMP ADJ BLDA: 7.31 [PH] (ref 7.4–7.5)
PH TEMP ADJ BLDA: 7.41 [PH] (ref 7.4–7.5)
PH TEMP ADJ BLDA: 7.42 [PH] (ref 7.4–7.5)
PH TEMP ADJ BLDA: 7.47 [PH] (ref 7.4–7.5)
PH TEMP ADJ BLDA: 7.49 [PH] (ref 7.4–7.5)
PH UR STRIP.AUTO: 5 [PH] (ref 5–8)
PH UR STRIP.AUTO: 5 [PH] (ref 5–8)
PHOSPHATE SERPL-MCNC: 1.7 MG/DL (ref 2.5–4.5)
PHOSPHATE SERPL-MCNC: 2.5 MG/DL (ref 2.5–4.5)
PHOSPHATE SERPL-MCNC: 2.7 MG/DL (ref 2.5–4.5)
PHOSPHATE SERPL-MCNC: 3.4 MG/DL (ref 2.5–4.5)
PHOSPHATE SERPL-MCNC: 3.4 MG/DL (ref 2.5–4.5)
PHOSPHATE SERPL-MCNC: 3.6 MG/DL (ref 2.5–4.5)
PHOSPHATE SERPL-MCNC: 3.9 MG/DL (ref 2.5–4.5)
PLATELET # BLD AUTO: 168 K/UL (ref 164–446)
PLATELET # BLD AUTO: 169 K/UL (ref 164–446)
PLATELET # BLD AUTO: 188 K/UL (ref 164–446)
PLATELET # BLD AUTO: 202 K/UL (ref 164–446)
PLATELET # BLD AUTO: 202 K/UL (ref 164–446)
PLATELET # BLD AUTO: 209 K/UL (ref 164–446)
PLATELET # BLD AUTO: 225 K/UL (ref 164–446)
PLATELET # BLD AUTO: 272 K/UL (ref 164–446)
PLATELET # BLD AUTO: 286 K/UL (ref 164–446)
PLATELET # BLD AUTO: 292 K/UL (ref 164–446)
PLATELET # BLD AUTO: 293 K/UL (ref 164–446)
PLATELET # BLD AUTO: 306 K/UL (ref 164–446)
PLATELET # BLD AUTO: 315 K/UL (ref 164–446)
PLATELET # BLD AUTO: 384 K/UL (ref 164–446)
PLATELET # BLD AUTO: 391 K/UL (ref 164–446)
PLATELET # BLD AUTO: 408 K/UL (ref 164–446)
PLATELET BLD QL SMEAR: NORMAL
PMV BLD AUTO: 10.4 FL (ref 9–12.9)
PMV BLD AUTO: 10.4 FL (ref 9–12.9)
PMV BLD AUTO: 10.5 FL (ref 9–12.9)
PMV BLD AUTO: 10.6 FL (ref 9–12.9)
PMV BLD AUTO: 10.6 FL (ref 9–12.9)
PMV BLD AUTO: 10.7 FL (ref 9–12.9)
PMV BLD AUTO: 10.9 FL (ref 9–12.9)
PMV BLD AUTO: 11 FL (ref 9–12.9)
PMV BLD AUTO: 11.1 FL (ref 9–12.9)
PMV BLD AUTO: 11.1 FL (ref 9–12.9)
PO2 BLDA: 114 MMHG (ref 64–87)
PO2 BLDA: 51 MMHG (ref 64–87)
PO2 BLDA: 73 MMHG (ref 64–87)
PO2 BLDA: 76 MMHG (ref 64–87)
PO2 BLDA: 80 MMHG (ref 64–87)
PO2 BLDA: 83 MMHG (ref 64–87)
PO2 TEMP ADJ BLDA: 116 MMHG (ref 64–87)
PO2 TEMP ADJ BLDA: 52 MMHG (ref 64–87)
PO2 TEMP ADJ BLDA: 75 MMHG (ref 64–87)
PO2 TEMP ADJ BLDA: 75 MMHG (ref 64–87)
PO2 TEMP ADJ BLDA: 80 MMHG (ref 64–87)
PO2 TEMP ADJ BLDA: 84 MMHG (ref 64–87)
POIKILOCYTOSIS BLD QL SMEAR: NORMAL
POTASSIUM SERPL-SCNC: 3.7 MMOL/L (ref 3.6–5.5)
POTASSIUM SERPL-SCNC: 3.8 MMOL/L (ref 3.6–5.5)
POTASSIUM SERPL-SCNC: 3.9 MMOL/L (ref 3.6–5.5)
POTASSIUM SERPL-SCNC: 4.1 MMOL/L (ref 3.6–5.5)
POTASSIUM SERPL-SCNC: 4.2 MMOL/L (ref 3.6–5.5)
POTASSIUM SERPL-SCNC: 4.4 MMOL/L (ref 3.6–5.5)
POTASSIUM SERPL-SCNC: 4.5 MMOL/L (ref 3.6–5.5)
POTASSIUM SERPL-SCNC: 4.8 MMOL/L (ref 3.6–5.5)
POTASSIUM SERPL-SCNC: 5 MMOL/L (ref 3.6–5.5)
POTASSIUM SERPL-SCNC: 5.8 MMOL/L (ref 3.6–5.5)
PROCALCITONIN SERPL-MCNC: 0.07 NG/ML
PROCALCITONIN SERPL-MCNC: 0.45 NG/ML
PROT SERPL-MCNC: 4.6 G/DL (ref 6–8.2)
PROT SERPL-MCNC: 5 G/DL (ref 6–8.2)
PROT SERPL-MCNC: 5.3 G/DL (ref 6–8.2)
PROT SERPL-MCNC: 5.3 G/DL (ref 6–8.2)
PROT UR QL STRIP: 100 MG/DL
PROT UR QL STRIP: 30 MG/DL
PROTHROMBIN TIME: 14.3 SEC (ref 12–14.6)
RBC # BLD AUTO: 3.51 M/UL (ref 4.7–6.1)
RBC # BLD AUTO: 3.83 M/UL (ref 4.7–6.1)
RBC # BLD AUTO: 3.84 M/UL (ref 4.7–6.1)
RBC # BLD AUTO: 3.86 M/UL (ref 4.7–6.1)
RBC # BLD AUTO: 3.94 M/UL (ref 4.7–6.1)
RBC # BLD AUTO: 4.17 M/UL (ref 4.7–6.1)
RBC # BLD AUTO: 4.24 M/UL (ref 4.7–6.1)
RBC # BLD AUTO: 4.41 M/UL (ref 4.7–6.1)
RBC # BLD AUTO: 4.42 M/UL (ref 4.7–6.1)
RBC # BLD AUTO: 4.48 M/UL (ref 4.7–6.1)
RBC # BLD AUTO: 4.49 M/UL (ref 4.7–6.1)
RBC # BLD AUTO: 4.61 M/UL (ref 4.7–6.1)
RBC # BLD AUTO: 4.62 M/UL (ref 4.7–6.1)
RBC # BLD AUTO: 4.62 M/UL (ref 4.7–6.1)
RBC # BLD AUTO: 4.73 M/UL (ref 4.7–6.1)
RBC # BLD AUTO: 4.98 M/UL (ref 4.7–6.1)
RBC # FLD: NORMAL CELLS/UL
RBC # URNS HPF: >150 /HPF
RBC # URNS HPF: ABNORMAL /HPF
RBC BLD AUTO: NORMAL
RBC BLD AUTO: PRESENT
RBC UR QL AUTO: ABNORMAL
RBC UR QL AUTO: NEGATIVE
RENAL EPI CELLS #/AREA URNS HPF: NEGATIVE /HPF
SAO2 % BLDA: 62 % (ref 93–99)
SAO2 % BLDA: 94 % (ref 93–99)
SAO2 % BLDA: 95 % (ref 93–99)
SAO2 % BLDA: 95 % (ref 93–99)
SAO2 % BLDA: 96 % (ref 93–99)
SAO2 % BLDA: 99 % (ref 93–99)
SIGNIFICANT IND 70042: ABNORMAL
SIGNIFICANT IND 70042: ABNORMAL
SIGNIFICANT IND 70042: NORMAL
SITE SITE: ABNORMAL
SITE SITE: ABNORMAL
SITE SITE: NORMAL
SODIUM SERPL-SCNC: 141 MMOL/L (ref 135–145)
SODIUM SERPL-SCNC: 142 MMOL/L (ref 135–145)
SODIUM SERPL-SCNC: 143 MMOL/L (ref 135–145)
SODIUM SERPL-SCNC: 144 MMOL/L (ref 135–145)
SODIUM SERPL-SCNC: 146 MMOL/L (ref 135–145)
SODIUM SERPL-SCNC: 146 MMOL/L (ref 135–145)
SODIUM SERPL-SCNC: 147 MMOL/L (ref 135–145)
SODIUM SERPL-SCNC: 155 MMOL/L (ref 135–145)
SOURCE SOURCE: ABNORMAL
SOURCE SOURCE: ABNORMAL
SOURCE SOURCE: NORMAL
SP GR UR STRIP.AUTO: 1.02
SP GR UR STRIP.AUTO: 1.02
SPECIMEN DRAWN FROM PATIENT: ABNORMAL
TRIGL SERPL-MCNC: 113 MG/DL (ref 0–149)
TRIGL SERPL-MCNC: 89 MG/DL (ref 0–149)
TROPONIN T SERPL-MCNC: 70 NG/L (ref 6–19)
TROPONIN T SERPL-MCNC: 79 NG/L (ref 6–19)
UROBILINOGEN UR STRIP.AUTO-MCNC: 0.2 MG/DL
UROBILINOGEN UR STRIP.AUTO-MCNC: 1 MG/DL
WBC # BLD AUTO: 10.1 K/UL (ref 4.8–10.8)
WBC # BLD AUTO: 11.8 K/UL (ref 4.8–10.8)
WBC # BLD AUTO: 13.2 K/UL (ref 4.8–10.8)
WBC # BLD AUTO: 13.4 K/UL (ref 4.8–10.8)
WBC # BLD AUTO: 15.3 K/UL (ref 4.8–10.8)
WBC # BLD AUTO: 15.5 K/UL (ref 4.8–10.8)
WBC # BLD AUTO: 15.7 K/UL (ref 4.8–10.8)
WBC # BLD AUTO: 15.8 K/UL (ref 4.8–10.8)
WBC # BLD AUTO: 17.4 K/UL (ref 4.8–10.8)
WBC # BLD AUTO: 17.6 K/UL (ref 4.8–10.8)
WBC # BLD AUTO: 17.9 K/UL (ref 4.8–10.8)
WBC # BLD AUTO: 18 K/UL (ref 4.8–10.8)
WBC # BLD AUTO: 19.9 K/UL (ref 4.8–10.8)
WBC # BLD AUTO: 19.9 K/UL (ref 4.8–10.8)
WBC # BLD AUTO: 22.4 K/UL (ref 4.8–10.8)
WBC # BLD AUTO: 9.9 K/UL (ref 4.8–10.8)
WBC # FLD: NORMAL CELLS/UL
WBC #/AREA URNS HPF: ABNORMAL /HPF
WBC #/AREA URNS HPF: ABNORMAL /HPF

## 2020-01-01 PROCEDURE — 51798 US URINE CAPACITY MEASURE: CPT

## 2020-01-01 PROCEDURE — 700102 HCHG RX REV CODE 250 W/ 637 OVERRIDE(OP): Performed by: FAMILY MEDICINE

## 2020-01-01 PROCEDURE — 97110 THERAPEUTIC EXERCISES: CPT

## 2020-01-01 PROCEDURE — 36600 WITHDRAWAL OF ARTERIAL BLOOD: CPT

## 2020-01-01 PROCEDURE — 700101 HCHG RX REV CODE 250: Performed by: HOSPITALIST

## 2020-01-01 PROCEDURE — A9270 NON-COVERED ITEM OR SERVICE: HCPCS | Performed by: FAMILY MEDICINE

## 2020-01-01 PROCEDURE — 94003 VENT MGMT INPAT SUBQ DAY: CPT

## 2020-01-01 PROCEDURE — 700105 HCHG RX REV CODE 258: Performed by: INTERNAL MEDICINE

## 2020-01-01 PROCEDURE — A9270 NON-COVERED ITEM OR SERVICE: HCPCS | Performed by: INTERNAL MEDICINE

## 2020-01-01 PROCEDURE — 93010 ELECTROCARDIOGRAM REPORT: CPT | Performed by: INTERNAL MEDICINE

## 2020-01-01 PROCEDURE — 700111 HCHG RX REV CODE 636 W/ 250 OVERRIDE (IP): Performed by: INTERNAL MEDICINE

## 2020-01-01 PROCEDURE — 85025 COMPLETE CBC W/AUTO DIFF WBC: CPT

## 2020-01-01 PROCEDURE — 700102 HCHG RX REV CODE 250 W/ 637 OVERRIDE(OP): Performed by: HOSPITALIST

## 2020-01-01 PROCEDURE — 99291 CRITICAL CARE FIRST HOUR: CPT | Performed by: INTERNAL MEDICINE

## 2020-01-01 PROCEDURE — 770022 HCHG ROOM/CARE - ICU (200)

## 2020-01-01 PROCEDURE — 700102 HCHG RX REV CODE 250 W/ 637 OVERRIDE(OP): Performed by: INTERNAL MEDICINE

## 2020-01-01 PROCEDURE — 80048 BASIC METABOLIC PNL TOTAL CA: CPT

## 2020-01-01 PROCEDURE — A9270 NON-COVERED ITEM OR SERVICE: HCPCS | Performed by: HOSPITALIST

## 2020-01-01 PROCEDURE — 700101 HCHG RX REV CODE 250: Performed by: INTERNAL MEDICINE

## 2020-01-01 PROCEDURE — 80053 COMPREHEN METABOLIC PANEL: CPT

## 2020-01-01 PROCEDURE — 99292 CRITICAL CARE ADDL 30 MIN: CPT | Mod: 25 | Performed by: INTERNAL MEDICINE

## 2020-01-01 PROCEDURE — 770020 HCHG ROOM/CARE - TELE (206)

## 2020-01-01 PROCEDURE — 770021 HCHG ROOM/CARE - ISO PRIVATE

## 2020-01-01 PROCEDURE — 84100 ASSAY OF PHOSPHORUS: CPT

## 2020-01-01 PROCEDURE — 700111 HCHG RX REV CODE 636 W/ 250 OVERRIDE (IP): Performed by: HOSPITALIST

## 2020-01-01 PROCEDURE — 83735 ASSAY OF MAGNESIUM: CPT

## 2020-01-01 PROCEDURE — 96376 TX/PRO/DX INJ SAME DRUG ADON: CPT

## 2020-01-01 PROCEDURE — 82803 BLOOD GASES ANY COMBINATION: CPT

## 2020-01-01 PROCEDURE — 99292 CRITICAL CARE ADDL 30 MIN: CPT | Performed by: INTERNAL MEDICINE

## 2020-01-01 PROCEDURE — 87040 BLOOD CULTURE FOR BACTERIA: CPT

## 2020-01-01 PROCEDURE — 770006 HCHG ROOM/CARE - MED/SURG/GYN SEMI*

## 2020-01-01 PROCEDURE — 97166 OT EVAL MOD COMPLEX 45 MIN: CPT

## 2020-01-01 PROCEDURE — 99233 SBSQ HOSP IP/OBS HIGH 50: CPT | Performed by: HOSPITALIST

## 2020-01-01 PROCEDURE — 302214 INTUBATION BOX: Performed by: EMERGENCY MEDICINE

## 2020-01-01 PROCEDURE — C1751 CATH, INF, PER/CENT/MIDLINE: HCPCS

## 2020-01-01 PROCEDURE — 94150 VITAL CAPACITY TEST: CPT

## 2020-01-01 PROCEDURE — 96367 TX/PROPH/DG ADDL SEQ IV INF: CPT

## 2020-01-01 PROCEDURE — 700105 HCHG RX REV CODE 258: Performed by: HOSPITALIST

## 2020-01-01 PROCEDURE — 81001 URINALYSIS AUTO W/SCOPE: CPT

## 2020-01-01 PROCEDURE — 0B9J8ZX DRAINAGE OF LEFT LOWER LUNG LOBE, VIA NATURAL OR ARTIFICIAL OPENING ENDOSCOPIC, DIAGNOSTIC: ICD-10-PCS | Performed by: INTERNAL MEDICINE

## 2020-01-01 PROCEDURE — 99239 HOSP IP/OBS DSCHRG MGMT >30: CPT | Performed by: INTERNAL MEDICINE

## 2020-01-01 PROCEDURE — 700105 HCHG RX REV CODE 258

## 2020-01-01 PROCEDURE — 304561 HCHG STAT O2

## 2020-01-01 PROCEDURE — 99291 CRITICAL CARE FIRST HOUR: CPT

## 2020-01-01 PROCEDURE — 31624 DX BRONCHOSCOPE/LAVAGE: CPT | Performed by: INTERNAL MEDICINE

## 2020-01-01 PROCEDURE — 97116 GAIT TRAINING THERAPY: CPT

## 2020-01-01 PROCEDURE — 84478 ASSAY OF TRIGLYCERIDES: CPT

## 2020-01-01 PROCEDURE — 36415 COLL VENOUS BLD VENIPUNCTURE: CPT

## 2020-01-01 PROCEDURE — 94640 AIRWAY INHALATION TREATMENT: CPT

## 2020-01-01 PROCEDURE — 700101 HCHG RX REV CODE 250: Performed by: PSYCHIATRY & NEUROLOGY

## 2020-01-01 PROCEDURE — 0B9C8ZZ DRAINAGE OF RIGHT UPPER LUNG LOBE, VIA NATURAL OR ARTIFICIAL OPENING ENDOSCOPIC: ICD-10-PCS | Performed by: INTERNAL MEDICINE

## 2020-01-01 PROCEDURE — 93005 ELECTROCARDIOGRAM TRACING: CPT | Performed by: FAMILY MEDICINE

## 2020-01-01 PROCEDURE — 97530 THERAPEUTIC ACTIVITIES: CPT

## 2020-01-01 PROCEDURE — 93005 ELECTROCARDIOGRAM TRACING: CPT | Performed by: INTERNAL MEDICINE

## 2020-01-01 PROCEDURE — 80162 ASSAY OF DIGOXIN TOTAL: CPT

## 2020-01-01 PROCEDURE — 700105 HCHG RX REV CODE 258: Performed by: EMERGENCY MEDICINE

## 2020-01-01 PROCEDURE — 85610 PROTHROMBIN TIME: CPT

## 2020-01-01 PROCEDURE — 84484 ASSAY OF TROPONIN QUANT: CPT

## 2020-01-01 PROCEDURE — 71045 X-RAY EXAM CHEST 1 VIEW: CPT

## 2020-01-01 PROCEDURE — 700111 HCHG RX REV CODE 636 W/ 250 OVERRIDE (IP): Performed by: EMERGENCY MEDICINE

## 2020-01-01 PROCEDURE — 99232 SBSQ HOSP IP/OBS MODERATE 35: CPT | Performed by: FAMILY MEDICINE

## 2020-01-01 PROCEDURE — 85027 COMPLETE CBC AUTOMATED: CPT

## 2020-01-01 PROCEDURE — 96375 TX/PRO/DX INJ NEW DRUG ADDON: CPT

## 2020-01-01 PROCEDURE — S9126 HOSPICE CARE, IN THE HOME, P: HCPCS

## 2020-01-01 PROCEDURE — 84145 PROCALCITONIN (PCT): CPT

## 2020-01-01 PROCEDURE — 87205 SMEAR GRAM STAIN: CPT

## 2020-01-01 PROCEDURE — 306565 RIGID MIT RESTRAINT(PAIR): Performed by: HOSPITALIST

## 2020-01-01 PROCEDURE — 6650990 HC HOSPICE AND HOME CARE RX REV CODE 0250

## 2020-01-01 PROCEDURE — G0299 HHS/HOSPICE OF RN EA 15 MIN: HCPCS

## 2020-01-01 PROCEDURE — 70450 CT HEAD/BRAIN W/O DYE: CPT

## 2020-01-01 PROCEDURE — 87502 INFLUENZA DNA AMP PROBE: CPT

## 2020-01-01 PROCEDURE — 99233 SBSQ HOSP IP/OBS HIGH 50: CPT | Performed by: INTERNAL MEDICINE

## 2020-01-01 PROCEDURE — 99239 HOSP IP/OBS DSCHRG MGMT >30: CPT | Performed by: HOSPITALIST

## 2020-01-01 PROCEDURE — 665036 HSPC NOTICE OF ELECTION NOE

## 2020-01-01 PROCEDURE — A4357 BEDSIDE DRAINAGE BAG: HCPCS | Performed by: HOSPITALIST

## 2020-01-01 PROCEDURE — 85007 BL SMEAR W/DIFF WBC COUNT: CPT

## 2020-01-01 PROCEDURE — 85730 THROMBOPLASTIN TIME PARTIAL: CPT

## 2020-01-01 PROCEDURE — 303105 HCHG CATHETER EXTRA

## 2020-01-01 PROCEDURE — 700101 HCHG RX REV CODE 250

## 2020-01-01 PROCEDURE — 83605 ASSAY OF LACTIC ACID: CPT | Mod: 91

## 2020-01-01 PROCEDURE — 97161 PT EVAL LOW COMPLEX 20 MIN: CPT

## 2020-01-01 PROCEDURE — 83880 ASSAY OF NATRIURETIC PEPTIDE: CPT

## 2020-01-01 PROCEDURE — 99291 CRITICAL CARE FIRST HOUR: CPT | Mod: 25 | Performed by: INTERNAL MEDICINE

## 2020-01-01 PROCEDURE — 0BH17EZ INSERTION OF ENDOTRACHEAL AIRWAY INTO TRACHEA, VIA NATURAL OR ARTIFICIAL OPENING: ICD-10-PCS | Performed by: EMERGENCY MEDICINE

## 2020-01-01 PROCEDURE — 87147 CULTURE TYPE IMMUNOLOGIC: CPT

## 2020-01-01 PROCEDURE — 51702 INSERT TEMP BLADDER CATH: CPT

## 2020-01-01 PROCEDURE — 31645 BRNCHSC W/THER ASPIR 1ST: CPT | Performed by: INTERNAL MEDICINE

## 2020-01-01 PROCEDURE — 99222 1ST HOSP IP/OBS MODERATE 55: CPT | Performed by: INTERNAL MEDICINE

## 2020-01-01 PROCEDURE — 306565 RIGID MIT RESTRAINT(PAIR): Performed by: INTERNAL MEDICINE

## 2020-01-01 PROCEDURE — 99233 SBSQ HOSP IP/OBS HIGH 50: CPT | Performed by: FAMILY MEDICINE

## 2020-01-01 PROCEDURE — 97535 SELF CARE MNGMENT TRAINING: CPT

## 2020-01-01 PROCEDURE — 94002 VENT MGMT INPAT INIT DAY: CPT

## 2020-01-01 PROCEDURE — 302978 HCHG BRONCHOSCOPY-DIAGNOSTIC

## 2020-01-01 PROCEDURE — 770001 HCHG ROOM/CARE - MED/SURG/GYN PRIV*

## 2020-01-01 PROCEDURE — 87186 SC STD MICRODIL/AGAR DIL: CPT

## 2020-01-01 PROCEDURE — 87086 URINE CULTURE/COLONY COUNT: CPT

## 2020-01-01 PROCEDURE — 96368 THER/DIAG CONCURRENT INF: CPT

## 2020-01-01 PROCEDURE — 87070 CULTURE OTHR SPECIMN AEROBIC: CPT

## 2020-01-01 PROCEDURE — 36556 INSERT NON-TUNNEL CV CATH: CPT

## 2020-01-01 PROCEDURE — 96365 THER/PROPH/DIAG IV INF INIT: CPT

## 2020-01-01 PROCEDURE — 99232 SBSQ HOSP IP/OBS MODERATE 35: CPT | Performed by: INTERNAL MEDICINE

## 2020-01-01 PROCEDURE — 83605 ASSAY OF LACTIC ACID: CPT

## 2020-01-01 PROCEDURE — 93306 TTE W/DOPPLER COMPLETE: CPT

## 2020-01-01 PROCEDURE — 0B9F8ZZ DRAINAGE OF RIGHT LOWER LUNG LOBE, VIA NATURAL OR ARTIFICIAL OPENING ENDOSCOPIC: ICD-10-PCS | Performed by: INTERNAL MEDICINE

## 2020-01-01 PROCEDURE — 97162 PT EVAL MOD COMPLEX 30 MIN: CPT

## 2020-01-01 PROCEDURE — 99223 1ST HOSP IP/OBS HIGH 75: CPT | Mod: AI | Performed by: INTERNAL MEDICINE

## 2020-01-01 PROCEDURE — 700111 HCHG RX REV CODE 636 W/ 250 OVERRIDE (IP): Performed by: FAMILY MEDICINE

## 2020-01-01 PROCEDURE — 89051 BODY FLUID CELL COUNT: CPT

## 2020-01-01 PROCEDURE — 96366 THER/PROPH/DIAG IV INF ADDON: CPT

## 2020-01-01 PROCEDURE — 92526 ORAL FUNCTION THERAPY: CPT

## 2020-01-01 PROCEDURE — 99152 MOD SED SAME PHYS/QHP 5/>YRS: CPT

## 2020-01-01 PROCEDURE — 93010 ELECTROCARDIOGRAM REPORT: CPT | Mod: 76 | Performed by: INTERNAL MEDICINE

## 2020-01-01 PROCEDURE — 92610 EVALUATE SWALLOWING FUNCTION: CPT

## 2020-01-01 PROCEDURE — 93306 TTE W/DOPPLER COMPLETE: CPT | Mod: 26 | Performed by: INTERNAL MEDICINE

## 2020-01-01 PROCEDURE — 700101 HCHG RX REV CODE 250: Performed by: EMERGENCY MEDICINE

## 2020-01-01 PROCEDURE — 31500 INSERT EMERGENCY AIRWAY: CPT

## 2020-01-01 PROCEDURE — 5A1945Z RESPIRATORY VENTILATION, 24-96 CONSECUTIVE HOURS: ICD-10-PCS | Performed by: EMERGENCY MEDICINE

## 2020-01-01 PROCEDURE — 99152 MOD SED SAME PHYS/QHP 5/>YRS: CPT | Performed by: INTERNAL MEDICINE

## 2020-01-01 RX ORDER — IPRATROPIUM BROMIDE AND ALBUTEROL SULFATE 2.5; .5 MG/3ML; MG/3ML
3 SOLUTION RESPIRATORY (INHALATION)
Status: DISCONTINUED | OUTPATIENT
Start: 2020-01-01 | End: 2020-01-01

## 2020-01-01 RX ORDER — BISACODYL 10 MG
10 SUPPOSITORY, RECTAL RECTAL
Status: DISCONTINUED | OUTPATIENT
Start: 2020-01-01 | End: 2020-01-01

## 2020-01-01 RX ORDER — SODIUM CHLORIDE 9 MG/ML
1000 INJECTION, SOLUTION INTRAVENOUS ONCE
Status: COMPLETED | OUTPATIENT
Start: 2020-01-01 | End: 2020-01-01

## 2020-01-01 RX ORDER — DIPHENHYDRAMINE HYDROCHLORIDE 50 MG/ML
12.5 INJECTION INTRAMUSCULAR; INTRAVENOUS ONCE
Status: COMPLETED | OUTPATIENT
Start: 2020-01-01 | End: 2020-01-01

## 2020-01-01 RX ORDER — MAGNESIUM SULFATE HEPTAHYDRATE 40 MG/ML
2 INJECTION, SOLUTION INTRAVENOUS ONCE
Status: COMPLETED | OUTPATIENT
Start: 2020-01-01 | End: 2020-01-01

## 2020-01-01 RX ORDER — FUROSEMIDE 20 MG/1
20 TABLET ORAL DAILY
Qty: 60 TAB
Start: 2020-01-01 | End: 2020-01-01

## 2020-01-01 RX ORDER — PROPOFOL 10 MG/ML
90 INJECTION, EMULSION INTRAVENOUS ONCE
Status: COMPLETED | OUTPATIENT
Start: 2020-01-01 | End: 2020-01-01

## 2020-01-01 RX ORDER — DILTIAZEM HYDROCHLORIDE 120 MG/1
120 TABLET, FILM COATED ORAL EVERY 8 HOURS
Status: ON HOLD | COMMUNITY
End: 2020-01-01

## 2020-01-01 RX ORDER — ACETAMINOPHEN 325 MG/1
650 TABLET ORAL EVERY 6 HOURS PRN
Status: DISCONTINUED | OUTPATIENT
Start: 2020-01-01 | End: 2020-01-01 | Stop reason: HOSPADM

## 2020-01-01 RX ORDER — DIGOXIN 0.25 MG/ML
125 INJECTION INTRAMUSCULAR; INTRAVENOUS DAILY
Status: DISCONTINUED | OUTPATIENT
Start: 2020-01-01 | End: 2020-01-01

## 2020-01-01 RX ORDER — OMEPRAZOLE 20 MG/1
20 CAPSULE, DELAYED RELEASE ORAL DAILY
Status: ON HOLD | COMMUNITY
End: 2020-01-01

## 2020-01-01 RX ORDER — ECHINACEA PURPUREA EXTRACT 125 MG
2 TABLET ORAL
Qty: 1 BOTTLE | Refills: 3 | Status: ON HOLD | OUTPATIENT
Start: 2020-01-01 | End: 2020-01-01

## 2020-01-01 RX ORDER — SODIUM CHLORIDE 9 MG/ML
INJECTION, SOLUTION INTRAVENOUS
Status: COMPLETED
Start: 2020-01-01 | End: 2020-01-01

## 2020-01-01 RX ORDER — ONDANSETRON 2 MG/ML
4 INJECTION INTRAMUSCULAR; INTRAVENOUS EVERY 4 HOURS PRN
Status: DISCONTINUED | OUTPATIENT
Start: 2020-01-01 | End: 2020-01-01 | Stop reason: HOSPADM

## 2020-01-01 RX ORDER — LABETALOL HYDROCHLORIDE 5 MG/ML
10 INJECTION, SOLUTION INTRAVENOUS EVERY 4 HOURS PRN
Status: DISCONTINUED | OUTPATIENT
Start: 2020-01-01 | End: 2020-01-01

## 2020-01-01 RX ORDER — SODIUM CHLORIDE 9 MG/ML
500 INJECTION, SOLUTION INTRAVENOUS ONCE
Status: COMPLETED | OUTPATIENT
Start: 2020-01-01 | End: 2020-01-01

## 2020-01-01 RX ORDER — DIGOXIN 125 MCG
125 TABLET ORAL DAILY
Status: ON HOLD | COMMUNITY
End: 2020-01-01

## 2020-01-01 RX ORDER — KETOCONAZOLE 20 MG/G
CREAM TOPICAL 2 TIMES DAILY
Status: DISCONTINUED | OUTPATIENT
Start: 2020-01-01 | End: 2020-01-01 | Stop reason: HOSPADM

## 2020-01-01 RX ORDER — MORPHINE SULFATE 10 MG/ML
10 INJECTION, SOLUTION INTRAMUSCULAR; INTRAVENOUS
Status: DISCONTINUED | OUTPATIENT
Start: 2020-01-01 | End: 2020-01-01

## 2020-01-01 RX ORDER — TRAZODONE HYDROCHLORIDE 50 MG/1
50 TABLET ORAL
Status: DISCONTINUED | OUTPATIENT
Start: 2020-01-01 | End: 2020-01-01

## 2020-01-01 RX ORDER — ACETYLCYSTEINE 200 MG/ML
3 SOLUTION ORAL; RESPIRATORY (INHALATION) PRN
Status: ACTIVE | OUTPATIENT
Start: 2020-01-01 | End: 2020-01-01

## 2020-01-01 RX ORDER — AMOXICILLIN 250 MG
2 CAPSULE ORAL 2 TIMES DAILY
Status: DISCONTINUED | OUTPATIENT
Start: 2020-01-01 | End: 2020-01-01

## 2020-01-01 RX ORDER — IPRATROPIUM BROMIDE AND ALBUTEROL SULFATE 2.5; .5 MG/3ML; MG/3ML
SOLUTION RESPIRATORY (INHALATION)
Status: COMPLETED
Start: 2020-01-01 | End: 2020-01-01

## 2020-01-01 RX ORDER — PREDNISONE 10 MG/1
40 TABLET ORAL DAILY
Status: DISCONTINUED | OUTPATIENT
Start: 2020-01-01 | End: 2020-01-01

## 2020-01-01 RX ORDER — OMEPRAZOLE 20 MG/1
20 CAPSULE, DELAYED RELEASE ORAL DAILY
Qty: 30 CAP
Start: 2020-01-01 | End: 2020-01-01

## 2020-01-01 RX ORDER — TRAZODONE HYDROCHLORIDE 50 MG/1
50 TABLET ORAL
Status: DISCONTINUED | OUTPATIENT
Start: 2020-01-01 | End: 2020-01-01 | Stop reason: HOSPADM

## 2020-01-01 RX ORDER — DIGOXIN 125 MCG
125 TABLET ORAL DAILY
Status: DISCONTINUED | OUTPATIENT
Start: 2020-01-01 | End: 2020-01-01 | Stop reason: HOSPADM

## 2020-01-01 RX ORDER — MORPHINE SULFATE 100 MG/5ML
10 SOLUTION ORAL
Status: DISCONTINUED | OUTPATIENT
Start: 2020-01-01 | End: 2020-01-01 | Stop reason: HOSPADM

## 2020-01-01 RX ORDER — SODIUM CHLORIDE, SODIUM LACTATE, POTASSIUM CHLORIDE, CALCIUM CHLORIDE 600; 310; 30; 20 MG/100ML; MG/100ML; MG/100ML; MG/100ML
1500 INJECTION, SOLUTION INTRAVENOUS ONCE
Status: COMPLETED | OUTPATIENT
Start: 2020-01-01 | End: 2020-01-01

## 2020-01-01 RX ORDER — PREDNISONE 20 MG/1
40 TABLET ORAL DAILY
Status: DISCONTINUED | OUTPATIENT
Start: 2020-01-01 | End: 2020-01-01

## 2020-01-01 RX ORDER — FUROSEMIDE 20 MG/1
20 TABLET ORAL
Status: DISCONTINUED | OUTPATIENT
Start: 2020-01-01 | End: 2020-01-01

## 2020-01-01 RX ORDER — ONDANSETRON 4 MG/1
4 TABLET, ORALLY DISINTEGRATING ORAL EVERY 4 HOURS PRN
Status: DISCONTINUED | OUTPATIENT
Start: 2020-01-01 | End: 2020-01-01

## 2020-01-01 RX ORDER — BUDESONIDE AND FORMOTEROL FUMARATE DIHYDRATE 160; 4.5 UG/1; UG/1
2 AEROSOL RESPIRATORY (INHALATION) 2 TIMES DAILY
Status: DISCONTINUED | OUTPATIENT
Start: 2020-01-01 | End: 2020-01-01

## 2020-01-01 RX ORDER — LINEZOLID 600 MG/1
600 TABLET, FILM COATED ORAL EVERY 12 HOURS
Status: DISCONTINUED | OUTPATIENT
Start: 2020-01-01 | End: 2020-01-01

## 2020-01-01 RX ORDER — DIGOXIN 125 MCG
125 TABLET ORAL DAILY
Status: DISCONTINUED | OUTPATIENT
Start: 2020-01-01 | End: 2020-01-01

## 2020-01-01 RX ORDER — METHYLPREDNISOLONE SODIUM SUCCINATE 125 MG/2ML
62.5 INJECTION, POWDER, LYOPHILIZED, FOR SOLUTION INTRAMUSCULAR; INTRAVENOUS EVERY 6 HOURS
Status: DISCONTINUED | OUTPATIENT
Start: 2020-01-01 | End: 2020-01-01

## 2020-01-01 RX ORDER — FUROSEMIDE 10 MG/ML
20 INJECTION INTRAMUSCULAR; INTRAVENOUS ONCE
Status: COMPLETED | OUTPATIENT
Start: 2020-01-01 | End: 2020-01-01

## 2020-01-01 RX ORDER — PREDNISONE 20 MG/1
20 TABLET ORAL DAILY
Status: COMPLETED | OUTPATIENT
Start: 2020-01-01 | End: 2020-01-01

## 2020-01-01 RX ORDER — AMLODIPINE BESYLATE 10 MG/1
10 TABLET ORAL
Status: DISCONTINUED | OUTPATIENT
Start: 2020-01-01 | End: 2020-01-01

## 2020-01-01 RX ORDER — ETOMIDATE 2 MG/ML
20 INJECTION INTRAVENOUS ONCE
Status: COMPLETED | OUTPATIENT
Start: 2020-01-01 | End: 2020-01-01

## 2020-01-01 RX ORDER — HALOPERIDOL 5 MG/ML
2.5 INJECTION INTRAMUSCULAR ONCE
Status: COMPLETED | OUTPATIENT
Start: 2020-01-01 | End: 2020-01-01

## 2020-01-01 RX ORDER — BUDESONIDE AND FORMOTEROL FUMARATE DIHYDRATE 160; 4.5 UG/1; UG/1
2 AEROSOL RESPIRATORY (INHALATION) 2 TIMES DAILY
Status: ON HOLD | COMMUNITY
End: 2020-01-01

## 2020-01-01 RX ORDER — METOPROLOL TARTRATE 1 MG/ML
5 INJECTION, SOLUTION INTRAVENOUS EVERY 4 HOURS
Status: DISPENSED | OUTPATIENT
Start: 2020-01-01 | End: 2020-01-01

## 2020-01-01 RX ORDER — DILTIAZEM HYDROCHLORIDE 120 MG/1
120 TABLET, FILM COATED ORAL EVERY 8 HOURS
Status: DISCONTINUED | OUTPATIENT
Start: 2020-01-01 | End: 2020-01-01 | Stop reason: HOSPADM

## 2020-01-01 RX ORDER — FUROSEMIDE 10 MG/ML
40 INJECTION INTRAMUSCULAR; INTRAVENOUS EVERY 8 HOURS
Status: DISCONTINUED | OUTPATIENT
Start: 2020-01-01 | End: 2020-01-01

## 2020-01-01 RX ORDER — MAGNESIUM SULFATE HEPTAHYDRATE 40 MG/ML
2 INJECTION, SOLUTION INTRAVENOUS ONCE
Status: DISCONTINUED | OUTPATIENT
Start: 2020-01-01 | End: 2020-01-01

## 2020-01-01 RX ORDER — HALOPERIDOL 5 MG/ML
2-5 INJECTION INTRAMUSCULAR EVERY 4 HOURS PRN
Status: DISCONTINUED | OUTPATIENT
Start: 2020-01-01 | End: 2020-01-01 | Stop reason: HOSPADM

## 2020-01-01 RX ORDER — TRIAMCINOLONE ACETONIDE 0.25 MG/G
CREAM TOPICAL 2 TIMES DAILY
Status: DISCONTINUED | OUTPATIENT
Start: 2020-01-01 | End: 2020-01-01

## 2020-01-01 RX ORDER — METOPROLOL TARTRATE 1 MG/ML
5 INJECTION, SOLUTION INTRAVENOUS
Status: DISCONTINUED | OUTPATIENT
Start: 2020-01-01 | End: 2020-01-01 | Stop reason: HOSPADM

## 2020-01-01 RX ORDER — AZITHROMYCIN 500 MG/1
500 INJECTION, POWDER, LYOPHILIZED, FOR SOLUTION INTRAVENOUS ONCE
Status: DISCONTINUED | OUTPATIENT
Start: 2020-01-01 | End: 2020-01-01

## 2020-01-01 RX ORDER — GLYCOPYRROLATE 0.2 MG/ML
0.2 INJECTION INTRAMUSCULAR; INTRAVENOUS 3 TIMES DAILY PRN
Status: DISCONTINUED | OUTPATIENT
Start: 2020-01-01 | End: 2020-01-01 | Stop reason: HOSPADM

## 2020-01-01 RX ORDER — ALBUTEROL SULFATE 90 UG/1
2 AEROSOL, METERED RESPIRATORY (INHALATION) EVERY 6 HOURS PRN
Status: ON HOLD | COMMUNITY
End: 2020-01-01

## 2020-01-01 RX ORDER — DILTIAZEM HYDROCHLORIDE 5 MG/ML
5 INJECTION INTRAVENOUS ONCE
Status: COMPLETED | OUTPATIENT
Start: 2020-01-01 | End: 2020-01-01

## 2020-01-01 RX ORDER — LORAZEPAM 2 MG/ML
0.5 INJECTION INTRAMUSCULAR
Status: DISCONTINUED | OUTPATIENT
Start: 2020-01-01 | End: 2020-01-01

## 2020-01-01 RX ORDER — AZITHROMYCIN 250 MG/1
500 TABLET, FILM COATED ORAL DAILY
Status: COMPLETED | OUTPATIENT
Start: 2020-01-01 | End: 2020-01-01

## 2020-01-01 RX ORDER — SODIUM CHLORIDE, SODIUM LACTATE, POTASSIUM CHLORIDE, AND CALCIUM CHLORIDE .6; .31; .03; .02 G/100ML; G/100ML; G/100ML; G/100ML
500 INJECTION, SOLUTION INTRAVENOUS ONCE
Status: COMPLETED | OUTPATIENT
Start: 2020-01-01 | End: 2020-01-01

## 2020-01-01 RX ORDER — ENALAPRILAT 1.25 MG/ML
1.25 INJECTION INTRAVENOUS EVERY 6 HOURS PRN
Status: DISCONTINUED | OUTPATIENT
Start: 2020-01-01 | End: 2020-01-01

## 2020-01-01 RX ORDER — POLYETHYLENE GLYCOL 3350 17 G/17G
1 POWDER, FOR SOLUTION ORAL
Status: DISCONTINUED | OUTPATIENT
Start: 2020-01-01 | End: 2020-01-01

## 2020-01-01 RX ORDER — KETOCONAZOLE 20 MG/G
1 CREAM TOPICAL 2 TIMES DAILY
Qty: 1 TUBE | Refills: 0 | Status: ON HOLD
Start: 2020-01-01 | End: 2020-01-01

## 2020-01-01 RX ORDER — PREDNISONE 1 MG/1
1 TABLET ORAL DAILY
Status: DISCONTINUED | OUTPATIENT
Start: 2020-01-01 | End: 2020-01-01

## 2020-01-01 RX ORDER — IPRATROPIUM BROMIDE AND ALBUTEROL SULFATE 2.5; .5 MG/3ML; MG/3ML
3 SOLUTION RESPIRATORY (INHALATION) EVERY 6 HOURS
Status: ON HOLD | COMMUNITY
End: 2020-01-01

## 2020-01-01 RX ORDER — AMOXICILLIN AND CLAVULANATE POTASSIUM 500; 125 MG/1; MG/1
1 TABLET, FILM COATED ORAL EVERY 8 HOURS
Status: ON HOLD | COMMUNITY
Start: 2020-01-01 | End: 2020-01-01

## 2020-01-01 RX ORDER — SODIUM CHLORIDE, SODIUM LACTATE, POTASSIUM CHLORIDE, AND CALCIUM CHLORIDE .6; .31; .03; .02 G/100ML; G/100ML; G/100ML; G/100ML
1000 INJECTION, SOLUTION INTRAVENOUS
Status: DISCONTINUED | OUTPATIENT
Start: 2020-01-01 | End: 2020-01-01

## 2020-01-01 RX ORDER — GLYCOPYRROLATE 1 MG/1
1 TABLET ORAL 3 TIMES DAILY PRN
Status: DISCONTINUED | OUTPATIENT
Start: 2020-01-01 | End: 2020-01-01 | Stop reason: HOSPADM

## 2020-01-01 RX ORDER — METHYLPREDNISOLONE SODIUM SUCCINATE 125 MG/2ML
125 INJECTION, POWDER, LYOPHILIZED, FOR SOLUTION INTRAMUSCULAR; INTRAVENOUS ONCE
Status: COMPLETED | OUTPATIENT
Start: 2020-01-01 | End: 2020-01-01

## 2020-01-01 RX ORDER — IPRATROPIUM BROMIDE AND ALBUTEROL SULFATE 2.5; .5 MG/3ML; MG/3ML
3 SOLUTION RESPIRATORY (INHALATION) EVERY 6 HOURS PRN
Status: ON HOLD | COMMUNITY
End: 2020-01-01

## 2020-01-01 RX ORDER — LINEZOLID 2 MG/ML
600 INJECTION, SOLUTION INTRAVENOUS EVERY 12 HOURS
Status: DISCONTINUED | OUTPATIENT
Start: 2020-01-01 | End: 2020-01-01

## 2020-01-01 RX ORDER — FUROSEMIDE 10 MG/ML
40 INJECTION INTRAMUSCULAR; INTRAVENOUS
Status: DISCONTINUED | OUTPATIENT
Start: 2020-01-01 | End: 2020-01-01

## 2020-01-01 RX ORDER — MORPHINE SULFATE 10 MG/ML
5 INJECTION, SOLUTION INTRAMUSCULAR; INTRAVENOUS
Status: DISCONTINUED | OUTPATIENT
Start: 2020-01-01 | End: 2020-01-01 | Stop reason: HOSPADM

## 2020-01-01 RX ORDER — ATROPINE SULFATE 10 MG/ML
2 SOLUTION/ DROPS OPHTHALMIC EVERY 4 HOURS PRN
Status: DISCONTINUED | OUTPATIENT
Start: 2020-01-01 | End: 2020-01-01

## 2020-01-01 RX ORDER — ATROPINE SULFATE 10 MG/ML
2 SOLUTION/ DROPS OPHTHALMIC EVERY 4 HOURS PRN
Status: DISCONTINUED | OUTPATIENT
Start: 2020-01-01 | End: 2020-01-01 | Stop reason: HOSPADM

## 2020-01-01 RX ORDER — LIDOCAINE HYDROCHLORIDE 20 MG/ML
5 INJECTION, SOLUTION INFILTRATION; PERINEURAL PRN
Status: ACTIVE | OUTPATIENT
Start: 2020-01-01 | End: 2020-01-01

## 2020-01-01 RX ORDER — ADENOSINE 3 MG/ML
INJECTION, SOLUTION INTRAVENOUS
Status: DISCONTINUED
Start: 2020-01-01 | End: 2020-01-01

## 2020-01-01 RX ORDER — FUROSEMIDE 20 MG/1
20 TABLET ORAL DAILY
Status: ON HOLD | COMMUNITY
End: 2020-01-01

## 2020-01-01 RX ORDER — FUROSEMIDE 10 MG/ML
40 INJECTION INTRAMUSCULAR; INTRAVENOUS ONCE
Status: COMPLETED | OUTPATIENT
Start: 2020-01-01 | End: 2020-01-01

## 2020-01-01 RX ORDER — TRAZODONE HYDROCHLORIDE 50 MG/1
50 TABLET ORAL NIGHTLY
Status: ON HOLD | COMMUNITY
End: 2020-01-01

## 2020-01-01 RX ORDER — DEXMEDETOMIDINE HYDROCHLORIDE 4 UG/ML
.1-.7 INJECTION, SOLUTION INTRAVENOUS CONTINUOUS
Status: DISCONTINUED | OUTPATIENT
Start: 2020-01-01 | End: 2020-01-01

## 2020-01-01 RX ORDER — IPRATROPIUM BROMIDE AND ALBUTEROL SULFATE 2.5; .5 MG/3ML; MG/3ML
3 SOLUTION RESPIRATORY (INHALATION)
Status: DISCONTINUED | OUTPATIENT
Start: 2020-01-01 | End: 2020-01-01 | Stop reason: HOSPADM

## 2020-01-01 RX ORDER — TRAZODONE HYDROCHLORIDE 50 MG/1
50 TABLET ORAL
Qty: 30 TAB | Refills: 3 | Status: SHIPPED | OUTPATIENT
Start: 2020-01-01 | End: 2020-01-01

## 2020-01-01 RX ORDER — OMEPRAZOLE 20 MG/1
20 CAPSULE, DELAYED RELEASE ORAL DAILY
Status: DISCONTINUED | OUTPATIENT
Start: 2020-01-01 | End: 2020-01-01 | Stop reason: HOSPADM

## 2020-01-01 RX ORDER — FAMOTIDINE 20 MG/1
20 TABLET, FILM COATED ORAL 2 TIMES DAILY
Status: DISCONTINUED | OUTPATIENT
Start: 2020-01-01 | End: 2020-01-01

## 2020-01-01 RX ORDER — DEXMEDETOMIDINE HYDROCHLORIDE 4 UG/ML
.1-1.5 INJECTION, SOLUTION INTRAVENOUS CONTINUOUS
Status: DISCONTINUED | OUTPATIENT
Start: 2020-01-01 | End: 2020-01-01

## 2020-01-01 RX ORDER — ECHINACEA PURPUREA EXTRACT 125 MG
2 TABLET ORAL
Status: DISCONTINUED | OUTPATIENT
Start: 2020-01-01 | End: 2020-01-01 | Stop reason: HOSPADM

## 2020-01-01 RX ORDER — METOPROLOL TARTRATE 1 MG/ML
5 INJECTION, SOLUTION INTRAVENOUS ONCE
Status: COMPLETED | OUTPATIENT
Start: 2020-01-01 | End: 2020-01-01

## 2020-01-01 RX ORDER — SODIUM CHLORIDE, SODIUM LACTATE, POTASSIUM CHLORIDE, CALCIUM CHLORIDE 600; 310; 30; 20 MG/100ML; MG/100ML; MG/100ML; MG/100ML
1000 INJECTION, SOLUTION INTRAVENOUS ONCE
Status: COMPLETED | OUTPATIENT
Start: 2020-01-01 | End: 2020-01-01

## 2020-01-01 RX ORDER — METHYLPREDNISOLONE SODIUM SUCCINATE 40 MG/ML
40 INJECTION, POWDER, LYOPHILIZED, FOR SOLUTION INTRAMUSCULAR; INTRAVENOUS EVERY 12 HOURS
Status: DISCONTINUED | OUTPATIENT
Start: 2020-01-01 | End: 2020-01-01

## 2020-01-01 RX ORDER — SUCCINYLCHOLINE CHLORIDE 20 MG/ML
100 INJECTION INTRAMUSCULAR; INTRAVENOUS ONCE
Status: COMPLETED | OUTPATIENT
Start: 2020-01-01 | End: 2020-01-01

## 2020-01-01 RX ORDER — TRIAMCINOLONE ACETONIDE 5 MG/G
CREAM TOPICAL DAILY
Status: DISCONTINUED | OUTPATIENT
Start: 2020-01-01 | End: 2020-01-01

## 2020-01-01 RX ORDER — FUROSEMIDE 10 MG/ML
20 INJECTION INTRAMUSCULAR; INTRAVENOUS
Status: DISCONTINUED | OUTPATIENT
Start: 2020-01-01 | End: 2020-01-01

## 2020-01-01 RX ORDER — ONDANSETRON 4 MG/1
4 TABLET, ORALLY DISINTEGRATING ORAL EVERY 4 HOURS PRN
Status: DISCONTINUED | OUTPATIENT
Start: 2020-01-01 | End: 2020-01-01 | Stop reason: HOSPADM

## 2020-01-01 RX ORDER — SCOLOPAMINE TRANSDERMAL SYSTEM 1 MG/1
1 PATCH, EXTENDED RELEASE TRANSDERMAL
Status: DISCONTINUED | OUTPATIENT
Start: 2020-01-01 | End: 2020-01-01 | Stop reason: HOSPADM

## 2020-01-01 RX ORDER — DILTIAZEM HYDROCHLORIDE 120 MG/1
120 CAPSULE, COATED, EXTENDED RELEASE ORAL ONCE
Status: ACTIVE | OUTPATIENT
Start: 2020-01-01 | End: 2020-01-01

## 2020-01-01 RX ORDER — ACETAZOLAMIDE 250 MG/1
500 TABLET ORAL ONCE
Status: COMPLETED | OUTPATIENT
Start: 2020-01-01 | End: 2020-01-01

## 2020-01-01 RX ORDER — DILTIAZEM HYDROCHLORIDE 120 MG/1
120 TABLET, FILM COATED ORAL EVERY 8 HOURS
Qty: 90 TAB
Start: 2020-01-01 | End: 2020-01-01

## 2020-01-01 RX ORDER — BUDESONIDE AND FORMOTEROL FUMARATE DIHYDRATE 160; 4.5 UG/1; UG/1
2 AEROSOL RESPIRATORY (INHALATION)
Status: DISCONTINUED | OUTPATIENT
Start: 2020-01-01 | End: 2020-01-01

## 2020-01-01 RX ORDER — SIMVASTATIN 10 MG
10 TABLET ORAL NIGHTLY
Status: DISCONTINUED | OUTPATIENT
Start: 2020-01-01 | End: 2020-01-01

## 2020-01-01 RX ORDER — LISINOPRIL 5 MG/1
5 TABLET ORAL
Status: DISCONTINUED | OUTPATIENT
Start: 2020-01-01 | End: 2020-01-01

## 2020-01-01 RX ORDER — MULTIVIT WITH MINERALS/LUTEIN
1000 TABLET ORAL DAILY
Status: ON HOLD | COMMUNITY
End: 2020-01-01

## 2020-01-01 RX ORDER — SIMVASTATIN 20 MG
10 TABLET ORAL NIGHTLY
Status: DISCONTINUED | OUTPATIENT
Start: 2020-01-01 | End: 2020-01-01

## 2020-01-01 RX ORDER — FUROSEMIDE 20 MG/1
20 TABLET ORAL
Status: DISCONTINUED | OUTPATIENT
Start: 2020-01-01 | End: 2020-01-01 | Stop reason: HOSPADM

## 2020-01-01 RX ORDER — DIGOXIN 125 MCG
125 TABLET ORAL DAILY
Qty: 30 TAB
Start: 2020-01-01 | End: 2020-01-01

## 2020-01-01 RX ORDER — LIDOCAINE HYDROCHLORIDE 20 MG/ML
5 SOLUTION OROPHARYNGEAL PRN
Status: ACTIVE | OUTPATIENT
Start: 2020-01-01 | End: 2020-01-01

## 2020-01-01 RX ADMIN — DEXMEDETOMIDINE HYDROCHLORIDE 0.8 MCG/KG/HR: 100 INJECTION, SOLUTION INTRAVENOUS at 09:50

## 2020-01-01 RX ADMIN — FUROSEMIDE 20 MG: 10 INJECTION, SOLUTION INTRAMUSCULAR; INTRAVENOUS at 06:00

## 2020-01-01 RX ADMIN — METHYLPREDNISOLONE SODIUM SUCCINATE 40 MG: 40 INJECTION, POWDER, FOR SOLUTION INTRAMUSCULAR; INTRAVENOUS at 18:53

## 2020-01-01 RX ADMIN — AMLODIPINE BESYLATE 10 MG: 10 TABLET ORAL at 02:04

## 2020-01-01 RX ADMIN — FENTANYL CITRATE 100 MCG: 0.05 INJECTION, SOLUTION INTRAMUSCULAR; INTRAVENOUS at 00:13

## 2020-01-01 RX ADMIN — IPRATROPIUM BROMIDE AND ALBUTEROL SULFATE 3 ML: .5; 3 SOLUTION RESPIRATORY (INHALATION) at 14:52

## 2020-01-01 RX ADMIN — SENNOSIDES AND DOCUSATE SODIUM 2 TABLET: 8.6; 5 TABLET ORAL at 04:56

## 2020-01-01 RX ADMIN — MELATONIN 1000 UNITS: at 18:00

## 2020-01-01 RX ADMIN — ACETAZOLAMIDE 500 MG: 500 INJECTION, POWDER, LYOPHILIZED, FOR SOLUTION INTRAVENOUS at 14:42

## 2020-01-01 RX ADMIN — DILTIAZEM HYDROCHLORIDE 120 MG: 30 TABLET, FILM COATED ORAL at 22:11

## 2020-01-01 RX ADMIN — DEXMEDETOMIDINE HYDROCHLORIDE 0.5 MCG/KG/HR: 100 INJECTION, SOLUTION INTRAVENOUS at 16:31

## 2020-01-01 RX ADMIN — DIGOXIN 125 MCG: 125 TABLET ORAL at 16:53

## 2020-01-01 RX ADMIN — DILTIAZEM HYDROCHLORIDE 120 MG: 30 TABLET, FILM COATED ORAL at 20:58

## 2020-01-01 RX ADMIN — KETOCONAZOLE: 20 CREAM TOPICAL at 17:46

## 2020-01-01 RX ADMIN — DILTIAZEM HYDROCHLORIDE 60 MG: 60 TABLET, FILM COATED ORAL at 14:08

## 2020-01-01 RX ADMIN — SIMVASTATIN 10 MG: 10 TABLET, FILM COATED ORAL at 19:39

## 2020-01-01 RX ADMIN — TRIAMCINOLONE ACETONIDE: 5 CREAM TOPICAL at 17:20

## 2020-01-01 RX ADMIN — FUROSEMIDE 40 MG: 10 INJECTION, SOLUTION INTRAVENOUS at 07:56

## 2020-01-01 RX ADMIN — TRAZODONE HYDROCHLORIDE 50 MG: 50 TABLET ORAL at 21:09

## 2020-01-01 RX ADMIN — IPRATROPIUM BROMIDE AND ALBUTEROL SULFATE 3 ML: .5; 3 SOLUTION RESPIRATORY (INHALATION) at 06:46

## 2020-01-01 RX ADMIN — DIGOXIN 125 MCG: 125 TABLET ORAL at 17:07

## 2020-01-01 RX ADMIN — IPRATROPIUM BROMIDE AND ALBUTEROL SULFATE 3 ML: .5; 3 SOLUTION RESPIRATORY (INHALATION) at 06:44

## 2020-01-01 RX ADMIN — FUROSEMIDE 20 MG: 20 TABLET ORAL at 06:37

## 2020-01-01 RX ADMIN — LINEZOLID 600 MG: 600 TABLET, FILM COATED ORAL at 16:53

## 2020-01-01 RX ADMIN — IPRATROPIUM BROMIDE AND ALBUTEROL SULFATE 3 ML: .5; 3 SOLUTION RESPIRATORY (INHALATION) at 10:12

## 2020-01-01 RX ADMIN — TRAZODONE HYDROCHLORIDE 50 MG: 50 TABLET ORAL at 20:57

## 2020-01-01 RX ADMIN — MELATONIN 1000 UNITS: at 05:28

## 2020-01-01 RX ADMIN — LISINOPRIL 5 MG: 5 TABLET ORAL at 02:19

## 2020-01-01 RX ADMIN — LISINOPRIL 5 MG: 5 TABLET ORAL at 05:07

## 2020-01-01 RX ADMIN — GLYCOPYRROLATE 1 CAPSULE: 15.6 CAPSULE RESPIRATORY (INHALATION) at 17:55

## 2020-01-01 RX ADMIN — METHYLPREDNISOLONE SODIUM SUCCINATE 62.5 MG: 125 INJECTION, POWDER, FOR SOLUTION INTRAMUSCULAR; INTRAVENOUS at 04:56

## 2020-01-01 RX ADMIN — SODIUM CHLORIDE 500 ML: 9 INJECTION, SOLUTION INTRAVENOUS at 05:26

## 2020-01-01 RX ADMIN — IPRATROPIUM BROMIDE AND ALBUTEROL SULFATE 3 ML: .5; 3 SOLUTION RESPIRATORY (INHALATION) at 00:42

## 2020-01-01 RX ADMIN — SIMVASTATIN 10 MG: 10 TABLET, FILM COATED ORAL at 19:47

## 2020-01-01 RX ADMIN — PREDNISONE 20 MG: 20 TABLET ORAL at 05:13

## 2020-01-01 RX ADMIN — DILTIAZEM HYDROCHLORIDE 120 MG: 30 TABLET, FILM COATED ORAL at 20:49

## 2020-01-01 RX ADMIN — FUROSEMIDE 20 MG: 10 INJECTION, SOLUTION INTRAMUSCULAR; INTRAVENOUS at 16:31

## 2020-01-01 RX ADMIN — SODIUM CHLORIDE, POTASSIUM CHLORIDE, SODIUM LACTATE AND CALCIUM CHLORIDE 1000 ML: 600; 310; 30; 20 INJECTION, SOLUTION INTRAVENOUS at 08:25

## 2020-01-01 RX ADMIN — FUROSEMIDE 20 MG: 20 TABLET ORAL at 06:05

## 2020-01-01 RX ADMIN — PREDNISONE 40 MG: 10 TABLET ORAL at 05:36

## 2020-01-01 RX ADMIN — DILTIAZEM HYDROCHLORIDE 120 MG: 30 TABLET, FILM COATED ORAL at 14:27

## 2020-01-01 RX ADMIN — RIVAROXABAN 20 MG: 20 TABLET, FILM COATED ORAL at 17:41

## 2020-01-01 RX ADMIN — RIVAROXABAN 20 MG: 20 TABLET, FILM COATED ORAL at 17:54

## 2020-01-01 RX ADMIN — ACETAMINOPHEN 650 MG: 325 TABLET, FILM COATED ORAL at 12:27

## 2020-01-01 RX ADMIN — IPRATROPIUM BROMIDE AND ALBUTEROL SULFATE 3 ML: .5; 3 SOLUTION RESPIRATORY (INHALATION) at 20:40

## 2020-01-01 RX ADMIN — ACETAMINOPHEN 650 MG: 325 TABLET, FILM COATED ORAL at 13:38

## 2020-01-01 RX ADMIN — DILTIAZEM HYDROCHLORIDE 120 MG: 30 TABLET, FILM COATED ORAL at 20:27

## 2020-01-01 RX ADMIN — DILTIAZEM HYDROCHLORIDE 60 MG: 60 TABLET, FILM COATED ORAL at 05:36

## 2020-01-01 RX ADMIN — PROPOFOL 90 MG: 10 INJECTION, EMULSION INTRAVENOUS at 18:45

## 2020-01-01 RX ADMIN — IPRATROPIUM BROMIDE AND ALBUTEROL SULFATE 3 ML: .5; 3 SOLUTION RESPIRATORY (INHALATION) at 10:18

## 2020-01-01 RX ADMIN — LINEZOLID 600 MG: 600 TABLET, FILM COATED ORAL at 18:06

## 2020-01-01 RX ADMIN — FAMOTIDINE 20 MG: 10 INJECTION INTRAVENOUS at 18:06

## 2020-01-01 RX ADMIN — IPRATROPIUM BROMIDE AND ALBUTEROL SULFATE 3 ML: .5; 3 SOLUTION RESPIRATORY (INHALATION) at 10:20

## 2020-01-01 RX ADMIN — TRAZODONE HYDROCHLORIDE 50 MG: 50 TABLET ORAL at 22:11

## 2020-01-01 RX ADMIN — FUROSEMIDE 20 MG: 10 INJECTION, SOLUTION INTRAMUSCULAR; INTRAVENOUS at 01:10

## 2020-01-01 RX ADMIN — DIGOXIN 125 MCG: 125 TABLET ORAL at 17:11

## 2020-01-01 RX ADMIN — FENTANYL CITRATE 100 MCG: 0.05 INJECTION, SOLUTION INTRAMUSCULAR; INTRAVENOUS at 03:51

## 2020-01-01 RX ADMIN — FUROSEMIDE 40 MG: 10 INJECTION, SOLUTION INTRAMUSCULAR; INTRAVENOUS at 21:31

## 2020-01-01 RX ADMIN — IPRATROPIUM BROMIDE AND ALBUTEROL SULFATE 3 ML: .5; 3 SOLUTION RESPIRATORY (INHALATION) at 21:32

## 2020-01-01 RX ADMIN — KETOCONAZOLE: 20 CREAM TOPICAL at 06:37

## 2020-01-01 RX ADMIN — IPRATROPIUM BROMIDE AND ALBUTEROL SULFATE 3 ML: .5; 3 SOLUTION RESPIRATORY (INHALATION) at 03:09

## 2020-01-01 RX ADMIN — SODIUM CHLORIDE, POTASSIUM CHLORIDE, SODIUM LACTATE AND CALCIUM CHLORIDE 500 ML: 600; 310; 30; 20 INJECTION, SOLUTION INTRAVENOUS at 15:30

## 2020-01-01 RX ADMIN — TRAZODONE HYDROCHLORIDE 50 MG: 50 TABLET ORAL at 21:26

## 2020-01-01 RX ADMIN — KETOCONAZOLE: 20 CREAM TOPICAL at 05:43

## 2020-01-01 RX ADMIN — RIVAROXABAN 20 MG: 20 TABLET, FILM COATED ORAL at 17:48

## 2020-01-01 RX ADMIN — LINEZOLID 600 MG: 600 TABLET, FILM COATED ORAL at 17:07

## 2020-01-01 RX ADMIN — FUROSEMIDE 20 MG: 20 TABLET ORAL at 05:54

## 2020-01-01 RX ADMIN — SENNOSIDES AND DOCUSATE SODIUM 2 TABLET: 8.6; 5 TABLET ORAL at 05:07

## 2020-01-01 RX ADMIN — SODIUM CHLORIDE 1000 ML: 9 INJECTION, SOLUTION INTRAVENOUS at 23:05

## 2020-01-01 RX ADMIN — DILTIAZEM HYDROCHLORIDE 120 MG: 30 TABLET, FILM COATED ORAL at 12:31

## 2020-01-01 RX ADMIN — KETOCONAZOLE: 20 CREAM TOPICAL at 06:27

## 2020-01-01 RX ADMIN — SALINE NASAL SPRAY 2 SPRAY: 1.5 SOLUTION NASAL at 12:59

## 2020-01-01 RX ADMIN — KETOCONAZOLE: 20 CREAM TOPICAL at 18:07

## 2020-01-01 RX ADMIN — IPRATROPIUM BROMIDE AND ALBUTEROL SULFATE 3 ML: .5; 3 SOLUTION RESPIRATORY (INHALATION) at 11:38

## 2020-01-01 RX ADMIN — DILTIAZEM HYDROCHLORIDE 120 MG: 30 TABLET, FILM COATED ORAL at 21:01

## 2020-01-01 RX ADMIN — TRIAMCINOLONE ACETONIDE: 5 CREAM TOPICAL at 06:01

## 2020-01-01 RX ADMIN — MORPHINE SULFATE 5 MG: 10 INJECTION INTRAVENOUS at 12:43

## 2020-01-01 RX ADMIN — DIGOXIN 125 MCG: 125 TABLET ORAL at 17:42

## 2020-01-01 RX ADMIN — DIGOXIN 125 MCG: 125 TABLET ORAL at 17:24

## 2020-01-01 RX ADMIN — SENNOSIDES AND DOCUSATE SODIUM 2 TABLET: 8.6; 5 TABLET ORAL at 18:09

## 2020-01-01 RX ADMIN — GLYCOPYRROLATE 1 CAPSULE: 15.6 CAPSULE RESPIRATORY (INHALATION) at 12:28

## 2020-01-01 RX ADMIN — DILTIAZEM HYDROCHLORIDE 120 MG: 30 TABLET, FILM COATED ORAL at 21:09

## 2020-01-01 RX ADMIN — TRIAMCINOLONE ACETONIDE: 0.25 CREAM TOPICAL at 05:20

## 2020-01-01 RX ADMIN — DILTIAZEM HYDROCHLORIDE 120 MG: 30 TABLET, FILM COATED ORAL at 11:44

## 2020-01-01 RX ADMIN — BUDESONIDE AND FORMOTEROL FUMARATE DIHYDRATE 2 PUFF: 160; 4.5 AEROSOL RESPIRATORY (INHALATION) at 20:23

## 2020-01-01 RX ADMIN — MELATONIN 1000 UNITS: at 18:06

## 2020-01-01 RX ADMIN — DEXMEDETOMIDINE HYDROCHLORIDE 0.7 MCG/KG/HR: 100 INJECTION, SOLUTION INTRAVENOUS at 03:53

## 2020-01-01 RX ADMIN — PROPOFOL 10 MCG/KG/MIN: 10 INJECTION, EMULSION INTRAVENOUS at 16:52

## 2020-01-01 RX ADMIN — RIVAROXABAN 20 MG: 20 TABLET, FILM COATED ORAL at 17:43

## 2020-01-01 RX ADMIN — FENTANYL CITRATE 50 MCG: 0.05 INJECTION, SOLUTION INTRAMUSCULAR; INTRAVENOUS at 18:30

## 2020-01-01 RX ADMIN — FUROSEMIDE 40 MG: 10 INJECTION, SOLUTION INTRAMUSCULAR; INTRAVENOUS at 05:27

## 2020-01-01 RX ADMIN — FENTANYL CITRATE 100 MCG: 0.05 INJECTION, SOLUTION INTRAMUSCULAR; INTRAVENOUS at 09:58

## 2020-01-01 RX ADMIN — IPRATROPIUM BROMIDE AND ALBUTEROL SULFATE 3 ML: .5; 3 SOLUTION RESPIRATORY (INHALATION) at 06:35

## 2020-01-01 RX ADMIN — PROPOFOL 10 MCG/KG/MIN: 10 INJECTION, EMULSION INTRAVENOUS at 20:58

## 2020-01-01 RX ADMIN — FUROSEMIDE 40 MG: 10 INJECTION, SOLUTION INTRAMUSCULAR; INTRAVENOUS at 14:37

## 2020-01-01 RX ADMIN — OMEPRAZOLE 40 MG: KIT at 05:27

## 2020-01-01 RX ADMIN — BUDESONIDE AND FORMOTEROL FUMARATE DIHYDRATE 2 PUFF: 160; 4.5 AEROSOL RESPIRATORY (INHALATION) at 12:28

## 2020-01-01 RX ADMIN — FAMOTIDINE 20 MG: 20 TABLET ORAL at 05:19

## 2020-01-01 RX ADMIN — IPRATROPIUM BROMIDE AND ALBUTEROL SULFATE 3 ML: .5; 3 SOLUTION RESPIRATORY (INHALATION) at 14:10

## 2020-01-01 RX ADMIN — TRIAMCINOLONE ACETONIDE: 0.25 CREAM TOPICAL at 11:45

## 2020-01-01 RX ADMIN — MELATONIN 1000 UNITS: at 05:07

## 2020-01-01 RX ADMIN — DILTIAZEM HYDROCHLORIDE 120 MG: 30 TABLET, FILM COATED ORAL at 04:50

## 2020-01-01 RX ADMIN — DEXMEDETOMIDINE HYDROCHLORIDE 0.2 MCG/KG/HR: 100 INJECTION, SOLUTION INTRAVENOUS at 23:33

## 2020-01-01 RX ADMIN — LISINOPRIL 5 MG: 5 TABLET ORAL at 05:29

## 2020-01-01 RX ADMIN — DEXMEDETOMIDINE HYDROCHLORIDE 0.4 MCG/KG/HR: 100 INJECTION, SOLUTION INTRAVENOUS at 21:49

## 2020-01-01 RX ADMIN — TRAZODONE HYDROCHLORIDE 50 MG: 50 TABLET ORAL at 20:27

## 2020-01-01 RX ADMIN — DEXMEDETOMIDINE HYDROCHLORIDE 0.7 MCG/KG/HR: 100 INJECTION, SOLUTION INTRAVENOUS at 04:47

## 2020-01-01 RX ADMIN — METHYLPREDNISOLONE SODIUM SUCCINATE 40 MG: 40 INJECTION, POWDER, FOR SOLUTION INTRAMUSCULAR; INTRAVENOUS at 05:17

## 2020-01-01 RX ADMIN — IPRATROPIUM BROMIDE AND ALBUTEROL SULFATE 3 ML: .5; 3 SOLUTION RESPIRATORY (INHALATION) at 06:40

## 2020-01-01 RX ADMIN — LORAZEPAM 0.5 MG: 2 INJECTION INTRAMUSCULAR; INTRAVENOUS at 19:21

## 2020-01-01 RX ADMIN — RIVAROXABAN 20 MG: 20 TABLET, FILM COATED ORAL at 21:28

## 2020-01-01 RX ADMIN — OMEPRAZOLE 20 MG: 20 CAPSULE, DELAYED RELEASE ORAL at 06:21

## 2020-01-01 RX ADMIN — Medication 50 MCG/HR: at 12:52

## 2020-01-01 RX ADMIN — DILTIAZEM HYDROCHLORIDE 60 MG: 60 TABLET, FILM COATED ORAL at 21:53

## 2020-01-01 RX ADMIN — DIGOXIN 125 MCG: 125 TABLET ORAL at 17:10

## 2020-01-01 RX ADMIN — IPRATROPIUM BROMIDE 0.5 MG: 0.5 SOLUTION RESPIRATORY (INHALATION) at 08:29

## 2020-01-01 RX ADMIN — IPRATROPIUM BROMIDE AND ALBUTEROL SULFATE 3 ML: .5; 3 SOLUTION RESPIRATORY (INHALATION) at 14:27

## 2020-01-01 RX ADMIN — METHYLPREDNISOLONE SODIUM SUCCINATE 40 MG: 40 INJECTION, POWDER, FOR SOLUTION INTRAMUSCULAR; INTRAVENOUS at 04:44

## 2020-01-01 RX ADMIN — METHYLPREDNISOLONE SODIUM SUCCINATE 62.5 MG: 125 INJECTION, POWDER, FOR SOLUTION INTRAMUSCULAR; INTRAVENOUS at 06:00

## 2020-01-01 RX ADMIN — DEXMEDETOMIDINE HYDROCHLORIDE 0.9 MCG/KG/HR: 100 INJECTION, SOLUTION INTRAVENOUS at 07:22

## 2020-01-01 RX ADMIN — ACETAZOLAMIDE 500 MG: 250 TABLET ORAL at 05:30

## 2020-01-01 RX ADMIN — SALINE NASAL SPRAY 2 SPRAY: 1.5 SOLUTION NASAL at 05:04

## 2020-01-01 RX ADMIN — SODIUM CHLORIDE, POTASSIUM CHLORIDE, SODIUM LACTATE AND CALCIUM CHLORIDE 500 ML: 600; 310; 30; 20 INJECTION, SOLUTION INTRAVENOUS at 18:10

## 2020-01-01 RX ADMIN — DEXMEDETOMIDINE HYDROCHLORIDE 0.2 MCG/KG/HR: 100 INJECTION, SOLUTION INTRAVENOUS at 09:15

## 2020-01-01 RX ADMIN — DILTIAZEM HYDROCHLORIDE 120 MG: 30 TABLET, FILM COATED ORAL at 03:18

## 2020-01-01 RX ADMIN — FUROSEMIDE 20 MG: 20 TABLET ORAL at 05:21

## 2020-01-01 RX ADMIN — DEXMEDETOMIDINE HYDROCHLORIDE 0.3 MCG/KG/HR: 100 INJECTION, SOLUTION INTRAVENOUS at 08:18

## 2020-01-01 RX ADMIN — PREDNISONE 40 MG: 20 TABLET ORAL at 05:27

## 2020-01-01 RX ADMIN — DIGOXIN 125 MCG: 125 TABLET ORAL at 17:43

## 2020-01-01 RX ADMIN — DEXMEDETOMIDINE HYDROCHLORIDE 0.9 MCG/KG/HR: 100 INJECTION, SOLUTION INTRAVENOUS at 22:28

## 2020-01-01 RX ADMIN — RIVAROXABAN 20 MG: 20 TABLET, FILM COATED ORAL at 17:07

## 2020-01-01 RX ADMIN — DILTIAZEM HYDROCHLORIDE 120 MG: 30 TABLET, FILM COATED ORAL at 11:37

## 2020-01-01 RX ADMIN — OMEPRAZOLE 20 MG: 20 CAPSULE, DELAYED RELEASE ORAL at 05:54

## 2020-01-01 RX ADMIN — IPRATROPIUM BROMIDE AND ALBUTEROL SULFATE 3 ML: .5; 3 SOLUTION RESPIRATORY (INHALATION) at 00:02

## 2020-01-01 RX ADMIN — METHYLPREDNISOLONE SODIUM SUCCINATE 40 MG: 40 INJECTION, POWDER, FOR SOLUTION INTRAMUSCULAR; INTRAVENOUS at 00:06

## 2020-01-01 RX ADMIN — FENTANYL CITRATE 100 MCG: 0.05 INJECTION, SOLUTION INTRAMUSCULAR; INTRAVENOUS at 01:21

## 2020-01-01 RX ADMIN — ACETAMINOPHEN 650 MG: 325 TABLET, FILM COATED ORAL at 17:10

## 2020-01-01 RX ADMIN — RIVAROXABAN 20 MG: 20 TABLET, FILM COATED ORAL at 17:24

## 2020-01-01 RX ADMIN — ACETAMINOPHEN 650 MG: 325 TABLET, FILM COATED ORAL at 22:26

## 2020-01-01 RX ADMIN — DEXMEDETOMIDINE HYDROCHLORIDE 0.6 MCG/KG/HR: 100 INJECTION, SOLUTION INTRAVENOUS at 14:24

## 2020-01-01 RX ADMIN — IPRATROPIUM BROMIDE AND ALBUTEROL SULFATE 3 ML: .5; 3 SOLUTION RESPIRATORY (INHALATION) at 02:25

## 2020-01-01 RX ADMIN — DILTIAZEM HYDROCHLORIDE 5 MG: 5 INJECTION INTRAVENOUS at 21:19

## 2020-01-01 RX ADMIN — IPRATROPIUM BROMIDE AND ALBUTEROL SULFATE 3 ML: .5; 3 SOLUTION RESPIRATORY (INHALATION) at 19:00

## 2020-01-01 RX ADMIN — DIGOXIN 125 MCG: 125 TABLET ORAL at 17:54

## 2020-01-01 RX ADMIN — DILTIAZEM HYDROCHLORIDE 120 MG: 30 TABLET, FILM COATED ORAL at 14:00

## 2020-01-01 RX ADMIN — CEFTRIAXONE SODIUM 2 G: 2 INJECTION, POWDER, FOR SOLUTION INTRAMUSCULAR; INTRAVENOUS at 04:48

## 2020-01-01 RX ADMIN — LINEZOLID 600 MG: 600 TABLET, FILM COATED ORAL at 05:30

## 2020-01-01 RX ADMIN — DILTIAZEM HYDROCHLORIDE 120 MG: 30 TABLET, FILM COATED ORAL at 06:21

## 2020-01-01 RX ADMIN — ENALAPRILAT 1.25 MG: 1.25 INJECTION INTRAVENOUS at 19:39

## 2020-01-01 RX ADMIN — SODIUM CHLORIDE: 9 INJECTION, SOLUTION INTRAVENOUS at 05:30

## 2020-01-01 RX ADMIN — FENTANYL CITRATE 100 MCG: 0.05 INJECTION, SOLUTION INTRAMUSCULAR; INTRAVENOUS at 22:08

## 2020-01-01 RX ADMIN — SIMVASTATIN 10 MG: 10 TABLET, FILM COATED ORAL at 22:38

## 2020-01-01 RX ADMIN — AZITHROMYCIN MONOHYDRATE 500 MG: 500 INJECTION, POWDER, LYOPHILIZED, FOR SOLUTION INTRAVENOUS at 09:16

## 2020-01-01 RX ADMIN — HALOPERIDOL LACTATE 2.5 MG: 5 INJECTION, SOLUTION INTRAMUSCULAR at 23:30

## 2020-01-01 RX ADMIN — FAMOTIDINE 20 MG: 20 TABLET ORAL at 06:13

## 2020-01-01 RX ADMIN — TRAZODONE HYDROCHLORIDE 50 MG: 50 TABLET ORAL at 22:03

## 2020-01-01 RX ADMIN — METHYLPREDNISOLONE SODIUM SUCCINATE 40 MG: 40 INJECTION, POWDER, FOR SOLUTION INTRAMUSCULAR; INTRAVENOUS at 16:59

## 2020-01-01 RX ADMIN — AZITHROMYCIN MONOHYDRATE 500 MG: 500 INJECTION, POWDER, LYOPHILIZED, FOR SOLUTION INTRAVENOUS at 05:16

## 2020-01-01 RX ADMIN — DILTIAZEM HYDROCHLORIDE 120 MG: 30 TABLET, FILM COATED ORAL at 06:37

## 2020-01-01 RX ADMIN — FENTANYL CITRATE 100 MCG: 0.05 INJECTION, SOLUTION INTRAMUSCULAR; INTRAVENOUS at 05:17

## 2020-01-01 RX ADMIN — PREDNISONE 40 MG: 10 TABLET ORAL at 04:41

## 2020-01-01 RX ADMIN — MELATONIN 1000 UNITS: at 16:53

## 2020-01-01 RX ADMIN — METHYLPREDNISOLONE SODIUM SUCCINATE 62.5 MG: 125 INJECTION, POWDER, FOR SOLUTION INTRAMUSCULAR; INTRAVENOUS at 12:51

## 2020-01-01 RX ADMIN — DIGOXIN 125 MCG: 125 TABLET ORAL at 18:05

## 2020-01-01 RX ADMIN — RIVAROXABAN 20 MG: 20 TABLET, FILM COATED ORAL at 18:10

## 2020-01-01 RX ADMIN — AMLODIPINE BESYLATE 10 MG: 10 TABLET ORAL at 05:27

## 2020-01-01 RX ADMIN — RIVAROXABAN 20 MG: 20 TABLET, FILM COATED ORAL at 18:05

## 2020-01-01 RX ADMIN — METHYLPREDNISOLONE SODIUM SUCCINATE 40 MG: 40 INJECTION, POWDER, FOR SOLUTION INTRAMUSCULAR; INTRAVENOUS at 11:21

## 2020-01-01 RX ADMIN — LISINOPRIL 5 MG: 5 TABLET ORAL at 06:37

## 2020-01-01 RX ADMIN — IPRATROPIUM BROMIDE AND ALBUTEROL SULFATE 3 ML: .5; 3 SOLUTION RESPIRATORY (INHALATION) at 01:49

## 2020-01-01 RX ADMIN — SODIUM CHLORIDE 1000 ML: 9 INJECTION, SOLUTION INTRAVENOUS at 20:15

## 2020-01-01 RX ADMIN — PREDNISONE 40 MG: 20 TABLET ORAL at 17:07

## 2020-01-01 RX ADMIN — DILTIAZEM HYDROCHLORIDE 60 MG: 60 TABLET, FILM COATED ORAL at 04:43

## 2020-01-01 RX ADMIN — OMEPRAZOLE 40 MG: KIT at 05:31

## 2020-01-01 RX ADMIN — FENTANYL CITRATE 100 MCG: 0.05 INJECTION, SOLUTION INTRAMUSCULAR; INTRAVENOUS at 21:56

## 2020-01-01 RX ADMIN — SIMVASTATIN 10 MG: 10 TABLET, FILM COATED ORAL at 21:29

## 2020-01-01 RX ADMIN — SODIUM CHLORIDE 500 ML: 9 INJECTION, SOLUTION INTRAVENOUS at 07:00

## 2020-01-01 RX ADMIN — AMLODIPINE BESYLATE 10 MG: 10 TABLET ORAL at 06:13

## 2020-01-01 RX ADMIN — DIGOXIN 125 MCG: 125 TABLET ORAL at 17:38

## 2020-01-01 RX ADMIN — TRAZODONE HYDROCHLORIDE 50 MG: 50 TABLET ORAL at 21:54

## 2020-01-01 RX ADMIN — AMLODIPINE BESYLATE 10 MG: 10 TABLET ORAL at 06:37

## 2020-01-01 RX ADMIN — FUROSEMIDE 20 MG: 20 TABLET ORAL at 05:09

## 2020-01-01 RX ADMIN — SALINE NASAL SPRAY 2 SPRAY: 1.5 SOLUTION NASAL at 21:00

## 2020-01-01 RX ADMIN — LABETALOL HYDROCHLORIDE 10 MG: 5 INJECTION, SOLUTION INTRAVENOUS at 21:52

## 2020-01-01 RX ADMIN — ALBUTEROL SULFATE 2.5 MG: 2.5 SOLUTION RESPIRATORY (INHALATION) at 08:29

## 2020-01-01 RX ADMIN — IPRATROPIUM BROMIDE AND ALBUTEROL SULFATE 3 ML: .5; 3 SOLUTION RESPIRATORY (INHALATION) at 21:56

## 2020-01-01 RX ADMIN — GLYCOPYRROLATE 1 CAPSULE: 15.6 CAPSULE RESPIRATORY (INHALATION) at 06:36

## 2020-01-01 RX ADMIN — CEFTRIAXONE SODIUM 2 G: 2 INJECTION, POWDER, FOR SOLUTION INTRAMUSCULAR; INTRAVENOUS at 08:40

## 2020-01-01 RX ADMIN — DILTIAZEM HYDROCHLORIDE 60 MG: 60 TABLET, FILM COATED ORAL at 05:13

## 2020-01-01 RX ADMIN — MAGNESIUM SULFATE HEPTAHYDRATE 2 G: 40 INJECTION, SOLUTION INTRAVENOUS at 09:27

## 2020-01-01 RX ADMIN — HYDRALAZINE HYDROCHLORIDE 20 MG: 20 INJECTION INTRAMUSCULAR; INTRAVENOUS at 19:30

## 2020-01-01 RX ADMIN — PROPOFOL 15 MCG/KG/MIN: 10 INJECTION, EMULSION INTRAVENOUS at 08:34

## 2020-01-01 RX ADMIN — SENNOSIDES AND DOCUSATE SODIUM 2 TABLET: 8.6; 5 TABLET ORAL at 05:29

## 2020-01-01 RX ADMIN — ACETAMINOPHEN 650 MG: 325 TABLET, FILM COATED ORAL at 05:07

## 2020-01-01 RX ADMIN — LINEZOLID 600 MG: 600 INJECTION, SOLUTION INTRAVENOUS at 11:20

## 2020-01-01 RX ADMIN — FUROSEMIDE 20 MG: 20 TABLET ORAL at 04:50

## 2020-01-01 RX ADMIN — DILTIAZEM HYDROCHLORIDE 120 MG: 30 TABLET, FILM COATED ORAL at 12:08

## 2020-01-01 RX ADMIN — DILTIAZEM HYDROCHLORIDE 120 MG: 30 TABLET, FILM COATED ORAL at 03:44

## 2020-01-01 RX ADMIN — DILTIAZEM HYDROCHLORIDE 120 MG: 30 TABLET, FILM COATED ORAL at 05:42

## 2020-01-01 RX ADMIN — METHYLPREDNISOLONE SODIUM SUCCINATE 62.5 MG: 125 INJECTION, POWDER, FOR SOLUTION INTRAMUSCULAR; INTRAVENOUS at 00:35

## 2020-01-01 RX ADMIN — MAGNESIUM SULFATE HEPTAHYDRATE 2 G: 40 INJECTION, SOLUTION INTRAVENOUS at 10:23

## 2020-01-01 RX ADMIN — HYDRALAZINE HYDROCHLORIDE 20 MG: 20 INJECTION INTRAMUSCULAR; INTRAVENOUS at 20:05

## 2020-01-01 RX ADMIN — FUROSEMIDE 40 MG: 10 INJECTION, SOLUTION INTRAMUSCULAR; INTRAVENOUS at 06:13

## 2020-01-01 RX ADMIN — METHYLPREDNISOLONE SODIUM SUCCINATE 125 MG: 125 INJECTION, POWDER, FOR SOLUTION INTRAMUSCULAR; INTRAVENOUS at 08:24

## 2020-01-01 RX ADMIN — KETOCONAZOLE: 20 CREAM TOPICAL at 12:34

## 2020-01-01 RX ADMIN — CEFTRIAXONE SODIUM 2 G: 2 INJECTION, POWDER, FOR SOLUTION INTRAMUSCULAR; INTRAVENOUS at 04:56

## 2020-01-01 RX ADMIN — FUROSEMIDE 20 MG: 20 TABLET ORAL at 05:29

## 2020-01-01 RX ADMIN — TRAZODONE HYDROCHLORIDE 50 MG: 50 TABLET ORAL at 21:31

## 2020-01-01 RX ADMIN — FENTANYL CITRATE 100 MCG: 0.05 INJECTION, SOLUTION INTRAMUSCULAR; INTRAVENOUS at 13:49

## 2020-01-01 RX ADMIN — METHYLPREDNISOLONE SODIUM SUCCINATE 40 MG: 40 INJECTION, POWDER, FOR SOLUTION INTRAMUSCULAR; INTRAVENOUS at 04:48

## 2020-01-01 RX ADMIN — IPRATROPIUM BROMIDE AND ALBUTEROL SULFATE 3 ML: .5; 3 SOLUTION RESPIRATORY (INHALATION) at 02:24

## 2020-01-01 RX ADMIN — BUDESONIDE AND FORMOTEROL FUMARATE DIHYDRATE 2 PUFF: 160; 4.5 AEROSOL RESPIRATORY (INHALATION) at 06:36

## 2020-01-01 RX ADMIN — FAMOTIDINE 20 MG: 20 TABLET ORAL at 18:43

## 2020-01-01 RX ADMIN — METOPROLOL TARTRATE 5 MG: 5 INJECTION, SOLUTION INTRAVENOUS at 12:49

## 2020-01-01 RX ADMIN — CEFTRIAXONE SODIUM 2 G: 2 INJECTION, POWDER, FOR SOLUTION INTRAMUSCULAR; INTRAVENOUS at 05:35

## 2020-01-01 RX ADMIN — DILTIAZEM HYDROCHLORIDE 120 MG: 30 TABLET, FILM COATED ORAL at 13:50

## 2020-01-01 RX ADMIN — SENNOSIDES AND DOCUSATE SODIUM 2 TABLET: 8.6; 5 TABLET ORAL at 17:44

## 2020-01-01 RX ADMIN — TRAZODONE HYDROCHLORIDE 50 MG: 50 TABLET ORAL at 20:47

## 2020-01-01 RX ADMIN — FENTANYL CITRATE 100 MCG: 0.05 INJECTION, SOLUTION INTRAMUSCULAR; INTRAVENOUS at 19:39

## 2020-01-01 RX ADMIN — SIMVASTATIN 10 MG: 10 TABLET, FILM COATED ORAL at 21:54

## 2020-01-01 RX ADMIN — DILTIAZEM HYDROCHLORIDE 5 MG/HR: 5 INJECTION INTRAVENOUS at 20:48

## 2020-01-01 RX ADMIN — RIVAROXABAN 20 MG: 20 TABLET, FILM COATED ORAL at 18:06

## 2020-01-01 RX ADMIN — DILTIAZEM HYDROCHLORIDE 120 MG: 30 TABLET, FILM COATED ORAL at 12:06

## 2020-01-01 RX ADMIN — CEFTRIAXONE SODIUM 2 G: 2 INJECTION, POWDER, FOR SOLUTION INTRAMUSCULAR; INTRAVENOUS at 05:40

## 2020-01-01 RX ADMIN — DILTIAZEM HYDROCHLORIDE 120 MG: 30 TABLET, FILM COATED ORAL at 06:05

## 2020-01-01 RX ADMIN — IPRATROPIUM BROMIDE AND ALBUTEROL SULFATE 3 ML: .5; 3 SOLUTION RESPIRATORY (INHALATION) at 22:06

## 2020-01-01 RX ADMIN — PREDNISONE 40 MG: 20 TABLET ORAL at 06:37

## 2020-01-01 RX ADMIN — SIMVASTATIN 10 MG: 10 TABLET, FILM COATED ORAL at 22:03

## 2020-01-01 RX ADMIN — DILTIAZEM HYDROCHLORIDE 60 MG: 60 TABLET, FILM COATED ORAL at 14:44

## 2020-01-01 RX ADMIN — METHYLPREDNISOLONE SODIUM SUCCINATE 40 MG: 40 INJECTION, POWDER, FOR SOLUTION INTRAMUSCULAR; INTRAVENOUS at 10:34

## 2020-01-01 RX ADMIN — KETOCONAZOLE: 20 CREAM TOPICAL at 17:54

## 2020-01-01 RX ADMIN — AMLODIPINE BESYLATE 10 MG: 10 TABLET ORAL at 05:07

## 2020-01-01 RX ADMIN — PREDNISONE 40 MG: 20 TABLET ORAL at 05:31

## 2020-01-01 RX ADMIN — SENNOSIDES AND DOCUSATE SODIUM 2 TABLET: 8.6; 5 TABLET ORAL at 17:38

## 2020-01-01 RX ADMIN — AMIODARONE HYDROCHLORIDE 150 MG: 1.5 INJECTION, SOLUTION INTRAVENOUS at 21:26

## 2020-01-01 RX ADMIN — DIGOXIN 125 MCG: 125 TABLET ORAL at 18:19

## 2020-01-01 RX ADMIN — IPRATROPIUM BROMIDE AND ALBUTEROL SULFATE 3 ML: .5; 3 SOLUTION RESPIRATORY (INHALATION) at 19:59

## 2020-01-01 RX ADMIN — FAMOTIDINE 20 MG: 20 TABLET ORAL at 06:00

## 2020-01-01 RX ADMIN — SCOPALAMINE 1 PATCH: 1 PATCH, EXTENDED RELEASE TRANSDERMAL at 00:02

## 2020-01-01 RX ADMIN — AMLODIPINE BESYLATE 10 MG: 10 TABLET ORAL at 05:17

## 2020-01-01 RX ADMIN — TRIAMCINOLONE ACETONIDE: 0.25 CREAM TOPICAL at 17:48

## 2020-01-01 RX ADMIN — OMEPRAZOLE 40 MG: KIT at 05:09

## 2020-01-01 RX ADMIN — IPRATROPIUM BROMIDE AND ALBUTEROL SULFATE 3 ML: .5; 3 SOLUTION RESPIRATORY (INHALATION) at 04:09

## 2020-01-01 RX ADMIN — RIVAROXABAN 20 MG: 20 TABLET, FILM COATED ORAL at 17:46

## 2020-01-01 RX ADMIN — DIGOXIN 125 MCG: 125 TABLET ORAL at 18:07

## 2020-01-01 RX ADMIN — RIVAROXABAN 20 MG: 20 TABLET, FILM COATED ORAL at 16:53

## 2020-01-01 RX ADMIN — METOPROLOL TARTRATE 5 MG: 5 INJECTION, SOLUTION INTRAVENOUS at 19:21

## 2020-01-01 RX ADMIN — METOPROLOL TARTRATE 5 MG: 5 INJECTION, SOLUTION INTRAVENOUS at 08:34

## 2020-01-01 RX ADMIN — CEFTRIAXONE SODIUM 2 G: 2 INJECTION, POWDER, FOR SOLUTION INTRAMUSCULAR; INTRAVENOUS at 06:05

## 2020-01-01 RX ADMIN — SIMVASTATIN 10 MG: 10 TABLET, FILM COATED ORAL at 20:01

## 2020-01-01 RX ADMIN — DIGOXIN 250 MCG: 0.25 INJECTION INTRAMUSCULAR; INTRAVENOUS at 10:34

## 2020-01-01 RX ADMIN — DILTIAZEM HYDROCHLORIDE 30 MG: 30 TABLET, FILM COATED ORAL at 10:32

## 2020-01-01 RX ADMIN — RIVAROXABAN 20 MG: 20 TABLET, FILM COATED ORAL at 17:20

## 2020-01-01 RX ADMIN — ENALAPRILAT 1.25 MG: 1.25 INJECTION INTRAVENOUS at 23:02

## 2020-01-01 RX ADMIN — ETOMIDATE 20 MG: 2 INJECTION INTRAVENOUS at 08:09

## 2020-01-01 RX ADMIN — KETOCONAZOLE: 20 CREAM TOPICAL at 17:43

## 2020-01-01 RX ADMIN — SENNOSIDES AND DOCUSATE SODIUM 2 TABLET: 8.6; 5 TABLET ORAL at 06:37

## 2020-01-01 RX ADMIN — SALINE NASAL SPRAY 2 SPRAY: 1.5 SOLUTION NASAL at 05:19

## 2020-01-01 RX ADMIN — FENTANYL CITRATE 100 MCG: 0.05 INJECTION, SOLUTION INTRAMUSCULAR; INTRAVENOUS at 15:59

## 2020-01-01 RX ADMIN — SENNOSIDES AND DOCUSATE SODIUM 2 TABLET: 8.6; 5 TABLET ORAL at 18:06

## 2020-01-01 RX ADMIN — FENTANYL CITRATE 100 MCG: 0.05 INJECTION, SOLUTION INTRAMUSCULAR; INTRAVENOUS at 12:49

## 2020-01-01 RX ADMIN — DEXMEDETOMIDINE HYDROCHLORIDE 1 MCG/KG/HR: 100 INJECTION, SOLUTION INTRAVENOUS at 03:46

## 2020-01-01 RX ADMIN — LINEZOLID 600 MG: 600 TABLET, FILM COATED ORAL at 06:37

## 2020-01-01 RX ADMIN — DEXMEDETOMIDINE HYDROCHLORIDE 0.6 MCG/KG/HR: 100 INJECTION, SOLUTION INTRAVENOUS at 21:45

## 2020-01-01 RX ADMIN — FUROSEMIDE 20 MG: 10 INJECTION, SOLUTION INTRAMUSCULAR; INTRAVENOUS at 05:19

## 2020-01-01 RX ADMIN — SENNOSIDES AND DOCUSATE SODIUM 2 TABLET: 8.6; 5 TABLET ORAL at 06:00

## 2020-01-01 RX ADMIN — SODIUM CHLORIDE, POTASSIUM CHLORIDE, SODIUM LACTATE AND CALCIUM CHLORIDE 1500 ML: 600; 310; 30; 20 INJECTION, SOLUTION INTRAVENOUS at 09:08

## 2020-01-01 RX ADMIN — IPRATROPIUM BROMIDE AND ALBUTEROL SULFATE 3 ML: .5; 3 SOLUTION RESPIRATORY (INHALATION) at 18:14

## 2020-01-01 RX ADMIN — IPRATROPIUM BROMIDE AND ALBUTEROL SULFATE 3 ML: .5; 3 SOLUTION RESPIRATORY (INHALATION) at 18:09

## 2020-01-01 RX ADMIN — CEFTRIAXONE SODIUM 2 G: 2 INJECTION, POWDER, FOR SOLUTION INTRAMUSCULAR; INTRAVENOUS at 04:44

## 2020-01-01 RX ADMIN — DILTIAZEM HYDROCHLORIDE 120 MG: 30 TABLET, FILM COATED ORAL at 20:47

## 2020-01-01 RX ADMIN — LINEZOLID 600 MG: 600 TABLET, FILM COATED ORAL at 05:27

## 2020-01-01 RX ADMIN — DIGOXIN 125 MCG: 125 TABLET ORAL at 18:43

## 2020-01-01 RX ADMIN — FAMOTIDINE 20 MG: 20 TABLET ORAL at 17:37

## 2020-01-01 RX ADMIN — RIVAROXABAN 20 MG: 20 TABLET, FILM COATED ORAL at 18:07

## 2020-01-01 RX ADMIN — METHYLPREDNISOLONE SODIUM SUCCINATE 62.5 MG: 125 INJECTION, POWDER, FOR SOLUTION INTRAMUSCULAR; INTRAVENOUS at 17:51

## 2020-01-01 RX ADMIN — LINEZOLID 600 MG: 600 INJECTION, SOLUTION INTRAVENOUS at 05:18

## 2020-01-01 RX ADMIN — OMEPRAZOLE 40 MG: KIT at 17:06

## 2020-01-01 RX ADMIN — DIPHENHYDRAMINE HYDROCHLORIDE 12.5 MG: 50 INJECTION INTRAMUSCULAR; INTRAVENOUS at 23:28

## 2020-01-01 RX ADMIN — METHYLPREDNISOLONE SODIUM SUCCINATE 40 MG: 40 INJECTION, POWDER, FOR SOLUTION INTRAMUSCULAR; INTRAVENOUS at 21:28

## 2020-01-01 RX ADMIN — IPRATROPIUM BROMIDE AND ALBUTEROL SULFATE 3 ML: .5; 3 SOLUTION RESPIRATORY (INHALATION) at 14:24

## 2020-01-01 RX ADMIN — BUDESONIDE AND FORMOTEROL FUMARATE DIHYDRATE 2 PUFF: 160; 4.5 AEROSOL RESPIRATORY (INHALATION) at 17:08

## 2020-01-01 RX ADMIN — POTASSIUM PHOSPHATE, MONOBASIC AND POTASSIUM PHOSPHATE, DIBASIC 30 MMOL: 224; 236 INJECTION, SOLUTION, CONCENTRATE INTRAVENOUS at 09:15

## 2020-01-01 RX ADMIN — IPRATROPIUM BROMIDE AND ALBUTEROL SULFATE 3 ML: .5; 3 SOLUTION RESPIRATORY (INHALATION) at 01:56

## 2020-01-01 RX ADMIN — LINEZOLID 600 MG: 600 TABLET, FILM COATED ORAL at 06:14

## 2020-01-01 RX ADMIN — PREDNISONE 40 MG: 20 TABLET ORAL at 05:11

## 2020-01-01 RX ADMIN — LINEZOLID 600 MG: 600 TABLET, FILM COATED ORAL at 05:11

## 2020-01-01 RX ADMIN — AMLODIPINE BESYLATE 10 MG: 10 TABLET ORAL at 05:29

## 2020-01-01 RX ADMIN — ENALAPRILAT 1.25 MG: 1.25 INJECTION INTRAVENOUS at 23:01

## 2020-01-01 RX ADMIN — HYDRALAZINE HYDROCHLORIDE 20 MG: 20 INJECTION INTRAMUSCULAR; INTRAVENOUS at 04:50

## 2020-01-01 RX ADMIN — BUDESONIDE AND FORMOTEROL FUMARATE DIHYDRATE 2 PUFF: 160; 4.5 AEROSOL RESPIRATORY (INHALATION) at 17:55

## 2020-01-01 RX ADMIN — IPRATROPIUM BROMIDE AND ALBUTEROL SULFATE 3 ML: .5; 3 SOLUTION RESPIRATORY (INHALATION) at 22:08

## 2020-01-01 RX ADMIN — CEFTRIAXONE SODIUM 2 G: 2 INJECTION, POWDER, FOR SOLUTION INTRAMUSCULAR; INTRAVENOUS at 04:41

## 2020-01-01 RX ADMIN — IPRATROPIUM BROMIDE AND ALBUTEROL SULFATE 3 ML: .5; 3 SOLUTION RESPIRATORY (INHALATION) at 19:09

## 2020-01-01 RX ADMIN — FAMOTIDINE 20 MG: 10 INJECTION INTRAVENOUS at 04:56

## 2020-01-01 RX ADMIN — GLYCOPYRROLATE 1 CAPSULE: 15.6 CAPSULE RESPIRATORY (INHALATION) at 21:15

## 2020-01-01 RX ADMIN — OMEPRAZOLE 20 MG: 20 CAPSULE, DELAYED RELEASE ORAL at 05:43

## 2020-01-01 RX ADMIN — LABETALOL HYDROCHLORIDE 20 MG: 5 INJECTION INTRAVENOUS at 01:05

## 2020-01-01 RX ADMIN — TRAZODONE HYDROCHLORIDE 50 MG: 50 TABLET ORAL at 21:00

## 2020-01-01 RX ADMIN — FENTANYL CITRATE 100 MCG: 0.05 INJECTION, SOLUTION INTRAMUSCULAR; INTRAVENOUS at 02:01

## 2020-01-01 RX ADMIN — FAMOTIDINE 20 MG: 10 INJECTION, SOLUTION INTRAVENOUS at 04:44

## 2020-01-01 RX ADMIN — LINEZOLID 600 MG: 600 TABLET, FILM COATED ORAL at 17:37

## 2020-01-01 RX ADMIN — OMEPRAZOLE 40 MG: KIT at 06:36

## 2020-01-01 RX ADMIN — DILTIAZEM HYDROCHLORIDE 120 MG: 30 TABLET, FILM COATED ORAL at 12:27

## 2020-01-01 RX ADMIN — DILTIAZEM HYDROCHLORIDE 60 MG: 60 TABLET, FILM COATED ORAL at 21:03

## 2020-01-01 RX ADMIN — FUROSEMIDE 20 MG: 20 TABLET ORAL at 06:21

## 2020-01-01 RX ADMIN — FUROSEMIDE 20 MG: 10 INJECTION, SOLUTION INTRAMUSCULAR; INTRAVENOUS at 15:27

## 2020-01-01 RX ADMIN — METHYLPREDNISOLONE SODIUM SUCCINATE 62.5 MG: 125 INJECTION, POWDER, FOR SOLUTION INTRAMUSCULAR; INTRAVENOUS at 00:13

## 2020-01-01 RX ADMIN — CEFTRIAXONE SODIUM 2 G: 2 INJECTION, POWDER, FOR SOLUTION INTRAMUSCULAR; INTRAVENOUS at 05:18

## 2020-01-01 RX ADMIN — AZITHROMYCIN 500 MG: 250 TABLET, FILM COATED ORAL at 06:34

## 2020-01-01 RX ADMIN — LABETALOL HYDROCHLORIDE 10 MG: 5 INJECTION, SOLUTION INTRAVENOUS at 21:17

## 2020-01-01 RX ADMIN — SUCCINYLCHOLINE CHLORIDE 100 MG: 20 INJECTION, SOLUTION INTRAMUSCULAR; INTRAVENOUS at 08:09

## 2020-01-01 RX ADMIN — FUROSEMIDE 20 MG: 20 TABLET ORAL at 05:42

## 2020-01-01 RX ADMIN — DILTIAZEM HYDROCHLORIDE 60 MG: 60 TABLET, FILM COATED ORAL at 14:53

## 2020-01-01 RX ADMIN — PHENYLEPHRINE HYDROCHLORIDE 50 MCG/MIN: 10 INJECTION INTRAVENOUS at 01:52

## 2020-01-01 RX ADMIN — TRIAMCINOLONE ACETONIDE: 5 CREAM TOPICAL at 05:09

## 2020-01-01 RX ADMIN — CEFTRIAXONE SODIUM 2 G: 2 INJECTION, POWDER, FOR SOLUTION INTRAMUSCULAR; INTRAVENOUS at 06:00

## 2020-01-01 RX ADMIN — MELATONIN 1000 UNITS: at 06:37

## 2020-01-01 RX ADMIN — DILTIAZEM HYDROCHLORIDE 60 MG: 60 TABLET, FILM COATED ORAL at 21:17

## 2020-01-01 RX ADMIN — ACETAZOLAMIDE 500 MG: 500 INJECTION, POWDER, LYOPHILIZED, FOR SOLUTION INTRAVENOUS at 05:28

## 2020-01-01 RX ADMIN — OMEPRAZOLE 20 MG: 20 CAPSULE, DELAYED RELEASE ORAL at 06:37

## 2020-01-01 RX ADMIN — DIGOXIN 125 MCG: 125 TABLET ORAL at 17:46

## 2020-01-01 RX ADMIN — LINEZOLID 600 MG: 600 INJECTION, SOLUTION INTRAVENOUS at 18:52

## 2020-01-01 RX ADMIN — SENNOSIDES AND DOCUSATE SODIUM 2 TABLET: 8.6; 5 TABLET ORAL at 18:43

## 2020-01-01 RX ADMIN — METHYLPREDNISOLONE SODIUM SUCCINATE 40 MG: 40 INJECTION, POWDER, FOR SOLUTION INTRAMUSCULAR; INTRAVENOUS at 06:13

## 2020-01-01 RX ADMIN — SODIUM CHLORIDE 500 ML: 9 INJECTION, SOLUTION INTRAVENOUS at 04:55

## 2020-01-01 RX ADMIN — DILTIAZEM HYDROCHLORIDE 120 MG: 30 TABLET, FILM COATED ORAL at 21:26

## 2020-01-01 RX ADMIN — METHYLPREDNISOLONE SODIUM SUCCINATE 62.5 MG: 125 INJECTION, POWDER, FOR SOLUTION INTRAMUSCULAR; INTRAVENOUS at 18:07

## 2020-01-01 RX ADMIN — MORPHINE SULFATE 5 MG: 10 INJECTION INTRAVENOUS at 00:02

## 2020-01-01 RX ADMIN — SENNOSIDES AND DOCUSATE SODIUM 2 TABLET: 8.6; 5 TABLET ORAL at 05:17

## 2020-01-01 RX ADMIN — METHYLPREDNISOLONE SODIUM SUCCINATE 62.5 MG: 125 INJECTION, POWDER, FOR SOLUTION INTRAMUSCULAR; INTRAVENOUS at 11:39

## 2020-01-01 RX ADMIN — IPRATROPIUM BROMIDE AND ALBUTEROL SULFATE 3 ML: .5; 3 SOLUTION RESPIRATORY (INHALATION) at 06:25

## 2020-01-01 RX ADMIN — DEXMEDETOMIDINE HYDROCHLORIDE 1 MCG/KG/HR: 100 INJECTION, SOLUTION INTRAVENOUS at 22:09

## 2020-01-01 RX ADMIN — DIGOXIN 125 MCG: 125 TABLET ORAL at 17:48

## 2020-01-01 RX ADMIN — FENTANYL CITRATE 50 MCG: 0.05 INJECTION, SOLUTION INTRAMUSCULAR; INTRAVENOUS at 10:35

## 2020-01-01 RX ADMIN — FAMOTIDINE 20 MG: 20 TABLET ORAL at 17:39

## 2020-01-01 RX ADMIN — METHYLPREDNISOLONE SODIUM SUCCINATE 40 MG: 40 INJECTION, POWDER, FOR SOLUTION INTRAMUSCULAR; INTRAVENOUS at 23:28

## 2020-01-01 RX ADMIN — ENALAPRILAT 1.25 MG: 1.25 INJECTION INTRAVENOUS at 01:11

## 2020-01-01 RX ADMIN — MAGNESIUM SULFATE IN WATER 2 G: 40 INJECTION, SOLUTION INTRAVENOUS at 18:07

## 2020-01-01 RX ADMIN — RIVAROXABAN 20 MG: 20 TABLET, FILM COATED ORAL at 18:43

## 2020-01-01 RX ADMIN — DILTIAZEM HYDROCHLORIDE 120 MG: 30 TABLET, FILM COATED ORAL at 05:09

## 2020-01-01 RX ADMIN — CEFTRIAXONE SODIUM 2 G: 2 INJECTION, POWDER, FOR SOLUTION INTRAMUSCULAR; INTRAVENOUS at 05:13

## 2020-01-01 RX ADMIN — DIGOXIN 125 MCG: 125 TABLET ORAL at 17:20

## 2020-01-01 RX ADMIN — OMEPRAZOLE 20 MG: 20 CAPSULE, DELAYED RELEASE ORAL at 04:50

## 2020-01-01 RX ADMIN — TRAZODONE HYDROCHLORIDE 50 MG: 50 TABLET ORAL at 21:01

## 2020-01-01 RX ADMIN — OMEPRAZOLE 20 MG: 20 CAPSULE, DELAYED RELEASE ORAL at 05:21

## 2020-01-01 RX ADMIN — SIMVASTATIN 10 MG: 10 TABLET, FILM COATED ORAL at 02:19

## 2020-01-01 RX ADMIN — KETOCONAZOLE: 20 CREAM TOPICAL at 04:50

## 2020-01-01 RX ADMIN — LINEZOLID 600 MG: 600 TABLET, FILM COATED ORAL at 18:00

## 2020-01-01 RX ADMIN — OMEPRAZOLE 20 MG: 20 CAPSULE, DELAYED RELEASE ORAL at 09:28

## 2020-01-01 RX ADMIN — DILTIAZEM HYDROCHLORIDE 90 MG: 60 TABLET, FILM COATED ORAL at 13:49

## 2020-01-01 RX ADMIN — POTASSIUM BICARBONATE 25 MEQ: 978 TABLET, EFFERVESCENT ORAL at 10:00

## 2020-01-01 RX ADMIN — RIVAROXABAN 20 MG: 20 TABLET, FILM COATED ORAL at 17:47

## 2020-01-01 SDOH — ECONOMIC STABILITY: GENERAL

## 2020-01-01 SDOH — ECONOMIC STABILITY: HOUSING INSECURITY: EVIDENCE OF SMOKING MATERIAL: 0

## 2020-01-01 ASSESSMENT — ENCOUNTER SYMPTOMS
HEADACHES: 0
CHILLS: 0
LOSS OF CONSCIOUSNESS: 0
TREMORS: 0
CONSTIPATION: 0
CLAUDICATION: 0
SPUTUM PRODUCTION: 1
TINGLING: 0
TREMORS: 0
FEVER: 0
ORTHOPNEA: 0
ORTHOPNEA: 0
CLAUDICATION: 0
DIZZINESS: 0
MYALGIAS: 0
CONSTIPATION: 0
BACK PAIN: 0
CLAUDICATION: 0
COUGH: 0
DOUBLE VISION: 0
VOMITING: 0
PALPITATIONS: 0
NAUSEA: 0
HEADACHES: 0
HEADACHES: 0
ORTHOPNEA: 0
CHILLS: 0
DIARRHEA: 0
DIZZINESS: 0
CHILLS: 0
DEPRESSION: 0
BACK PAIN: 0
CARDIOVASCULAR NEGATIVE: 1
ABDOMINAL PAIN: 0
NAUSEA: 0
ABDOMINAL PAIN: 0
VOMITING: 0
BACK PAIN: 0
TINGLING: 0
BACK PAIN: 0
EYES NEGATIVE: 1
STOOL FREQUENCY: TWICE DAILY
BLURRED VISION: 0
VOMITING: 0
TINGLING: 0
CLAUDICATION: 0
TINGLING: 0
NAUSEA: 0
COUGH: 0
BACK PAIN: 0
WEIGHT LOSS: 0
PALPITATIONS: 0
PALPITATIONS: 0
DIZZINESS: 0
DIZZINESS: 0
LOSS OF CONSCIOUSNESS: 0
DEPRESSION: 0
COUGH: 0
HEADACHES: 0
CHILLS: 0
SENSORY CHANGE: 0
DIZZINESS: 0
COUGH: 0
VOMITING: 0
BACK PAIN: 0
PALPITATIONS: 0
VOMITING: 0
NECK PAIN: 0
FEVER: 0
DIARRHEA: 0
FEVER: 0
HEADACHES: 0
CHILLS: 0
EYE PAIN: 0
GASTROINTESTINAL NEGATIVE: 1
BLURRED VISION: 0
BACK PAIN: 0
DIARRHEA: 1
PALPITATIONS: 0
MYALGIAS: 0
DEPRESSION: 0
HEADACHES: 0
FEVER: 0
BACK PAIN: 0
COUGH: 0
ORTHOPNEA: 0
CHILLS: 0
ABDOMINAL PAIN: 0
VOMITING: 0
FEVER: 0
VOMITING: 0
EYE PAIN: 0
PALPITATIONS: 0
SENSORY CHANGE: 0
PALPITATIONS: 0
BLURRED VISION: 0
FEVER: 0
NECK PAIN: 0
COUGH: 0
TINGLING: 0
PHOTOPHOBIA: 0
VOMITING: 0
FEVER: 0
LOSS OF CONSCIOUSNESS: 0
SHORTNESS OF BREATH: 1
DOUBLE VISION: 0
HEADACHES: 0
DIZZINESS: 0
PALPITATIONS: 0
DOUBLE VISION: 0
CHILLS: 0
VOMITING: 0
ABDOMINAL PAIN: 0
NEUROLOGICAL NEGATIVE: 1
BLURRED VISION: 0
BLURRED VISION: 0
FEVER: 0
DIARRHEA: 0
NAUSEA: 0
HEADACHES: 0
HEMOPTYSIS: 0
DEPRESSION: 0
PHOTOPHOBIA: 0
BACK PAIN: 0
ABDOMINAL PAIN: 0
TREMORS: 0
DOUBLE VISION: 0
PALPITATIONS: 0
CONSTIPATION: 0
FEVER: 0
MYALGIAS: 0
ORTHOPNEA: 0
HEADACHES: 0
MYALGIAS: 0
BACK PAIN: 0
DOUBLE VISION: 0
DIZZINESS: 0
ORTHOPNEA: 0
LAST BOWEL MOVEMENT: 65409
HEMOPTYSIS: 0
SHORTNESS OF BREATH: 1
FEVER: 0
SHORTNESS OF BREATH: 1
FEVER: 0
VOMITING: 0
CHANGE IN LEVEL OF CONSCIOUSNESS: 1
COUGH: 1
VOMITING: 0
PHOTOPHOBIA: 0
NECK PAIN: 0
PHOTOPHOBIA: 0
DIARRHEA: 0
BOWEL INCONTINENCE: 1
DIZZINESS: 0
AGITATION: 1
HEADACHES: 0
DIARRHEA: 0
ABDOMINAL PAIN: 0
TINGLING: 0
NECK PAIN: 0
SORE THROAT: 0
DOUBLE VISION: 0
DIZZINESS: 0
DIARRHEA: 0
ABDOMINAL PAIN: 0
DIARRHEA: 0
LOSS OF CONSCIOUSNESS: 1
COUGH: 1
PHOTOPHOBIA: 0
WEIGHT LOSS: 0
VOMITING: 0
CHANGE IN LEVEL OF CONSCIOUSNESS: 1
HYPERTENSION: 1
NAUSEA: 0
DIARRHEA: 0
HEADACHES: 0
DIARRHEA: 0
SORE THROAT: 0
NECK PAIN: 0
DIZZINESS: 0
NAUSEA: 0
NAUSEA: 0
EYE PAIN: 0
PALPITATIONS: 0
ABDOMINAL PAIN: 0
SENSORY CHANGE: 0
DIZZINESS: 0
COUGH: 0
ORTHOPNEA: 0
DEPRESSION: 0
DOUBLE VISION: 0
MYALGIAS: 0
ABDOMINAL PAIN: 0
DEPRESSION: 0
DEPRESSION: 0
DIZZINESS: 0
HEADACHES: 0
NAUSEA: 0
WEAKNESS: 0
BACK PAIN: 0
HEMOPTYSIS: 0
VOMITING: 0
BLURRED VISION: 0
SHORTNESS OF BREATH: 1
MUSCULOSKELETAL NEGATIVE: 1
PALPITATIONS: 0
NECK PAIN: 0
TREMORS: 0
COUGH: 1
CHILLS: 0
FEVER: 0
NAUSEA: 0
DRY SKIN: 1
TINGLING: 0
HEMOPTYSIS: 0
COUGH: 1
BLURRED VISION: 0
PALPITATIONS: 0
SHORTNESS OF BREATH: 0
SORE THROAT: 0
DIARRHEA: 0
BLURRED VISION: 0
WEIGHT LOSS: 0
DOUBLE VISION: 0
ABDOMINAL PAIN: 0
ABDOMINAL PAIN: 0
PALPITATIONS: 0
BLURRED VISION: 0
BLURRED VISION: 0
NAUSEA: 0
HEADACHES: 0
CHILLS: 0
BLURRED VISION: 0
DOUBLE VISION: 0
EYE PAIN: 0
CLAUDICATION: 0
BLURRED VISION: 0
SORE THROAT: 0
DOUBLE VISION: 0
PHOTOPHOBIA: 0
BACK PAIN: 0
PHOTOPHOBIA: 0
NAUSEA: 0
CHILLS: 0
ABDOMINAL PAIN: 0
TREMORS: 0
ORTHOPNEA: 0
WEIGHT LOSS: 0
NERVOUS/ANXIOUS: 1
SHORTNESS OF BREATH: 1
HEMOPTYSIS: 0
SHORTNESS OF BREATH: 0
CHILLS: 0

## 2020-01-01 ASSESSMENT — GAIT ASSESSMENTS
GAIT LEVEL OF ASSIST: UNABLE TO PARTICIPATE
DISTANCE (FEET): 40
DISTANCE (FEET): 35
DEVIATION: BRADYKINETIC;SHUFFLED GAIT
GAIT LEVEL OF ASSIST: MODERATE ASSIST
GAIT LEVEL OF ASSIST: UNABLE TO PARTICIPATE
DEVIATION: BRADYKINETIC;SHUFFLED GAIT
ASSISTIVE DEVICE: FRONT WHEEL WALKER
ASSISTIVE DEVICE: FRONT WHEEL WALKER
GAIT LEVEL OF ASSIST: MINIMAL ASSIST

## 2020-01-01 ASSESSMENT — CHA2DS2 SCORE
AGE 75 OR GREATER: YES
AGE 65 TO 74: NO
SEX: MALE
HYPERTENSION: YES
CHF OR LEFT VENTRICULAR DYSFUNCTION: NO
SEX: MALE
PRIOR STROKE OR TIA OR THROMBOEMBOLISM: NO
VASCULAR DISEASE: NO
CHF OR LEFT VENTRICULAR DYSFUNCTION: NO
CHA2DS2 VASC SCORE: 3
VASCULAR DISEASE: YES
DIABETES: NO
DIABETES: NO
AGE 65 TO 74: NO
PRIOR STROKE OR TIA OR THROMBOEMBOLISM: NO
AGE 75 OR GREATER: YES
CHA2DS2 VASC SCORE: 4
HYPERTENSION: YES

## 2020-01-01 ASSESSMENT — COGNITIVE AND FUNCTIONAL STATUS - GENERAL
EATING MEALS: TOTAL
WALKING IN HOSPITAL ROOM: A LOT
SUGGESTED CMS G CODE MODIFIER DAILY ACTIVITY: CK
MOBILITY SCORE: 13
SUGGESTED CMS G CODE MODIFIER DAILY ACTIVITY: CK
SUGGESTED CMS G CODE MODIFIER MOBILITY: CK
WALKING IN HOSPITAL ROOM: A LITTLE
STANDING UP FROM CHAIR USING ARMS: A LOT
SUGGESTED CMS G CODE MODIFIER MOBILITY: CL
SUGGESTED CMS G CODE MODIFIER MOBILITY: CN
PERSONAL GROOMING: A LITTLE
MOVING TO AND FROM BED TO CHAIR: A LITTLE
WALKING IN HOSPITAL ROOM: TOTAL
TURNING FROM BACK TO SIDE WHILE IN FLAT BAD: UNABLE
DRESSING REGULAR UPPER BODY CLOTHING: A LITTLE
MOVING FROM LYING ON BACK TO SITTING ON SIDE OF FLAT BED: UNABLE
HELP NEEDED FOR BATHING: A LOT
PERSONAL GROOMING: A LITTLE
DAILY ACTIVITIY SCORE: 16
STANDING UP FROM CHAIR USING ARMS: TOTAL
MOBILITY SCORE: 6
HELP NEEDED FOR BATHING: A LOT
MOBILITY SCORE: 15
WALKING IN HOSPITAL ROOM: A LITTLE
SUGGESTED CMS G CODE MODIFIER DAILY ACTIVITY: CL
DRESSING REGULAR LOWER BODY CLOTHING: A LOT
SUGGESTED CMS G CODE MODIFIER MOBILITY: CL
DRESSING REGULAR LOWER BODY CLOTHING: A LOT
STANDING UP FROM CHAIR USING ARMS: A LOT
EATING MEALS: A LITTLE
MOVING TO AND FROM BED TO CHAIR: UNABLE
TOILETING: A LOT
PERSONAL GROOMING: A LITTLE
TOILETING: A LOT
PERSONAL GROOMING: A LITTLE
MOVING FROM LYING ON BACK TO SITTING ON SIDE OF FLAT BED: A LOT
DRESSING REGULAR UPPER BODY CLOTHING: A LITTLE
STANDING UP FROM CHAIR USING ARMS: TOTAL
EATING MEALS: TOTAL
TURNING FROM BACK TO SIDE WHILE IN FLAT BAD: A LITTLE
CLIMB 3 TO 5 STEPS WITH RAILING: TOTAL
CLIMB 3 TO 5 STEPS WITH RAILING: TOTAL
MOVING FROM LYING ON BACK TO SITTING ON SIDE OF FLAT BED: UNABLE
DRESSING REGULAR LOWER BODY CLOTHING: A LOT
DAILY ACTIVITIY SCORE: 13
DRESSING REGULAR UPPER BODY CLOTHING: A LITTLE
TOILETING: A LOT
HELP NEEDED FOR BATHING: A LOT
TURNING FROM BACK TO SIDE WHILE IN FLAT BAD: A LITTLE
MOBILITY SCORE: 6
TURNING FROM BACK TO SIDE WHILE IN FLAT BAD: UNABLE
WALKING IN HOSPITAL ROOM: TOTAL
MOBILITY SCORE: 13
SUGGESTED CMS G CODE MODIFIER DAILY ACTIVITY: CL
MOVING TO AND FROM BED TO CHAIR: UNABLE
CLIMB 3 TO 5 STEPS WITH RAILING: TOTAL
DAILY ACTIVITIY SCORE: 15
STANDING UP FROM CHAIR USING ARMS: A LITTLE
CLIMB 3 TO 5 STEPS WITH RAILING: A LOT
DRESSING REGULAR LOWER BODY CLOTHING: A LOT
HELP NEEDED FOR BATHING: A LOT
MOVING FROM LYING ON BACK TO SITTING ON SIDE OF FLAT BED: UNABLE
DRESSING REGULAR UPPER BODY CLOTHING: A LITTLE
MOVING TO AND FROM BED TO CHAIR: A LITTLE
MOVING FROM LYING ON BACK TO SITTING ON SIDE OF FLAT BED: UNABLE
MOVING TO AND FROM BED TO CHAIR: UNABLE
DAILY ACTIVITIY SCORE: 13
TOILETING: A LOT
CLIMB 3 TO 5 STEPS WITH RAILING: A LOT
SUGGESTED CMS G CODE MODIFIER MOBILITY: CN

## 2020-01-01 ASSESSMENT — ACTIVITIES OF DAILY LIVING (ADL)
DRESSING_REQUIRES_ASSISTANCE: 1
TOILETING: INDEPENDENT
PHYSICAL_TRANSFER_REQUIRES_ASSISTANCE: 1
DRESSING_REQUIRES_ASSISTANCE: 1
PHYSICAL_TRANSFER_REQUIRES_ASSISTANCE: 1
BATHING_REQUIRES_ASSISTANCE: 1
EATING_REQUIRES_ASSISTANCE: 1
CONTINENCE_REQUIRES_ASSISTANCE: 1
BATHING_REQUIRES_ASSISTANCE: 1
EATING_REQUIRES_ASSISTANCE: 1
AMBULATION_REQUIRES_ASSISTANCE: 1
AMBULATION_REQUIRES_ASSISTANCE: 1
MONEY MANAGEMENT (EXPENSES/BILLS): TOTALLY DEPENDENT
TOILETING: UNABLE TO DETERMINE AT THIS TIME
CONTINENCE_REQUIRES_ASSISTANCE: 1

## 2020-01-01 ASSESSMENT — PATIENT HEALTH QUESTIONNAIRE - PHQ9: CLINICAL INTERPRETATION OF PHQ2 SCORE: 0

## 2020-01-01 ASSESSMENT — PULMONARY FUNCTION TESTS: FVC: .98

## 2020-01-01 ASSESSMENT — COPD QUESTIONNAIRES
COPD SCREENING SCORE: 6
DURING THE PAST 4 WEEKS HOW MUCH DID YOU FEEL SHORT OF BREATH: SOME OF THE TIME
COPD: 1
DO YOU EVER COUGH UP ANY MUCUS OR PHLEGM?: YES, A FEW DAYS A WEEK OR MONTH
HAVE YOU SMOKED AT LEAST 100 CIGARETTES IN YOUR ENTIRE LIFE: YES

## 2020-01-01 ASSESSMENT — LIFESTYLE VARIABLES
SUBSTANCE_ABUSE: 0
EVER_SMOKED: YES

## 2020-01-01 NOTE — DIETARY
Nutrition Services:    Pt previously on TF yesterday. Pt now has TF on hold due to pulling cortrak out. Per KUB, pt has possible ileus vs SBO. Per rounds discussion, waiting on pt to have BM before re-starting TF. Cortrak not replaced yet. Pt back on HFNC.    Recommend re-start TF when appropriate.    RD to follow.

## 2020-01-01 NOTE — PROGRESS NOTES
Dr. Brown updated pt now in Aflutter w/ HR in 120s-130s and BP up to 190s. Per Dr. Brown diltiazem gtt to be started.

## 2020-01-01 NOTE — PROGRESS NOTES
Spoke w Dr Aldana regarding patients BP, respirations and not currently on anticoagulation given DVT in RUE and paroxysmal Afib.      PRN antihypertensives received and RT protocol started.  Hold off on AC as per daytime doctor and LP performed yesterday.

## 2020-01-01 NOTE — ASSESSMENT & PLAN NOTE
Slowly risking BUN possibly due to steroids, nutrition, volume status  Observe, avoid diuresis, decrease protein intake

## 2020-01-01 NOTE — PROGRESS NOTES
Critical Care Progress Note    Date of admission  12/28/2019    Chief Complaint  79 y.o. male admitted 12/28/2019 with acute hypoxic respiratory failure requiring intubation/ mechanical ventilation    Hospital Course  79 y.o. male who presented 12/28/2019 with shortness of breath. Recent cough with yellow sputum. Hx copd on 3 L oxygen at home. [place].  On high flow 40 L 40%.  High work of breathing with paradoxical pattern.  RR to 30s.  + wheezing/poor air movement.  On amiodarone for hx of afib.  Hx cad w/ prior stent, dyslipidemia, hypertension and hypoxic respiratory failure.  afib rvr, received beta blocker this am and improved to low 100s.  Ns at 100 ml/hr. Home xarelto.  Amiodarone started this am.    Interval Problem Update  Reviewed last 24 hour events:  Extubated yesterday  Issues with ileus yesterday- much improved  Intermittent afib with RVR on dig/ dilt (held last night for decreased heart rate) on and off anmio  On high flow (30 liters) with good sats  Delirious since extubation but generally cooperative  To receive 14 days antibiotics for strep pneumonia bacteremia (day 4 today)    Prior 24 hours  Afib/flutter with RVR intermittent  Amnio on, now off  Dilt drip  Dig loading starting  Tolerating extubating this morning so far OK  Distended abdomen- KUB- ileus vs early SBO  Given narcotic gut reversal agent  Mobility  Oriented, approp, supple neck  Meningitis dosing decreased to CAP doing of ceftriaxone      Prior 24 hours  Amnio started last night, stopped today  Dilt given per Core-track with good response  On vent day 2  Poorly responsive but on sedation  Jerking movement atypical for seizure intermittent  Unsuccessful attempt to do LP today  Levophed 2 and weaned today to off  CXR LLL consolodation  Antibiotics conitnued today secondary to elevated WBC, fever and focal consolidation on CXR and probable Strep in blood (sensitivities/ speciation pending)  Cannot do ROS  secondary to being on vent and sedated      Review of Systems  All systems negative except for complaints of feeling bloated, cannot do ROS today secondary to delirium       Vital Signs for last 24 hours   Temp:  [37 °C (98.6 °F)] 37 °C (98.6 °F)  Pulse:  [] 63  Resp:  [23-37] 26  BP: ()/(43-86) 137/63  SpO2:  [89 %-96 %] 93 %    Hemodynamic parameters for last 24 hours       Respiratory Information for the last 24 hours       Physical Exam   Physical Exam  Vitals reviewed: Critically ill-appearing on mechanical ventilation and sedated.   HENT:      Head: Atraumatic.      Nose: Nose normal.   Eyes:      Extraocular Movements: Extraocular movements intact.   Cardiovascular:      Rate and Rhythm: Normal rate and regular rhythm.      Pulses: Normal pulses.   Abdominal:      General: Abdomen is flat. Bowel sounds are normal.      Palpations: Abdomen is soft.   Skin:     Comments: Slightly cool extremities hands and feet   Neurological:      General: No focal deficit present.         Medications  Current Facility-Administered Medications   Medication Dose Route Frequency Provider Last Rate Last Dose   • ipratropium-albuterol (DUONEB) nebulizer solution  3 mL Nebulization RT EVERY 1 HOUR PRN Randall Schwartz M.D.   3 mL at 01/01/20 0409   • [START ON 1/2/2020] digoxin (LANOXIN) injection 125 mcg  125 mcg Intravenous DAILY AT 1800 Musa Brown M.D.       • cefTRIAXone (ROCEPHIN) 2 g in  mL IVPB  2 g Intravenous DAILY Musa Brown M.D.   Stopped at 01/01/20 0514   • DILTIAZem (CARDIZEM) 100 mg in D5W 100 mL Infusion  0-15 mg/hr Intravenous Continuous Musa Brown M.D.   Stopped at 01/01/20 0100   • digoxin (LANOXIN) injection 250 mcg  250 mcg Intravenous Q6HR Musa Brown M.D.   250 mcg at 01/01/20 1034   • labetalol (NORMODYNE/TRANDATE) injection 20 mg  20 mg Intravenous Q4HRS PRN Maurice Aldana M.D.   10 mg at 12/31/19 2020   • hydrALAZINE (APRESOLINE) injection 20 mg  20 mg  Intravenous Q4HRS PRN Maurice Aldana M.D.   10 mg at 12/31/19 2315   • methylPREDNISolone (SOLU-MEDROL) 40 MG injection 40 mg  40 mg Intravenous Q6HRS Maurice Aldana M.D.   40 mg at 01/01/20 1034   • dexmedetomidine (PRECEDEX) 400 mcg/100mL NS premix infusion  0.1-1 mcg/kg/hr Intravenous Continuous Maurice Aldana M.D. 7.4 mL/hr at 01/01/20 0818 0.3 mcg/kg/hr at 01/01/20 0818   • ipratropium-albuterol (DUONEB) nebulizer solution  3 mL Nebulization Q4HRS (RT) Maurice Aldana M.D.   3 mL at 01/01/20 1018   • Pharmacy Consult: Enteral tube insertion - review meds/change route/product selection  1 Each Other PHARMACY TO DOSE Musa Brown M.D.       • ondansetron (ZOFRAN) syringe/vial injection 4 mg  4 mg Intravenous Q4HRS PRN Lizett Pradhan M.D.   4 mg at 12/31/19 1027   • Respiratory Care per Protocol   Nebulization Continuous RT Sascha Angela M.D.       • MD Alert...ICU Electrolyte Replacement per Pharmacy   Other PHARMACY TO DOSE Sascha Angela M.D.           Fluids    Intake/Output Summary (Last 24 hours) at 1/1/2020 1101  Last data filed at 1/1/2020 1000  Gross per 24 hour   Intake 835.81 ml   Output 1135 ml   Net -299.19 ml       Laboratory  Recent Labs     12/30/19  0346 12/31/19  0328 01/01/20  0032   ISTATAPH 7.414 7.440 7.414   ISTATAPCO2 33.3 34.1 42.8*   ISTATAPO2 93* 93* 114*   ISTATATCO2 22 24 29   [id number] 97 98 99   ISTATARTHCO3 21.3 23.2 27.4*   ISTATARTBE -3 0 2   ISTATTEMP 35.5 C 36.5 C 37.2 C   ISTATFIO2 40 40 45   ISTATSPEC Arterial Arterial Arterial   ISTATAPHTC 7.437 7.448 7.412   [id number] 84 90* 116*         Recent Labs     12/30/19  0504 12/31/19  0415 01/01/20  0400   SODIUM 140 139 142   POTASSIUM 3.8 3.9 5.0   CHLORIDE 106 106 109   CO2 24 25 27   BUN 33* 39* 46*   CREATININE 0.79 0.81 0.94   MAGNESIUM 1.9 2.2 2.1   PHOSPHORUS 2.0* 1.9* 3.4   CALCIUM 8.9 8.6 8.6     Recent Labs     12/30/19  0504 12/31/19  0415 01/01/20  0400   ALTSGPT 18  --   --    ASTSGOT 22   --   --    ALKPHOSPHAT 75  --   --    TBILIRUBIN 0.6  --   --    PREALBUMIN  --  3.0*  --    GLUCOSE 173* 190* 151*     Recent Labs     12/30/19  0504 12/31/19  0415 01/01/20  0400   WBC 24.2* 19.4* 17.9*   NEUTSPOLYS 97.40* 93.40* 91.80*   LYMPHOCYTES 0.90* 2.20* 3.00*   MONOCYTES 1.70 3.70 4.20   EOSINOPHILS 0.00 0.00 0.00   BASOPHILS 0.00 0.20 0.30   ASTSGOT 22  --   --    ALTSGPT 18  --   --    ALKPHOSPHAT 75  --   --    TBILIRUBIN 0.6  --   --      Recent Labs     12/30/19  0504 12/31/19 0415 01/01/20  0400   RBC 4.65* 4.23* 4.42*   HEMOGLOBIN 14.3 13.0* 13.4*   HEMATOCRIT 43.7 39.0* 41.3*   PLATELETCT 219 192 168       Imaging  X-Ray:  I have personally reviewed the images and compared with prior images.    Assessment/Plan  * CAP (community acquired pneumonia)  Assessment & Plan  Ceftriaxone and doxycycline  Neg flu, wife had flu this week    Will treat 14 days for step pneumonia with strep bacteremia. May not require IV antibiotics for entire course    Acute delirium  Assessment & Plan  ICU delirium in elderly pt. Treat non-pharmacologically with mobility, removing tubes, catheters, up during day, etc    Ileus ([facility])  Assessment & Plan  Resolving rapidly, can start advancing po (pt just pulled out coretrack)    AF (atrial fibrillation) ([facility])  Assessment & Plan  Now on dig iv, dilt was stopped, was on amnio, consider cardiology consult    Elevated BUN  Assessment & Plan  Slowly risking BUN possibly due to steroids, nutrition, volume status  Observe, avoid diuresis, decrease protein intake    Acute exacerbation of chronic obstructive pulmonary disease (COPD) ([facility])  Assessment & Plan  duonebs scheduled  Steroids scheduled    I don't think this patient is suffering from an acute exacerbation of COPD- pneumonia/ ARDS is  of hypoxia. In other words, his underlying acute disease process will unlikely respond notably to steroids and broncho-dilators.    Acute on chronic respiratory failure with hypoxemia  (HCC)  Assessment & Plan  Extubated, trial going OK complicated by ileus possible early SBO    Ileus improved but now has mildly agitated delirium  Doing well with high flow with good sats  Mobilize pt    Lactic acidosis  Assessment & Plan  Resolved    Elevated troponin  Assessment & Plan  Hx cad  Likely demand ischemia from hypoxic respiratory failure    Sepsis due to pneumonia (HCC)  Assessment & Plan  Strep pneumonia in blood- 14 days antibiotics        Dyslipidemia- (present on admission)  Assessment & Plan  Continue statin    Hypertension- (present on admission)  Assessment & Plan  Systolic sometimes up to 170s but related to agitation  Treat systolic > 180 with prn Labetolol    CAD (coronary artery disease)- (present on admission)  Assessment & Plan  Hx stents       VTE:  Heparin  Ulcer: H2 Antagonist  Lines: Central Line  Ongoing indication addressed and Alvarado Catheter  Ongoing indication addressed    I have performed a physical exam and reviewed and updated ROS and Plan today (1/1/2020). In review of yesterday's note (12/31/2019), there are no changes except as documented above.     Discussed patient condition and risk of morbidity and/or mortality with Hospitalist, Family, RN, RT, Therapies, Pharmacy and Charge nurse / hot rounds  The patient remains critically ill.  Critical care time = 80 minutes in directly providing and coordinating critical care and extensive data review.  No time overlap and excludes procedures.

## 2020-01-01 NOTE — PROGRESS NOTES
Pt tearful and removing equipment. Pt pulled out cortrak, high flow placed back on patient. educated on need for oxygen, no evidence of learning. Per MD Brown, okay to keep cortrak out for now, will readdress later

## 2020-01-01 NOTE — CARE PLAN
Problem: Bronchoconstriction:  Goal: Improve in air movement and diminished wheezing  Outcome: PROGRESSING AS EXPECTED     Problem: Oxygenation:  Goal: Maintain adequate oxygenation dependent on patient condition  Outcome: PROGRESSING AS EXPECTED   High flow 50LPM, 35% FIO2.

## 2020-01-01 NOTE — DOCUMENTATION QUERY
[facility]                                                                       Query Response Note      PATIENT:               JOSE DE JESUS HOPPER  ACCT #:                  [id number]  MRN:                     [id number]  :                      1940  ADMIT DATE:       2019 11:38 PM  DISCH DATE:          RESPONDING  PROVIDER #:        [id number]           QUERY TEXT:    It is unclear whether septic shock was present on admission.  Your help is needed.  Please clarify the POA status.    The patient's Clinical Indicators include:  Per H/P: sepsis due to pneumonia, acute on chronic respiratory failure with hypoxia  Per Vitals Flowsheet:  129/59, 146, 26, 99.4F, 91% ,  90/41, 69/37, 76/36  Per Pulmonary Consult Note: shock  Per  Procedure note: septic shock and hypoxic respiratory failure requiring pressors  Risk Factors: severe sepsis  Treatment: IVF, levophed, antibiotics  Options provided:   -- Septic shock was present on admission   -- Septic shock was not present on admission   -- Unable to determine      Query created by: Peña Herbert on 2019 1:27 PM    RESPONSE TEXT:    Septic shock was present on admission          Electronically signed by:  ROSENDO MORRIS 2019 11:06 PM

## 2020-01-01 NOTE — CARE PLAN
Problem: Bronchoconstriction:  Goal: Improve in air movement and diminished wheezing  Outcome: PROGRESSING AS EXPECTED       Respiratory Update    Treatment modality: SVN  Frequency: PRN    Pt tolerating current treatments well with no adverse reactions.

## 2020-01-01 NOTE — PROGRESS NOTES
Critical Care Progress Note    Date of admission  12/28/2019    Chief Complaint  79 y.o. male admitted 12/28/2019 with acute hypoxic respiratory failure requiring intubation/ mechanical ventilation    Hospital Course  79 y.o. male who presented 12/28/2019 with shortness of breath. Recent cough with yellow sputum. Hx copd on 3 L oxygen at home. [place] is [place].  On high flow 40 L 40%.  High work of breathing with paradoxical pattern.  RR to 30s.  + wheezing/poor air movement.  On amiodarone for hx of afib.  Hx cad w/ prior stent, dyslipidemia, hypertension and hypoxic respiratory failure.  afib rvr, received beta blocker this am and improved to low 100s.  Ns at 100 ml/hr. Home xarelto.  Amiodarone started this am.    Interval Problem Update  Reviewed last 24 hour events:  Afib/flutter with RVR intermittent  Amnio on, now off  Dilt drip  Dig loading starting  Tolerating extubating this morning so far OK  Distended abdomen- KUB- ileus vs early SBO  Given narcotic gut reversal agent  Mobility  Oriented, approp, supple neck  Meningitis dosing decreased to CAP doing of ceftriaxone      Prior 24 hours  Amnio started last night, stopped today  Dilt given per Core-track with good response  On vent day 2  Poorly responsive but on sedation  Jerking movement atypical for seizure intermittent  Unsuccessful attempt to do LP today  Levophed 2 and weaned today to off  CXR LLL consolodation  Antibiotics conitnued today secondary to elevated WBC, fever and focal consolidation on CXR and probable Strep in blood (sensitivities/ speciation pending)  Cannot do ROS secondary to being on vent and sedated      Review of Systems  All stysems negative except for complaints of feeling bloated       Vital Signs for last 24 hours   Temp:  [36.8 °C (98.2 °F)] 36.8 °C (98.2 °F)  Pulse:  [] 133  Resp:  [14-27] 27  BP: ()/(50-88) 135/86  SpO2:  [91 %-100 %] 94 %    Hemodynamic parameters for last 24 hours        Respiratory Information for the last 24 hours  [place] Vent Mode: APVCMV  Rate (breaths/min): 24  Vt Target (mL): 480  PEEP/CPAP: 8  FiO2: 30  P Support: 5  P MEAN: 13  Length of Weaning Trial (Hours): 1 hour  Control VTE (exp VT): 484    Physical Exam   Physical Exam  Vitals reviewed: Critically ill-appearing on mechanical ventilation and sedated.   HENT:      Head: Atraumatic.      Nose: Nose normal.   Eyes:      Extraocular Movements: Extraocular movements intact.   Cardiovascular:      Rate and Rhythm: Normal rate and regular rhythm.      Pulses: Normal pulses.   Abdominal:      General: Abdomen is flat. Bowel sounds are normal.      Palpations: Abdomen is soft.   Skin:     Comments: Slightly cool extremities hands and feet   Neurological:      General: No focal deficit present.         Medications  Current Facility-Administered Medications   Medication Dose Route Frequency Provider Last Rate Last Dose   • [START ON 1/1/2020] cefTRIAXone (ROCEPHIN) 2 g in  mL IVPB  2 g Intravenous DAILY Musa Brown M.D.       • DILTIAZem (CARDIZEM) 100 mg in D5W 100 mL Infusion  0-15 mg/hr Intravenous Continuous Musa Brown M.D. 15 mL/hr at 12/31/19 1422 15 mg/hr at 12/31/19 1422   • digoxin (LANOXIN) injection 250 mcg  250 mcg Intravenous Q6HR Musa Brown M.D.       • Pharmacy Consult: Enteral tube insertion - review meds/change route/product selection  1 Each Other PHARMACY TO DOSE Musa Brown M.D.       • fentaNYL (SUBLIMAZE) injection 200 mcg  200 mcg Intravenous Q15 MIN PRN Musa Brown M.D.   200 mcg at 12/30/19 1928    And   • fentaNYL (SUBLIMAZE) injection 400 mcg  400 mcg Intravenous Q15 MIN PRN uMsa Brown M.D.   400 mcg at 12/30/19 1628    And   • fentaNYL (SUBLIMAZE) 50 mcg/mL in 50mL (Continuous Infusion)   Intravenous Continuous Musa Brown M.D.   Stopped at 12/31/19 0918   • ondansetron (ZOFRAN) syringe/vial injection 4 mg  4 mg Intravenous Q4HRS PRN Lizett NUNEZ  GUERO Pradhan   4 mg at 12/31/19 1027   • Respiratory Care per Protocol   Nebulization Continuous RT Sascha Angela M.D.       • ipratropium-albuterol (DUONEB) nebulizer solution  3 mL Nebulization Q2HRS PRN (RT) Sascha Angela M.D.       • famotidine (PEPCID) injection 20 mg  20 mg Intravenous Q12HRS Sascha Angela M.D.   20 mg at 12/30/19 1809   • MD Alert...ICU Electrolyte Replacement per Pharmacy   Other PHARMACY TO DOSE Sascha Angela M.D.       • lidocaine (XYLOCAINE) 1 % injection 1-2 mL  1-2 mL Tracheal Tube Q30 MIN PRN Sascha Angela M.D.       • norepinephrine (LEVOPHED) 8 mg in  mL Infusion  0-30 mcg/min Intravenous Continuous Sascha Angela M.D.   Stopped at 12/30/19 0902       Fluids    Intake/Output Summary (Last 24 hours) at 12/31/2019 1643  Last data filed at 12/31/2019 0600  Gross per 24 hour   Intake 1904.94 ml   Output 445 ml   Net 1459.94 ml       Laboratory  Recent Labs     12/29/19  1649 12/30/19  0346 12/31/19  0328   ISTATAPH 7.427 7.414 7.440   ISTATAPCO2 39.7* 33.3 34.1   ISTATAPO2 252* 93* 93*   ISTATATCO2 27 22 24   [id number] 100* 97 98   ISTATARTHCO3 26.2* 21.3 23.2   ISTATARTBE 2 -3 0   ISTATTEMP 36.7 C 35.5 C 36.5 C   ISTATFIO2 80 40 40   ISTATSPEC Arterial Arterial Arterial   ISTATAPHTC 7.432 7.437 7.448   [id number] 251* 84 90*         Recent Labs     12/29/19  0043 12/30/19  0504 12/31/19  0415   SODIUM 139 140 139   POTASSIUM 4.7 3.8 3.9   CHLORIDE 103 106 106   CO2 24 24 25   BUN 27* 33* 39*   CREATININE 1.05 0.79 0.81   MAGNESIUM  --  1.9 2.2   PHOSPHORUS  --  2.0* 1.9*   CALCIUM 9.2 8.9 8.6     Recent Labs     12/29/19  0043 12/30/19  0504 12/31/19  0415   ALTSGPT 12 18  --    ASTSGOT 29 22  --    ALKPHOSPHAT 77 75  --    TBILIRUBIN 0.8 0.6  --    PREALBUMIN  --   --  3.0*   GLUCOSE 139* 173* 190*     Recent Labs     12/29/19 0043 12/30/19  0504 12/31/19  0415   WBC 28.9* 24.2* 19.4*   NEUTSPOLYS 90.40* 97.40* 93.40*   LYMPHOCYTES 2.60* 0.90* 2.20*   MONOCYTES 2.60 1.70  3.70   EOSINOPHILS 0.00 0.00 0.00   BASOPHILS 0.00 0.00 0.20   ASTSGOT 29 22  --    ALTSGPT 12 18  --    ALKPHOSPHAT 77 75  --    TBILIRUBIN 0.8 0.6  --      Recent Labs     12/29/19  0043 12/30/19  0504 12/31/19  0415   RBC 5.30 4.65* 4.23*   HEMOGLOBIN 16.4 14.3 13.0*   HEMATOCRIT 49.9 43.7 39.0*   PLATELETCT 221 219 192   PROTHROMBTM 14.8*  --   --    INR 1.13  --   --        Imaging  X-Ray:  I have personally reviewed the images and compared with prior images.    Assessment/Plan  * CAP (community acquired pneumonia)  Assessment & Plan  Ceftriaxone and doxycycline  Neg flu, wife had flu this week    Pneumonia with fever, elevated WBC and infiltrate  Continue 5 day course antibiotics    AF (atrial fibrillation) (HCC)  Assessment & Plan  Amnio off and on, now off, use dilt and added dig today (loaded)    Elevated BUN  Assessment & Plan  Unchanged over last 24 hours    Acute exacerbation of chronic obstructive pulmonary disease (COPD) ([facility])  Assessment & Plan  duonebs scheduled  Steroids scheduled    Acute on chronic respiratory failure with hypoxemia ([facility])  Assessment & Plan  Extubated, trial going OK complicated by ileus possible early SBO    Lactic acidosis  Assessment & Plan  Resolved    Elevated troponin  Assessment & Plan  Hx cad  Likely demand ischemia from hypoxic respiratory failure    Sepsis due to pneumonia ([facility])  Assessment & Plan  Sepsis/ septic shock on pressors with pneumonia (no bug yet identified)        Dyslipidemia- (present on admission)  Assessment & Plan  Continue statin    Hypertension- (present on admission)  Assessment & Plan  Levophed weaned to off    CAD (coronary artery disease)- (present on admission)  Assessment & Plan  Hx stents       VTE:  Heparin  Ulcer: H2 Antagonist  Lines: Central Line  Ongoing indication addressed and Alvarado Catheter  Ongoing indication addressed    I have performed a physical exam and reviewed and updated ROS and Plan today (12/31/2019). In review of yesterday's  note (12/30/2019), there are no changes except as documented above.     Discussed patient condition and risk of morbidity and/or mortality with Hospitalist, Family, RN, RT, Therapies, Pharmacy and Charge nurse / hot rounds  The patient remains critically ill.  Critical care time = 37 minutes in directly providing and coordinating critical care and extensive data review.  No time overlap and excludes procedures.

## 2020-01-01 NOTE — RESPIRATORY CARE
Increase WOB and expiratory wheezing noted. Partial Duoneb treatment given. Patient agitated and refusing remaining treatment. Increase WOB and tachypnea continues. High flow refused after approximately. 15 minutes of use. RN aware.

## 2020-01-01 NOTE — CARE PLAN
Problem: Bowel/Gastric:  Goal: Normal bowel function is maintained or improved  Outcome: PROGRESSING AS EXPECTED   Patient has bowel sounds present in all quadrants. No bowel movement at this time. Nausea and vomiting no longer present       Problem: Skin Integrity  Goal: Risk for impaired skin integrity will decrease  Outcome: PROGRESSING AS EXPECTED   Patient being turned q2, mepilex and pillows in use. Skin checked under devices.

## 2020-01-01 NOTE — CARE PLAN
Problem: Nutritional:  Goal: Nutrition support tolerated and meeting greater than 85% of estimated needs  Outcome: PROGRESSING SLOWER THAN EXPECTED    See RD note, RD to follow.

## 2020-01-01 NOTE — CARE PLAN
Problem: Respiratory:  Goal: Respiratory status will improve  Outcome: NOT MET  Intervention: Collaborate with respiratory therapist and Interdisciplinary Team on treatment measures to improve respiratory function  Note:   RT Protocol started.  Dr Aldana to bedside to eval patient.  Steroids, duoneb, and dex ordered.  If respiratory status doesn't improve, might need intubation.

## 2020-01-02 NOTE — PROGRESS NOTES
Critical Care Progress Note    Date of admission  12/28/2019    Chief Complaint  79 y.o. male admitted 12/28/2019 with acute hypoxic respiratory failure requiring intubation/ mechanical ventilation    Hospital Course  79 y.o. male who presented 12/28/2019 with shortness of breath. Recent cough with yellow sputum. Hx copd on 3 L oxygen at home. [place].  On high flow 40 L 40%.  High work of breathing with paradoxical pattern.  RR to 30s.  + wheezing/poor air movement.  On amiodarone for hx of afib.  Hx cad w/ prior stent, dyslipidemia, hypertension and hypoxic respiratory failure.  afib rvr, received beta blocker this am and improved to low 100s.  Ns at 100 ml/hr. Home xarelto.  Amiodarone started this am.    Interval Problem Update  Reviewed last 24 hour events:  Remains on titrated high flow Fi02 30 percent, 50 liters  Delirious but less agitated this am  Afib with intermittent RVR  Cardiology consulted      Prior 24 hours  Extubated yesterday  Issues with ileus yesterday- much improved  Intermittent afib with RVR on dig/ dilt (held last night for decreased heart rate) on and off anmio  On high flow (30 liters) with good sats  Delirious since extubation but generally cooperative  To receive 14 days antibiotics for strep pneumonia bacteremia (day 4 today)    Prior 24 hours  Afib/flutter with RVR intermittent  Amnio on, now off  Dilt drip  Dig loading starting  Tolerating extubating this morning so far OK  Distended abdomen- KUB- ileus vs early SBO  Given narcotic gut reversal agent  Mobility  Oriented, approp, supple neck  Meningitis dosing decreased to CAP doing of ceftriaxone      Prior 24 hours  Amnio started last night, stopped today  Dilt given per Core-track with good response  On vent day 2  Poorly responsive but on sedation  Jerking movement atypical for seizure intermittent  Unsuccessful attempt to do LP today  Levophed 2 and weaned today to off  CXR LLL  consolodation  Antibiotics conitnued today secondary to elevated WBC, fever and focal consolidation on CXR and probable Strep in blood (sensitivities/ speciation pending)  Cannot do ROS secondary to being on vent and sedated      Review of Systems  All systems negative except for complaints of feeling bloated, cannot do ROS today secondary to delirium       Vital Signs for last 24 hours   Temp:  [36.3 °C (97.3 °F)-37.1 °C (98.8 °F)] 36.3 °C (97.3 °F)  Pulse:  [] 69  Resp:  [7-32] 24  BP: (120-182)/(46-82) 161/74  SpO2:  [90 %-97 %] 96 %    Hemodynamic parameters for last 24 hours       Respiratory Information for the last 24 hours       Physical Exam   Physical Exam  Vitals reviewed: On high flow O2, appears calm but confused.   HENT:      Head: Atraumatic.      Nose: Nose normal.   Eyes:      Extraocular Movements: Extraocular movements intact.   Cardiovascular:      Rate and Rhythm: Normal rate and regular rhythm.      Pulses: Normal pulses.   Abdominal:      General: Abdomen is flat. Bowel sounds are normal.      Palpations: Abdomen is soft.   Skin:     General: Skin is warm and dry.      Comments: Slightly cool extremities hands and feet   Neurological:      General: No focal deficit present.         Medications  Current Facility-Administered Medications   Medication Dose Route Frequency Provider Last Rate Last Dose   • ipratropium-albuterol (DUONEB) nebulizer solution  3 mL Nebulization RT EVERY 1 HOUR PRN Randall Schwartz M.D.   3 mL at 01/01/20 0409   • digoxin (LANOXIN) injection 125 mcg  125 mcg Intravenous DAILY AT 1800 Musa Brown M.D.       • cefTRIAXone (ROCEPHIN) 2 g in  mL IVPB  2 g Intravenous DAILY Musa Brown M.D.   Stopped at 01/02/20 0518   • DILTIAZem (CARDIZEM) 100 mg in D5W 100 mL Infusion  0-15 mg/hr Intravenous Continuous Musa Brown M.D.   Stopped at 01/02/20 0028   • labetalol (NORMODYNE/TRANDATE) injection 20 mg  20 mg Intravenous Q4HRS PRN Musa P. Young,  M.D.   10 mg at 12/31/19 2020   • hydrALAZINE (APRESOLINE) injection 20 mg  20 mg Intravenous Q4HRS PRN Musa Brown M.D.   20 mg at 01/01/20 2005   • methylPREDNISolone (SOLU-MEDROL) 40 MG injection 40 mg  40 mg Intravenous Q6HRS Maurice Aldana M.D.   40 mg at 01/02/20 0448   • dexmedetomidine (PRECEDEX) 400 mcg/100mL NS premix infusion  0.1-1 mcg/kg/hr Intravenous Continuous Maurice Aldana M.D.   Stopped at 01/02/20 0711   • Pharmacy Consult: Enteral tube insertion - review meds/change route/product selection  1 Each Other PHARMACY TO DOSE Musa Brown M.D.       • ondansetron (ZOFRAN) syringe/vial injection 4 mg  4 mg Intravenous Q4HRS PRN Lizett Pradhan M.D.   4 mg at 12/31/19 1027   • Respiratory Care per Protocol   Nebulization Continuous RT Sascha Angela M.D.       • MD Alert...ICU Electrolyte Replacement per Pharmacy   Other PHARMACY TO DOSE Sascha Angela M.D.           Fluids    Intake/Output Summary (Last 24 hours) at 1/2/2020 1057  Last data filed at 1/2/2020 1000  Gross per 24 hour   Intake 367.94 ml   Output 1300 ml   Net -932.06 ml       Laboratory  Recent Labs     12/31/19  0328 01/01/20  0032   ISTATAPH 7.440 7.414   ISTATAPCO2 34.1 42.8*   ISTATAPO2 93* 114*   ISTATATCO2 24 29   FUGDQBS0BCE 98 99   ISTATARTHCO3 23.2 27.4*   ISTATARTBE 0 2   ISTATTEMP 36.5 C 37.2 C   ISTATFIO2 40 45   ISTATSPEC Arterial Arterial   ISTATAPHTC 7.448 7.412   [id number] 90* 116*         Recent Labs     12/31/19  0415 01/01/20  0400 01/02/20  0500   SODIUM 139 142 144   POTASSIUM 3.9 5.0 4.8   CHLORIDE 106 109 109   CO2 25 27 31   BUN 39* 46* 44*   CREATININE 0.81 0.94 0.75   MAGNESIUM 2.2 2.1  --    PHOSPHORUS 1.9* 3.4  --    CALCIUM 8.6 8.6 8.6     Recent Labs     12/31/19  0415 01/01/20  0400 01/02/20  0500   PREALBUMIN 3.0*  --   --    GLUCOSE 190* 151* 162*     Recent Labs     12/31/19 0415 01/01/20  0400 01/02/20  0500   WBC 19.4* 17.9* 13.2*   NEUTSPOLYS 93.40* 91.80* 91.20*   LYMPHOCYTES  2.20* 3.00* 3.20*   MONOCYTES 3.70 4.20 4.90   EOSINOPHILS 0.00 0.00 0.00   BASOPHILS 0.20 0.30 0.20     Recent Labs     12/31/19  0415 01/01/20  0400 01/02/20  0500   RBC 4.23* 4.42* 4.61*   HEMOGLOBIN 13.0* 13.4* 14.1   HEMATOCRIT 39.0* 41.3* 42.9   PLATELETCT 192 168 169       Imaging  X-Ray:  I have personally reviewed the images and compared with prior images.    Assessment/Plan  * CAP (community acquired pneumonia)  Assessment & Plan  Ceftriaxone and doxycycline  Neg flu, wife had flu this week    Will treat 14 days for step pneumonia with strep bacteremia. May not require IV antibiotics for entire course    Slow improvement    Acute delirium  Assessment & Plan  Delirium appears to be slowing easing.     Ileus ([facility])  Assessment & Plan  Resolving rapidly, can start advancing po (pt just pulled out coretrack)    AF (atrial fibrillation) ([facility])  Assessment & Plan  Now on dig iv, dilt was stopped, was on amnio, consider cardiology consult    Cards consulted- recommending po aminiodirone and to expect significant rate break though, especially while sick.    Elevated BUN  Assessment & Plan  Slowly risking BUN possibly due to steroids, nutrition, volume status  Observe, avoid diuresis, decrease protein intake    Acute exacerbation of chronic obstructive pulmonary disease (COPD) ([facility])  Assessment & Plan  duonebs scheduled  Steroids scheduled    I don't think this patient is suffering from an acute exacerbation of COPD- pneumonia/ ARDS is  of hypoxia. In other words, his underlying acute disease process will unlikely respond notably to steroids and broncho-dilators.    Acute on chronic respiratory failure with hypoxemia ([facility])  Assessment & Plan  Extubated, trial going OK complicated by ileus possible early SBO    Ileus improved but now has mildly agitated delirium  Doing well with high flow with good sats  Mobilize pt    Now on high flow- continues to warrant mobilization, gentle diureses    Lactic  acidosis  Assessment & Plan  Resolved    Elevated troponin  Assessment & Plan  Hx cad  Likely demand ischemia from hypoxic respiratory failure    Sepsis due to pneumonia ([facility])  Assessment & Plan  Strep pneumonia in blood- 14 days antibiotics    Clinically responding         Dyslipidemia- (present on admission)  Assessment & Plan  Continue statin    Hypertension- (present on admission)  Assessment & Plan  Systolic sometimes up to 170s but related to agitation  Treat systolic > 180 with prn Labetolol    CAD (coronary artery disease)- (present on admission)  Assessment & Plan  Hx stents       VTE:  Heparin  Ulcer: H2 Antagonist  Lines: Central Line  Ongoing indication addressed and Alvarado Catheter  Ongoing indication addressed    I have performed a physical exam and reviewed and updated ROS and Plan today (1/2/2020). In review of yesterday's note (1/1/2020), there are no changes except as documented above.     Discussed patient condition and risk of morbidity and/or mortality with Hospitalist, Family, RN, RT, Therapies, Pharmacy and Charge nurse / hot rounds  The patient remains critically ill.  Critical care time = 32 minutes in directly providing and coordinating critical care and extensive data review.  No time overlap and excludes procedures.

## 2020-01-02 NOTE — CARE PLAN
Problem: Bronchoconstriction:  Goal: Improve in air movement and diminished wheezing  Outcome: PROGRESSING AS EXPECTED     Problem: Oxygenation:  Goal: Maintain adequate oxygenation dependent on patient condition  Outcome: PROGRESSING AS EXPECTED     Duoneb Q4  HHFO 50L 40% FiO2

## 2020-01-02 NOTE — CARE PLAN
Problem: Safety - Medical Restraint  Goal: Remains free of injury from restraints (Restraint for Interference with Medical Device)  Description  INTERVENTIONS:  1. Determine that other, less restrictive measures have been tried or would not be effective before applying the restraint  2. Evaluate the patient's condition at the time of restraint application  3. Inform patient/family regarding the reason for restraint  4. Q2H: Monitor safety, psychosocial status, comfort, nutrition and hydration  Flowsheets (Taken 1/2/2020 0013)  Addressed this shift: Remains free of injury from restraints (restraint for interference with medical device): Determine that other, less restrictive measures have been tried or would not be effective before applying the restraint; Evaluate the patient's condition at the time of restraint application; Inform patient/family regarding the reason for restraint; Every 2 hours: Monitor safety, psychosocial status, comfort, nutrition and hydration  Note:   Will continue to assess need for restraints.     Problem: Venous Thromboembolism (VTW)/Deep Vein Thrombosis (DVT) Prevention:  Goal: Patient will participate in Venous Thrombosis (VTE)/Deep Vein Thrombosis (DVT)Prevention Measures  Flowsheets (Taken 1/2/2020 0000)  Mechanical Prophylaxis : SCDs, Sequential Compression Device  SCDs, Sequential Compression Device: On

## 2020-01-02 NOTE — CARE PLAN
Problem: Bronchoconstriction:  Goal: Improve in air movement and diminished wheezing  Outcome: PROGRESSING AS EXPECTED  Duoneb Q4     Problem: Oxygenation:  Goal: Maintain adequate oxygenation dependent on patient condition  Outcome: PROGRESSING AS EXPECTED  HHFNC 50L, 40%. Will continue to titrate as tolerated.

## 2020-01-02 NOTE — CARE PLAN
Problem: Safety  Goal: Will remain free from injury  Outcome: PROGRESSING AS EXPECTED   Patient eduacted on fall precaution rationale. Patient confused, no evident learning. Fall alarm on, yellow socks in place, RN rounding on patient.   Problem: Bowel/Gastric:  Goal: Normal bowel function is maintained or improved  Outcome: PROGRESSING AS EXPECTED   Patient reestablished bowel movements. MD notified. Loose bowel movements.

## 2020-01-02 NOTE — THERAPY
"Speech Language Therapy Clinical Swallow Evaluation completed.    Functional Status: Pt AOX1, pleasantly confused with visual hallucinations. RN aware. Pt pulled cortrak yesterday, referral to SLP to determine PO readiness. Pt's daughter present and provides pt's hx. Oral motor examination revealed mild weakness overall. Pt is edentulous, dentures not present, pt's SO to bring dentures in as pt does not eat without them. Pt required assistance to feed self PO trials ice chips, thin liquid, and puree textures. Occasional wet cough noted prior to, throughout, and after PO intake. Per RN, cough has been productive. Throat clear in 2/20 trials thin liquid. Pt did require cues x2 to swallow prior to talking. Pt consumed 4 oz of puree texture, one throat clear noted throughout all trials. Pt declined trials soft/chopped texture d/t dentures not present. Recommend initiation of a Dysphagia 1 diet with thin liquids. Recommend close supervision during all PO intake, closely monitor lung sounds. Discussed with RN. Education provided to pt and family. SLP will continue to follow.     Recommendations - Diet:  Dysphagia 1, thin                          Strategies: Direct supervision during meals, Assistance needed for meal tray set-up, small bites and sips, Head of Bed at 90 Degrees, no talking during PO intake, limit distractions                          Medication Administration:  Crushed in puree    Plan of Care: Will benefit from Speech Therapy 3 times per week    Post-Acute Therapy: Recommend inpatient transitional care services for continued speech therapy services.      See \"Rehab Therapy-Acute\" Patient Summary Report for complete documentation.     "

## 2020-01-03 PROBLEM — R31.0 GROSS HEMATURIA: Status: ACTIVE | Noted: 2020-01-01

## 2020-01-03 NOTE — CONSULTS
Reason for Consult:  Asked by Dr Musa Brown M.D. to see this patient with atrial fibrillation rapid ventricular response paroxysmal [place]  Patient's PCP: Randall Jama M.D.    CC:   Chief Complaint   Patient presents with   • Shortness of Breath   • Atrial Fibrillation     RVR       HPI: 79-year-old gentleman with history of paroxysmal atrial fibrillation on rate control agents had last seen us in cardiology in 2017 follows up with Dr. Jama who presents with pneumonia but has been having paroxysmal atrial fibrillation which has been controlled on medication intermittently seems a lot of the nights he has paroxysms of A. fib heart rates are mildly elevated but with his tenuous breathing may have difficulty with breathing.  Does not note any significant palpitations as an outpatient he is unsure of his outpatient medications    Medications / Drug list prior to admission:  No current facility-administered medications on file prior to encounter.      Current Outpatient Medications on File Prior to Encounter   Medication Sig Dispense Refill   • aspirin EC (ECOTRIN) 325 MG Tablet Delayed Response Take 325 mg by mouth every day.     • lisinopril (PRINIVIL) 2.5 MG Tab Take 2.5 mg by mouth 2 Times a Day.     • Ascorbic Acid (VITAMIN C PO) Take 2,000 mg by mouth every day.     • Coenzyme Q10 (COQ-10) 100 MG Cap Take 100 mg by mouth every day.     • simvastatin (ZOCOR) 10 MG Tab Take 10 mg by mouth every evening.     • vitamin D (CHOLECALCIFEROL) 1000 UNIT Tab Take 1,000 Units by mouth 2 Times a Day.         Current list of administered Medications:    Current Facility-Administered Medications:   •  digoxin (LANOXIN) tablet 125 mcg, 125 mcg, Oral, DAILY AT 1800, Musa Brown M.D., 125 mcg at 01/02/20 1720  •  [START ON 1/3/2020] predniSONE (DELTASONE) tablet 40 mg, 40 mg, Oral, DAILY, Musa Brown M.D.  •  DILTIAZem (CARDIZEM) tablet 60 mg, 60 mg, Oral, Q8HRS, Musa Brown M.D., 60 mg at  "01/02/20 1453  •  rivaroxaban (XARELTO) tablet 20 mg, 20 mg, Oral, PM MEAL, Musa Brown M.D., 20 mg at 01/02/20 1720  •  ipratropium-albuterol (DUONEB) nebulizer solution, 3 mL, Nebulization, RT EVERY 1 HOUR PRN, Randall Schwartz M.D., 3 mL at 01/01/20 0409  •  cefTRIAXone (ROCEPHIN) 2 g in  mL IVPB, 2 g, Intravenous, DAILY, Musa Brown M.D., Stopped at 01/02/20 0518  •  DILTIAZem (CARDIZEM) 100 mg in D5W 100 mL Infusion, 0-15 mg/hr, Intravenous, Continuous, Musa Brown M.D., Stopped at 01/02/20 0028  •  labetalol (NORMODYNE/TRANDATE) injection 20 mg, 20 mg, Intravenous, Q4HRS PRN, Musa Brown M.D., 10 mg at 12/31/19 2020  •  hydrALAZINE (APRESOLINE) injection 20 mg, 20 mg, Intravenous, Q4HRS PRN, Musa Brown M.D., Stopped at 01/02/20 1508  •  dexmedetomidine (PRECEDEX) 400 mcg/100mL NS premix infusion, 0.1-1 mcg/kg/hr, Intravenous, Continuous, Maurice lAdana M.D., Stopped at 01/02/20 0711  •  Pharmacy Consult: Enteral tube insertion - review meds/change route/product selection, 1 Each, Other, PHARMACY TO DOSE, Musa Brown M.D.  •  ondansetron (ZOFRAN) syringe/vial injection 4 mg, 4 mg, Intravenous, Q4HRS PRN, Lizett Pradhan M.D., 4 mg at 12/31/19 1027  •  Respiratory Care per Protocol, , Nebulization, Continuous RT, Sascha Angela M.D.  •  MD Alert...ICU Electrolyte Replacement per Pharmacy, , Other, PHARMACY TO DOSE, Sascha Angela M.D.    Past Medical History:   Diagnosis Date   • Backpain occasional   • Cancer (HCC) 6/2017    Lung   • CATARACT     Bilateral IOL's   • COPD    • Coughing blood 7/5/17    Blood and productive in May 2017. \"Just one day\"   • High cholesterol    • Hyperlipidemia    • Hypertension    • Indigestion    • Myocardial infarct (HCC) with stent    1/23/2005   • Pneumonia        Past Surgical History:   Procedure Laterality Date   • THORACOSCOPY Left 7/11/2017    Procedure: THORACOSCOPY UPPER LOBECTOMY, NODE DISSECTION;  Surgeon: John H Ganser, " M.D.;  Location: SURGERY [facility];  Service:    • SPLENECTOMY  2010    Performed by JIM OMALLEY at SURGERY [facility]   • OTHER      kidney stone   • OTHER CARDIAC SURGERY         Family History   Problem Relation Age of Onset   • Heart Disease Neg Hx      Patient family history was personally reviewed, no pertinent family history to current presentation    Social History     Tobacco Use   • Smoking status: Former Smoker     Packs/day: 1.00     Years: 60.00     Pack years: 60.00     Types: Cigarettes     Last attempt to quit: 2017     Years since quittin.5   • Smokeless tobacco: Never Used   • Tobacco comment: 1 pk a day   Substance Use Topics   • Alcohol use: Yes     Comment: Rarely   • Drug use: No       ALLERGIES:  No Known Allergies    Review of systems:  A complete review of symptoms was reviewed with patient . This is reviewed in H&P and PMH. ALL OTHERS reviewed and negative    Physical exam:  Patient Vitals for the past 24 hrs:   BP Temp Temp src Pulse Resp SpO2 Weight   20 1700 155/84 -- -- 87 (!) 29 92 % --   20 1600 153/64 -- -- 89 (!) 27 92 % --   20 1500 (!) 176/82 -- -- 100 (!) 25 92 % --   20 1400 (!) 171/79 -- -- 100 (!) 25 89 % --   20 1300 (!) 173/78 -- -- 99 (!) 32 89 % --   20 1200 (!) 179/71 -- -- 63 (!) 31 -- --   20 1100 (!) 169/72 -- -- 71 (!) 33 98 % --   20 1027 -- -- -- 69 (!) 24 96 % --   20 1022 -- -- -- 69 19 97 % --   20 1000 (!) 161/74 -- -- 66 (!) 23 96 % --   20 0900 153/72 -- -- 65 (!) 25 94 % --   20 0800 160/76 -- -- 63 (!) 26 93 % --   20 0700 (!) 169/82 -- -- 60 (!) 25 93 % --   20 0626 -- -- -- 62 (!) 27 94 % --   20 0600 (!) 166/72 -- -- (!) 57 (!) 25 94 % --   20 0500 (!) 163/71 -- -- 65 19 95 % 101 kg (222 lb 10.6 oz)   20 0400 140/62 36.3 °C (97.3 °F) Bladder 65 (!) 23 94 % --   20 0300 148/67 -- -- 70 (!) 7 92 % --   20 0200 132/63  -- -- 66 (!) 11 93 % --   01/02/20 0156 -- -- -- 68 20 93 % --   01/02/20 0100 124/46 -- -- 67 14 94 % --   01/02/20 0000 120/52 37 °C (98.6 °F) Bladder 69 (!) 28 94 % --   01/01/20 2300 144/78 -- -- (!) 121 (!) 31 90 % --   01/01/20 2207 -- -- -- (!) 123 18 94 % --   01/01/20 2200 147/71 -- -- (!) 124 (!) 25 94 % --   01/01/20 2100 145/79 -- -- (!) 141 (!) 32 97 % --   01/01/20 2003 -- -- -- 80 (!) 22 95 % --   01/01/20 2000 (!) 182/76 37.1 °C (98.8 °F) Bladder 72 (!) 29 96 % --   01/01/20 1900 (!) 164/73 -- -- 65 (!) 26 96 % --   01/01/20 1805 156/67 -- -- 66 (!) 23 95 % --   01/01/20 1800 (!) 171/78 -- -- 71 18 94 % --     General: No acute distress.   EYES: no jaundice  HEENT: OP clear   Neck: No bruits No JVD.   CVS:   RRR. S1 + S2. No M/R/G  Resp: Bilateral rales  Abdomen: Soft, NT, ND,  Skin: Grossly nothing acute no obvious rashes  Neurological: Alert, Moves all extremities, no cranial nerve defects on limited exam  Extremities: Pulse 2+ in b/l LE.  no edema. No cyanosis.       Data:  Laboratory studies personally reviewed by me:  Recent Results (from the past 24 hour(s))   Basic Metabolic Panel (BMP)    Collection Time: 01/02/20  5:00 AM   Result Value Ref Range    Sodium 144 135 - 145 mmol/L    Potassium 4.8 3.6 - 5.5 mmol/L    Chloride 109 96 - 112 mmol/L    Co2 31 20 - 33 mmol/L    Glucose 162 (H) 65 - 99 mg/dL    Bun 44 (H) 8 - 22 mg/dL    Creatinine 0.75 0.50 - 1.40 mg/dL    Calcium 8.6 8.5 - 10.5 mg/dL    Anion Gap 4.0 0.0 - 11.9   CBC with Differential    Collection Time: 01/02/20  5:00 AM   Result Value Ref Range    WBC 13.2 (H) 4.8 - 10.8 K/uL    RBC 4.61 (L) 4.70 - 6.10 M/uL    Hemoglobin 14.1 14.0 - 18.0 g/dL    Hematocrit 42.9 42.0 - 52.0 %    MCV 93.1 81.4 - 97.8 fL    MCH 30.6 27.0 - 33.0 pg    MCHC 32.9 (L) 33.7 - 35.3 g/dL    RDW 52.3 (H) 35.9 - 50.0 fL    Platelet Count 169 164 - 446 K/uL    MPV 10.9 9.0 - 12.9 fL    Neutrophils-Polys 91.20 (H) 44.00 - 72.00 %    Lymphocytes 3.20 (L) 22.00  - 41.00 %    Monocytes 4.90 0.00 - 13.40 %    Eosinophils 0.00 0.00 - 6.90 %    Basophils 0.20 0.00 - 1.80 %    Immature Granulocytes 0.50 0.00 - 0.90 %    Nucleated RBC 0.70 /100 WBC    Neutrophils (Absolute) 12.00 (H) 1.82 - 7.42 K/uL    Lymphs (Absolute) 0.42 (L) 1.00 - 4.80 K/uL    Monos (Absolute) 0.65 0.00 - 0.85 K/uL    Eos (Absolute) 0.00 0.00 - 0.51 K/uL    Baso (Absolute) 0.02 0.00 - 0.12 K/uL    Immature Granulocytes (abs) 0.06 0.00 - 0.11 K/uL    NRBC (Absolute) 0.09 K/uL   ESTIMATED GFR    Collection Time: 01/02/20  5:00 AM   Result Value Ref Range    GFR If African American >60 >60 mL/min/1.73 m 2    GFR If Non African American >60 >60 mL/min/1.73 m 2       Imaging:  DX-CHEST-PORTABLE (1 VIEW)   Final Result         Findings on chest radiograph appear stable since the prior radiograph.  No new abnormalities are identified. Opacification and volume loss in the left lung base is again noted.      US-EXTREMITY VENOUS UPPER UNILAT RIGHT   Final Result      [id number] VIEW   Final Result         1. Mild dilatation of small bowel loops, with gaseous distention of the colon. Findings may be due to ileus or partial small bowel obstruction.   2. Left basilar consolidation and left pleural effusion.      DX-CHEST-PORTABLE (1 VIEW)   Final Result      No significant interval change.      DX-ABDOMEN FOR TUBE PLACEMENT   Final Result      Feeding tube placement with the tip projecting over the stomach body.      DX-CHEST-PORTABLE (1 VIEW)   Final Result      No significant interval change.      DX-CHEST-PORTABLE (1 VIEW)   Final Result      1.  Supportive tubing as described above.   2.  No other significant change from prior exam.         DX-CHEST-PORTABLE (1 VIEW)   Final Result         Airspace opacity in the left lower lobe with associated left pleural effusion, concerning for infection              EKG : personally reviewed by me sinus rhythm    Telemetry shows paroxysmal atrial fibrillation    All pertinent  features of laboratory and imaging reviewed including primary images where applicable      Principal Problem:    CAP (community acquired pneumonia) POA: Unknown  Active Problems:    CAD (coronary artery disease) POA: Yes      Overview: Stents in place.      Continue ASA.    Hypertension POA: Yes      Overview: Resume lisinopril and metoprolol.      130s - 140s 8/25    Dyslipidemia POA: Yes      Overview: Resume Lipitor.    Sepsis due to pneumonia (HCC) POA: Unknown    Elevated troponin POA: Unknown    Lactic acidosis POA: Unknown    Acute on chronic respiratory failure with hypoxemia (HCC) POA: Unknown    Acute exacerbation of chronic obstructive pulmonary disease (COPD) (HCC) POA: Unknown    Elevated BUN POA: Unknown    AF (atrial fibrillation) (HCC) POA: Unknown    Ileus (HCC) POA: Unknown      Overview: Markedly distended abdomen, non-tender, denies pain but c/o nausea without       vomiting, KUB shows ileus vs early SBO            Plan      Off narcotics      Mobilize      NPO      Narcotic GI reversal agent given      Serial observations    Acute delirium POA: Unknown  Resolved Problems:    * No resolved hospital problems. *      Assessment / Plan:  Paroxysmal atrial fibrillation with rapid ventricular response  Would just do conservative therapies this is triggered by his pneumonia and his underlying lung disease so is found to have recurrent episodes of atrial fibrillation but does not seem to have severe consequence of them so would just be conservative in his current regimen diltiazem 60 every 8 digoxin and Xarelto    I would avoid IV medications for rate control such as diltiazem certainly amiodarone bolus for recurrent episodes may help to maintain sinus rhythm more so    Need for chronic anticoagulation  Strongly reinforced with him importance of chronic anticoagulation    Cardiology will sign off certainly call for refractory arrhythmias that cause symptoms    I personally discussed his case with    Musa Brown M.D.    I will arrange follow-up with cardiology he may choose to just follow-up with primary care as well    It is my pleasure to participate in the care of Mr. Cano.  Please do not hesitate to contact me with questions or concerns.    Shorty Moeller MD PhD [facility]  Cardiologist [facility]    1/2/2020    Please note that this dictation was created using voice recognition software. I have worked with consultants from the vendor as well as technical experts from [facility] to optimize the interface. I have made every reasonable attempt to correct obvious errors, but I expect that there are errors of grammar and possibly content I did not discover before finalizing the note.

## 2020-01-03 NOTE — DIETARY
Nutrition Services: TF D/c'd    Admit day 5.  Pt now on modified diet order per SLP 1/2.  PO intake <50% of 3 recorded meals per ADLs.  No indication of poor PO intake or weight loss PTA.    RD will monitor PO intake and additional pt needs.

## 2020-01-03 NOTE — PROGRESS NOTES
Received report and assumed pt care. Pt is sitting up to cardiac chair. Pt is disoriented to event. 4L NC, VSS. Dr. Brown rounding at bedside, okay to transfer pt to telemetry unit.

## 2020-01-03 NOTE — CARE PLAN
Problem: Respiratory:  Goal: Respiratory status will improve  Note:   Pt on 4L NC. Pt able to cough and suction own secretions.      Problem: Venous Thromboembolism (VTW)/Deep Vein Thrombosis (DVT) Prevention:  Goal: Patient will participate in Venous Thrombosis (VTE)/Deep Vein Thrombosis (DVT)Prevention Measures  Flowsheets  Taken 1/3/2020 0000  Mechanical Prophylaxis : SCDs, Sequential Compression Device  SCDs, Sequential Compression Device: On  Taken 1/2/2020 2000  Pharmacologic Prophylaxis Used: Rivaroxaban (Xarelto)

## 2020-01-03 NOTE — DISCHARGE PLANNING
· This RNCM left a voicemail for the Pt's wife, Daiana, at [phone] requesting a call back for discharge planning assessment  · CM to F/U

## 2020-01-03 NOTE — CARE PLAN
Problem: Nutritional:  Goal: Achieve adequate nutritional intake  Description  Patient will consume at least 50% of most meals.   Outcome: NOT MET

## 2020-01-03 NOTE — ASSESSMENT & PLAN NOTE
Following trauma: pt accidentally pulled cath  Holding rivaroxaban  Follow post void residuals for signs of retention until urine is clear  1/7: Voiding trials.  H&H has been stable.  1/8: Resolved.  Xarelto restarted     1/14 f/u cbc

## 2020-01-03 NOTE — CARE PLAN
Problem: Bronchoconstriction:  Goal: Improve in air movement and diminished wheezing  Outcome: PROGRESSING AS EXPECTED     Problem: Oxygenation:  Goal: Maintain adequate oxygenation dependent on patient condition  Outcome: PROGRESSING AS EXPECTED     Duonebs PRN x 2   4L O2

## 2020-01-03 NOTE — PROGRESS NOTES
Pt removed mendoza catheter. No bleeding at this time. Per Dr. Sharma will leave catheter out and watch for bleeding. If bleeding results orders to start CBI received.

## 2020-01-03 NOTE — PROGRESS NOTES
"Hospital Medicine Daily Progress Note    Date of Service  1/3/2020    Chief Complaint  79 y.o. male admitted 12/28/2019 with SOB    Hospital Course    PMHx COPD on 3 LNC baseline, AFib, HLD, AC on rivaroxaban, HTN, CAD.  Presented with increased cough, SOB and fever.  Admitted on HFNC.  On 12/29 worsened requiring intubation. Extubated 1/1      Interval Problem Update  Pt feeling \"pretty good\" today.  Notes some SOB with exertion which is unusual for him but otherwise has no complaints    Consultants/Specialty  pulmonology    Code Status  full    Disposition  OK to floor    Review of Systems  Review of Systems   Constitutional: Negative for chills and fever.   HENT: Negative for nosebleeds and sore throat.    Eyes: Negative for blurred vision and double vision.   Respiratory: Positive for cough and shortness of breath.    Cardiovascular: Negative for chest pain, palpitations and leg swelling.   Gastrointestinal: Negative for abdominal pain, diarrhea, nausea and vomiting.   Genitourinary: Negative for dysuria and urgency.   Musculoskeletal: Negative for back pain.   Skin: Negative for rash.   Neurological: Negative for dizziness, loss of consciousness and headaches.        Physical Exam  Temp:  [37.2 °C (99 °F)-37.4 °C (99.3 °F)] 37.2 °C (99 °F)  Pulse:  [] 74  Resp:  [18-35] 18  BP: (146-209)/() 165/71  SpO2:  [89 %-98 %] 96 %    Physical Exam  Constitutional:       General: He is not in acute distress.     Appearance: He is well-developed. He is not diaphoretic.   HENT:      Head: Normocephalic and atraumatic.   Eyes:      Conjunctiva/sclera: Conjunctivae normal.   Neck:      Vascular: No JVD.   Cardiovascular:      Rate and Rhythm: Normal rate.      Heart sounds: Murmur present. No gallop.    Pulmonary:      Effort: Pulmonary effort is normal. No respiratory distress.      Breath sounds: No stridor. No wheezing or rales.   Abdominal:      Palpations: Abdomen is soft.      Tenderness: There is no " tenderness. There is no guarding or rebound.   Musculoskeletal:         General: No tenderness.      Right lower leg: No edema.      Left lower leg: No edema.   Skin:     General: Skin is warm and dry.      Findings: No rash.   Neurological:      General: No focal deficit present.      Mental Status: He is oriented to person, place, and time. Mental status is at baseline.   Psychiatric:         Mood and Affect: Mood normal.         Thought Content: Thought content normal.         Fluids    Intake/Output Summary (Last 24 hours) at 1/3/2020 0513  Last data filed at 1/3/2020 0400  Gross per 24 hour   Intake 1805.07 ml   Output 1795 ml   Net 10.07 ml       Laboratory  Recent Labs     01/01/20  0400 01/02/20  0500 01/03/20  0445   WBC 17.9* 13.2* 15.8*   RBC 4.42* 4.61* 4.62*   HEMOGLOBIN 13.4* 14.1 14.0   HEMATOCRIT 41.3* 42.9 42.4   MCV 93.4 93.1 91.8   MCH 30.3 30.6 30.3   MCHC 32.4* 32.9* 33.0*   RDW 53.4* 52.3* 50.4*   PLATELETCT 168 169 188   MPV 10.9 10.9 10.6     Recent Labs     01/01/20  0400 01/02/20  0500   SODIUM 142 144   POTASSIUM 5.0 4.8   CHLORIDE 109 109   CO2 27 31   GLUCOSE 151* 162*   BUN 46* 44*   CREATININE 0.94 0.75   CALCIUM 8.6 8.6                   Imaging  DX-CHEST-PORTABLE (1 VIEW)   Final Result         Findings on chest radiograph appear stable since the prior radiograph.  No new abnormalities are identified. Opacification and volume loss in the left lung base is again noted.      US-EXTREMITY VENOUS UPPER UNILAT RIGHT   Final Result      [id number] VIEW   Final Result         1. Mild dilatation of small bowel loops, with gaseous distention of the colon. Findings may be due to ileus or partial small bowel obstruction.   2. Left basilar consolidation and left pleural effusion.      DX-CHEST-PORTABLE (1 VIEW)   Final Result      No significant interval change.      DX-ABDOMEN FOR TUBE PLACEMENT   Final Result      Feeding tube placement with the tip projecting over the stomach body.       DX-CHEST-PORTABLE (1 VIEW)   Final Result      No significant interval change.      DX-CHEST-PORTABLE (1 VIEW)   Final Result      1.  Supportive tubing as described above.   2.  No other significant change from prior exam.         DX-CHEST-PORTABLE (1 VIEW)   Final Result         Airspace opacity in the left lower lobe with associated left pleural effusion, concerning for infection           Assessment/Plan  * CAP (community acquired pneumonia)  Assessment & Plan  Strep pneumonae  Complete 10 days of Rocephin    Gross hematuria  Assessment & Plan  Following trauma: pt accidentally pulled cath  Holding rivaroxaban  Start CBI    Acute delirium  Assessment & Plan  Pt is now alert and interactive.    delerium resolved    AF (atrial fibrillation) ([facility])  Assessment & Plan  Cardizem  Tele  DC rivaroxban due to gross hematuria    Elevated BUN  Assessment & Plan  resolved    Acute exacerbation of chronic obstructive pulmonary disease (COPD) ([facility])  Assessment & Plan  Strep pneumo: complete 10 day course  Cont O2/RT protocols  Steroids to oral and start taper      Acute on chronic respiratory failure with hypoxemia ([facility])  Assessment & Plan  Extubated 1/2  Tolerating well  moblize  Cont to address AECOPD and CAP    Lactic acidosis  Assessment & Plan  Improved    Elevated troponin  Assessment & Plan  Type II event    Sepsis due to pneumonia ([facility])  Assessment & Plan  Continue current antibiotics and close clinical monitoring        Dyslipidemia- (present on admission)  Assessment & Plan  Continue  simvastatin    Hypertension- (present on admission)  Assessment & Plan  Monitor blood pressure with Cardizem    CAD (coronary artery disease)- (present on admission)  Assessment & Plan  Continue medical therapy       VTE prophylaxis: rivaroxaban

## 2020-01-03 NOTE — CARE PLAN
Problem: Respiratory:  Goal: Respiratory status will improve  Outcome: PROGRESSING AS EXPECTED  Pt on 4L NC, home O2 2L NC    Problem: Psychosocial Needs:  Goal: Level of anxiety will decrease  Outcome: PROGRESSING AS EXPECTED  Pt requires frequent re-orientation  Pt is fidgety and picks at equipment

## 2020-01-04 NOTE — PROGRESS NOTES
Received report and assumed care. Pt confused, requiring frequent reorienting regarding medical devices, safety and plan of care. Bed in low and locked position, alarm on, call light within reach. Pt does not appear to comprehend use of call light. Wife at bedside.

## 2020-01-04 NOTE — CARE PLAN
Problem: Safety  Goal: Will remain free from injury  Outcome: PROGRESSING AS EXPECTED  Goal: Will remain free from falls  Outcome: PROGRESSING AS EXPECTED     Problem: Venous Thromboembolism (VTW)/Deep Vein Thrombosis (DVT) Prevention:  Goal: Patient will participate in Venous Thrombosis (VTE)/Deep Vein Thrombosis (DVT)Prevention Measures  Outcome: PROGRESSING AS EXPECTED     Problem: Bowel/Gastric:  Goal: Normal bowel function is maintained or improved  Outcome: PROGRESSING AS EXPECTED     Problem: Knowledge Deficit  Goal: Knowledge of disease process/condition, treatment plan, diagnostic tests, and medications will improve  Outcome: PROGRESSING AS EXPECTED     Problem: Respiratory:  Goal: Respiratory status will improve  Outcome: PROGRESSING AS EXPECTED     Problem: Skin Integrity  Goal: Risk for impaired skin integrity will decrease  Outcome: PROGRESSING AS EXPECTED

## 2020-01-04 NOTE — PROGRESS NOTES
Pt's wife Daiana updated on pt condition and I informed her that he will be transferred to [id number].  All questions answered at this time.

## 2020-01-04 NOTE — PROGRESS NOTES
12 hour chart check    Monitor Summary:  -/.10/-  Sinus Rhythm/A Fib/A Flutter  Occasional PVCs  Rate:

## 2020-01-04 NOTE — PROGRESS NOTES
"Hospital Medicine Daily Progress Note    Date of Service  1/4/2020    Chief Complaint  79 y.o. male admitted 12/28/2019 with SOB    Hospital Course    PMHx COPD on 3 LNC baseline, AFib, HLD, AC on rivaroxaban, HTN, CAD.  Presented with increased cough, SOB and fever.  Admitted on HFNC.  On 12/29 worsened requiring intubation. Extubated 1/1      Interval Problem Update  Slept poorly overnight  Notes more cough  Wife feels he looks \"good\" today, he's thinking more clearly    Consultants/Specialty  pulmonology    Code Status  full    Disposition  OK to floor    Review of Systems  Review of Systems   Constitutional: Negative for chills and fever.   HENT: Negative for nosebleeds and sore throat.    Eyes: Negative for blurred vision and double vision.   Respiratory: Positive for cough and shortness of breath.    Cardiovascular: Negative for chest pain, palpitations and leg swelling.   Gastrointestinal: Negative for abdominal pain, diarrhea, nausea and vomiting.   Genitourinary: Negative for dysuria and urgency.   Musculoskeletal: Negative for back pain.   Skin: Negative for rash.   Neurological: Negative for dizziness, loss of consciousness and headaches.        Physical Exam  Temp:  [36.3 °C (97.3 °F)-37.3 °C (99.1 °F)] 36.4 °C (97.6 °F)  Pulse:  [] 65  Resp:  [17-36] 22  BP: (141-173)/(66-81) 145/72  SpO2:  [92 %-96 %] 92 %    Physical Exam  Constitutional:       General: He is not in acute distress.     Appearance: He is well-developed. He is not diaphoretic.   HENT:      Head: Normocephalic and atraumatic.   Eyes:      Conjunctiva/sclera: Conjunctivae normal.   Neck:      Vascular: No JVD.   Cardiovascular:      Rate and Rhythm: Normal rate.      Heart sounds: Murmur present. No gallop.    Pulmonary:      Effort: Pulmonary effort is normal. No respiratory distress.      Breath sounds: No stridor. Rhonchi present. No wheezing or rales.   Abdominal:      Palpations: Abdomen is soft.      Tenderness: There is no " tenderness. There is no guarding or rebound.   Musculoskeletal:         General: No tenderness.      Right lower leg: No edema.      Left lower leg: No edema.   Skin:     General: Skin is warm and dry.      Findings: No rash.   Neurological:      General: No focal deficit present.      Mental Status: He is oriented to person, place, and time. Mental status is at baseline.   Psychiatric:         Mood and Affect: Mood normal.         Thought Content: Thought content normal.         Fluids    Intake/Output Summary (Last 24 hours) at 1/4/2020 0532  Last data filed at 1/3/2020 1700  Gross per 24 hour   Intake no documentation   Output 850 ml   Net -850 ml       Laboratory  Recent Labs     01/02/20  0500 01/03/20  0445 01/04/20  0407   WBC 13.2* 15.8* 18.0*   RBC 4.61* 4.62* 4.98   HEMOGLOBIN 14.1 14.0 15.1   HEMATOCRIT 42.9 42.4 46.4   MCV 93.1 91.8 93.2   MCH 30.6 30.3 30.3   MCHC 32.9* 33.0* 32.5*   RDW 52.3* 50.4* 50.4*   PLATELETCT 169 188 202   MPV 10.9 10.6 11.1     Recent Labs     01/02/20  0500 01/03/20  0445 01/04/20  0407   SODIUM 144 144 144   POTASSIUM 4.8 4.2 4.5   CHLORIDE 109 106 101   CO2 31 33 35*   GLUCOSE 162* 131* 109*   BUN 44* 35* 26*   CREATININE 0.75 0.65 0.65   CALCIUM 8.6 8.4* 8.5                   Imaging  DX-CHEST-PORTABLE (1 VIEW)   Final Result         Findings on chest radiograph appear stable since the prior radiograph.  No new abnormalities are identified. Opacification and volume loss in the left lung base is again noted.      US-EXTREMITY VENOUS UPPER UNILAT RIGHT   Final Result      [id number] VIEW   Final Result         1. Mild dilatation of small bowel loops, with gaseous distention of the colon. Findings may be due to ileus or partial small bowel obstruction.   2. Left basilar consolidation and left pleural effusion.      DX-CHEST-PORTABLE (1 VIEW)   Final Result      No significant interval change.      DX-ABDOMEN FOR TUBE PLACEMENT   Final Result      Feeding tube placement with  the tip projecting over the stomach body.      DX-CHEST-PORTABLE (1 VIEW)   Final Result      No significant interval change.      DX-CHEST-PORTABLE (1 VIEW)   Final Result      1.  Supportive tubing as described above.   2.  No other significant change from prior exam.         DX-CHEST-PORTABLE (1 VIEW)   Final Result         Airspace opacity in the left lower lobe with associated left pleural effusion, concerning for infection           Assessment/Plan  * CAP (community acquired pneumonia)  Assessment & Plan  Strep pneumonae  Complete 10 days of Rocephin    Gross hematuria  Assessment & Plan  Following trauma: pt accidentally pulled cath  Holding rivaroxaban  Resolved overnight  DC mendoza    Acute delirium  Assessment & Plan  Pt is now alert and interactive.    delerium resolved    AF (atrial fibrillation) ([facility])  Assessment & Plan  Cardizem  Dig  Tele  Resume Rivaroxaban tomorrow if no more gross hematuria    Elevated BUN  Assessment & Plan  resolved    Acute exacerbation of chronic obstructive pulmonary disease (COPD) ([facility])  Assessment & Plan  Strep pneumo: complete 10 day course  Cont O2/RT protocols  Tapering oral steroids; DC after tomorrows dose      Acute on chronic respiratory failure with hypoxemia ([facility])  Assessment & Plan  Extubated 1/2  Tolerating well  moblize  Cont to address AECOPD and CAP  Some rhonchi today, + 8 litres from admit  Give single dose IV lasix  Follow BMP tomorrow    Lactic acidosis  Assessment & Plan  Improved    Elevated troponin  Assessment & Plan  Type II event    Sepsis due to pneumonia ([facility])  Assessment & Plan  Continue current antibiotics and close clinical monitoring        Dyslipidemia- (present on admission)  Assessment & Plan  Continue  simvastatin    Hypertension- (present on admission)  Assessment & Plan  Monitor blood pressure with Cardizem    CAD (coronary artery disease)- (present on admission)  Assessment & Plan  Continue medical therapy       VTE prophylaxis:  rivaroxaban

## 2020-01-04 NOTE — PROGRESS NOTES
MD notified of patient's conversion back into AFib/A Flutter. Patient is asymptomatic with blood pressures maintaining in the 140s. Because he already has multiple PO meds for his heart rate/rhythm, no new orders at this time.

## 2020-01-05 PROBLEM — R41.0 ACUTE DELIRIUM: Status: RESOLVED | Noted: 2020-01-01 | Resolved: 2020-01-01

## 2020-01-05 PROBLEM — R79.9 ELEVATED BUN: Status: RESOLVED | Noted: 2019-01-01 | Resolved: 2020-01-01

## 2020-01-05 PROBLEM — I48.0 PAROXYSMAL ATRIAL FIBRILLATION (HCC): Status: ACTIVE | Noted: 2019-01-01

## 2020-01-05 PROBLEM — I48.91 AF (ATRIAL FIBRILLATION) (HCC): Status: RESOLVED | Noted: 2019-01-01 | Resolved: 2020-01-01

## 2020-01-05 PROBLEM — R31.0 GROSS HEMATURIA: Status: RESOLVED | Noted: 2020-01-01 | Resolved: 2020-01-01

## 2020-01-05 PROBLEM — E87.20 LACTIC ACIDOSIS: Status: RESOLVED | Noted: 2019-01-01 | Resolved: 2020-01-01

## 2020-01-05 NOTE — CARE PLAN
Problem: Knowledge Deficit  Goal: Knowledge of disease process/condition, treatment plan, diagnostic tests, and medications will improve  Intervention: Explain information regarding disease process/condition, treatment plan, diagnostic tests, and medications and document in education  Note:   Patient and family educated on plan of care, antibiotic administration, medications, fall prevention, and delirium management. All questions answered.      Problem: Pain Management  Goal: Pain level will decrease to patient's comfort goal  Intervention: Follow pain managment plan developed in collaboration with patient and Interdisciplinary Team  Note:   Pain assessed with each encounter. Patient denies at this time. Patient repositioned for comfort, extra blanket and pillows provided.

## 2020-01-05 NOTE — PROGRESS NOTES
Pt transported to [facility] via wheelchair on 3 L NC. Pt states no pain at this time. Alvarado catheter removed. pts belongings placed in belongings bag (paperwork in file folder and a phone ) and sent with the pt.

## 2020-01-05 NOTE — PROGRESS NOTES
2 RN Skin Check    2 RN skin check complete.   Devices in place: Nasal Cannula.  Skin assessed under devices: yes.  Bilateral ears pink/blanching  Confirmed pressure ulcers found on: N/A.  New potential pressure ulcers noted on N/A. Wound consult placed No.  The following interventions in place Pillows, patient turns self from side to side.   Patient's bilateral heels red/blanching  Left elbow red/ slow to jone

## 2020-01-05 NOTE — PROGRESS NOTES
Pulmonary Care Progress Note    Date of admission  12/28/2019    Chief Complaint  79 y.o. male admitted 12/28/2019 with acute hypoxic respiratory failure requiring intubation/ mechanical ventilation    Hospital Course  79 y.o. male who presented 12/28/2019 with shortness of breath. Recent cough with yellow sputum. Hx copd on 3 L oxygen at home[place].  On high flow 40 L 40%.  High work of breathing with paradoxical pattern.  RR to 30s.  + wheezing/poor air movement.  On amiodarone for hx of afib.  Hx cad w/ prior stent, dyslipidemia, hypertension and hypoxic respiratory failure.  afib rvr, received beta blocker this am and improved to low 100s.  Ns at 100 ml/hr. Home xarelto.  Amiodarone started this am.    Interval Problem Update  Chart review from the past 24 hours includes imaging, laboratory studies, vital signs and notes available.  Pertinent data for today's visit includes transfer to [facility] since extubation but generally cooperative  To receive 14 days antibiotics for strep pneumonia bacteremia     Review of Systems  Patient is confused, granddaughter at bedside, no specific new complaint and is able to relate living in [facility]   Constitutional: Negative for chills and fever.   HENT: Negative for congestion and sore throat.    Eyes: Negative for photophobia and discharge.   Respiratory: Positive for cough, sputum production, shortness of breath and wheezing.    Cardiovascular: Negative for chest pain, palpitations and leg swelling.   Gastrointestinal: Negative for abdominal pain, diarrhea, nausea and vomiting.   Musculoskeletal: Negative for back pain and myalgias.   Neurological: General  weakness and no  headaches.        Vital Signs for last 24 hours   Temp:  [36.4 °C (97.5 °F)-37.5 °C (99.5 °F)] 36.8 °C (98.2 °F)  Pulse:  [] 141  Resp:  [18-24] 20  BP: (147-163)/(72-93) 158/89  SpO2:  [91 %-97 %] 91 %    Physical Exam  Vitals  reviewed: On NP O2, appears calm but confused.   HENT:      Head: Atraumatic.      Nose: Nose normal.   Eyes:      Extraocular Movements: Extraocular movements intact.   Cardiovascular:      Rate and Rhythm: Normal rate and regular rhythm.      Pulses: Normal pulses.   Abdominal:      General: Abdomen is flat. Bowel sounds are normal.      Palpations: Abdomen is soft.   Skin:     General: Skin is warm and dry.      Comments: Slightly cool extremities hands and feet   Neurological:      General: No focal deficit present.         Medications  Current Facility-Administered Medications   Medication Dose Route Frequency Provider Last Rate Last Dose   • DILTIAZem (CARDIZEM) tablet 90 mg  90 mg Oral Q8HRS Star Daivd D.O.   90 mg at 01/05/20 1349   • Metoprolol Tartrate (LOPRESSOR) injection 5 mg  5 mg Intravenous Q2HRS PRN ABBY Zhou.O.   5 mg at 01/05/20 1249   • traZODone (DESYREL) tablet 50 mg  50 mg Oral QHS Star David D.O.   50 mg at 01/04/20 2100   • digoxin (LANOXIN) tablet 125 mcg  125 mcg Oral DAILY AT 1800 Musa Brown M.D.   125 mcg at 01/04/20 1743   • ipratropium-albuterol (DUONEB) nebulizer solution  3 mL Nebulization RT EVERY 1 HOUR PRN Randall Schwartz M.D.   3 mL at 01/03/20 0149   • cefTRIAXone (ROCEPHIN) 2 g in  mL IVPB  2 g Intravenous DAILY ABBY Zhou.O.   Stopped at 01/05/20 0543   • labetalol (NORMODYNE/TRANDATE) injection 20 mg  20 mg Intravenous Q4HRS PRN Musa Brown M.D.   20 mg at 01/03/20 0105   • hydrALAZINE (APRESOLINE) injection 20 mg  20 mg Intravenous Q4HRS PRN Musa Brown M.D.   20 mg at 01/02/20 1930   • ondansetron (ZOFRAN) syringe/vial injection 4 mg  4 mg Intravenous Q4HRS PRN Lizett Pradhan M.D.   4 mg at 12/31/19 1027   • Respiratory Care per Protocol   Nebulization Continuous RT Sascha Angela M.D.           Fluids    Intake/Output Summary (Last 24 hours) at 1/5/2020 1411  Last data filed at 1/5/2020  1000  Gross per 24 hour   Intake 400 ml   Output 250 ml   Net 150 ml       Laboratory          Recent Labs     01/03/20 0445 01/04/20  0407 01/05/20  0639   SODIUM 144 144 141   POTASSIUM 4.2 4.5 4.1   CHLORIDE 106 101 100   CO2 33 35* 36*   BUN 35* 26* 29*   CREATININE 0.65 0.65 0.74   CALCIUM 8.4* 8.5 7.9*     Recent Labs     01/03/20 0445 01/04/20  0407 01/05/20  0639   GLUCOSE 131* 109* 111*     Recent Labs     01/03/20 0445 01/04/20  0407   WBC 15.8* 18.0*   NEUTSPOLYS 87.30* 84.50*   LYMPHOCYTES 4.80* 7.00*   MONOCYTES 7.00 7.60   EOSINOPHILS 0.00 0.00   BASOPHILS 0.10 0.20     Recent Labs     01/03/20 0445 01/04/20  0407   RBC 4.62* 4.98   HEMOGLOBIN 14.0 15.1   HEMATOCRIT 42.4 46.4   PLATELETCT 188 202       Imaging  X-Ray:  I have personally reviewed the images and compared with prior images.    Assessment/Plan  * CAP (community acquired pneumonia)  Assessment & Plan  Ceftriaxone and doxycycline  Neg flu, wife had flu this week    Will treat 14 days for strep pneumonia with strep bacteremia. May not require IV antibiotics for entire course      Sepsis due to pneumonia ([facility])  Assessment & Plan  Strep pneumonia in blood- 14 days antibiotics    Clinically responding         Hypertension- (present on admission)  Assessment & Plan  Systolic sometimes up to 170s but related to agitation  Treat systolic > 180 with prn Labetolol    CAD (coronary artery disease)- (present on admission)  Assessment & Plan  Hx stents    Ileus ([facility])  Assessment & Plan  Resolving rapidly, can start advancing po (pt just pulled out coretrack)    Acute exacerbation of chronic obstructive pulmonary disease (COPD) ([facility])  Assessment & Plan  duonebs scheduled  Steroids scheduled    Hypoxia predominantly pneumonia but also has CO2 retention related to underlying COPD    Acute on chronic respiratory failure with hypoxemia ([facility])  Assessment & Plan  Extubated    Was on high flow- continues to warrant mobilization, gentle diureses    Elevated  troponin  Assessment & Plan  Hx cad  Likely demand ischemia from hypoxic respiratory failure    Dyslipidemia- (present on admission)  Assessment & Plan  Continue statin      I have performed a physical exam and reviewed and updated ROS and Plan today (1/5/2020). In review of yesterday's note (1/4/2020), there are no changes except as documented above.     Jeni Steven MD , FCCP, Pulmonary Service

## 2020-01-05 NOTE — THERAPY
"80 y/o male adm for cough and SOB, home oxygen 3 l/min. Dx: sepsis with PNA s/p intubation,  RN states on 5 l/min NC and desaturates. EOB sitting with PT sat uration 99% to 90% with HR to 150's. No standing dur to tachycardia and necessitated BTB. RN notified. Pt to benefit from acute PT to address strength, balance , activity tolerance, functional mobility to achieve higher level of function.  Physical Therapy Evaluation completed.   Bed Mobility:  Supine to Sit: Minimal Assist  Transfers: Sit to Stand: Unable to Participate  Gait: Level Of Assist: Unable to Participate with Front-Wheel Walker       Plan of Care: Will benefit from Physical Therapy 3 times per week  Discharge Recommendations: Equipment: Will Continue to Assess for Equipment Needs. Post-acute therapy Discharge to a transitional care facility for continued skilled therapy services.    See \"Rehab Therapy-Acute\" Patient Summary Report for complete documentation.     "

## 2020-01-05 NOTE — RESPIRATORY CARE
COPD EDUCATION by COPD CLINICAL EDUCATOR  1/5/2020  at  3:51 PM by Radha Gomez     Patient interviewed by COPD education team.  Patient unable to participate in full program.  Short intervention has been conducted.  A comprehensive packet including information about COPD and treatments provided and reviewed with Humphrey. He quit smoking >2yrs ago. He was on 3 lpm Oxygen and no respiratory medications prior to this admission.

## 2020-01-05 NOTE — PROGRESS NOTES
Bedside report received. Patient sitting up in bed, family at bedside. RN assessed pain; patient declines pain present. Call light within reach. Need for bed alarm assessed; patient is at high risk for falls, precautions in place as appropriate.     No

## 2020-01-05 NOTE — PROGRESS NOTES
Hospital Medicine Daily Progress Note    Date of Service  1/5/2020    Chief Complaint  79 y.o. male admitted 12/28/2019 with SOB    Hospital Course    PMHx COPD on 3 LNC baseline, AFib, HLD, AC on rivaroxaban, HTN, CAD.  Presented with increased cough, SOB and fever.  Admitted on HFNC.  On 12/29 worsened requiring intubation. Extubated 1/1      Interval Problem Update  Grand daughter notes he's a little more confused then his normal  Overnight went into AFib RVR rates to 150s with ; pt is without sxs    Consultants/Specialty  pulmonology    Code Status  full    Disposition  OK to floor    Review of Systems  Review of Systems   Unable to perform ROS: Other   Constitutional: Negative for chills and fever.   HENT: Negative for nosebleeds and sore throat.    Eyes: Negative for blurred vision and double vision.   Respiratory: Positive for cough and shortness of breath.    Cardiovascular: Negative for chest pain, palpitations and leg swelling.   Gastrointestinal: Negative for abdominal pain, diarrhea, nausea and vomiting.   Genitourinary: Negative for dysuria and urgency.   Musculoskeletal: Negative for back pain.   Skin: Negative for rash.   Neurological: Negative for dizziness, loss of consciousness and headaches.        Physical Exam  Temp:  [36.4 °C (97.5 °F)-37.5 °C (99.5 °F)] 36.5 °C (97.7 °F)  Pulse:  [] 76  Resp:  [18-22] 18  BP: (110-158)/(72-92) 147/72  SpO2:  [91 %-98 %] 94 %    Physical Exam  Constitutional:       General: He is not in acute distress.     Appearance: He is well-developed. He is not diaphoretic.   HENT:      Head: Normocephalic and atraumatic.   Eyes:      Conjunctiva/sclera: Conjunctivae normal.   Neck:      Vascular: No JVD.   Cardiovascular:      Rate and Rhythm: Tachycardia present. Rhythm irregular.      Heart sounds: Murmur present. No gallop.    Pulmonary:      Effort: Pulmonary effort is normal. No respiratory distress.      Breath sounds: No stridor. Rhonchi present. No  wheezing or rales.   Abdominal:      Palpations: Abdomen is soft.      Tenderness: There is no tenderness. There is no guarding or rebound.   Musculoskeletal:         General: No tenderness.      Right lower leg: No edema.      Left lower leg: No edema.   Skin:     General: Skin is warm and dry.      Findings: No rash.   Neurological:      General: No focal deficit present.      Mental Status: He is oriented to person, place, and time. Mental status is at baseline.   Psychiatric:         Mood and Affect: Mood normal.         Thought Content: Thought content normal.         Fluids    Intake/Output Summary (Last 24 hours) at 1/5/2020 0538  Last data filed at 1/5/2020 0247  Gross per 24 hour   Intake 630 ml   Output 2245 ml   Net -1615 ml       Laboratory  Recent Labs     01/03/20  0445 01/04/20  0407   WBC 15.8* 18.0*   RBC 4.62* 4.98   HEMOGLOBIN 14.0 15.1   HEMATOCRIT 42.4 46.4   MCV 91.8 93.2   MCH 30.3 30.3   MCHC 33.0* 32.5*   RDW 50.4* 50.4*   PLATELETCT 188 202   MPV 10.6 11.1     Recent Labs     01/03/20  0445 01/04/20  0407   SODIUM 144 144   POTASSIUM 4.2 4.5   CHLORIDE 106 101   CO2 33 35*   GLUCOSE 131* 109*   BUN 35* 26*   CREATININE 0.65 0.65   CALCIUM 8.4* 8.5                   Imaging  DX-CHEST-PORTABLE (1 VIEW)   Final Result         Findings on chest radiograph appear stable since the prior radiograph.  No new abnormalities are identified. Opacification and volume loss in the left lung base is again noted.      US-EXTREMITY VENOUS UPPER UNILAT RIGHT   Final Result      [id number] VIEW   Final Result         1. Mild dilatation of small bowel loops, with gaseous distention of the colon. Findings may be due to ileus or partial small bowel obstruction.   2. Left basilar consolidation and left pleural effusion.      DX-CHEST-PORTABLE (1 VIEW)   Final Result      No significant interval change.      DX-ABDOMEN FOR TUBE PLACEMENT   Final Result      Feeding tube placement with the tip projecting over the  stomach body.      DX-CHEST-PORTABLE (1 VIEW)   Final Result      No significant interval change.      DX-CHEST-PORTABLE (1 VIEW)   Final Result      1.  Supportive tubing as described above.   2.  No other significant change from prior exam.         DX-CHEST-PORTABLE (1 VIEW)   Final Result         Airspace opacity in the left lower lobe with associated left pleural effusion, concerning for infection           Assessment/Plan  * CAP (community acquired pneumonia)  Assessment & Plan  Strep pneumonae  Complete 10 days of Rocephin    Sepsis due to pneumonia ([facility])  Assessment & Plan  Continue current antibiotics and close clinical monitoring        Hypertension- (present on admission)  Assessment & Plan  Monitor blood pressure with Cardizem    CAD (coronary artery disease)- (present on admission)  Assessment & Plan  Continue medical therapy    Paroxysmal atrial fibrillation ([facility])  Assessment & Plan  Increase dose of dilt to 90mg TID  Give single dose of 30mg po  Prn IV Metoprolol for break through  Dig  Tele  Resume rivaroxaban    Acute exacerbation of chronic obstructive pulmonary disease (COPD) ([facility])  Assessment & Plan  Strep pneumo: complete 10 day course  Cont O2/RT protocols  Now off steroids      Acute on chronic respiratory failure with hypoxemia ([facility])  Assessment & Plan  Extubated 1/2  Tolerating well  moblize   Cont to address AECOPD and CAP  Po lasix  Follow BMP     Elevated troponin  Assessment & Plan  Type II event    Dyslipidemia- (present on admission)  Assessment & Plan  Continue  simvastatin       VTE prophylaxis: rivaroxaban

## 2020-01-05 NOTE — CARE PLAN
Problem: Safety  Goal: Will remain free from injury  Outcome: PROGRESSING AS EXPECTED   RN will provide patient education regarding fall risk  RN will provide patient education on the use of his call light  Patient will demonstrate how to use call light  Patient will verbalize appropriate times to use call light    Problem: Knowledge Deficit  Goal: Knowledge of disease process/condition, treatment plan, diagnostic tests, and medications will improve  Outcome: PROGRESSING AS EXPECTED  Pt updated on POC, tests, and medications. Pt verbalizes understanding and has no further questions at this time. Pt educated on calling for any more questions.

## 2020-01-06 NOTE — PROGRESS NOTES
Patient HR beginning to increase.   RN in to assess patient, patient just waking up from nap. Very agitated, confused.

## 2020-01-06 NOTE — PROGRESS NOTES
Hospital Medicine Daily Progress Note    Date of Service  1/6/2020    Chief Complaint  79 y.o. male admitted 12/28/2019 with SOB    Hospital Course    PMHx COPD on 3 LNC baseline, AFib, HLD, AC on rivaroxaban, HTN, CAD.  Presented with increased cough, SOB and fever.  Admitted on HFNC.  On 12/29 worsened requiring intubation. Extubated 1/1      Interval Problem Update  Agitated and confused yesterday evening but settled down after 1 dose of ativan and has been pleasant and interactive the remainder of the night.  Sinus rates 80-90s  Some bloody urine this am    Consultants/Specialty  pulmonology    Code Status  full    Disposition  OK to floor    Review of Systems  Review of Systems   Unable to perform ROS: Other   Constitutional: Negative for chills and fever.   HENT: Negative for nosebleeds and sore throat.    Eyes: Negative for blurred vision and double vision.   Respiratory: Negative for cough and shortness of breath.    Cardiovascular: Negative for chest pain, palpitations and leg swelling.   Gastrointestinal: Negative for abdominal pain, diarrhea, nausea and vomiting.   Genitourinary: Negative for dysuria and urgency.   Musculoskeletal: Negative for back pain.   Skin: Negative for rash.   Neurological: Positive for loss of consciousness. Negative for dizziness and headaches.        Physical Exam  Temp:  [36.4 °C (97.6 °F)-37 °C (98.6 °F)] 36.9 °C (98.4 °F)  Pulse:  [] 86  Resp:  [18-24] 18  BP: (114-168)/(69-93) 168/82  SpO2:  [91 %-94 %] 92 %    Physical Exam  Constitutional:       General: He is not in acute distress.     Appearance: He is well-developed. He is not diaphoretic.   HENT:      Head: Normocephalic and atraumatic.   Eyes:      Conjunctiva/sclera: Conjunctivae normal.   Neck:      Vascular: No JVD.   Cardiovascular:      Rate and Rhythm: Tachycardia present. Rhythm irregular.      Heart sounds: Murmur present. No gallop.    Pulmonary:      Effort: Pulmonary effort is normal. No respiratory  distress.      Breath sounds: No stridor. No wheezing, rhonchi or rales.   Abdominal:      Palpations: Abdomen is soft.      Tenderness: There is no tenderness. There is no guarding or rebound.   Musculoskeletal:         General: No tenderness.      Right lower leg: No edema.      Left lower leg: No edema.   Skin:     General: Skin is warm and dry.      Findings: No rash.   Neurological:      General: No focal deficit present.      Mental Status: He is oriented to person, place, and time. Mental status is at baseline.      Comments: O to person and place  Confused about medical issues  Frequent re-orientation   Psychiatric:         Mood and Affect: Mood normal.         Thought Content: Thought content normal.         Fluids    Intake/Output Summary (Last 24 hours) at 1/6/2020 0524  Last data filed at 1/5/2020 2000  Gross per 24 hour   Intake 270 ml   Output 0 ml   Net 270 ml       Laboratory  Recent Labs     01/04/20  0407   WBC 18.0*   RBC 4.98   HEMOGLOBIN 15.1   HEMATOCRIT 46.4   MCV 93.2   MCH 30.3   MCHC 32.5*   RDW 50.4*   PLATELETCT 202   MPV 11.1     Recent Labs     01/04/20  0407 01/05/20  0639 01/06/20  0344   SODIUM 144 141 142   POTASSIUM 4.5 4.1 4.2   CHLORIDE 101 100 98   CO2 35* 36* 38*   GLUCOSE 109* 111* 90   BUN 26* 29* 32*   CREATININE 0.65 0.74 0.78   CALCIUM 8.5 7.9* 8.5                   Imaging  DX-CHEST-PORTABLE (1 VIEW)   Final Result         Findings on chest radiograph appear stable since the prior radiograph.  No new abnormalities are identified. Opacification and volume loss in the left lung base is again noted.      US-EXTREMITY VENOUS UPPER UNILAT RIGHT   Final Result      ER-EXIOBDU-8 VIEW   Final Result         1. Mild dilatation of small bowel loops, with gaseous distention of the colon. Findings may be due to ileus or partial small bowel obstruction.   2. Left basilar consolidation and left pleural effusion.      DX-CHEST-PORTABLE (1 VIEW)   Final Result      No significant  interval change.      DX-ABDOMEN FOR TUBE PLACEMENT   Final Result      Feeding tube placement with the tip projecting over the stomach body.      DX-CHEST-PORTABLE (1 VIEW)   Final Result      No significant interval change.      DX-CHEST-PORTABLE (1 VIEW)   Final Result      1.  Supportive tubing as described above.   2.  No other significant change from prior exam.         DX-CHEST-PORTABLE (1 VIEW)   Final Result         Airspace opacity in the left lower lobe with associated left pleural effusion, concerning for infection           Assessment/Plan  * CAP (community acquired pneumonia)  Assessment & Plan  Strep pneumonae  Complete 10 days of Rocephin    Sepsis due to pneumonia ([facility])  Assessment & Plan  Continue current antibiotics and close clinical monitoring        Hypertension- (present on admission)  Assessment & Plan  Monitor blood pressure with Cardizem    CAD (coronary artery disease)- (present on admission)  Assessment & Plan  Continue medical therapy    Gross hematuria  Assessment & Plan  Following trauma: pt accidentally pulled cath  Holding rivaroxaban  Follow post void residuals for signs of retention until urine is clear    Paroxysmal atrial fibrillation ([facility])  Assessment & Plan  Now back in sinus on diltiazem 120 TID  Prn IV Metoprolol for break through  Dig  Tele  Holding rivaroxaban due to gross hematuria    Acute exacerbation of chronic obstructive pulmonary disease (COPD) ([facility])  Assessment & Plan  Strep pneumo: complete 10 day course of Abx's, can transition to po if ready for DC  Cont O2/RT protocols  Now off steroids      Acute on chronic respiratory failure with hypoxemia ([facility])  Assessment & Plan  Extubated 1/2  Tolerating well  moblize   Cont to address AECOPD and CAP  Po lasix  Follow BMP     Elevated troponin  Assessment & Plan  Type II event    Dyslipidemia- (present on admission)  Assessment & Plan  Continue  simvastatin       VTE prophylaxis: rivaroxaban

## 2020-01-06 NOTE — PROGRESS NOTES
Bedside report received from MARYA Mercado. Assumed care of pt. Pt awake, laying in bed. A/Ox1, -160 overnght . No concerns, complaints or distress. Lap belt on, pt able to release the belt.  Pt educated to call before getting out of bed. POC reviewed and white board updated. Tele box on.  SR 80on the monitor. Call light in reach. Bed locked in lowest position with 2 upper bed rails up. Bed alarm on.

## 2020-01-06 NOTE — PROGRESS NOTES
Paged Dr. Bateman, pt urinated this am 200ml, estimated 3 ml of blood oozed from urithra with 2cm blood clot. Postvoid bladder scan 148ml. Per  Dr Bateman, check post void residual towards the end of the shift to ensure pt is not having an obstruction.

## 2020-01-06 NOTE — DISCHARGE PLANNING
SW went to patient's room, patient's spouse, Daiana provided information about patient. Patient is disoriented.     Patient is a 79-year old  man who lives with his spouse, Daiana. Patient is retired, has FDC and SS benefits.     PCP is Dr. Jama. No issues with ADLs, spouse helps with IADLs. Has home O2, [employer], 2L at night and as needed. No issues with AOD or MH. Pharmacy is [facility] in [place].     Patients spouse thinks patient is recommended Rehab not SNF. Wants [facility]. SW reviewed notes, patient recommended SNF. Spouse unwilling to make choice at this time, wants to talk with physician.     SW to fax to [facility] [phone]    Care Transition Team Assessment    Information Source  Orientation : Disoriented to Event, Disoriented to Place, Disoriented to Time, Unable to Assess  Information Given By: Spouse  Informant's Name: Daiana Cano  Who is responsible for making decisions for patient? : Spouse  Name(s) of Primary Decision Maker: Daiana Cano    Readmission Evaluation  Is this a readmission?: No    Elopement Risk  Legal Hold: No  Ambulatory or Self Mobile in Wheelchair: Yes  Disoriented: No  Psychiatric Symptoms: None  History of Wandering: No  Elopement this Admit: No  Vocalizing Wanting to Leave: No  Displays Behaviors, Body Language Wanting to Leave: No-Not at Risk for Elopement  Elopement Risk: Not at Risk for Elopement    Interdisciplinary Discharge Planning  Lives with - Patient's Self Care Capacity: Spouse  Patient or legal guardian wants to designate a caregiver (see row info): No  Housing / Facility: [address]  Prior Services: Unable To Determine At This Time    Discharge Preparedness  What is your plan after discharge?: Skilled nursing facility  What are your discharge supports?: Spouse  Prior Functional Level: Ambulatory, Needs Assist with Activities of Daily Living, Needs Assist with Medication Management  Difficulity with ADLs: Toileting, Walking  Difficulty with ADLs  Comment: Needs assistance currently  Difficulity with IADLs: Cooking, Driving, Keeping track of finances, Laundry, Managing medication, Shopping, Using the telephone or computer  Difficulity with IADL Comments: Spouse assists with IADLs    Functional Assesment  Prior Functional Level: Ambulatory, Needs Assist with Activities of Daily Living, Needs Assist with Medication Management    Finances  Financial Barriers to Discharge: No  Prescription Coverage: Yes    Vision / Hearing Impairment  Vision Impairment : Yes  Right Eye Vision: Impaired, Wears Glasses  Left Eye Vision: Impaired, Wears Glasses  Hearing Impairment : No         Advance Directive  Advance Directive?: DPOA for Health Care  Durable Power of  Name and Contact : Daiana Cano    Domestic Abuse  Have you ever been the victim of abuse or violence?: No  Physical Abuse or Sexual Abuse: No  Verbal Abuse or Emotional Abuse: No  Possible Abuse Reported to:: Not Applicable    Psychological Assessment  History of Substance Abuse: None  History of Psychiatric Problems: No  Non-compliant with Treatment: No  Newly Diagnosed Illness: Yes    Discharge Risks or Barriers  Discharge risks or barriers?: Complex medical needs  Patient risk factors: Complex medical needs    Anticipated Discharge Information  Anticipated discharge disposition: SNF  Discharge Address: [place] (home)  Discharge Contact Phone Number: [phone]

## 2020-01-06 NOTE — CARE PLAN
Problem: Communication  Goal: The ability to communicate needs accurately and effectively will improve  Outcome: PROGRESSING AS EXPECTED  Patient white board updated. Patient updated on plan of care. All questions and concerns addressed.      Problem: Safety  Goal: Will remain free from injury  Outcome: PROGRESSING AS EXPECTED  Goal: Will remain free from falls  Outcome: PROGRESSING AS EXPECTED  Appropriate Fall precautions in place. Patient oriented to room and call light. Patient educated regarding safety and fall precautions      Problem: Venous Thromboembolism (VTW)/Deep Vein Thrombosis (DVT) Prevention:  Goal: Patient will participate in Venous Thrombosis (VTE)/Deep Vein Thrombosis (DVT)Prevention Measures  Outcome: PROGRESSING AS EXPECTED  DVT prophylaxis in place. Ambulation, mobility, and frequent repositioning encouraged. Patient educated about DVT precautions and prevention.

## 2020-01-06 NOTE — PROGRESS NOTES
Pt c/o pain with urination. Urinated approx 250 cc pink/red urine and then oozed tommy red blood from his urethra estimated 10-20 ml. Hospitalist notified. UA sent, xaralto held, furosemide held per MD. Day coverage Hospitalist updated.

## 2020-01-06 NOTE — THERAPY
"Occupational Therapy Evaluation completed.   Functional Status:  Max A supine to sit (pt refusing to participate).  Pt pushing back against OT and refused to participate.  Pt able to get his legs back in bed without assistance.  Giovanni MOREJON to don socks.  Attempted to explain role of OT and importance of OOB activity, however pt stating that he just wants to stay in bed.  Plan of Care: Will benefit from Occupational Therapy 3 times per week  Discharge Recommendations:  Equipment: Will Continue to Assess for Equipment Needs. .Recommend post-acute placement for additional occupational therapy services prior to discharge home.     Pt is 80 y/o M seen for OT evaluation.  Pt admitted with pneumonia and sepsis.  Pt with hx of COPD.  Pt was confused and agitated during session, and refused full participation.  Pt appears to have functional deficits at this time and will continue to benefit from acute OT services.    See \"Rehab Therapy-Acute\" Patient Summary Report for complete documentation.    "

## 2020-01-06 NOTE — PROGRESS NOTES
Patient wore lapbelt throughout the night. Patient able to safely demonstrate independent removal of lap belt. Fall risk and reasoning for belt placement discussed with patient and family, Placement and safety of lap belt assessed and reassessed throughout shift.

## 2020-01-06 NOTE — PROGRESS NOTES
RN attempting to administer PRN HR control medications, patient agitated and confused. Not allowing RN to touch patient. Also not allowing CNA to obtain evening vitals. MD updated. Family updated.

## 2020-01-06 NOTE — CARE PLAN
Problem: Nutritional:  Goal: Nutrition support tolerated and meeting greater than 85% of estimated needs  Outcome: DISCHARGED-GOAL NOT MET    Pt diet advanced to Dysphagia 1.    Goal: Achieve adequate nutritional intake  Description  Patient will consume at least 50% of most meals.   Outcome: PROGRESSING SLOWER THAN EXPECTED     PO 0-50%. Pt does not like puree diet, advanced to Dys 3 after lunch today per chart review. Agreeable to try Boost/HP milkshake. RD following.

## 2020-01-06 NOTE — PROGRESS NOTES
Pt concerted from A-fib to Sinus rhythm with frequent PACs and a 1.5 second pause. Md notified and EKG ordered

## 2020-01-07 PROBLEM — F02.80 ALZHEIMER'S DEMENTIA WITHOUT BEHAVIORAL DISTURBANCE (HCC): Status: ACTIVE | Noted: 2020-01-01

## 2020-01-07 PROBLEM — G30.9 ALZHEIMER'S DEMENTIA WITHOUT BEHAVIORAL DISTURBANCE (HCC): Status: ACTIVE | Noted: 2020-01-01

## 2020-01-07 NOTE — THERAPY
"Speech Language Therapy dysphagia treatment completed.   Functional Status:  The patient was seen for dysphagia therapy this date. The patient was awake, alert and oriented to self and location. The patient was seen during his D1/NTL meal items with additional PO trials of D3/thin liquids items. The patient consumed D1/thin meal items with no overt s/s of aspiration or difficulty. Patient initially declined PO trials of advanced textures however agreed following education. The patient consumed D3/thin liquid meal items with no overt s/s of aspiration or difficulty.     Recommendations: 1) Diet advancement to D3/thin liquids. Monitor during meals to ensure upright positioning.     Plan of Care: Will benefit from Speech Therapy 3 times per week    Post-Acute Therapy: Recommend inpatient transitional care services for continued speech therapy services.        See \"Rehab Therapy-Acute\" Patient Summary Report for complete documentation.     "

## 2020-01-07 NOTE — PROGRESS NOTES
Pulmonary Care Progress Note    Date of admission  12/28/2019    Chief Complaint  79 y.o. male admitted 12/28/2019 with acute hypoxic respiratory failure requiring intubation/ mechanical ventilation    Hospital Course  79 y.o. male who presented 12/28/2019 with shortness of breath. Recent cough with yellow sputum. Hx copd on 3 L oxygen at home. [place].  On high flow 40 L 40%.  High work of breathing with paradoxical pattern.  RR to 30s.  + wheezing/poor air movement.  On amiodarone for hx of afib.  Hx cad w/ prior stent, dyslipidemia, hypertension and hypoxic respiratory failure.     Found to have strep pna and bacteremia  Delirium has resolved.  Improving and down to 3l NC saturating at 94%    Interval Problem Update   great cough  Able to sit up in bed    Review of Systems    Constitutional: Negative for chills and fever.   HENT: Negative for congestion and sore throat.    Eyes: Negative for photophobia and discharge.   Respiratory: Positive for cough, sputum production, shortness of breath   Cardiovascular: Negative for chest pain, palpitations and leg swelling.   Gastrointestinal: Negative for abdominal pain, diarrhea, nausea and vomiting.   Musculoskeletal: Negative for back pain and myalgias.   Neurological: General  weakness and no  headaches.        Vital Signs for last 24 hours   Temp:  [36.4 °C (97.5 °F)-37 °C (98.6 °F)] 36.9 °C (98.5 °F)  Pulse:  [72-89] 87  Resp:  [18-20] 18  BP: (114-168)/(60-82) 136/61  SpO2:  [92 %-97 %] 97 %    Physical Exam  Constitutional:       Appearance: He is ill-appearing.   HENT:      Head: Atraumatic.      Nose: Nose normal.   Eyes:      Extraocular Movements: Extraocular movements intact.   Cardiovascular:      Rate and Rhythm: Normal rate and regular rhythm.      Pulses: Normal pulses.   Pulmonary:      Effort: Pulmonary effort is normal.      Comments: Diminished bS b/l  Abdominal:      General: Abdomen is flat. Bowel sounds are normal.       Palpations: Abdomen is soft.   Skin:     General: Skin is warm and dry.      Comments: Slightly cool extremities hands and feet   Neurological:      General: No focal deficit present.      Mental Status: He is alert.         Medications  Current Facility-Administered Medications   Medication Dose Route Frequency Provider Last Rate Last Dose   • sodium chloride (OCEAN) 0.65 % nasal spray 2 Spray  2 Spray Nasal Q2HRS PRN Klaus Hall M.D.   2 [place] at 01/06/20 0504   • Metoprolol Tartrate (LOPRESSOR) injection 5 mg  5 mg Intravenous Q2HRS PRN ABBY Zhou.OJose Enrique   5 mg at 01/05/20 1249   • furosemide (LASIX) tablet 20 mg  20 mg Oral Q DAY ABBY Zhou.OJose Enrique   Stopped at 01/05/20 1934   • DILTIAZem (CARDIZEM) tablet 120 mg  120 mg Oral Q8HRS ABBY Zhou.O.   120 mg at 01/06/20 1231   • traZODone (DESYREL) tablet 50 mg  50 mg Oral QHS ABBY Zhou.OJose Enrique   Stopped at 01/05/20 2100   • digoxin (LANOXIN) tablet 125 mcg  125 mcg Oral DAILY AT 1800 Musa Brown M.D.   125 mcg at 01/06/20 1707   • ipratropium-albuterol (DUONEB) nebulizer solution  3 mL Nebulization RT EVERY 1 HOUR PRN Randall Schwartz M.D.   3 mL at 01/03/20 0149   • cefTRIAXone (ROCEPHIN) 2 g in  mL IVPB  2 g Intravenous DAILY STEF ZhouOJose Enrique   Stopped at 01/06/20 0610   • labetalol (NORMODYNE/TRANDATE) injection 20 mg  20 mg Intravenous Q4HRS PRN Musa Brown M.D.   20 mg at 01/03/20 0105   • hydrALAZINE (APRESOLINE) injection 20 mg  20 mg Intravenous Q4HRS PRN Musa Brown M.D.   20 mg at 01/02/20 1930   • ondansetron (ZOFRAN) syringe/vial injection 4 mg  4 mg Intravenous Q4HRS PRN Lizett Pradhan M.D.   4 mg at 12/31/19 1027   • Respiratory Care per Protocol   Nebulization Continuous RT Sascha Angela M.D.           Fluids    Intake/Output Summary (Last 24 hours) at 1/6/2020 1752  Last data filed at 1/6/2020 0923  Gross per 24 hour   Intake 240 ml   Output 250 ml   Net  -10 ml       Laboratory          Recent Labs     01/04/20  0407 01/05/20  0639 01/06/20  0344   SODIUM 144 141 142   POTASSIUM 4.5 4.1 4.2   CHLORIDE 101 100 98   CO2 35* 36* 38*   BUN 26* 29* 32*   CREATININE 0.65 0.74 0.78   CALCIUM 8.5 7.9* 8.5     Recent Labs     01/04/20  0407 01/05/20  0639 01/06/20  0344   GLUCOSE 109* 111* 90     Recent Labs     01/04/20  0407   WBC 18.0*   NEUTSPOLYS 84.50*   LYMPHOCYTES 7.00*   MONOCYTES 7.60   EOSINOPHILS 0.00   BASOPHILS 0.20     Recent Labs     01/04/20  0407   RBC 4.98   HEMOGLOBIN 15.1   HEMATOCRIT 46.4   PLATELETCT 202       Imaging  Last CXR 1/1/20 LLL infiltrates    Assessment/Plan  * CAP (community acquired pneumonia)  Assessment & Plan  Ceftriaxone    Completing 14 days as had strep bacteremia      Sepsis due to pneumonia ([facility])  Assessment & Plan  Strep pneumonia in blood- 14 days antibiotics  Clinically responding     Hypertension- (present on admission)  Assessment & Plan  Systolic sometimes up to 170s but related to agitation  Treat systolic > 180 with prn Labetolol    CAD (coronary artery disease)- (present on admission)  Assessment & Plan  Hx stents    Ileus ([facility])  Assessment & Plan  resolved    Paroxysmal atrial fibrillation ([facility])  Assessment & Plan  Digoxin and diltiazem    Acute exacerbation of chronic obstructive pulmonary disease (COPD) ([facility])  Assessment & Plan  duonebs scheduled  Hypoxia predominantly pneumonia but also has CO2 retention related to underlying COPD - improving    Acute on chronic respiratory failure with hypoxemia ([facility])  Assessment & Plan  Was intubated now on on 3l NC  Great cough  Using IS    Elevated troponin  Assessment & Plan  Hx cad  Likely demand ischemia from hypoxic respiratory failure    Dyslipidemia- (present on admission)  Assessment & Plan  Continue statin    Will sign off  No additional recs    I have performed a physical exam and reviewed and updated ROS and Plan today (1/6/2020). In review of yesterday's note (1/5/2020),  there are no changes except as documented above.

## 2020-01-07 NOTE — CARE PLAN
Problem: Safety  Goal: Will remain free from injury  Note:   Bed alarm on, non slip socks on, bed in low position, 2 side rails up, call light within reach, will continue to monitor.      Problem: Pain Management  Goal: Pain level will decrease to patient's comfort goal  Note:   Patient assessed per unit policy. Medicated per MAR. Patient verbalized understanding to call when needing pain relief.

## 2020-01-07 NOTE — PROGRESS NOTES
Hospital Medicine Daily Progress Note    Date of Service  1/7/2020    Chief Complaint  79 y.o. male admitted 12/28/2019 with SOB    Hospital Course    PMHx COPD on 3 LNC baseline, AFib, HLD, AC on rivaroxaban, HTN, CAD.  Presented with increased cough, SOB and fever.  Admitted on HFNC.  On 12/29 worsened requiring intubation. Extubated 1/1      Interval Problem Update  Agitated and confused yesterday evening but settled down after 1 dose of ativan and has been pleasant and interactive the remainder of the night.  Sinus rates 80-90s  Some bloody urine this am  1/7: Resting comfortably in bed.  Respiratory status stable with oxygen requirements close to baseline [currently on 3 L and at home on 2 L].  No acute distress noted.  Few clots in the urine noted per nursing staff.  Urine is clear however.  Successful voiding trial so far.  Wife and daughter at bedside.  Questions answered.  Consultants/Specialty  pulmonology    Code Status  full    Disposition  Pending SNF acceptance.    Review of Systems  Review of Systems   Unable to perform ROS: Dementia   Eyes: Negative for blurred vision and double vision.   Cardiovascular: Negative for chest pain and palpitations.   Gastrointestinal: Negative for abdominal pain, diarrhea, nausea and vomiting.   Genitourinary: Negative for dysuria and urgency.   Musculoskeletal: Negative for back pain.   Neurological: Negative for dizziness and headaches.        Physical Exam  Temp:  [36.1 °C (96.9 °F)-37 °C (98.6 °F)] 36.5 °C (97.7 °F)  Pulse:  [64-88] 64  Resp:  [18-19] 18  BP: (101-164)/(47-82) 132/59  SpO2:  [92 %-97 %] 94 %    Physical Exam  Constitutional:       General: He is not in acute distress.     Appearance: He is well-developed. He is not diaphoretic.   HENT:      Head: Normocephalic and atraumatic.   Eyes:      Conjunctiva/sclera: Conjunctivae normal.   Neck:      Vascular: No JVD.   Cardiovascular:      Rate and Rhythm: Tachycardia present. Rhythm irregular.      Heart  sounds: Murmur present. No gallop.    Pulmonary:      Effort: Pulmonary effort is normal. No respiratory distress.      Breath sounds: No stridor. No wheezing, rhonchi or rales.   Abdominal:      Palpations: Abdomen is soft.      Tenderness: There is no tenderness. There is no guarding or rebound.      Hernia: No hernia is present.   Musculoskeletal:         General: No tenderness.      Right lower leg: No edema.      Left lower leg: No edema.   Skin:     General: Skin is warm and dry.      Findings: No lesion or rash.   Neurological:      General: No focal deficit present.      Mental Status: He is alert. Mental status is at baseline.      Comments: O to person and place  Confused about medical issues  Frequent re-orientation   Psychiatric:         Behavior: Behavior is slowed.         Cognition and Memory: Memory is impaired.         Fluids    Intake/Output Summary (Last 24 hours) at 1/7/2020 1217  Last data filed at 1/7/2020 1000  Gross per 24 hour   Intake 120 ml   Output 775 ml   Net -655 ml       Laboratory      Recent Labs     01/05/20  0639 01/06/20  0344 01/07/20  0357   SODIUM 141 142 141   POTASSIUM 4.1 4.2 3.8   CHLORIDE 100 98 101   CO2 36* 38* 35*   GLUCOSE 111* 90 103*   BUN 29* 32* 24*   CREATININE 0.74 0.78 0.66   CALCIUM 7.9* 8.5 7.9*                   Imaging  DX-CHEST-PORTABLE (1 VIEW)   Final Result         Findings on chest radiograph appear stable since the prior radiograph.  No new abnormalities are identified. Opacification and volume loss in the left lung base is again noted.      US-EXTREMITY VENOUS UPPER UNILAT RIGHT   Final Result      [id number] VIEW   Final Result         1. Mild dilatation of small bowel loops, with gaseous distention of the colon. Findings may be due to ileus or partial small bowel obstruction.   2. Left basilar consolidation and left pleural effusion.      DX-CHEST-PORTABLE (1 VIEW)   Final Result      No significant interval change.      DX-ABDOMEN FOR TUBE  PLACEMENT   Final Result      Feeding tube placement with the tip projecting over the stomach body.      DX-CHEST-PORTABLE (1 VIEW)   Final Result      No significant interval change.      DX-CHEST-PORTABLE (1 VIEW)   Final Result      1.  Supportive tubing as described above.   2.  No other significant change from prior exam.         DX-CHEST-PORTABLE (1 VIEW)   Final Result         Airspace opacity in the left lower lobe with associated left pleural effusion, concerning for infection           Assessment/Plan  * CAP (community acquired pneumonia)  Assessment & Plan  Strep pneumonae  Complete 10 days of Rocephin    Sepsis due to pneumonia ([facility])  Assessment & Plan  Completed a course of antibiotics   Sepsis resolved         Hypertension- (present on admission)  Assessment & Plan  Monitor blood pressure with Cardizem    CAD (coronary artery disease)- (present on admission)  Assessment & Plan  Continue medical therapy    Alzheimer's dementia without behavioral disturbance ([facility])- (present on admission)  Assessment & Plan  High risk for hospital-acquired delirium.  Avoid benzodiazepines and/or anticholinergic medications.  Minimize lines.  Avoid Alvarado catheter.  Daily orientation.    Gross hematuria  Assessment & Plan  Following trauma: pt accidentally pulled cath  Holding rivaroxaban  Follow post void residuals for signs of retention until urine is clear  1/7: Voiding trials.  H&H has been stable.    Paroxysmal atrial fibrillation ([facility])  Assessment & Plan  Now back in sinus on diltiazem 120 TID  Prn IV Metoprolol for break through  Dig  Tele  Holding rivaroxaban due to gross hematuria    Acute exacerbation of chronic obstructive pulmonary disease (COPD) ([facility])  Assessment & Plan  Strep pneumo: complete 10 day course of Abx's, can transition to po if ready for DC  Cont O2/RT protocols  Now off steroids      Acute on chronic respiratory failure with hypoxemia ([facility])  Assessment & Plan  Extubated 1/2  Tolerating  well  moblize   Cont to address AECOPD and CAP  Po lasix  Follow BMP   Wean down oxygen as tolerated     Elevated troponin  Assessment & Plan  Type II event    Dyslipidemia- (present on admission)  Assessment & Plan  Continue  simvastatin  Plan of care discussed with multidisciplinary team during rounds.    VTE prophylaxis: SCDs.  Holding rivaroxaban for now due to hematuria.

## 2020-01-07 NOTE — CARE PLAN
Problem: Safety  Goal: Will remain free from injury  1/7/2020 0734 by Sonja Han R.N.  Outcome: PROGRESSING AS EXPECTED   Safety precautions and fall prevention interventions in place. Fall prevention education provided, pt verbalized understanding. Bed in low/locked position, treaded socks on pt, call bell is within reach. Appropriate devices provided with ambulation assistance. Pt calls appropriately for help.   Problem: Knowledge Deficit  Goal: Knowledge of disease process/condition, treatment plan, diagnostic tests, and medications will improve  1/7/2020 0734 by Sonja Han R.N.  Outcome: PROGRESSING AS EXPECTED  Pt and family updated on POC, including medications, treatment and discharge planning. Questions answered as needed, pt and family verbalized understanding. Encouraged to voice further questions/concerns.

## 2020-01-07 NOTE — PROGRESS NOTES
Bedside report received from MARYA Nichole. Assumed care of pt. Pt awake, sitting at the edge of thebed. A/Ox1, VSS, pt on 3L 02.  No concerns, complaints or distress. Pt educated to call before getting out of bed. POC reviewed and white board updated. Tele box on. Paced 60 on the monitor. Call light in reach. Bed locked in lowest position with 2 upper bed rails up. Bed alarm on.

## 2020-01-07 NOTE — THERAPY
Physical Therapy Treatment completed.   Bed Mobility:  Supine to Sit: Moderate Assist  Transfers: Sit to Stand: Minimal Assist  Gait: Level Of Assist: Minimal Assist with Front-Wheel Walker       Plan of Care: Will benefit from Physical Therapy 3 times per week  Discharge Recommendations: Equipment: Will Continue to Assess for Equipment Needs. Post-acute therapy Discharge to a transitional care facility for continued skilled therapy services.    Pt seen today for PT treatment session. Pt's wife present in room, also a patient in the hospital and granddaughter also present in room. Pt did require coaxing for OOB activity but eventually agreeable to therapy. Pt required moderate assist for supine to sit at EOB. Pt's O2 saturations initially at 93% in supine, dropped to the high 80's with seated EOB only on 3L. Despite sitting at EOB for 8 min, O2 saturations did not recover above 90. Advised RN and increased O2 to 4L for ambulation. Pt ambulated short distance in hallway only with FWW, minimal assist. Pt on 4L O2 while ambulating, quickly became SOB and O2 saturations in the low 80's. Pt tripoding on FWW to improve breathing. Returned to room and EOB, O2 saturations at 83% upon return. Pt sat EOB 10 minutes and had only returned to 88%.Pt returned to bed with SPV and provided with HEP for LE ROM/strengthening. Pt able to perform 5 reps of less of each exercise due to pain and general weakness. At end of session, spoke with pt and family about DC options once pt medically stable. Pt's wife is adamant that pt DC to Rehab facility. Wife educated on difference between SNF and rehab. PT educated wife and granddaughter about reasons why SNF is more appropriate at this time given poor activity tolerance, drops in O2 saturations with limited mobility with poor recovery and history of COPD. Wife continues to be insistent that the decision be made SNF vs rehab closer to DC. Recommend post-acute placement for continued physical  "therapy services prior to discharge home. Patient can tolerate post-acute therapies at a 5x/week frequency.         See \"Rehab Therapy-Acute\" Patient Summary Report for complete documentation.       "

## 2020-01-07 NOTE — ASSESSMENT & PLAN NOTE
High risk for hospital-acquired delirium.  Avoid benzodiazepines and/or anticholinergic medications.  Minimize lines.  Avoid Alvarado catheter.  Daily orientation.    1/14 snf pending  Encourage activity

## 2020-01-07 NOTE — PROGRESS NOTES
Received report and assumed care of patient. Patient is alert and oriented to self. Family at bedside. Patient is in no signs of distress. Patient was updated on the plan of care for the day. Call light within reach, bed in low position, 2 side rails up. All fall precautions in place. Will continue to monitor.

## 2020-01-08 NOTE — PROGRESS NOTES
MS:  SB-, I/O Acc [facility]  (F)PVC, (o)coup, bigem  .14/.08/.44

## 2020-01-08 NOTE — PROGRESS NOTES
Received bedside report from RN, pt care assumed, VSS, pt assessment complete. Pt AAOx1,very pleasant, no c/o 0/10 pain at this time. No signs of acute distress noted at this time. POC discussed with pt and verbalizes no questions. Pt denies any additional needs at this time. Bed in lowest position, bed alarm on, pt educated on fall risk and verbalized understanding, call light within reach, hourly rounding initiated.

## 2020-01-08 NOTE — PROGRESS NOTES
Hospital Medicine Daily Progress Note    Date of Service  1/8/2020    Chief Complaint  79 y.o. male admitted 12/28/2019 with SOB    Hospital Course    PMHx COPD on 3 LNC baseline, AFib, HLD, AC on rivaroxaban, HTN, CAD.  Presented with increased cough, SOB and fever.  Admitted on HFNC.  On 12/29 worsened requiring intubation. Extubated 1/1      Interval Problem Update  Agitated and confused yesterday evening but settled down after 1 dose of ativan and has been pleasant and interactive the remainder of the night.  Sinus rates 80-90s  Some bloody urine this am  1/7: Resting comfortably in bed.  Respiratory status stable with oxygen requirements close to baseline [currently on 3 L and at home on 2 L].  No acute distress noted.  Few clots in the urine noted per nursing staff.  Urine is clear however.  Successful voiding trial so far.  Wife and daughter at bedside.  Questions answered.  1/8: Resting comfortably in bed.  No hematuria reported and patient is able to void with no issues.  Urine is clear.  H&H has been stable.  Hemodynamically stable.  Wife does not want Xarelto as it was very expensive.  She also declined Coumadin.  She wanted to ask her friends for a more affordable medicine.  No issues overnight per staff.  Consultants/Specialty  pulmonology    Code Status  full    Disposition  Pending SNF acceptance.    Review of Systems  Review of Systems   Unable to perform ROS: Dementia   Constitutional: Negative for fever.   HENT: Negative for hearing loss.    Eyes: Negative for blurred vision, double vision and photophobia.   Cardiovascular: Negative for chest pain, palpitations and orthopnea.   Gastrointestinal: Negative for abdominal pain, nausea and vomiting.   Genitourinary: Negative for dysuria, frequency, hematuria and urgency.   Musculoskeletal: Negative for back pain.   Neurological: Negative for dizziness, tingling and headaches.   Psychiatric/Behavioral: Negative for depression, substance abuse and  suicidal ideas.        Physical Exam  Temp:  [36.2 °C (97.1 °F)-36.7 °C (98.1 °F)] 36.4 °C (97.6 °F)  Pulse:  [61-71] 63  Resp:  [16-18] 18  BP: (117-163)/(58-65) 160/58  SpO2:  [90 %-96 %] 94 %    Physical Exam  Constitutional:       General: He is not in acute distress.     Appearance: He is well-developed. He is not diaphoretic.   HENT:      Head: Normocephalic and atraumatic.      Nose: No rhinorrhea.   Eyes:      Conjunctiva/sclera: Conjunctivae normal.   Neck:      Vascular: No JVD.   Cardiovascular:      Rate and Rhythm: Tachycardia present. Rhythm irregular.      Heart sounds: Murmur present. No gallop.    Pulmonary:      Effort: Pulmonary effort is normal. No respiratory distress.      Breath sounds: No stridor. No wheezing, rhonchi or rales.   Abdominal:      Palpations: Abdomen is soft.      Tenderness: There is no tenderness. There is no guarding or rebound.      Hernia: No hernia is present.   Musculoskeletal:         General: No tenderness.      Right lower leg: No edema.      Left lower leg: No edema.   Skin:     General: Skin is warm and dry.      Findings: No lesion or rash.   Neurological:      General: No focal deficit present.      Mental Status: He is alert. Mental status is at baseline.      Comments: O to person and place  Confused about medical issues  Frequent re-orientation   Psychiatric:         Behavior: Behavior is slowed.         Cognition and Memory: Memory is impaired. He exhibits impaired recent memory and impaired remote memory.         Fluids    Intake/Output Summary (Last 24 hours) at 1/8/2020 1227  Last data filed at 1/8/2020 0830  Gross per 24 hour   Intake 240 ml   Output 550 ml   Net -310 ml       Laboratory  Recent Labs     01/08/20  0333   WBC 13.4*   RBC 4.48*   HEMOGLOBIN 13.6*   HEMATOCRIT 42.6   MCV 95.1   MCH 30.4   MCHC 31.9*   RDW 48.9   PLATELETCT 225   MPV 10.4     Recent Labs     01/06/20  0344 01/07/20  0357   SODIUM 142 141   POTASSIUM 4.2 3.8   CHLORIDE 98 101    CO2 38* 35*   GLUCOSE 90 103*   BUN 32* 24*   CREATININE 0.78 0.66   CALCIUM 8.5 7.9*                   Imaging  DX-CHEST-PORTABLE (1 VIEW)   Final Result         Findings on chest radiograph appear stable since the prior radiograph.  No new abnormalities are identified. Opacification and volume loss in the left lung base is again noted.      US-EXTREMITY VENOUS UPPER UNILAT RIGHT   Final Result      [id number] VIEW   Final Result         1. Mild dilatation of small bowel loops, with gaseous distention of the colon. Findings may be due to ileus or partial small bowel obstruction.   2. Left basilar consolidation and left pleural effusion.      DX-CHEST-PORTABLE (1 VIEW)   Final Result      No significant interval change.      DX-ABDOMEN FOR TUBE PLACEMENT   Final Result      Feeding tube placement with the tip projecting over the stomach body.      DX-CHEST-PORTABLE (1 VIEW)   Final Result      No significant interval change.      DX-CHEST-PORTABLE (1 VIEW)   Final Result      1.  Supportive tubing as described above.   2.  No other significant change from prior exam.         DX-CHEST-PORTABLE (1 VIEW)   Final Result         Airspace opacity in the left lower lobe with associated left pleural effusion, concerning for infection           Assessment/Plan  * CAP (community acquired pneumonia)  Assessment & Plan  Strep pneumonae  Complete 10 days of Rocephin    Sepsis due to pneumonia (HCC)  Assessment & Plan  Completed a course of antibiotics   Sepsis resolved         Hypertension- (present on admission)  Assessment & Plan  Monitor blood pressure with Cardizem    CAD (coronary artery disease)- (present on admission)  Assessment & Plan  Continue medical therapy    Alzheimer's dementia without behavioral disturbance (HCC)- (present on admission)  Assessment & Plan  High risk for hospital-acquired delirium.  Avoid benzodiazepines and/or anticholinergic medications.  Minimize lines.  Avoid Alvarado catheter.  Daily  orientation.    Gross hematuria  Assessment & Plan  Following trauma: pt accidentally pulled cath  Holding rivaroxaban  Follow post void residuals for signs of retention until urine is clear  1/7: Voiding trials.  H&H has been stable.  1/8: Resolved.    Paroxysmal atrial fibrillation (HCC)  Assessment & Plan  Now back in sinus on diltiazem 120 TID  Prn IV Metoprolol for break through  Dig  Tele  Holding rivaroxaban due to gross hematuria  1/8: Hematuria has resolved.  Patient's wife stated that they cannot afford Xarelto as they were paying almost $600 a month for it.  She is also not interested in something cheaper like Coumadin.  She stated she wants to check with her friends to see which medicine will be more affordable.  I notified  discussed that with the patient.    Acute exacerbation of chronic obstructive pulmonary disease (COPD) ([facility])  Assessment & Plan  Strep pneumo: complete 10 day course of Abx's, can transition to po if ready for DC  Cont O2/RT protocols  Now off steroids      Acute on chronic respiratory failure with hypoxemia ([facility])  Assessment & Plan  Extubated 1/2  Tolerating well  moblize   Cont to address AECOPD and CAP  Po lasix  Follow BMP   Wean down oxygen as tolerated       Elevated troponin  Assessment & Plan  Type II event    Dyslipidemia- (present on admission)  Assessment & Plan  Continue  simvastatin  Plan of care discussed with multidisciplinary team during rounds.    VTE prophylaxis: SCDs.  Holding rivaroxaban for now due to hematuria.

## 2020-01-08 NOTE — DISCHARGE PLANNING
SW talked with spouse, made choice for [facility].    SW faxed to [facility] [phone]

## 2020-01-08 NOTE — DISCHARGE PLANNING
SW spoke with spouse, and granddaughter discussing SNF. Provided SNF lists for [place] area per request.     Spouse reports she would like to tour all SNF's before making a decision. Spouse is not yet D/C.     Unsure if patient is [facility], if so SW to complete avoidable days.

## 2020-01-08 NOTE — CARE PLAN
Problem: Safety  Goal: Will remain free from injury  Outcome: PROGRESSING AS EXPECTED  Goal: Will remain free from falls  Outcome: PROGRESSING AS EXPECTED     Problem: Knowledge Deficit  Goal: Knowledge of disease process/condition, treatment plan, diagnostic tests, and medications will improve  Outcome: PROGRESSING AS EXPECTED     Problem: Respiratory:  Goal: Respiratory status will improve  Outcome: PROGRESSING AS EXPECTED     Problem: Skin Integrity  Goal: Risk for impaired skin integrity will decrease  Outcome: PROGRESSING AS EXPECTED

## 2020-01-08 NOTE — THERAPY
"Occupational Therapy Treatment completed with focus on ADLs, ADL transfers and patient education.  Functional Status:  Min A supine>sit EOB, mod A w/LB dressing, min A sit>Stand, min A txf to chair, min A w/grooming seated, set up w/meal. Remained up in chair w/alarm and call light w/in reach   Plan of Care: Will benefit from Occupational Therapy 3 times per week  Discharge Recommendations:  Equipment Will Continue to Assess for Equipment Needs. Post-acute therapy Recommend post-acute placement for additional occupational therapy services prior to discharge home. Patient can tolerate post-acute therapies at a 5x/week frequency.      See \"Rehab Therapy-Acute\" Patient Summary Report for complete documentation.     Pt seen for OT tx pt making steady progress, on going weakness, decreased activity tolerance and confusion impacting ADL's. Wife is also a pt and having medical issues. Continue to recommend post acute placement prior to d/c home   "

## 2020-01-09 NOTE — PROGRESS NOTES
Morning report received at bedside. Care assumed. Pt alert and awake sitting up in bed. No complaints of pain or discomfort. AOx1, oriented to self only and on 3L O2. Tele monitor on. Bed in lowest position and locked. Call light and all belongings within reach. No further needs at this time.

## 2020-01-09 NOTE — PROGRESS NOTES
Received report and assumed care of patient. Patient is alert and oriented to self only. Patient is in no signs of distress denies pain at this time. Family at bedside. Patient was updated on the plan of care for the day. Call light within reach, bed in low position, 2 side rails up. All fall precautions in place. Will continue to monitor.

## 2020-01-09 NOTE — WOUND TEAM
Wound consult received for sacrum and bilateral heels. Pt already laying on left side, assessed sacrum. Pt's sacrum is slightly pink but blanching and intact. Bilateral feet assessed, pt's entire bilateral plantar feet is red but blanching. Unknown etiology. No open wounds noted. Pt noted to have some hives on lateral and medial aspect of bilateral feet. Dr Camarillo is aware, MD ordered steroid cream of pt's bilateral feet. Updated Santiago bedside RN. No advanced wound care needs at this time.

## 2020-01-09 NOTE — CARE PLAN
Problem: Safety  Goal: Will remain free from falls  Outcome: PROGRESSING AS EXPECTED     Problem: Bowel/Gastric:  Goal: Will not experience complications related to bowel motility  Outcome: PROGRESSING AS EXPECTED

## 2020-01-09 NOTE — CARE PLAN
Problem: Safety  Goal: Will remain free from injury  Note:   Bed alarm on, non slip socks on, bed in low position, 2 side rails up, call light within reach, will continue to monitor.      Problem: Respiratory:  Goal: Respiratory status will improve  Note:   Patient ton 3L NC tolerates well. Does intermittently take oxygen off but recovers quickly.

## 2020-01-09 NOTE — DISCHARGE PLANNING
CM called [facility] on [employer] to check cost of Xarelto. They state that this month for a 30 day supply the cost is $441.49, but after this, pt's copay should significantly decrease because they are showing that pt just needs to pay $435.00 more towards his deductible.   The staff is unable to tell this CM right now exactly what the cost will be until he meets this deductible.   CM will update Dr. Camarillo.

## 2020-01-09 NOTE — CARE PLAN
Problem: Nutritional:  Goal: Achieve adequate nutritional intake  Description  Patient will consume at least 50% of most meals.   Outcome: MET   Per chart, most meals 50% or > - eating adequately at this time.  Please consult RD prn.

## 2020-01-09 NOTE — DISCHARGE PLANNING
@1447  Agency[facility]Facility Name: [facility]  Outcome: Left message, awaiting call back.    @2004  Agency[facility]Facility Name: [facility]  Spoke To: Galilea  Outcome: Pending review.    Received Choice form at 1041  Agency[facility]Facility Name: [facility]  Referral sent per Choice form @ 1046

## 2020-01-10 PROBLEM — R21 RASH: Status: ACTIVE | Noted: 2020-01-01

## 2020-01-10 NOTE — PROGRESS NOTES
Bedside report received from day RN; assumed pt care. Pt assessment complete. Pt A&Ox 1-2. Reviewed plan of care with pt. Tele box on and rhythm verified. Chart and labs reviewed. Bed in lowest position, and 2 side rails up. Pt educated on call light; call light with in reach.

## 2020-01-10 NOTE — CARE PLAN
Problem: Fluid Volume:  Goal: Will maintain balanced intake and output  Outcome: PROGRESSING AS EXPECTED     Problem: Psychosocial Needs:  Goal: Level of anxiety will decrease  Outcome: PROGRESSING AS EXPECTED

## 2020-01-10 NOTE — CARE PLAN
Problem: Safety  Goal: Will remain free from falls  Outcome: PROGRESSING AS EXPECTED     Problem: Venous Thromboembolism (VTW)/Deep Vein Thrombosis (DVT) Prevention:  Goal: Patient will participate in Venous Thrombosis (VTE)/Deep Vein Thrombosis (DVT)Prevention Measures  Outcome: PROGRESSING AS EXPECTED     Problem: Bowel/Gastric:  Goal: Normal bowel function is maintained or improved  Outcome: PROGRESSING AS EXPECTED

## 2020-01-10 NOTE — PROGRESS NOTES
Hospital Medicine Daily Progress Note    Date of Service  1/9/2020    Chief Complaint  79 y.o. male admitted 12/28/2019 with SOB    Hospital Course    PMHx COPD on 3 LNC baseline, AFib, HLD, AC on rivaroxaban, HTN, CAD.  Presented with increased cough, SOB and fever.  Admitted on HFNC.  On 12/29 worsened requiring intubation. Extubated 1/1      Interval Problem Update  Agitated and confused yesterday evening but settled down after 1 dose of ativan and has been pleasant and interactive the remainder of the night.  Sinus rates 80-90s  Some bloody urine this am  1/7: Resting comfortably in bed.  Respiratory status stable with oxygen requirements close to baseline [currently on 3 L and at home on 2 L].  No acute distress noted.  Few clots in the urine noted per nursing staff.  Urine is clear however.  Successful voiding trial so far.  Wife and daughter at bedside.  Questions answered.  1/8: Resting comfortably in bed.  No hematuria reported and patient is able to void with no issues.  Urine is clear.  H&H has been stable.  Hemodynamically stable.  Wife does not want Xarelto as it was very expensive.  She also declined Coumadin.  She wanted to ask her friends for a more affordable medicine.  No issues overnight per staff.  1/9: Resting comfortably in bed.  No acute distress noted.  No issues overnight per staff.  Consultants/Specialty  pulmonology    Code Status  full    Disposition  Pending SNF acceptance.    Review of Systems  Review of Systems   Unable to perform ROS: Dementia   Constitutional: Negative for chills and fever.   HENT: Negative for hearing loss and tinnitus.    Eyes: Negative for blurred vision, double vision and photophobia.   Cardiovascular: Negative for chest pain, palpitations, orthopnea and claudication.   Gastrointestinal: Negative for abdominal pain, nausea and vomiting.   Genitourinary: Negative for dysuria, frequency, hematuria and urgency.   Musculoskeletal: Negative for back pain and neck  pain.   Neurological: Negative for dizziness, tingling, tremors and headaches.   Psychiatric/Behavioral: Negative for depression, substance abuse and suicidal ideas.        Physical Exam  Temp:  [36.4 °C (97.6 °F)-37.2 °C (98.9 °F)] 37.2 °C (98.9 °F)  Pulse:  [48-74] 64  Resp:  [16-19] 16  BP: (133-156)/(49-64) 146/52  SpO2:  [94 %-98 %] 94 %    Physical Exam  Constitutional:       General: He is not in acute distress.     Appearance: He is well-developed. He is not ill-appearing.   HENT:      Head: Normocephalic and atraumatic.      Nose: No congestion or rhinorrhea.   Eyes:      Conjunctiva/sclera: Conjunctivae normal.   Neck:      Vascular: No JVD.   Cardiovascular:      Rate and Rhythm: Tachycardia present. Rhythm irregular.      Heart sounds: Murmur present. No gallop.    Pulmonary:      Effort: Pulmonary effort is normal. No respiratory distress.      Breath sounds: No stridor. No wheezing or rhonchi.   Abdominal:      General: There is no distension.      Palpations: Abdomen is soft. There is no mass.      Tenderness: There is no tenderness.      Hernia: No hernia is present.   Musculoskeletal:         General: No tenderness.      Right lower leg: No edema.      Left lower leg: No edema.      Comments: Erythema on soles bilaterally    Skin:     General: Skin is warm and dry.      Findings: No lesion or rash.   Neurological:      General: No focal deficit present.      Mental Status: He is alert. Mental status is at baseline.      Comments: O to person and place  Confused about medical issues  Frequent re-orientation   Psychiatric:         Behavior: Behavior is slowed.         Cognition and Memory: Memory is impaired. He exhibits impaired recent memory and impaired remote memory.         Fluids    Intake/Output Summary (Last 24 hours) at 1/9/2020 1725  Last data filed at 1/9/2020 1230  Gross per 24 hour   Intake 840 ml   Output 200 ml   Net 640 ml       Laboratory  Recent Labs     01/08/20  0333   WBC 13.4*    RBC 4.48*   HEMOGLOBIN 13.6*   HEMATOCRIT 42.6   MCV 95.1   MCH 30.4   MCHC 31.9*   RDW 48.9   PLATELETCT 225   MPV 10.4     Recent Labs     01/07/20  0357   SODIUM 141   POTASSIUM 3.8   CHLORIDE 101   CO2 35*   GLUCOSE 103*   BUN 24*   CREATININE 0.66   CALCIUM 7.9*                   Imaging  DX-CHEST-PORTABLE (1 VIEW)   Final Result         Findings on chest radiograph appear stable since the prior radiograph.  No new abnormalities are identified. Opacification and volume loss in the left lung base is again noted.      US-EXTREMITY VENOUS UPPER UNILAT RIGHT   Final Result      [id number] VIEW   Final Result         1. Mild dilatation of small bowel loops, with gaseous distention of the colon. Findings may be due to ileus or partial small bowel obstruction.   2. Left basilar consolidation and left pleural effusion.      DX-CHEST-PORTABLE (1 VIEW)   Final Result      No significant interval change.      DX-ABDOMEN FOR TUBE PLACEMENT   Final Result      Feeding tube placement with the tip projecting over the stomach body.      DX-CHEST-PORTABLE (1 VIEW)   Final Result      No significant interval change.      DX-CHEST-PORTABLE (1 VIEW)   Final Result      1.  Supportive tubing as described above.   2.  No other significant change from prior exam.         DX-CHEST-PORTABLE (1 VIEW)   Final Result         Airspace opacity in the left lower lobe with associated left pleural effusion, concerning for infection           Assessment/Plan  * CAP (community acquired pneumonia)  Assessment & Plan  Strep pneumonae  Completed 10 days of Rocephin    Sepsis due to pneumonia (HCC)  Assessment & Plan  Completed a course of antibiotics   Sepsis resolved         Hypertension- (present on admission)  Assessment & Plan  Monitor blood pressure with Cardizem    CAD (coronary artery disease)- (present on admission)  Assessment & Plan  Continue medical therapy    Alzheimer's dementia without behavioral disturbance (HCC)- (present on  admission)  Assessment & Plan  High risk for hospital-acquired delirium.  Avoid benzodiazepines and/or anticholinergic medications.  Minimize lines.  Avoid Alvarado catheter.  Daily orientation.    Gross hematuria  Assessment & Plan  Following trauma: pt accidentally pulled cath  Holding rivaroxaban  Follow post void residuals for signs of retention until urine is clear  1/7: Voiding trials.  H&H has been stable.  1/8: Resolved.  Xarelto restarted     Paroxysmal atrial fibrillation ([facility])  Assessment & Plan  Now back in sinus on diltiazem 120 TID  Prn IV Metoprolol for break through  Dig  Tele  Holding rivaroxaban due to gross hematuria  1/8: Hematuria has resolved.  Patient's wife stated that they cannot afford Xarelto as they were paying almost $600 a month for it.  She is also not interested in something cheaper like Coumadin.  She stated she wants to check with her friends to see which medicine will be more affordable.  I notified  discussed that with the patient.  Xarelto has been restarted     Acute exacerbation of chronic obstructive pulmonary disease (COPD) ([facility])  Assessment & Plan  Strep pneumo: complete 10 day course of Abx's, can transition to po if ready for DC  Cont O2/RT protocols  Now off steroids      Acute on chronic respiratory failure with hypoxemia ([facility])  Assessment & Plan  Extubated 1/2  Tolerating well  moblize   Cont to address AECOPD and CAP  Po lasix  Follow BMP   Wean down oxygen as tolerated       Elevated troponin  Assessment & Plan  Type II event    Dyslipidemia- (present on admission)  Assessment & Plan  Continue  simvastatin  Plan of care discussed with multidisciplinary team during rounds.    VTE prophylaxis: Xarelto

## 2020-01-10 NOTE — PROGRESS NOTES
Morning report received at bedside. Care assumed. Pt alert and awake sitting up in bed. No complaints of pain or discomfort. AOx3 and on RA. Tele monitor on. Bed in lowest position and locked. Call light and all belongings within reach. No further needs at this time.

## 2020-01-10 NOTE — THERAPY
"Physical Therapy Treatment completed.   Bed Mobility:  Supine to Sit: (up at EOB)  Transfers: Sit to Stand: Minimal Assist  Gait: Level Of Assist: Moderate Assist with Front-Wheel Walker       Plan of Care: Will benefit from Physical Therapy 3 times per week  Discharge Recommendations: Equipment: Will Continue to Assess for Equipment Needs. Post-acute therapy Recommend post-acute placement for continued physical therapy services prior to discharge home. Patient can tolerate post-acute therapies at a 5x/week frequency.       Pt agreeable to ambulate. At first taking instruction well and able to stand with steadying assist. Once ambulated out into hallway highly distractable and impulsive. Pt began to disregard therapist and increase in agitation. \"I'm going to sit on that damn chair whether you like it or not!\" Moderate assist to safely lower into chair as pt refusing to listen or ambulate further. Unfortunately he chose to sit in a wheeled computer chair. When asked why he stopped pt replied \"It bothers me that you are carrying that thing\" referring to his portable O2 tank the therapist was managing. Informed patient he must ambulate with O2. Assist x2 required to stand due to poor choice of chair. Was able to ambulate back into room with max encouragement and cues. Confusion primarily limited capabilities. Continue to recommend post acute placement. Acute PT to continue to follow while in house.     See \"Rehab Therapy-Acute\" Patient Summary Report for complete documentation.       "

## 2020-01-10 NOTE — DISCHARGE PLANNING
@1035  Agency[facility]Facility Name: [facility]  Spoke To: Galilea  Outcome: No beds today or tomorrow.    @1000  Agency[facility]Facility Name: [facility]  Spoke To: Galilea  Outcome: Going into morning meeting to see if they have beds today, will have two for tomorrow.

## 2020-01-11 PROBLEM — I47.29 NSVT (NONSUSTAINED VENTRICULAR TACHYCARDIA) (HCC): Status: ACTIVE | Noted: 2020-01-01

## 2020-01-11 NOTE — PROGRESS NOTES
Patient's spouse and daughter toured multiple SNFs yesterday. Updated this RN that they would like to choose [facility] [place] for patient to be discharged to. Will update case management.

## 2020-01-11 NOTE — DISCHARGE PLANNING
Received Choice form at [phone]  Agency/Facility Name: [facility]  Referral sent per Choice form @ [phone]

## 2020-01-11 NOTE — ASSESSMENT & PLAN NOTE
On soles bilaterally   No pruritus.  Failed topical steroids.  Could be extensive tinea pedis  Trial of ketoconazole topical.  1/11: Slowly improving with topical ketoconazole  1/12: Continues to slowly improve with current medication. Will continue.   1/13: Progressive improvement. Continue current management. Suspecting extensive bilateral tenia pedis.

## 2020-01-11 NOTE — PROGRESS NOTES
Hospital Medicine Daily Progress Note    Date of Service  1/10/2020    Chief Complaint  79 y.o. male admitted 12/28/2019 with SOB    Hospital Course    PMHx COPD on 3 LNC baseline, AFib, HLD, AC on rivaroxaban, HTN, CAD.  Presented with increased cough, SOB and fever.  Admitted on HFNC.  On 12/29 worsened requiring intubation. Extubated 1/1      Interval Problem Update  Agitated and confused yesterday evening but settled down after 1 dose of ativan and has been pleasant and interactive the remainder of the night.  Sinus rates 80-90s  Some bloody urine this am  1/7: Resting comfortably in bed.  Respiratory status stable with oxygen requirements close to baseline [currently on 3 L and at home on 2 L].  No acute distress noted.  Few clots in the urine noted per nursing staff.  Urine is clear however.  Successful voiding trial so far.  Wife and daughter at bedside.  Questions answered.  1/8: Resting comfortably in bed.  No hematuria reported and patient is able to void with no issues.  Urine is clear.  H&H has been stable.  Hemodynamically stable.  Wife does not want Xarelto as it was very expensive.  She also declined Coumadin.  She wanted to ask her friends for a more affordable medicine.  No issues overnight per staff.  1/9: Resting comfortably in bed.  No acute distress noted.  No issues overnight per staff.  1/10: Resting comfortably in bed.  Hemodynamically stable.  Rash on both feet did not improve with topical steroids.  It looks like extensive tinea pedis.  Trial of ketoconazole.  Afebrile overnight and this morning.  No issues overnight per staff.  Consultants/Specialty  pulmonology    Code Status  full    Disposition  Pending SNF acceptance.    Review of Systems  Review of Systems   Unable to perform ROS: Dementia   Constitutional: Negative for chills and fever.   HENT: Negative for hearing loss and tinnitus.    Eyes: Negative for blurred vision, double vision and photophobia.   Respiratory: Negative for  cough and hemoptysis.    Cardiovascular: Negative for chest pain, palpitations and orthopnea.   Gastrointestinal: Negative for nausea and vomiting.   Genitourinary: Negative for dysuria, frequency and urgency.   Musculoskeletal: Negative for myalgias and neck pain.   Neurological: Negative for dizziness, tingling and headaches.   Psychiatric/Behavioral: Negative for depression and suicidal ideas.        Physical Exam  Temp:  [36.4 °C (97.6 °F)-36.8 °C (98.2 °F)] 36.6 °C (97.9 °F)  Pulse:  [59-68] 67  Resp:  [16-19] 19  BP: (141-164)/(56-66) 141/56  SpO2:  [90 %-98 %] 90 %    Physical Exam  Constitutional:       General: He is not in acute distress.     Appearance: He is well-developed. He is not ill-appearing.   HENT:      Head: Normocephalic and atraumatic.      Nose: No congestion or rhinorrhea.   Eyes:      Conjunctiva/sclera: Conjunctivae normal.   Neck:      Vascular: No JVD.   Cardiovascular:      Rate and Rhythm: Tachycardia present. Rhythm irregular.      Heart sounds: Murmur present. No gallop.    Pulmonary:      Effort: Pulmonary effort is normal. No respiratory distress.      Breath sounds: No stridor. No wheezing or rhonchi.   Abdominal:      General: There is no distension.      Palpations: Abdomen is soft. There is no mass.      Tenderness: There is no tenderness.   Musculoskeletal:         General: No swelling or tenderness.      Left lower leg: No edema.      Comments: Erythema on soles bilaterally    Skin:     General: Skin is warm and dry.      Findings: No lesion or rash.   Neurological:      General: No focal deficit present.      Mental Status: He is alert. Mental status is at baseline.      Comments: O to person and place  Confused about medical issues  Frequent re-orientation   Psychiatric:         Behavior: Behavior is slowed.         Cognition and Memory: Memory is impaired. He exhibits impaired recent memory and impaired remote memory.         Fluids    Intake/Output Summary (Last 24 hours)  at 1/10/2020 1654  Last data filed at 1/10/2020 1200  Gross per 24 hour   Intake 600 ml   Output 900 ml   Net -300 ml       Laboratory  Recent Labs     01/08/20  0333   WBC 13.4*   RBC 4.48*   HEMOGLOBIN 13.6*   HEMATOCRIT 42.6   MCV 95.1   MCH 30.4   MCHC 31.9*   RDW 48.9   PLATELETCT 225   MPV 10.4                       Imaging  DX-CHEST-PORTABLE (1 VIEW)   Final Result         Findings on chest radiograph appear stable since the prior radiograph.  No new abnormalities are identified. Opacification and volume loss in the left lung base is again noted.      US-EXTREMITY VENOUS UPPER UNILAT RIGHT   Final Result      [id number] VIEW   Final Result         1. Mild dilatation of small bowel loops, with gaseous distention of the colon. Findings may be due to ileus or partial small bowel obstruction.   2. Left basilar consolidation and left pleural effusion.      DX-CHEST-PORTABLE (1 VIEW)   Final Result      No significant interval change.      DX-ABDOMEN FOR TUBE PLACEMENT   Final Result      Feeding tube placement with the tip projecting over the stomach body.      DX-CHEST-PORTABLE (1 VIEW)   Final Result      No significant interval change.      DX-CHEST-PORTABLE (1 VIEW)   Final Result      1.  Supportive tubing as described above.   2.  No other significant change from prior exam.         DX-CHEST-PORTABLE (1 VIEW)   Final Result         Airspace opacity in the left lower lobe with associated left pleural effusion, concerning for infection           Assessment/Plan  * CAP (community acquired pneumonia)  Assessment & Plan  Strep pneumonae  Completed 10 days of Rocephin    Sepsis due to pneumonia (HCC)  Assessment & Plan  Completed a course of antibiotics   Sepsis resolved         Hypertension- (present on admission)  Assessment & Plan  Monitor blood pressure with Cardizem    CAD (coronary artery disease)- (present on admission)  Assessment & Plan  Continue medical therapy    Rash- (present on  admission)  Assessment & Plan  On soles bilaterally   No pruritus.  Failed topical steroids.  Could be extensive tinea pedis  Trial of ketoconazole topical.    Alzheimer's dementia without behavioral disturbance (HCC)- (present on admission)  Assessment & Plan  High risk for hospital-acquired delirium.  Avoid benzodiazepines and/or anticholinergic medications.  Minimize lines.  Avoid Alvarado catheter.  Daily orientation.    Gross hematuria  Assessment & Plan  Following trauma: pt accidentally pulled cath  Holding rivaroxaban  Follow post void residuals for signs of retention until urine is clear  1/7: Voiding trials.  H&H has been stable.  1/8: Resolved.  Xarelto restarted     Paroxysmal atrial fibrillation ([facility])  Assessment & Plan  Now back in sinus on diltiazem 120 TID  Prn IV Metoprolol for break through  Dig  Tele  Holding rivaroxaban due to gross hematuria  1/8: Hematuria has resolved.  Patient's wife stated that they cannot afford Xarelto as they were paying almost $600 a month for it.  She is also not interested in something cheaper like Coumadin.  She stated she wants to check with her friends to see which medicine will be more affordable.  I notified  discussed that with the patient.  Xarelto has been restarted     Acute exacerbation of chronic obstructive pulmonary disease (COPD) ([facility])  Assessment & Plan  Strep pneumo: complete 10 day course of Abx's, can transition to po if ready for DC  Cont O2/RT protocols  Now off steroids      Acute on chronic respiratory failure with hypoxemia ([facility])  Assessment & Plan  Extubated 1/2  Tolerating well  moblize   Cont to address AECOPD and CAP  Po lasix  Follow BMP   Wean down oxygen as tolerated       Elevated troponin  Assessment & Plan  Type II event    Dyslipidemia- (present on admission)  Assessment & Plan  Continue  simvastatin  Plan of care discussed with multidisciplinary team during rounds.    VTE prophylaxis: Xarelto

## 2020-01-11 NOTE — PROGRESS NOTES
Monitor Summary   Sinus Barron-Sinus Rhythm 49-65  4 beats of Vtach; Rare PACs/PVCs  .16/.08/.48

## 2020-01-11 NOTE — PROGRESS NOTES
Morning report received at bedside. Care assumed. Pt alert and awake sitting up in bed. No complaints of pain or discomfort. AOx3 and on 3L. Tele monitor on. Bed in lowest position and locked. Call light and all belongings within reach. No further needs at this time.

## 2020-01-11 NOTE — DISCHARGE PLANNING
Bedside nurse states pt's wife is here and would like for pt to go to [place]. This writer met with pt and spouse and obtained choice which was faxed to [facility] at ext. [phone].

## 2020-01-12 NOTE — CARE PLAN
Problem: Communication  Goal: The ability to communicate needs accurately and effectively will improve  Outcome: PROGRESSING AS EXPECTED  Intervention: [place] patient and significant other/support system to call light to alert staff of needs  Flowsheets (Taken 1/12/2020 6608)  Oriented to:: All of the Following : Location of Bathroom, Visiting Policy, Unit Routine, Call Light and Bedside Controls, Bedside Rail Policy, Smoking Policy, Rights and Responsibilities, Bedside Report, and Patient Education Notebook     Problem: Safety  Goal: Will remain free from injury  Outcome: PROGRESSING AS EXPECTED  Note:   Fall risk assessed, fall precautions in place, bed alarm on, pt verbalizes understanding of fall risk.

## 2020-01-12 NOTE — PROGRESS NOTES
Bedside report received, pt care assumed, tele box on. VSS, pt assessment complete. Pt aaox2, disoriented to time and place. No signs of distress noted at this time. POC discussed with pt and verbalizes no questions. Pt denies any additional needs at this time. Bed in lowest position, bed alarm on, pt educated on fall risk and verbalized understanding, call light within reach.

## 2020-01-12 NOTE — PROGRESS NOTES
Hospital Medicine Daily Progress Note    Date of Service  1/11/2020    Chief Complaint  79 y.o. male admitted 12/28/2019 with SOB    Hospital Course    PMHx COPD on 3 LNC baseline, AFib, HLD, AC on rivaroxaban, HTN, CAD.  Presented with increased cough, SOB and fever.  Admitted on HFNC.  On 12/29 worsened requiring intubation. Extubated 1/1      Interval Problem Update  Agitated and confused yesterday evening but settled down after 1 dose of ativan and has been pleasant and interactive the remainder of the night.  Sinus rates 80-90s  Some bloody urine this am  1/7: Resting comfortably in bed.  Respiratory status stable with oxygen requirements close to baseline [currently on 3 L and at home on 2 L].  No acute distress noted.  Few clots in the urine noted per nursing staff.  Urine is clear however.  Successful voiding trial so far.  Wife and daughter at bedside.  Questions answered.  1/8: Resting comfortably in bed.  No hematuria reported and patient is able to void with no issues.  Urine is clear.  H&H has been stable.  Hemodynamically stable.  Wife does not want Xarelto as it was very expensive.  She also declined Coumadin.  She wanted to ask her friends for a more affordable medicine.  No issues overnight per staff.  1/9: Resting comfortably in bed.  No acute distress noted.  No issues overnight per staff.  1/10: Resting comfortably in bed.  Hemodynamically stable.  Rash on both feet did not improve with topical steroids.  It looks like extensive tinea pedis.  Trial of ketoconazole.  Afebrile overnight and this morning.  No issues overnight per staff.  1/11: Sitting up in chair comfortably.  Respiratory status stable.  Oxygen requirements stable.  Denies any chest pain.  No acute distress noted.  Erythema on soles showed improvement.  18 beats of V. tach noted.  Magnesium level 1.7 mg/dL.  2 g of magnesium sulfate given.  Hemodynamically stable.  Has been afebrile overnight and this morning.  No issues overnight  per staff.  Consultants/Specialty  pulmonology    Code Status  full    Disposition  Pending SNF acceptance.    Review of Systems  Review of Systems   Unable to perform ROS: Dementia   Constitutional: Negative for chills, fever and weight loss.   HENT: Negative for ear pain, hearing loss and tinnitus.    Eyes: Negative for blurred vision, double vision, photophobia and pain.   Respiratory: Negative for cough and hemoptysis.    Cardiovascular: Negative for chest pain, palpitations, orthopnea and claudication.   Gastrointestinal: Negative for abdominal pain, diarrhea, nausea and vomiting.   Genitourinary: Negative for dysuria, frequency, hematuria and urgency.   Musculoskeletal: Negative for back pain, myalgias and neck pain.   Neurological: Negative for dizziness, tingling, tremors and headaches.   Psychiatric/Behavioral: Negative for depression, substance abuse and suicidal ideas.        Physical Exam  Temp:  [36.5 °C (97.7 °F)-37 °C (98.6 °F)] 37 °C (98.6 °F)  Pulse:  [51-65] 58  Resp:  [16-18] 16  BP: (106-151)/(50-83) 130/62  SpO2:  [92 %-100 %] 100 %    Physical Exam  Constitutional:       General: He is not in acute distress.     Appearance: He is well-developed. He is not ill-appearing or diaphoretic.   HENT:      Head: Normocephalic and atraumatic.      Nose: No congestion or rhinorrhea.   Eyes:      General:         Left eye: No discharge.      Conjunctiva/sclera: Conjunctivae normal.   Neck:      Vascular: No JVD.   Cardiovascular:      Rate and Rhythm: Tachycardia present. Rhythm irregular.      Heart sounds: Murmur present. No friction rub. No gallop.    Pulmonary:      Effort: Pulmonary effort is normal. No respiratory distress.      Breath sounds: No stridor. No wheezing, rhonchi or rales.   Chest:      Chest wall: No tenderness.   Abdominal:      General: There is no distension.      Palpations: Abdomen is soft. There is no mass.      Tenderness: There is no tenderness.      Hernia: No hernia is  present.   Musculoskeletal:         General: No swelling or tenderness.      Left lower leg: No edema.      Comments: Erythema on soles bilaterally (slowly improving)    Skin:     General: Skin is warm and dry.      Findings: No lesion or rash.   Neurological:      General: No focal deficit present.      Mental Status: He is alert. Mental status is at baseline.      Sensory: No sensory deficit.      Comments: O to person and place  Confused about medical issues  Frequent re-orientation   Psychiatric:         Mood and Affect: Mood normal.         Behavior: Behavior is slowed.         Cognition and Memory: Memory is impaired. He exhibits impaired recent memory and impaired remote memory.         Fluids    Intake/Output Summary (Last 24 hours) at 1/11/2020 1653  Last data filed at 1/11/2020 0252  Gross per 24 hour   Intake 240 ml   Output 150 ml   Net 90 ml       Laboratory  Recent Labs     01/11/20  0945   WBC 15.5*   RBC 4.17*   HEMOGLOBIN 12.6*   HEMATOCRIT 40.7*   MCV 97.6   MCH 30.2   MCHC 31.0*   RDW 51.8*   PLATELETCT 286   MPV 10.7     Recent Labs     01/11/20  0945   SODIUM 142   POTASSIUM 3.7   CHLORIDE 100   CO2 37*   GLUCOSE 118*   BUN 11   CREATININE 0.77   CALCIUM 8.0*                   Imaging  DX-CHEST-PORTABLE (1 VIEW)   Final Result         Findings on chest radiograph appear stable since the prior radiograph.  No new abnormalities are identified. Opacification and volume loss in the left lung base is again noted.      US-EXTREMITY VENOUS UPPER UNILAT RIGHT   Final Result      [id number] VIEW   Final Result         1. Mild dilatation of small bowel loops, with gaseous distention of the colon. Findings may be due to ileus or partial small bowel obstruction.   2. Left basilar consolidation and left pleural effusion.      DX-CHEST-PORTABLE (1 VIEW)   Final Result      No significant interval change.      DX-ABDOMEN FOR TUBE PLACEMENT   Final Result      Feeding tube placement with the tip projecting  over the stomach body.      DX-CHEST-PORTABLE (1 VIEW)   Final Result      No significant interval change.      DX-CHEST-PORTABLE (1 VIEW)   Final Result      1.  Supportive tubing as described above.   2.  No other significant change from prior exam.         DX-CHEST-PORTABLE (1 VIEW)   Final Result         Airspace opacity in the left lower lobe with associated left pleural effusion, concerning for infection           Assessment/Plan  * CAP (community acquired pneumonia)  Assessment & Plan  Strep pneumonae  Completed 10 days of Rocephin    Sepsis due to pneumonia ([facility])  Assessment & Plan  Completed a course of antibiotics   Sepsis resolved         Hypertension- (present on admission)  Assessment & Plan  Monitor blood pressure with Cardizem    CAD (coronary artery disease)- (present on admission)  Assessment & Plan  Continue medical therapy    NSVT (nonsustained ventricular tachycardia) ([facility])- (present on admission)  Assessment & Plan  Maintain potassium levels above 4 g/dL and magnesium above 2 mg/dL.    Rash- (present on admission)  Assessment & Plan  On soles bilaterally   No pruritus.  Failed topical steroids.  Could be extensive tinea pedis  Trial of ketoconazole topical.  1/11: Slowly improving with topical ketoconazole    Alzheimer's dementia without behavioral disturbance ([facility])- (present on admission)  Assessment & Plan  High risk for hospital-acquired delirium.  Avoid benzodiazepines and/or anticholinergic medications.  Minimize lines.  Avoid Alvarado catheter.  Daily orientation.    Gross hematuria  Assessment & Plan  Following trauma: pt accidentally pulled cath  Holding rivaroxaban  Follow post void residuals for signs of retention until urine is clear  1/7: Voiding trials.  H&H has been stable.  1/8: Resolved.  Xarelto restarted     Paroxysmal atrial fibrillation ([facility])  Assessment & Plan  Now back in sinus on diltiazem 120 TID  Prn IV Metoprolol for break through  Dig  Tele  Holding rivaroxaban due to  gross hematuria  1/8: Hematuria has resolved.  Patient's wife stated that they cannot afford Xarelto as they were paying almost $600 a month for it.  She is also not interested in something cheaper like Coumadin.  She stated she wants to check with her friends to see which medicine will be more affordable.  I notified  discussed that with the patient.  Xarelto has been restarted     Acute exacerbation of chronic obstructive pulmonary disease (COPD) ([facility])  Assessment & Plan  Strep pneumo: complete 10 day course of Abx's, can transition to po if ready for DC  Cont O2/RT protocols  Now off steroids  Exacerbation resolved       Acute on chronic respiratory failure with hypoxemia ([facility])  Assessment & Plan  Extubated 1/2  Tolerating well  moblize   Cont to address AECOPD and CAP  Po lasix  Follow BMP   Wean down oxygen as tolerated       Elevated troponin  Assessment & Plan  Type II event    Dyslipidemia- (present on admission)  Assessment & Plan  Continue  simvastatin  Plan of care discussed with multidisciplinary team during rounds.    VTE prophylaxis: Xarelto

## 2020-01-13 NOTE — PROGRESS NOTES
Received report and assumed care of patient. Patient is alert and oriented to self only. Wife at bedside. Patient is in no signs of distress. Patient was updated on the plan of care for the day. Call light within reach, bed in low position, 2 side rails up. All fall precautions in place. Will continue to monitor.

## 2020-01-13 NOTE — DISCHARGE PLANNING
Received Transport Form @ 1135  Spoke to Mumtaz @ [facility]    Transport is scheduled for 1/14 @1600 going to [facility].    @1130  Agency/Facility Name: [facility]  Spoke To: Celena  Outcome: Requesting [facility] set up transport for tomorrow at 1600.    @1125  Agency/Facility Name: [facility]  Spoke To: Celena  Outcome: Left message for transport tomorrow, awaiting call back.    Agency/Facility Name: [facility]  Spoke To: Galilea  Outcome: No beds today and wife has refused to use [facility] now.    @1040  Agency/Facility Name: [facility]  Spoke To: Celena  Outcome: No beds today, will have them tomorrow.    @1025  Agency/Facility Name: [facility]  Outcome: Left message inquiring about beds, awaiting call back.    Agency/Facility Name: [facility]  Outcome: Left message inquiring about beds, awaiting call back.

## 2020-01-13 NOTE — CARE PLAN
Problem: Safety  Goal: Will remain free from injury  Note:   Bed alarm on, non slip socks on, bed in low position, 2 side rails up, call light within reach, will continue to monitor.      Problem: Venous Thromboembolism (VTW)/Deep Vein Thrombosis (DVT) Prevention:  Goal: Patient will participate in Venous Thrombosis (VTE)/Deep Vein Thrombosis (DVT)Prevention Measures  Note:   Patient taking xarelto for VTE prophylaxis.

## 2020-01-13 NOTE — CARE PLAN
Problem: Safety  Goal: Will remain free from falls  Outcome: PROGRESSING AS EXPECTED  Intervention: Implement fall precautions  Flowsheets  Taken 1/13/2020 1055  Bed Alarm: Yes - Alarm On (Pended)  Bedrails: Bedrails Closest to Bathroom Down (Pended)  Taken 1/13/2020 0800  Environmental Precautions: Treaded Slipper Socks on Patient;Personal Belongings, Wastebasket, Call Bell etc. in Easy Reach;Bed in Low Position  Note:   Patient and family members compliant with use of call light and environmental fall precautions put into place.     Problem: Communication  Goal: The ability to communicate needs accurately and effectively will improve  Intervention: [place] patient and significant other/support system to call light to alert staff of needs  Flowsheets (Taken 1/12/2020 1504 by Melodie Parikh, R.N.)  Oriented to:: All of the Following : Location of Bathroom, Visiting Policy, Unit Routine, Call Light and Bedside Controls, Bedside Rail Policy, Smoking Policy, Rights and Responsibilities, Bedside Report, and Patient Education Notebook  Note:   Patient, significant other and other present family member oriented to environment, equipment and updated with current plan of care.

## 2020-01-13 NOTE — PROGRESS NOTES
Hospital Medicine Daily Progress Note    Date of Service  1/12/2020    Chief Complaint  79 y.o. male admitted 12/28/2019 with SOB    Hospital Course    PMHx COPD on 3 LNC baseline, AFib, HLD, AC on rivaroxaban, HTN, CAD.  Presented with increased cough, SOB and fever.  Admitted on HFNC.  On 12/29 worsened requiring intubation. Extubated 1/1      Interval Problem Update  Agitated and confused yesterday evening but settled down after 1 dose of ativan and has been pleasant and interactive the remainder of the night.  Sinus rates 80-90s  Some bloody urine this am  1/7: Resting comfortably in bed.  Respiratory status stable with oxygen requirements close to baseline [currently on 3 L and at home on 2 L].  No acute distress noted.  Few clots in the urine noted per nursing staff.  Urine is clear however.  Successful voiding trial so far.  Wife and daughter at bedside.  Questions answered.  1/8: Resting comfortably in bed.  No hematuria reported and patient is able to void with no issues.  Urine is clear.  H&H has been stable.  Hemodynamically stable.  Wife does not want Xarelto as it was very expensive.  She also declined Coumadin.  She wanted to ask her friends for a more affordable medicine.  No issues overnight per staff.  1/9: Resting comfortably in bed.  No acute distress noted.  No issues overnight per staff.  1/10: Resting comfortably in bed.  Hemodynamically stable.  Rash on both feet did not improve with topical steroids.  It looks like extensive tinea pedis.  Trial of ketoconazole.  Afebrile overnight and this morning.  No issues overnight per staff.  1/11: Sitting up in chair comfortably.  Respiratory status stable.  Oxygen requirements stable.  Denies any chest pain.  No acute distress noted.  Erythema on soles showed improvement.  18 beats of V. tach noted.  Magnesium level 1.7 mg/dL.  2 g of magnesium sulfate given.  Hemodynamically stable.  Has been afebrile overnight and this morning.  No issues overnight  per staff.  1/12: Sitting up in bed comfortably. Respiratory status stable. Afebrile overnight and this morning. Tolerating PO intake well. No issues overnight per staff.   Consultants/Specialty  pulmonology    Code Status  full    Disposition  Pending SNF acceptance.    Review of Systems  Review of Systems   Unable to perform ROS: Dementia   Constitutional: Negative for chills, fever and weight loss.   HENT: Negative for ear pain, hearing loss and tinnitus.    Eyes: Negative for blurred vision, double vision, photophobia and pain.   Respiratory: Negative for cough and hemoptysis.    Cardiovascular: Negative for chest pain, palpitations, orthopnea and claudication.   Gastrointestinal: Negative for abdominal pain, constipation, diarrhea, nausea and vomiting.   Genitourinary: Negative for dysuria, frequency, hematuria and urgency.   Musculoskeletal: Negative for back pain, myalgias and neck pain.   Neurological: Negative for dizziness, tingling, tremors, sensory change and headaches.   Psychiatric/Behavioral: Negative for depression, substance abuse and suicidal ideas.        Physical Exam  Temp:  [35.9 °C (96.6 °F)-37 °C (98.6 °F)] 35.9 °C (96.6 °F)  Pulse:  [58-65] 58  Resp:  [16-20] 18  BP: (124-160)/(52-60) 145/60  SpO2:  [94 %-99 %] 99 %    Physical Exam  Constitutional:       General: He is not in acute distress.     Appearance: He is well-developed. He is not ill-appearing, toxic-appearing or diaphoretic.   HENT:      Head: Normocephalic and atraumatic.      Nose: No congestion or rhinorrhea.   Eyes:      General:         Right eye: No discharge.         Left eye: No discharge.      Conjunctiva/sclera: Conjunctivae normal.   Neck:      Vascular: No JVD.   Cardiovascular:      Rate and Rhythm: Tachycardia present. Rhythm irregular.      Heart sounds: Murmur present. No friction rub. No gallop.    Pulmonary:      Effort: Pulmonary effort is normal. No respiratory distress.      Breath sounds: No stridor. No  wheezing, rhonchi or rales.   Abdominal:      General: There is no distension.      Palpations: Abdomen is soft. There is no mass.      Tenderness: There is no tenderness. There is no guarding or rebound.      Hernia: No hernia is present.   Musculoskeletal:         General: No swelling or tenderness.      Comments: Erythema on soles bilaterally (slowly improving)    Skin:     General: Skin is warm and dry.      Findings: No lesion or rash.   Neurological:      General: No focal deficit present.      Mental Status: He is alert. Mental status is at baseline.      Cranial Nerves: No cranial nerve deficit.      Sensory: No sensory deficit.      Comments: O to person and place  Confused about medical issues  Frequent re-orientation   Psychiatric:         Mood and Affect: Mood normal.         Behavior: Behavior is slowed.         Cognition and Memory: Memory is impaired. He exhibits impaired recent memory and impaired remote memory.         Fluids    Intake/Output Summary (Last 24 hours) at 1/12/2020 1642  Last data filed at 1/12/2020 0800  Gross per 24 hour   Intake 70 ml   Output 550 ml   Net -480 ml       Laboratory  Recent Labs     01/11/20  0945 01/12/20  0339   WBC 15.5* 11.8*   RBC 4.17* 4.24*   HEMOGLOBIN 12.6* 12.8*   HEMATOCRIT 40.7* 41.0*   MCV 97.6 96.7   MCH 30.2 30.2   MCHC 31.0* 31.2*   RDW 51.8* 52.0*   PLATELETCT 286 293   MPV 10.7 10.5     Recent Labs     01/11/20  0945   SODIUM 142   POTASSIUM 3.7   CHLORIDE 100   CO2 37*   GLUCOSE 118*   BUN 11   CREATININE 0.77   CALCIUM 8.0*                   Imaging  DX-CHEST-PORTABLE (1 VIEW)   Final Result         Findings on chest radiograph appear stable since the prior radiograph.  No new abnormalities are identified. Opacification and volume loss in the left lung base is again noted.      US-EXTREMITY VENOUS UPPER UNILAT RIGHT   Final Result      [id number] VIEW   Final Result         1. Mild dilatation of small bowel loops, with gaseous distention of the  colon. Findings may be due to ileus or partial small bowel obstruction.   2. Left basilar consolidation and left pleural effusion.      DX-CHEST-PORTABLE (1 VIEW)   Final Result      No significant interval change.      DX-ABDOMEN FOR TUBE PLACEMENT   Final Result      Feeding tube placement with the tip projecting over the stomach body.      DX-CHEST-PORTABLE (1 VIEW)   Final Result      No significant interval change.      DX-CHEST-PORTABLE (1 VIEW)   Final Result      1.  Supportive tubing as described above.   2.  No other significant change from prior exam.         DX-CHEST-PORTABLE (1 VIEW)   Final Result         Airspace opacity in the left lower lobe with associated left pleural effusion, concerning for infection           Assessment/Plan  * CAP (community acquired pneumonia)  Assessment & Plan  Strep pneumonae  Completed 10 days of Rocephin    Sepsis due to pneumonia ([facility])  Assessment & Plan  Completed a course of antibiotics   Sepsis resolved         Hypertension- (present on admission)  Assessment & Plan  Monitor blood pressure with Cardizem    CAD (coronary artery disease)- (present on admission)  Assessment & Plan  Continue medical therapy    NSVT (nonsustained ventricular tachycardia) ([facility])- (present on admission)  Assessment & Plan  Maintain potassium levels above 4 g/dL and magnesium above 2 mg/dL.    Rash- (present on admission)  Assessment & Plan  On soles bilaterally   No pruritus.  Failed topical steroids.  Could be extensive tinea pedis  Trial of ketoconazole topical.  1/11: Slowly improving with topical ketoconazole  1/12: Continues to slowly improve with current medication. Will continue.     Alzheimer's dementia without behavioral disturbance ([facility])- (present on admission)  Assessment & Plan  High risk for hospital-acquired delirium.  Avoid benzodiazepines and/or anticholinergic medications.  Minimize lines.  Avoid Alvarado catheter.  Daily orientation.    Gross hematuria  Assessment &  Plan  Following trauma: pt accidentally pulled cath  Holding rivaroxaban  Follow post void residuals for signs of retention until urine is clear  1/7: Voiding trials.  H&H has been stable.  1/8: Resolved.  Xarelto restarted     Paroxysmal atrial fibrillation ([facility])  Assessment & Plan  Now back in sinus on diltiazem 120 TID  Prn IV Metoprolol for break through  Dig  Tele  Holding rivaroxaban due to gross hematuria  1/8: Hematuria has resolved.  Patient's wife stated that they cannot afford Xarelto as they were paying almost $600 a month for it.  She is also not interested in something cheaper like Coumadin.  She stated she wants to check with her friends to see which medicine will be more affordable.  I notified  discussed that with the patient.  Xarelto has been restarted     Acute exacerbation of chronic obstructive pulmonary disease (COPD) ([facility])  Assessment & Plan  Strep pneumo: complete 10 day course of Abx's, can transition to po if ready for DC  Cont O2/RT protocols  Now off steroids  Exacerbation resolved       Acute on chronic respiratory failure with hypoxemia ([facility])  Assessment & Plan  Extubated 1/2  Tolerating well  moblize   Cont to address AECOPD and CAP  Po lasix  Follow BMP   Wean down oxygen as tolerated       Elevated troponin  Assessment & Plan  Type II event    Dyslipidemia- (present on admission)  Assessment & Plan  Continue  simvastatin  Plan of care discussed with multidisciplinary team during rounds.    VTE prophylaxis: Xarelto

## 2020-01-13 NOTE — PROGRESS NOTES
Report called to MARYA Oconnor on [facility] 5th floor. All questions answered at this time. Pts family at bedside, notified of transfer. Awaiting transport.

## 2020-01-14 NOTE — PROGRESS NOTES
Hospital Medicine Daily Progress Note    Date of Service  1/13/2020    Chief Complaint  79 y.o. male admitted 12/28/2019 with SOB    Hospital Course    PMHx COPD on 3 LNC baseline, AFib, HLD, AC on rivaroxaban, HTN, CAD.  Presented with increased cough, SOB and fever.  Admitted on HFNC.  On 12/29 worsened requiring intubation. Extubated 1/1      Interval Problem Update  Agitated and confused yesterday evening but settled down after 1 dose of ativan and has been pleasant and interactive the remainder of the night.  Sinus rates 80-90s  Some bloody urine this am  1/7: Resting comfortably in bed.  Respiratory status stable with oxygen requirements close to baseline [currently on 3 L and at home on 2 L].  No acute distress noted.  Few clots in the urine noted per nursing staff.  Urine is clear however.  Successful voiding trial so far.  Wife and daughter at bedside.  Questions answered.  1/8: Resting comfortably in bed.  No hematuria reported and patient is able to void with no issues.  Urine is clear.  H&H has been stable.  Hemodynamically stable.  Wife does not want Xarelto as it was very expensive.  She also declined Coumadin.  She wanted to ask her friends for a more affordable medicine.  No issues overnight per staff.  1/9: Resting comfortably in bed.  No acute distress noted.  No issues overnight per staff.  1/10: Resting comfortably in bed.  Hemodynamically stable.  Rash on both feet did not improve with topical steroids.  It looks like extensive tinea pedis.  Trial of ketoconazole.  Afebrile overnight and this morning.  No issues overnight per staff.  1/11: Sitting up in chair comfortably.  Respiratory status stable.  Oxygen requirements stable.  Denies any chest pain.  No acute distress noted.  Erythema on soles showed improvement.  18 beats of V. tach noted.  Magnesium level 1.7 mg/dL.  2 g of magnesium sulfate given.  Hemodynamically stable.  Has been afebrile overnight and this morning.  No issues overnight  per staff.  1/12: Sitting up in bed comfortably. Respiratory status stable. Afebrile overnight and this morning. Tolerating PO intake well. No issues overnight per staff.   1/13: Laying in bed comfortably.  Respiratory status stable.  Hemodynamically stable.  Sinus rhythm.  No hematuria.  Rash on both feet improving.  Afebrile overnight and this morning.  No issues overnight per staff.  Consultants/Specialty  pulmonology    Code Status  full    Disposition  Pending SNF acceptance.    Review of Systems  Review of Systems   Unable to perform ROS: Dementia   Constitutional: Negative for chills, fever, malaise/fatigue and weight loss.   HENT: Negative for ear pain, hearing loss and tinnitus.    Eyes: Negative for blurred vision, double vision, photophobia and pain.   Respiratory: Negative for cough and hemoptysis.    Cardiovascular: Negative for chest pain, palpitations, orthopnea and claudication.   Gastrointestinal: Negative for abdominal pain, constipation, diarrhea, nausea and vomiting.   Genitourinary: Negative for dysuria, frequency, hematuria and urgency.   Musculoskeletal: Negative for back pain, myalgias and neck pain.   Skin: Positive for rash (On both feet.  Improving).   Neurological: Negative for dizziness, tingling, tremors, sensory change and headaches.   Psychiatric/Behavioral: Negative for depression, substance abuse and suicidal ideas.        Physical Exam  Temp:  [36 °C (96.8 °F)-36.8 °C (98.2 °F)] 36.6 °C (97.9 °F)  Pulse:  [52-62] 62  Resp:  [15-19] 19  BP: (119-151)/(41-94) 141/41  SpO2:  [94 %-100 %] 99 %    Physical Exam  Constitutional:       General: He is not in acute distress.     Appearance: He is well-developed. He is not ill-appearing, toxic-appearing or diaphoretic.   HENT:      Head: Normocephalic and atraumatic.      Nose: No congestion or rhinorrhea.   Eyes:      General:         Right eye: No discharge.         Left eye: No discharge.      Conjunctiva/sclera: Conjunctivae normal.    Neck:      Vascular: No JVD.   Cardiovascular:      Rate and Rhythm: Tachycardia present. Rhythm irregular.      Heart sounds: Murmur present. No friction rub. No gallop.    Pulmonary:      Effort: Pulmonary effort is normal. No respiratory distress.      Breath sounds: No stridor. No wheezing, rhonchi or rales.   Abdominal:      General: There is no distension.      Palpations: Abdomen is soft. There is no mass.      Tenderness: There is no tenderness. There is no guarding or rebound.      Hernia: No hernia is present.   Musculoskeletal:         General: No swelling or tenderness.      Comments: Erythema on soles bilaterally (slowly improving)    Skin:     General: Skin is warm and dry.      Coloration: Skin is not jaundiced.      Findings: Rash (Rash on both feet.  Improving.) present. No lesion.   Neurological:      General: No focal deficit present.      Mental Status: He is alert. Mental status is at baseline.      Cranial Nerves: No cranial nerve deficit.      Sensory: No sensory deficit.      Comments: O to person and place  Confused about medical issues  Frequent re-orientation   Psychiatric:         Mood and Affect: Mood normal.         Behavior: Behavior is slowed.         Cognition and Memory: Memory is impaired. He exhibits impaired recent memory and impaired remote memory.         Fluids    Intake/Output Summary (Last 24 hours) at 1/13/2020 1749  Last data filed at 1/13/2020 1200  Gross per 24 hour   Intake 180 ml   Output 420 ml   Net -240 ml       Laboratory  Recent Labs     01/11/20  0945 01/12/20  0339   WBC 15.5* 11.8*   RBC 4.17* 4.24*   HEMOGLOBIN 12.6* 12.8*   HEMATOCRIT 40.7* 41.0*   MCV 97.6 96.7   MCH 30.2 30.2   MCHC 31.0* 31.2*   RDW 51.8* 52.0*   PLATELETCT 286 293   MPV 10.7 10.5     Recent Labs     01/11/20  0945   SODIUM 142   POTASSIUM 3.7   CHLORIDE 100   CO2 37*   GLUCOSE 118*   BUN 11   CREATININE 0.77   CALCIUM 8.0*                   Imaging  DX-CHEST-PORTABLE (1 VIEW)   Final  Result         Findings on chest radiograph appear stable since the prior radiograph.  No new abnormalities are identified. Opacification and volume loss in the left lung base is again noted.      US-EXTREMITY VENOUS UPPER UNILAT RIGHT   Final Result      [id number] VIEW   Final Result         1. Mild dilatation of small bowel loops, with gaseous distention of the colon. Findings may be due to ileus or partial small bowel obstruction.   2. Left basilar consolidation and left pleural effusion.      DX-CHEST-PORTABLE (1 VIEW)   Final Result      No significant interval change.      DX-ABDOMEN FOR TUBE PLACEMENT   Final Result      Feeding tube placement with the tip projecting over the stomach body.      DX-CHEST-PORTABLE (1 VIEW)   Final Result      No significant interval change.      DX-CHEST-PORTABLE (1 VIEW)   Final Result      1.  Supportive tubing as described above.   2.  No other significant change from prior exam.         DX-CHEST-PORTABLE (1 VIEW)   Final Result         Airspace opacity in the left lower lobe with associated left pleural effusion, concerning for infection           Assessment/Plan  * CAP (community acquired pneumonia)  Assessment & Plan  Strep pneumonae  Completed 10 days of Rocephin    Sepsis due to pneumonia (HCC)  Assessment & Plan  Completed a course of antibiotics   Sepsis resolved         Hypertension- (present on admission)  Assessment & Plan  Monitor blood pressure with Cardizem    CAD (coronary artery disease)- (present on admission)  Assessment & Plan  Continue medical therapy    NSVT (nonsustained ventricular tachycardia) ([facility])- (present on admission)  Assessment & Plan  Maintain potassium levels above 4 g/dL and magnesium above 2 mg/dL.    Rash- (present on admission)  Assessment & Plan  On soles bilaterally   No pruritus.  Failed topical steroids.  Could be extensive tinea pedis  Trial of ketoconazole topical.  1/11: Slowly improving with topical ketoconazole  1/12: Continues  to slowly improve with current medication. Will continue.   1/13: Progressive improvement. Continue current management. Suspecting extensive bilateral tenia pedis.    Alzheimer's dementia without behavioral disturbance (HCC)- (present on admission)  Assessment & Plan  High risk for hospital-acquired delirium.  Avoid benzodiazepines and/or anticholinergic medications.  Minimize lines.  Avoid Alvarado catheter.  Daily orientation.    Gross hematuria  Assessment & Plan  Following trauma: pt accidentally pulled cath  Holding rivaroxaban  Follow post void residuals for signs of retention until urine is clear  1/7: Voiding trials.  H&H has been stable.  1/8: Resolved.  Xarelto restarted     Paroxysmal atrial fibrillation ([facility])  Assessment & Plan  Now back in sinus on diltiazem 120 TID  Prn IV Metoprolol for break through  Dig  Tele  Holding rivaroxaban due to gross hematuria  1/8: Hematuria has resolved.  Patient's wife stated that they cannot afford Xarelto as they were paying almost $600 a month for it.  She is also not interested in something cheaper like Coumadin.  She stated she wants to check with her friends to see which medicine will be more affordable.  I notified  discussed that with the patient.  Xarelto has been restarted     Acute exacerbation of chronic obstructive pulmonary disease (COPD) ([facility])  Assessment & Plan  Strep pneumo: complete 10 day course of Abx's, can transition to po if ready for DC  Cont O2/RT protocols  Now off steroids  Exacerbation resolved       Acute on chronic respiratory failure with hypoxemia ([facility])  Assessment & Plan  Extubated 1/2  Tolerating well  moblize   Cont to address AECOPD and CAP  Po lasix  Follow BMP   Wean down oxygen as tolerated       Elevated troponin  Assessment & Plan  Type II event    Dyslipidemia- (present on admission)  Assessment & Plan  Continue  simvastatin  Plan of care discussed with multidisciplinary team during rounds.    VTE prophylaxis:  Xarelto

## 2020-01-14 NOTE — PROGRESS NOTES
Hospital Medicine Daily Progress Note    Date of Service  1/14/2020    Chief Complaint  79 y.o. male admitted 12/28/2019 with SOB    Hospital Course    PMHx COPD on 3 LNC baseline, AFib, HLD, AC on rivaroxaban, HTN, CAD.  Presented with increased cough, SOB and fever.  Admitted on HFNC.  On 12/29 worsened requiring intubation. Extubated 1/1      Interval Problem Update  Agitated and confused yesterday evening but settled down after 1 dose of ativan and has been pleasant and interactive the remainder of the night.  Sinus rates 80-90s  Some bloody urine this am  1/7: Resting comfortably in bed.  Respiratory status stable with oxygen requirements close to baseline [currently on 3 L and at home on 2 L].  No acute distress noted.  Few clots in the urine noted per nursing staff.  Urine is clear however.  Successful voiding trial so far.  Wife and daughter at bedside.  Questions answered.  1/8: Resting comfortably in bed.  No hematuria reported and patient is able to void with no issues.  Urine is clear.  H&H has been stable.  Hemodynamically stable.  Wife does not want Xarelto as it was very expensive.  She also declined Coumadin.  She wanted to ask her friends for a more affordable medicine.  No issues overnight per staff.  1/9: Resting comfortably in bed.  No acute distress noted.  No issues overnight per staff.  1/10: Resting comfortably in bed.  Hemodynamically stable.  Rash on both feet did not improve with topical steroids.  It looks like extensive tinea pedis.  Trial of ketoconazole.  Afebrile overnight and this morning.  No issues overnight per staff.  1/11: Sitting up in chair comfortably.  Respiratory status stable.  Oxygen requirements stable.  Denies any chest pain.  No acute distress noted.  Erythema on soles showed improvement.  18 beats of V. tach noted.  Magnesium level 1.7 mg/dL.  2 g of magnesium sulfate given.  Hemodynamically stable.  Has been afebrile overnight and this morning.  No issues overnight  per staff.  1/12: Sitting up in bed comfortably. Respiratory status stable. Afebrile overnight and this morning. Tolerating PO intake well. No issues overnight per staff.   1/13: Laying in bed comfortably.  Respiratory status stable.  Hemodynamically stable.  Sinus rhythm.  No hematuria.  Rash on both feet improving.  Afebrile overnight and this morning.  No issues overnight per staff.  Consultants/Specialty  pulmonology    Code Status  full    Disposition  Pending SNF acceptance.    Review of Systems  Review of Systems   Unable to perform ROS: Dementia   Constitutional: Negative for chills, fever, malaise/fatigue and weight loss.   HENT: Negative for ear pain, hearing loss and tinnitus.    Eyes: Negative for blurred vision, double vision, photophobia and pain.   Respiratory: Negative for cough and hemoptysis.    Cardiovascular: Negative for chest pain, palpitations, orthopnea and claudication.   Gastrointestinal: Negative for abdominal pain, constipation, diarrhea, nausea and vomiting.   Genitourinary: Negative for dysuria, frequency, hematuria and urgency.   Musculoskeletal: Negative for back pain, myalgias and neck pain.   Skin: Positive for rash (On both feet.  Improving).   Neurological: Negative for dizziness, tingling, tremors, sensory change and headaches.   Psychiatric/Behavioral: Negative for depression, substance abuse and suicidal ideas.        Physical Exam  Temp:  [36.6 °C (97.9 °F)-37.5 °C (99.5 °F)] 36.8 °C (98.3 °F)  Pulse:  [61-74] 62  Resp:  [15-20] 20  BP: (112-157)/(41-65) 157/65  SpO2:  [91 %-100 %] 97 %    Physical Exam  Constitutional:       General: He is not in acute distress.     Appearance: He is well-developed. He is not ill-appearing, toxic-appearing or diaphoretic.   HENT:      Head: Normocephalic and atraumatic.      Nose: No congestion or rhinorrhea.   Eyes:      General:         Right eye: No discharge.         Left eye: No discharge.      Conjunctiva/sclera: Conjunctivae normal.    Neck:      Vascular: No JVD.   Cardiovascular:      Rate and Rhythm: Tachycardia present. Rhythm irregular.      Heart sounds: Murmur present. No friction rub. No gallop.    Pulmonary:      Effort: Pulmonary effort is normal. No respiratory distress.      Breath sounds: No stridor. No wheezing, rhonchi or rales.   Abdominal:      General: There is no distension.      Palpations: Abdomen is soft. There is no mass.      Tenderness: There is no tenderness. There is no guarding or rebound.      Hernia: No hernia is present.   Musculoskeletal:         General: No swelling or tenderness.      Comments: Erythema on soles bilaterally (slowly improving)    Skin:     General: Skin is warm and dry.      Coloration: Skin is not jaundiced.      Findings: Rash (Rash on both feet.  Improving.) present. No lesion.   Neurological:      General: No focal deficit present.      Mental Status: He is alert. Mental status is at baseline.      Cranial Nerves: No cranial nerve deficit.      Sensory: No sensory deficit.      Comments: O to person and place  Confused about medical issues  Frequent re-orientation   Psychiatric:         Mood and Affect: Mood normal.         Behavior: Behavior is slowed.         Cognition and Memory: Memory is impaired. He exhibits impaired recent memory and impaired remote memory.         Fluids    Intake/Output Summary (Last 24 hours) at 1/14/2020 1009  Last data filed at 1/14/2020 0943  Gross per 24 hour   Intake 240 ml   Output 975 ml   Net -735 ml       Laboratory  Recent Labs     01/12/20  0339   WBC 11.8*   RBC 4.24*   HEMOGLOBIN 12.8*   HEMATOCRIT 41.0*   MCV 96.7   MCH 30.2   MCHC 31.2*   RDW 52.0*   PLATELETCT 293   MPV 10.5                       Imaging  DX-CHEST-PORTABLE (1 VIEW)   Final Result         Findings on chest radiograph appear stable since the prior radiograph.  No new abnormalities are identified. Opacification and volume loss in the left lung base is again noted.      US-EXTREMITY  VENOUS UPPER UNILAT RIGHT   Final Result      [id number] VIEW   Final Result         1. Mild dilatation of small bowel loops, with gaseous distention of the colon. Findings may be due to ileus or partial small bowel obstruction.   2. Left basilar consolidation and left pleural effusion.      DX-CHEST-PORTABLE (1 VIEW)   Final Result      No significant interval change.      DX-ABDOMEN FOR TUBE PLACEMENT   Final Result      Feeding tube placement with the tip projecting over the stomach body.      DX-CHEST-PORTABLE (1 VIEW)   Final Result      No significant interval change.      DX-CHEST-PORTABLE (1 VIEW)   Final Result      1.  Supportive tubing as described above.   2.  No other significant change from prior exam.         DX-CHEST-PORTABLE (1 VIEW)   Final Result         Airspace opacity in the left lower lobe with associated left pleural effusion, concerning for infection           Assessment/Plan  * CAP (community acquired pneumonia)  Assessment & Plan  Strep pneumonae  Completed 10 days of Rocephin    Sepsis due to pneumonia (HCC)  Assessment & Plan  Completed a course of antibiotics   Sepsis resolved         Hypertension- (present on admission)  Assessment & Plan  Monitor blood pressure with Cardizem    CAD (coronary artery disease)- (present on admission)  Assessment & Plan  Continue medical therapy    NSVT (nonsustained ventricular tachycardia) ([facility])- (present on admission)  Assessment & Plan  Maintain potassium levels above 4 g/dL and magnesium above 2 mg/dL.    Rash- (present on admission)  Assessment & Plan  On soles bilaterally   No pruritus.  Failed topical steroids.  Could be extensive tinea pedis  Trial of ketoconazole topical.  1/11: Slowly improving with topical ketoconazole  1/12: Continues to slowly improve with current medication. Will continue.   1/13: Progressive improvement. Continue current management. Suspecting extensive bilateral tenia pedis.    Alzheimer's dementia without behavioral  disturbance (HCC)- (present on admission)  Assessment & Plan  High risk for hospital-acquired delirium.  Avoid benzodiazepines and/or anticholinergic medications.  Minimize lines.  Avoid Alvarado catheter.  Daily orientation.    1/14 snf pending  Encourage activity    Gross hematuria  Assessment & Plan  Following trauma: pt accidentally pulled cath  Holding rivaroxaban  Follow post void residuals for signs of retention until urine is clear  1/7: Voiding trials.  H&H has been stable.  1/8: Resolved.  Xarelto restarted     1/14 f/u cbc    Paroxysmal atrial fibrillation (HCC)  Assessment & Plan  Now back in sinus on diltiazem 120 TID  Prn IV Metoprolol for break through  Dig  Tele  Holding rivaroxaban due to gross hematuria  1/8: Hematuria has resolved.  Patient's wife stated that they cannot afford Xarelto as they were paying almost $600 a month for it.  She is also not interested in something cheaper like Coumadin.  She stated she wants to check with her friends to see which medicine will be more affordable.  I notified  discussed that with the patient.  Xarelto has been restarted     1/14 on xarelto, rate controlled    Acute exacerbation of chronic obstructive pulmonary disease (COPD) (HCC)  Assessment & Plan  Strep pneumo: complete 10 day course of Abx's, can transition to po if ready for DC  Cont O2/RT protocols  Now off steroids  Exacerbation resolved       Acute on chronic respiratory failure with hypoxemia ([facility])  Assessment & Plan  Extubated 1/2  Tolerating well  moblize   Cont to address AECOPD and CAP  Po lasix  Follow BMP   Wean down oxygen as tolerated       Elevated troponin  Assessment & Plan  Type II event    Dyslipidemia- (present on admission)  Assessment & Plan  Continue  simvastatin  Plan of care discussed with multidisciplinary team during rounds.    VTE prophylaxis: Xarelto

## 2020-01-14 NOTE — DISCHARGE PLANNING
Agency/Facility Name: [facility]  Spoke To: Asael  Outcome: Pushed back to 1700.    Agency/Facility Name: [facility]  Spoke To: Celena  Outcome: Request transport pushed back to [phone].

## 2020-01-14 NOTE — DISCHARGE PLANNING
Anticipated Discharge Disposition: skilled    Action: Pt to transfer to [facility] skilled today at 16:30-17:00 via [facility]. Wife at bedside and aware. IMM letter given. [place] and chart copy on chart. BSN aware.     Barriers to Discharge:     Plan: to [facility] 16:30-17:00.

## 2020-01-14 NOTE — PROGRESS NOTES
Reviewed d/c paperwork with patient and family. Paperwork in chart--to be picked up by transport around 1640.

## 2020-01-14 NOTE — PROGRESS NOTES
Assumed care of pt at 0715. Report received and bedside rounding completed with night RN. Pt is calm no SOB, or in any acute distress noted.    Pt is considered a high fall risk. edu provided on risk level.   Fall precautions in place,  bed alarm. - Treaded non slip socks. Bed locked. Communication board updated with POC. Call light and pt belongings within reach - pt makes needs known - hourly rounding in place. See flowsheets for further assessment.     Patient removed his IV last night. Per Dr. Justine soriano that patient doesn't have an IV. Patient to leave at 4pm today.

## 2020-01-14 NOTE — CARE PLAN
Problem: Communication  Goal: The ability to communicate needs accurately and effectively will improve  Outcome: PROGRESSING SLOWER THAN EXPECTED     Problem: Safety  Goal: Will remain free from injury  Outcome: PROGRESSING SLOWER THAN EXPECTED  Goal: Will remain free from falls  Outcome: PROGRESSING SLOWER THAN EXPECTED     Problem: Infection  Goal: Will remain free from infection  Outcome: PROGRESSING SLOWER THAN EXPECTED     Problem: Venous Thromboembolism (VTW)/Deep Vein Thrombosis (DVT) Prevention:  Goal: Patient will participate in Venous Thrombosis (VTE)/Deep Vein Thrombosis (DVT)Prevention Measures  Outcome: PROGRESSING SLOWER THAN EXPECTED     Problem: Bowel/Gastric:  Goal: Normal bowel function is maintained or improved  Outcome: PROGRESSING SLOWER THAN EXPECTED  Goal: Will not experience complications related to bowel motility  Outcome: PROGRESSING SLOWER THAN EXPECTED     Problem: Knowledge Deficit  Goal: Knowledge of disease process/condition, treatment plan, diagnostic tests, and medications will improve  Outcome: PROGRESSING SLOWER THAN EXPECTED  Goal: Knowledge of the prescribed therapeutic regimen will improve  Outcome: PROGRESSING SLOWER THAN EXPECTED     Problem: Discharge Barriers/Planning  Goal: Patient's continuum of care needs will be met  Outcome: PROGRESSING SLOWER THAN EXPECTED     Problem: Respiratory:  Goal: Respiratory status will improve  Outcome: PROGRESSING SLOWER THAN EXPECTED     Problem: Fluid Volume:  Goal: Will maintain balanced intake and output  Outcome: PROGRESSING SLOWER THAN EXPECTED     Problem: Psychosocial Needs:  Goal: Level of anxiety will decrease  Outcome: PROGRESSING SLOWER THAN EXPECTED     Problem: Safety - Medical Restraint  Goal: Remains free of injury from restraints (Restraint for Interference with Medical Device)  Description  INTERVENTIONS:  1. Determine that other, less restrictive measures have been tried or would not be effective before applying the  restraint  2. Evaluate the patient's condition at the time of restraint application  3. Inform patient/family regarding the reason for restraint  4. Q2H: Monitor safety, psychosocial status, comfort, nutrition and hydration  Outcome: PROGRESSING SLOWER THAN EXPECTED  Goal: Free from restraint(s) (Restraint for Interference with Medical Device)  Description  INTERVENTIONS:  1. ONCE/SHIFT or MINIMUM Q12H: Assess and document the continuing need for restraints  2. Q24H: Continued use of restraint requires LIP to perform face to face examination and written order  3. Identify and implement measures to help patient regain control  Outcome: PROGRESSING SLOWER THAN EXPECTED     Problem: Pain Management  Goal: Pain level will decrease to patient's comfort goal  Outcome: PROGRESSING SLOWER THAN EXPECTED     Problem: Skin Integrity  Goal: Risk for impaired skin integrity will decrease  Outcome: PROGRESSING SLOWER THAN EXPECTED     Problem: Mobility  Goal: Risk for activity intolerance will decrease  Outcome: PROGRESSING SLOWER THAN EXPECTED

## 2020-01-14 NOTE — DISCHARGE INSTRUCTIONS
Discharge Instructions    Discharged to other by medical transportation with escort. Discharged via wheelchair, hospital escort: Yes.  Special equipment needed: Oxygen    Be sure to schedule a follow-up appointment with your primary care doctor or any specialists as instructed.     Discharge Plan:   Influenza Vaccine Indication: Not indicated: Previously immunized this influenza season and > 8 years of age    I understand that a diet low in cholesterol, fat, and sodium is recommended for good health. Unless I have been given specific instructions below for another diet, I accept this instruction as my diet prescription.   Other diet: Regular, Dysphagia 3, thin liquids    Special Instructions: Sepsis, Adult  Sepsis is a serious infection of your blood or tissues that affects your whole body. The infection that causes sepsis may be bacterial, viral, fungal, or parasitic. Sepsis may be life threatening. Sepsis can cause your blood pressure to drop. This may result in shock. Shock causes your central nervous system and your organs to stop working correctly.  What increases the risk?  Sepsis can happen in anyone, but it is more likely to happen in people who have weakened immune systems.  What are the signs or symptoms?  Symptoms of sepsis can include:  · Fever or low body temperature (hypothermia).  · Rapid breathing (hyperventilation).  · Chills.  · Rapid heartbeat (tachycardia).  · Confusion or light-headedness.  · Trouble breathing.  · Urinating much less than usual.  · Cool, clammy skin or red, flushed skin.  · Other problems with the heart, kidneys, or brain.  How is this diagnosed?  Your health care provider will likely do tests to look for an infection, to see if the infection has spread to your blood, and to see how serious your condition is. Tests can include:  · Blood tests, including cultures of your blood.  · Cultures of other fluids from your body, such as:  ¨ Urine.  ¨ Pus from wounds.  ¨ Mucus coughed up  from your lungs.  · Urine tests other than cultures.  · X-ray exams or other imaging tests.  How is this treated?  Treatment will begin with elimination of the source of infection. If your sepsis is likely caused by a bacterial or fungal infection, you will be given antibiotic or antifungal medicines.  You may also receive:  · Oxygen.  · Fluids through an IV tube.  · Medicines to increase your blood pressure.  · A machine to clean your blood (dialysis) if your kidneys fail.  · A machine to help you breathe if your lungs fail.  Get help right away if:  You get an infection or develop any of the signs and symptoms of sepsis after surgery or a hospitalization.  This information is not intended to replace advice given to you by your health care provider. Make sure you discuss any questions you have with your health care provider.  Document Released: 09/15/2004 Document Revised: 05/25/2017 Document Reviewed: 08/25/2014  [employer] Patient Education © 2017 [employer]      · Is patient discharged on Warfarin / Coumadin?   No     Depression / Suicide Risk    As you are discharged from this [facility] facility, it is important to learn how to keep safe from harming yourself.    Recognize the warning signs:  · Abrupt changes in personality, positive or negative- including increase in energy   · Giving away possessions  · Change in eating patterns- significant weight changes-  positive or negative  · Change in sleeping patterns- unable to sleep or sleeping all the time   · Unwillingness or inability to communicate  · Depression  · Unusual sadness, discouragement and loneliness  · Talk of wanting to die  · Neglect of personal appearance   · Rebelliousness- reckless behavior  · Withdrawal from people/activities they love  · Confusion- inability to concentrate     If you or a loved one observes any of these behaviors or has concerns about self-harm, here's what you can do:  · Talk about it- your feelings and  reasons for harming yourself  · Remove any means that you might use to hurt yourself (examples: pills, rope, extension cords, firearm)  · Get professional help from the community (Mental Health, Substance Abuse, psychological counseling)  · Do not be alone:Call your Safe Contact- someone whom you trust who will be there for you.  · Call your local CRISIS HOTLINE 056-2085 or 802-465-5603  · Call your local Children's Mobile Crisis Response Team [place] (521) 156-1138 or www.[employer]  · Call the toll free National Suicide Prevention Hotlines   · National Suicide Prevention Lifeline 132-973-TNZP (7370)  · National Hope Line Network 800-SUICIDE (613-6932)

## 2020-01-14 NOTE — DISCHARGE SUMMARY
Discharge Summary    CHIEF COMPLAINT ON ADMISSION  Chief Complaint   Patient presents with   • Shortness of Breath   • Atrial Fibrillation     RVR       Reason for Admission  EMS     CODE STATUS  Full Code    HPI & HOSPITAL COURSE  This is a 79 y.o. male here with PMHx COPD on 3 LNC baseline, AFib, HLD, AC on rivaroxaban, HTN, CAD.  Presented with increased cough, SOB and fever.  Admitted on HFNC.  On 12/29 worsened requiring intubation. Extubated 1/1      Respiratory status has significantly improved, he is now on oxygen supplementation.  He has completed a course of antibiotics during this admission.     Atrial fibrillation, rate is controlled, xarelto was held d/t hematuria from accidental catheter removal.  Now resolved.  Hemoglobin stable and xarelto has been restarted.    He is ambulating with improved strength and will benefit from continued therapy.       Therefore, he is discharged in good and stable condition to skilled nursing facility.    The patient met 2-midnight criteria for an inpatient stay at the time of discharge.      FOLLOW UP ITEMS POST DISCHARGE  F/u with pcp/dc clinic in 2 days  Cardiology as scheduled    DISCHARGE DIAGNOSES  Principal Problem:    CAP (community acquired pneumonia) POA: Unknown  Active Problems:    Sepsis due to pneumonia (HCC) POA: Unknown    CAD (coronary artery disease) POA: Yes      Overview: Stents in place.      Continue ASA.    Hypertension POA: Yes      Overview: Resume lisinopril and metoprolol.      130s - 140s 8/25    Dyslipidemia POA: Yes      Overview: Resume Lipitor.    Elevated troponin POA: Unknown    Acute on chronic respiratory failure with hypoxemia (HCC) POA: Unknown    Acute exacerbation of chronic obstructive pulmonary disease (COPD) (HCC) POA: Unknown    Paroxysmal atrial fibrillation (HCC) POA: Unknown    Gross hematuria POA: Unknown    Alzheimer's dementia without behavioral disturbance (HCC) POA: Yes    Rash POA: Yes    NSVT (nonsustained ventricular  tachycardia) (HCC) POA: Yes  Resolved Problems:    Lactic acidosis POA: Unknown    Elevated BUN POA: Unknown    Acute delirium POA: Unknown      FOLLOW UP  Future Appointments   Date Time Provider Department Center   1/30/2020 12:45 PM Kate Guzman P.A.-C. [facility] None     Randall Jama M.D.  [address]  [address]  [facility] [address]  [phone]      [facility]  unable to call the office to schedule your hospital follow up due to the weekend. Please call to schedule your appointment. Thank you.       MEDICATIONS ON DISCHARGE     Medication List      START taking these medications      Instructions   digoxin 125 MCG Tabs  Commonly known as:  LANOXIN   Take 1 Tab by mouth every day at 6 PM.  Dose:  125 mcg     DILTIAZem 120 MG Tabs  Commonly known as:  CARDIZEM   Take 1 Tab by mouth every 8 hours.  Dose:  120 mg     furosemide 20 MG Tabs  Start taking on:  January 15, 2020  Commonly known as:  LASIX   Take 1 Tab by mouth every day.  Dose:  20 mg     ketoconazole 2 % Crea  Commonly known as:  NIZORAL   Apply 1 Application to affected area(s) 2 Times a Day.  Dose:  1 Application     omeprazole 20 MG delayed-release capsule  Start taking on:  January 15, 2020  Commonly known as:  PRILOSEC   Take 1 Cap by mouth every day.  Dose:  20 mg     rivaroxaban 20 MG Tabs tablet  Commonly known as:  XARELTO   Take 1 Tab by mouth with dinner.  Dose:  20 mg     sodium chloride 0.65 % Soln  Commonly known as:  OCEAN   Spray 2 Sprays in nose every 2 hours as needed.  Dose:  2 Spray     traZODone 50 MG Tabs  Commonly known as:  DESYREL   Take 1 Tab by mouth every bedtime.  Dose:  50 mg        CONTINUE taking these medications      Instructions   CoQ-10 100 MG Caps   Take 100 mg by mouth every day.  Dose:  100 mg     lisinopril 2.5 MG Tabs  Commonly known as:  PRINIVIL   Take 2.5 mg by mouth 2 Times a Day.  Dose:  2.5 mg     simvastatin 10 MG Tabs  Commonly known as:  ZOCOR   Take 10 mg by mouth every evening.  Dose:   10 mg     VITAMIN C PO   Take 2,000 mg by mouth every day.  Dose:  2,000 mg     vitamin D 1000 UNIT Tabs  Commonly known as:  cholecalciferol   Take 1,000 Units by mouth 2 Times a Day.  Dose:  1,000 Units        STOP taking these medications    aspirin  MG Tbec  Commonly known as:  ECOTRIN            Allergies  No Known Allergies    DIET  Orders Placed This Encounter   Procedures   • Diet Order Regular     Standing Status:   Standing     Number of Occurrences:   1     Order Specific Question:   Diet:     Answer:   Regular [1]     Order Specific Question:   Texture/Fiber modifications:     Answer:   Dysphagia 3(Mechanical Soft)specify fluid consistency(question 6) [3]     Order Specific Question:   Consistency/Fluid modifications:     Answer:   Thin Liquids [3]     Order Specific Question:   Miscellaneous modifications:     Answer:   SLP - Deliver to Nursing Station [22]       ACTIVITY  As tolerated and directed by skilled nursing.  Weight bearing as tolerated    LINES, DRAINS, AND WOUNDS  This is an automated list. Peripheral IVs will be removed prior to discharge.                     MENTAL STATUS ON TRANSFER  Level of Consciousness: Confused  Orientation : Disoriented to Place, Disoriented to Event, Disoriented to Time  Speech: Speech Clear    CONSULTATIONS  Cardiology  pulmonary    PROCEDURES  Bronchoscopy  ETT    LABORATORY  Lab Results   Component Value Date    SODIUM 142 01/11/2020    POTASSIUM 3.7 01/11/2020    CHLORIDE 100 01/11/2020    CO2 37 (H) 01/11/2020    GLUCOSE 118 (H) 01/11/2020    BUN 11 01/11/2020    CREATININE 0.77 01/11/2020    CREATININE 1.0 03/26/2008        Lab Results   Component Value Date    WBC 11.8 (H) 01/12/2020    HEMOGLOBIN 12.8 (L) 01/12/2020    HEMATOCRIT 41.0 (L) 01/12/2020    PLATELETCT 293 01/12/2020        Total time of the discharge process exceeds 45 minutes.

## 2020-01-14 NOTE — PROGRESS NOTES
Assumed care of patient at 1900. Received bedside report from day RN. Patient resting comfortably in bed. No signs of distress. Bed is in low and locked position. Non-slip socks are in place. Call light is within reach. Bed alarm is on.

## 2020-01-14 NOTE — CARE PLAN
Problem: Safety  Goal: Will remain free from injury  Outcome: PROGRESSING AS EXPECTED  Note:   Patient educated on the importance of utilizing call light for assistance. Bed is in low position and locked. Patient is wearing non-slip socks. Bed alarm is on. Call light within reach.     Problem: Infection  Goal: Will remain free from infection  Outcome: PROGRESSING AS EXPECTED  Note:   Proper hand hygiene was performed before and after patient contact. Waste materials have been disposed of properly. Patient educated on the importance of washing hands to prevent spread of infection.

## 2020-01-22 PROBLEM — J96.22 ACUTE ON CHRONIC RESPIRATORY FAILURE WITH HYPOXIA AND HYPERCAPNIA (HCC): Status: ACTIVE | Noted: 2020-01-01

## 2020-01-22 PROBLEM — G93.40 ACUTE ENCEPHALOPATHY: Status: ACTIVE | Noted: 2020-01-01

## 2020-01-22 PROBLEM — Z90.2 S/P LOBECTOMY OF LUNG: Status: ACTIVE | Noted: 2020-01-01

## 2020-01-22 PROBLEM — J96.21 ACUTE ON CHRONIC RESPIRATORY FAILURE WITH HYPOXIA AND HYPERCAPNIA (HCC): Status: ACTIVE | Noted: 2020-01-01

## 2020-01-22 NOTE — ED NOTES
Pharmacy Medication Reconciliation      Medication reconciliation updated and complete per MAR:[facility] & [facility]  Allergies have been verified per MAR  Pt home pharmacy:[facility]    Per pt MAR pt is on an unknown course of AUGMENTIN that was started on 01/20/2020

## 2020-01-22 NOTE — ASSESSMENT & PLAN NOTE
Acute metabolic encephalopathy with underlying dementia  CT head negative  IV Haldol for acute agitation and may need sublingual morphine by Hospice

## 2020-01-22 NOTE — CONSULTS
Critical Care Consultation    Date of consult: 1/22/2020    Referring Physician  Hai Ralph M.D.    Reason for Consultation  COPD exacerbation     History of Presenting Illness  79 y.o. male with hx of COPD s/p JENNIFER lobectomy and recent pneumococcal pneumonia requiring hospitalization 12/29-1/14 with ICU admission and mechanical ventilation who now presented with AMS. Per EMS, pt was found hypoxic in 78% on NRB 15L/min.    At ED, pt was obtunded, pH 7.1 with pCO2 >100. Afebrile, HR in 60s, SBP 90s/50s. Lactate 1.0. Pt was subsequently intubated. 2.5L fluids were given. Ceftriaxone was given. Blood cultures obtained.     Code Status  Prior    Review of Systems  Review of Systems   Reason unable to perform ROS: intubated, sedated, RASS -4, unable to perform.       Past Medical History   has a past medical history of Backpain (occasional), Cancer (HCC) (6/2017), CATARACT, COPD, Coughing blood (7/5/17), High cholesterol, Hyperlipidemia, Hypertension, Indigestion, Myocardial infarct (HCC) (with stent), and Pneumonia.    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    Family History  family history is not on file.    Social History   reports that he quit smoking about 2 years ago. His smoking use included cigarettes. He has a 60.00 pack-year smoking history. He has never used smokeless tobacco. He reports current alcohol use. He reports that he does not use drugs.    Medications  Home Medications    **Home medications have not yet been reviewed for this encounter**       Current Facility-Administered Medications   Medication Dose Route Frequency Provider Last Rate Last Dose   • cefTRIAXone (ROCEPHIN) 2 g in  mL IVPB  2 g Intravenous Once Hai Ralph M.D.       • azithromycin (ZITHROMAX) injection 500 mg  500 mg Intravenous Once Hai Ralph M.D.       • propofol (DIPRIVAN) injection  0-80 mcg/kg/min  Intravenous Once Hai Ralph M.D.         Current Outpatient Medications   Medication Sig Dispense Refill   • digoxin (LANOXIN) 125 MCG Tab Take 1 Tab by mouth every day at 6 PM. 30 Tab    • DILTIAZem (CARDIZEM) 120 MG Tab Take 1 Tab by mouth every 8 hours. 90 Tab    • furosemide (LASIX) 20 MG Tab Take 1 Tab by mouth every day. 60 Tab    • ketoconazole (NIZORAL) 2 % Cream Apply 1 Application to affected area(s) 2 Times a Day. 1 Tube 0   • omeprazole (PRILOSEC) 20 MG delayed-release capsule Take 1 Cap by mouth every day. 30 Cap    • sodium chloride (OCEAN) 0.65 % Solution Spray 2 Sprays in nose every 2 hours as needed. 1 Bottle 3   • traZODone (DESYREL) 50 MG Tab Take 1 Tab by mouth every bedtime. 30 Tab 3   • rivaroxaban (XARELTO) 20 MG Tab tablet Take 1 Tab by mouth with dinner. 30 Tab 0   • lisinopril (PRINIVIL) 2.5 MG Tab Take 2.5 mg by mouth 2 Times a Day.     • Ascorbic Acid (VITAMIN C PO) Take 2,000 mg by mouth every day.     • Coenzyme Q10 (COQ-10) 100 MG Cap Take 100 mg by mouth every day.     • simvastatin (ZOCOR) 10 MG Tab Take 10 mg by mouth every evening.     • vitamin D (CHOLECALCIFEROL) 1000 UNIT Tab Take 1,000 Units by mouth 2 Times a Day.         Allergies  No Known Allergies    Vital Signs last 24 hours  Temp:  [36.8 °C (98.2 °F)] 36.8 °C (98.2 °F)  Pulse:  [67-81] 67  Resp:  [9-22] 18  BP: (107-127)/(37-52) 112/52  SpO2:  [72 %-100 %] 100 %    Physical Exam  Physical Exam  Vitals signs and nursing note reviewed.   Constitutional:       General: He is not in acute distress.     Appearance: He is not ill-appearing, toxic-appearing or diaphoretic.      Comments: Intubated, sedated, on propofol     HENT:      Head: Normocephalic and atraumatic.      Mouth/Throat:      Mouth: Mucous membranes are moist.      Comments: ET tube in place  Neck:      Musculoskeletal: Neck supple.   Cardiovascular:      Rate and Rhythm: Normal rate and regular rhythm.      Pulses: Normal pulses.      Heart sounds: Normal  heart sounds. No murmur.   Pulmonary:      Effort: Pulmonary effort is normal. No respiratory distress.      Breath sounds: Normal breath sounds. No wheezing, rhonchi or rales.   Abdominal:      General: Abdomen is flat. Bowel sounds are normal. There is no distension.      Palpations: Abdomen is soft. There is no mass.      Tenderness: There is no tenderness. There is no guarding.      Hernia: No hernia is present.   Musculoskeletal:         General: No swelling or tenderness.   Skin:     General: Skin is dry.      Capillary Refill: Capillary refill takes 2 to 3 seconds.      Coloration: Skin is not jaundiced.      Comments: + mottling bilaterally with left knee > right knee   Neurological:      Comments: Sedated. Not able to obtain         Fluids  No intake or output data in the 24 hours ending 01/22/20 0832    Laboratory  Recent Results (from the past 48 hour(s))   Lactic acid (lactate)    Collection Time: 01/22/20  8:15 AM   Result Value Ref Range    Lactic Acid 1.0 0.5 - 2.0 mmol/L       Imaging  DX-CHEST-PORTABLE (1 VIEW)   Final Result      1.  Grossly unchanged appearance of LEFT basilar atelectasis and possible superimposed airspace disease   2.  Small LEFT pleural effusion      CT-HEAD W/O    (Results Pending)   EC-ECHOCARDIOGRAM COMPLETE W/O CONT    (Results Pending)   DX-CHEST-PORTABLE (1 VIEW)    (Results Pending)       Assessment/Plan  Acute on chronic respiratory failure with hypoxia and hypercapnia (HCC)- (present on admission)  Assessment & Plan  Due to COPD exacerbation.   Bedside echo with mildly dilated RV, good RV function. IVC is full.   Continue full vent support, to keep sat>88%  Goal paO2 >55  ABG was reviewed, pt is chronically hypercapneic, rate was adjusted to 20     Acute exacerbation of chronic obstructive pulmonary disease (COPD) (HCC)- (present on admission)  Assessment & Plan  Intubated for hypoxic hypercapneic resp failure.   Sedation with propofol and fentanyl  Attempted to  decrease propofol and pt became dyssynchronized with vent.   Propofol was increased to 18mcg/kg/min  Start fentanyl gtt for pain control.  Antibiotics with ceftriaxone and azithromycin  Solumedrol 62.5 Q6H  Will need to bronch given persistent LLL pneumonia on CXR. Only minimal left pleural effusion on my bedside US, mostly consolidation.   Rule out influenza  Procalcitonin      S/P lobectomy of lung  Assessment & Plan  S/p JENNIFER lobectomy in 2017    UOP is marginally low. Will give 1L fluid bolus    Discussed patient condition and risk of morbidity and/or mortality with Hospitalist, RN, RT and Pharmacy.    The patient remains critically ill.  Critical care time = 90 minutes in directly providing and coordinating critical care and extensive data review.  No time overlap and excludes procedures.

## 2020-01-22 NOTE — ASSESSMENT & PLAN NOTE
Due to COPD exacerbation.   Bedside echo at admission with mildly dilated RV, good RV function. IVC is full.   Being diuresed with lasix 40IV Q8H  Bicarb is up to 40s, will decrease lasix to 40 IV daily and add diamox 500 BID   Would like to keep fluid balance negative in next 2-3 days.   Goal keep sat >88%, paO2 >55  Goal I/O negative 500-1000cc in next 24 hours

## 2020-01-22 NOTE — ED TRIAGE NOTES
Pt LAUREN schaffer from [facility] where they found him this morning obtunded. On EMS arrival pts o2 sat was 80% on his 4L NC. Pt unresponsive. Pt given narcan and placed on non-re breather. Pt then responsive to pain only. On arrival here pt obtunded. ERP to bedside on arrival and pt intubated. SHAN Alvarado placed. Pt with recent diagnosis of pneumonia.

## 2020-01-22 NOTE — ED NOTES
Lab from Lab called with critical result of Potassium=6.8/CO2=49 at 0948. Critical lab result read back to Lab.   Dr. Vela notified of critical lab result at 0950.  Critical lab result read back by Dr. Vela.

## 2020-01-22 NOTE — ASSESSMENT & PLAN NOTE
Intubated for hypoxic hypercapneic resp failure.   On Linezolid for MRSA pneumonia. Treat for 7 days.   Solumedrol 40 Q12H. Change to prednisone 40mg daily and will treat for total 10 days given his severe COPD  Resume symbicort. Add long acting anticholinergic Seebri  Duonebs prn

## 2020-01-22 NOTE — ED PROVIDER NOTES
ED Provider Note    Scribed for Hai Ralph M.D. by Sun Gonzalez. 2020, 8:12 AM.    Primary care provider: Randall Jama M.D.  Means of arrival: Ambulance  History obtained from: EMS  History limited by: Patient's altered level of consciousness     CHIEF COMPLAINT  Chief Complaint   Patient presents with   • ALOC       HPI  Humphrey Cano Jr. is a 79 y.o. male with a history of COPD and recent pneumonia who presents to the Emergency Department via ambulance for evaluation of altered level of consciousness. Per EMS, the patient was found hypoxic at 71% Oxygen saturation at his care home, only arousal by sternal rub. Patient is currently on antibiotics for pneumonia and has a history of COPD.. No other history was able to be obtained due to emergent nature and patient's condition.  Last month he was intubated for respiratory infection.  He is a full code.    HPI limited secondary to patient's altered level of consciousness       REVIEW OF SYSTEMS  Pertinent positives include shortness of breath, hypoxia, altered level of consciousness.   Pertinent negatives unable to obtain due to emergent nature.      ROS limited secondary to patient's altered level of consciousness       PAST MEDICAL HISTORY   has a past medical history of Backpain (occasional), Cancer (HCC) (2017), CATARACT, COPD, Coughing blood (17), High cholesterol, Hyperlipidemia, Hypertension, Indigestion, Myocardial infarct (HCC) (with stent), and Pneumonia.    SURGICAL HISTORY   has a past surgical history that includes other; other cardiac surgery; splenectomy (2010); and thoracoscopy (Left, 2017).    SOCIAL HISTORY  Social History     Tobacco Use   • Smoking status: Former Smoker     Packs/day: 1.00     Years: 60.00     Pack years: 60.00     Types: Cigarettes     Last attempt to quit: 2017     Years since quittin.5   • Smokeless tobacco: Never Used   • Tobacco comment: 1 pk a day   Substance and Sexual Activity    • Alcohol use: Yes     Comment: Rarely   • Drug use: No   • Sexual activity: Not on file       FAMILY HISTORY  Family History   Problem Relation Age of Onset   • Heart Disease Neg Hx        CURRENT MEDICATIONS  Reviewed.  See Encounter Summary.   Current Outpatient Medications on File Prior to Encounter   Medication Sig Dispense Refill   • albuterol 108 (90 Base) MCG/ACT Aero Soln inhalation aerosol Inhale 2 Puffs by mouth every 6 hours as needed for Shortness of Breath.     • amoxicillin-clavulanate (AUGMENTIN) 500-125 MG Tab Take 1 Tab by mouth every 8 hours.     • digoxin (LANOXIN) 125 MCG Tab Take 125 mcg by mouth every day.     • DILTIAZem (CARDIZEM) 120 MG Tab Take 120 mg by mouth every 8 hours.     • furosemide (LASIX) 20 MG Tab Take 20 mg by mouth every day.     • omeprazole (PRILOSEC) 20 MG delayed-release capsule Take 20 mg by mouth every day.     • budesonide-formoterol (SYMBICORT) 160-4.5 MCG/ACT Aerosol Inhale 2 Puffs by mouth 2 Times a Day.     • traZODone (DESYREL) 50 MG Tab Take 50 mg by mouth every evening.     • Ascorbic Acid (VITAMIN C) 1000 MG Tab Take 1,000 mg by mouth every day.     • rivaroxaban (XARELTO) 20 MG Tab tablet Take 20 mg by mouth every day.     • Tuberculin PPD (APLISOL) 5 UNIT/0.1ML Solution 0.1 mL by Intradermal route Once.     • ipratropium-albuterol (DUONEB) 0.5-2.5 (3) MG/3ML nebulizer solution 3 mL by Nebulization route every 6 hours as needed for Shortness of Breath.     • ipratropium-albuterol (DUONEB) 0.5-2.5 (3) MG/3ML nebulizer solution 3 mL by Nebulization route every 6 hours.     • ketoconazole (NIZORAL) 2 % Cream Apply 1 Application to affected area(s) 2 Times a Day. 1 Tube 0   • sodium chloride (OCEAN) 0.65 % Solution Spray 2 Sprays in nose every 2 hours as needed. 1 Bottle 3   • lisinopril (PRINIVIL) 2.5 MG Tab Take 2.5 mg by mouth 2 Times a Day.     • Coenzyme Q10 (COQ-10) 100 MG Cap Take 100 mg by mouth every day.     • simvastatin (ZOCOR) 10 MG Tab Take 10 mg  by mouth every evening.     • vitamin D (CHOLECALCIFEROL) 1000 UNIT Tab Take 1,000 Units by mouth 2 Times a Day.          ALLERGIES  No Known Allergies    PHYSICAL EXAM  VITAL SIGNS: BP (!) 127/37   Pulse 74   Temp 36.8 °C (98.2 °F) (Temporal)   Resp (!) 9   Ht 1.829 m (6')   Wt 74.8 kg (165 lb)   SpO2 90%   BMI 22.38 kg/m²   Constitutional: Well developed, Well nourished, high normal blood pressure, low diastolic blood pressure, afebrile, borderline hypoxia, on supplemental oxygen  HENT: Dry mucous membranes, Nose is normal in appearance without rhinorrhea, external ears are normal  Eyes: Anicteric,  pupils are equal round and reactive, there is no conjunctival drainage or pallor   Neck: The trachea is midline, there is no obvious mass or meningeal signs  Cardiovascular:  Normal perfusion.   regular rate and rhythm without murmurs gallops or rubs  Thorax & Lungs: Diminished breath sounds no wheezes or rales appreciated.  Abdomen: Abdomen is normal in appearance, no gross peritoneal signs  normal bowel sounds,  nonpulsatile  Musculoskeletal: No deformities noted in all 4 extremities. No edema present.   Skin: Skin is warm, visualized skin shows no erythema, no rash.  Neurologic:  Opened eyes to pain. Withdrew extremities to painful stimulis there is no focal abnormality noted.  Psychiatric: Normal mood and mentation.      Differentials:  pneumonia, respiratory failure, COPD exacerbation    RADIOLOGY  CT-HEAD W/O   Final Result      1. Cerebral atrophy.   2. White matter lucencies most consistent with small vessel ischemic change versus demyelination or gliosis.   3. Otherwise, Head CT without contrast with no acute findings. No evidence of acute cerebral infarction, hemorrhage or mass lesion.      DX-CHEST-PORTABLE (1 VIEW)   Final Result      1.  No evidence of pneumothorax following RIGHT neck catheter placement   2.  Grossly unchanged appearance of LEFT basilar atelectasis and possible superimposed  airspace disease   3.  Small LEFT pleural effusion      DX-CHEST-PORTABLE (1 VIEW)   Final Result      1.  Grossly unchanged appearance of LEFT basilar atelectasis and possible superimposed airspace disease   2.  Small LEFT pleural effusion      EC-ECHOCARDIOGRAM COMPLETE W/O CONT    (Results Pending)      The radiologist's interpretations of all radiological studies have been reviewed by me.              LABORATORY:  Results for orders placed or performed during the hospital encounter of 01/22/20   URINALYSIS   Result Value Ref Range    Color Yellow     Character Clear     Specific Gravity 1.017 <1.035    Ph 5.0 5.0 - 8.0    Glucose Negative Negative mg/dL    Ketones Negative Negative mg/dL    Protein 30 (A) Negative mg/dL    Bilirubin Negative Negative    Urobilinogen, Urine 0.2 Negative    Nitrite Negative Negative    Leukocyte Esterase Negative Negative    Occult Blood Negative Negative    Micro Urine Req Microscopic    Lactic acid (lactate)   Result Value Ref Range    Lactic Acid 1.0 0.5 - 2.0 mmol/L   CBC WITH DIFFERENTIAL   Result Value Ref Range    WBC 10.1 4.8 - 10.8 K/uL    RBC 4.41 (L) 4.70 - 6.10 M/uL    Hemoglobin 13.6 (L) 14.0 - 18.0 g/dL    Hematocrit 45.8 42.0 - 52.0 %    .9 (H) 81.4 - 97.8 fL    MCH 30.8 27.0 - 33.0 pg    MCHC 29.7 (L) 33.7 - 35.3 g/dL    RDW 59.5 (H) 35.9 - 50.0 fL    Platelet Count 202 164 - 446 K/uL    MPV 10.4 9.0 - 12.9 fL    Neutrophils-Polys 84.30 (H) 44.00 - 72.00 %    Lymphocytes 8.70 (L) 22.00 - 41.00 %    Monocytes 6.10 0.00 - 13.40 %    Eosinophils 0.00 0.00 - 6.90 %    Basophils 0.00 0.00 - 1.80 %    Nucleated RBC 0.00 /100 WBC    Neutrophils (Absolute) 8.51 (H) 1.82 - 7.42 K/uL    Lymphs (Absolute) 0.88 (L) 1.00 - 4.80 K/uL    Monos (Absolute) 0.62 0.00 - 0.85 K/uL    Eos (Absolute) 0.00 0.00 - 0.51 K/uL    Baso (Absolute) 0.00 0.00 - 0.12 K/uL    NRBC (Absolute) 0.00 K/uL   URINE MICROSCOPIC (W/UA)   Result Value Ref Range    WBC 0-2 (A) /hpf    RBC 0-2 (A)  /hpf    Epithelial Cells Few /hpf    Epithelial Cells Renal Negative /hpf    Mucous Threads Few /hpf    Amorphous Crystal Present /hpf   DIFFERENTIAL MANUAL   Result Value Ref Range    Metamyelocytes 0.90 %    Manual Diff Status PERFORMED    Influenza A/B By PCR (Adult - Flu Only)   Result Value Ref Range    Influenza virus A RNA Negative Negative    Influenza virus B, PCR Negative Negative   Lactic Acid -STAT Once   Result Value Ref Range    Lactic Acid 2.1 (H) 0.5 - 2.0 mmol/L   Prothrombin time (INR)   Result Value Ref Range    PT 14.3 12.0 - 14.6 sec    INR 1.08 0.87 - 1.13   Comp Metabolic Panel   Result Value Ref Range    Sodium 147 (H) 135 - 145 mmol/L    Potassium 5.8 (H) 3.6 - 5.5 mmol/L    Chloride 106 96 - 112 mmol/L    Co2 38 (H) 20 - 33 mmol/L    Anion Gap 3.0 0.0 - 11.9    Glucose 112 (H) 65 - 99 mg/dL    Bun 23 (H) 8 - 22 mg/dL    Creatinine 0.96 0.50 - 1.40 mg/dL    Calcium 8.2 (L) 8.5 - 10.5 mg/dL    AST(SGOT) 30 12 - 45 U/L    ALT(SGPT) 21 2 - 50 U/L    Alkaline Phosphatase 63 30 - 99 U/L    Total Bilirubin 0.4 0.1 - 1.5 mg/dL    Albumin 2.4 (L) 3.2 - 4.9 g/dL    Total Protein 4.6 (L) 6.0 - 8.2 g/dL    Globulin 2.2 1.9 - 3.5 g/dL    A-G Ratio 1.1 g/dL   MAGNESIUM   Result Value Ref Range    Magnesium 1.8 1.5 - 2.5 mg/dL   PHOSPHORUS   Result Value Ref Range    Phosphorus 3.9 2.5 - 4.5 mg/dL   APTT   Result Value Ref Range    APTT 31.9 24.7 - 36.0 sec   ISTAT ARTERIAL BLOOD GAS   Result Value Ref Range    Ph 6.918 (LL) 7.400 - 7.500    Pco2 15.4 (L) 26.0 - 37.0 mmHg    Po2 51 (L) 64 - 87 mmHg    Tco2 <10 (L) 20 - 33 mmol/L    S02 62 (L) 93 - 99 %    Hco3 3.1 (L) 17.0 - 25.0 mmol/L    BE -26 (L) -4 - 3 mmol/L    Body Temp 37.1 C degrees    O2 Therapy 100 %    iPF Ratio 51     Ph Temp Raj 6.917 (LL) 7.400 - 7.500    Pco2 Temp Co 15.4 (L) 26.0 - 37.0 mmHg    Po2 Temp Cor 52 (L) 64 - 87 mmHg    Specimen Arterial     Action Range Triggered YES     Inst. Qualified Patient YES       Lab results reviewed  by me.     INTERVENTIONS:Indications IV Fluid: Sepsis for which p.o. hydration and adequate  MEDICATIONS GIVEN IN THE E.D.  Medications   lactated ringers infusion (BOLUS) (has no administration in time range)   methylPREDNISolone sod succ (SOLU-MEDROL) 125 MG injection 125 mg (125 mg Intravenous Given 1/22/20 0824)   albuterol (PROVENTIL) 2.5mg/0.5ml nebulizer solution 2.5 mg (2.5 mg Nebulization Given 1/22/20 0829)   ipratropium (ATROVENT) 0.02 % nebulizer solution 0.5 mg (0.5 mg Nebulization Given 1/22/20 0829)   lactated ringers infusion (BOLUS) (0 mL Intravenous Stopped 1/22/20 0918)   cefTRIAXone (ROCEPHIN) 2 g in  mL IVPB (0 g Intravenous Stopped 1/22/20 0910)   propofol (DIPRIVAN) injection (18 mcg/kg/min × 74.8 kg Intravenous Rate Change 1/22/20 1035)   lactated ringers infusion (0 mL Intravenous Stopped 1/22/20 1042)     Sponsor: Improved hydration but hypotensive.  The intensivist requested to place the central line.    Endotracheal Intubation Procedure    Indication for endotracheal intubation: respiratory failure  Sedation: Etomidate 20 mg  Paralytic: Succinylcholine 100 mg  Adjuncts: none.  Preoxygenated: Yes with high flow oxygen.   Equipment:8-0 endotracheal tube.   Cricoid Pressure: Yes.  Number of attempts:1st attempt esophageal and removed, sats droped to 91%, intubatied 2nd 24 cm at teeth.  ETT location confirmed by:  Ascultation, calorimetric capnography  Adverse events: None.    Response:Improved oxygenation.    ED COURSE:  Nursing notes and vital signs were reviewed. (See chart for details)  The patient's medical  records were reviewed, history was obtained from the patient which show the patient was taking Rocephin January and was diagnosed with strep pneumonia by blood culture during his last admission. ;      Obtained and reviewed past medical records from 12/24/19 which indicated admission and was transfused one unit. Patient was diagnosed with diverticulitis and polyps were  removed.    8:12 AM Patient seen and examined at bedside. The patient presents with altered level of consciousness prior to arrival. Intubation procedure done at this time. See procedure note above for further details.    Ordered for DX chest, lactic acid, Blood culture x2, UA, Urine culture, CBC with diff, CMP to evaluate. Patient will be treated with atrovent 0.5 mg, albuterol 2.5 mg, solu medrol 125 mg, azithromycin 500 mg, rocephin 2 g in  mL IVPB, for his symptoms.  Differential Diagnoses includes, but are not limited to: Pneumonia, COPD, sepsis, respiratory failure .     8:15 AM - The patient will be treated with lactaed ringer infusion bolus, propofol injection    8:28 AM - I discussed the patient's case and the above findings with Dr. Vela ([facility]) who will evaluate the patient for hospitalization and recommends social work consult.     8:30 AM Pulmonary called.     9:10 AM - Patient was reevaluated at bedside.     9:50 AM Patient was reevaluated at bedside. Intensivist at bedside and will do central line procedure.    HYDRATION: Based on the patient's presentation of Sepsis the patient was given IV fluids. IV Hydration was used because oral hydration was not adequate alone. Upon recheck following hydration, the patient was mildly improved.    CRITICAL CARE  The very real possibilty of a deterioration of this patient's condition required the highest level of my preparedness for sudden, emergent intervention.  I provided critical care services, which included medication orders, frequent reevaluations of the patient's condition and response to treatment, ordering and reviewing test results, and discussing the case with various consultants.  The critical care time associated with the care of the patient was 34 minutes. Review chart for interventions. This time is exclusive of any other billable procedures.      MEDICAL DECISION MAKING:  Critically ill patient presents with left sided pneumonia and  COPD exacerbation with hypoxic and hypercapnic respiratory failure.  There is no evidence of stroke.  This is unlikely to be pulmonary edema.  PE is unlikely.  He does have sepsis with shock.    PLAN:  Admission to ICU for ventilator support, pressors, bronchodilators, antibiotics, steroids    DISPOSITION:  Patient will be hospitalized by Dr. Urrutia in critical condition.     CONDITION: guarded.     FINAL IMPRESSION  1. Pneumonia due to infectious organism, unspecified laterality, unspecified part of lung    2. Acute on chronic respiratory failure with hypoxia and hypercapnia (HCC)    3. Chronic obstructive pulmonary disease with acute exacerbation (HCC)     4.     Intubation procedure performed by ERP.   5.     The critical care time associated with the care of the patient was 34 minutes (not including intubation procedure)     Sun WARNER (Scribe), am scribing for, and in the presence of, Hai Ralph M.D..    Electronically signed by: Sun Gonzalez (Scribe), 1/22/2020    Hai WARNER M.D. personally performed the services described in this documentation, as scribed by Sun Gonzalez in my presence, and it is both accurate and complete. C    The note accurately reflects work and decisions made by me.  Hai Ralph M.D.  1/22/2020  2:07 PM

## 2020-01-22 NOTE — CARE PLAN
Vent Day # 1     Ventilator settings changed this shift: Per MD     Weaning trials: Held, Vent day 1    Respiratory Procedures: Bronchoscopy pending.     Plan: Continue current ventilator settings and wean mechanical ventilation as tolerated per physician orders.

## 2020-01-22 NOTE — ASSESSMENT & PLAN NOTE
Severe, requiring mechanical ventilation on admission  Patient with severe COPD, previously oxygen dependent with nocturnal oxygen  The patient now extubated, treated for MRSA pneumonia,  Ongoing oxygen requirements

## 2020-01-22 NOTE — H&P
Hospital Medicine History & Physical Note    Date of Service  1/22/2020    Primary Care Physician  Randall Jama M.D.    Consultants  Pulmonology / Critical care     Code Status  FULL CODE     Chief Complaint  SOB . AMS    History of Presenting Illness  79 y.o. male who presented 1/22/2020 with SOB / AMS    Patient has underlying history of COPD, chronic hypoxemic respiratory failure, dyslipidemia, hypertension, coronary artery disease, paroxysmal atrial fibrillation anticoagulated with Xarelto.  Recent hospitalization in the end of December through 14 January 2020, when he was admitted with shortness of breath/A. fib with RVR requiring ICU stay/mechanical ventilation.  Subsequently discharged to skilled nursing facility.    Patient brought to the emergency department today from skilled nursing facility because of shortness of breath, hypoxemia and patient found obtunded.  EMS was called, patient received Narcan and was placed on nonrebreather mask.  Patient minimally responsive, poor Valery Coma Scale, upon arrival to the emergency department emergently intubated.  Noted to have wheezing, poor mental status and only awakening to sternal rub, received empiric antibiotics and hospital medicine was consulted.    Upon evaluation of this patient in the emergency department, minimal troponin history of presenting illness from this patient.  Patient is on mechanical ventilation, sedated.  Discussed with emergency room nursing staff, patient brought in from the skilled nursing facility.  Try to call patient spouse, family but no response on the phone numbers in the demographics list.    Case discussed at bedside with critical care team, Dr. Kumar.     Minimal hypotension, uncertain if propofol related versus severe sepsis.  Receiving IV fluid hydration, may need initiation of vasopressors.    Review of Systems  Review of Systems   Unable to perform ROS: Medical condition       Past Medical History   has a past  medical history of Backpain (occasional), Cancer (HCC) (2017), CATARACT, COPD, Coughing blood (17), High cholesterol, Hyperlipidemia, Hypertension, Indigestion, Myocardial infarct (HCC) (with stent), and Pneumonia.    Surgical History   has a past surgical history that includes other; other cardiac surgery; splenectomy (2010); and thoracoscopy (Left, 2017).     Family History  Unknown, unable to obtain from the patient given his critical illness, upon review of EMR this is negative    Social History   reports that he quit smoking about 2 years ago. His smoking use included cigarettes. He has a 60.00 pack-year smoking history. He has never used smokeless tobacco. He reports current alcohol use. He reports that he does not use drugs.    Allergies  No Known Allergies    Medications  Prior to Admission Medications   Prescriptions Last Dose Informant Patient Reported? Taking?   Ascorbic Acid (VITAMIN C) 1000 MG Tab 2020 at 0716 MAR from Other Facility Yes Yes   Sig: Take 1,000 mg by mouth every day.   Coenzyme Q10 (COQ-10) 100 MG Cap 2020 at 0716 MAR from Other Facility Yes No   Sig: Take 100 mg by mouth every day.   DILTIAZem (CARDIZEM) 120 MG Tab 2020 at 2114 MAR from Other Facility Yes Yes   Sig: Take 120 mg by mouth every 8 hours.   Tuberculin PPD (APLISOL) 5 UNIT/0.1ML Solution 2020 at 1055 MAR from Other Facility Yes Yes   Si.1 mL by Intradermal route Once.   albuterol 108 (90 Base) MCG/ACT Aero Soln inhalation aerosol 2020 at 2230 MAR from Other Facility Yes Yes   Sig: Inhale 2 Puffs by mouth every 6 hours as needed for Shortness of Breath.   amoxicillin-clavulanate (AUGMENTIN) 500-125 MG Tab 2020 at 2114 MAR from Other Facility Yes Yes   Sig: Take 1 Tab by mouth every 8 hours.   budesonide-formoterol (SYMBICORT) 160-4.5 MCG/ACT Aerosol 2020 at 2052 MAR from Other Facility Yes Yes   Sig: Inhale 2 Puffs by mouth 2 Times a Day.   digoxin (LANOXIN) 125 MCG Tab  1/21/2020 at 1737 MAR from Other Facility Yes Yes   Sig: Take 125 mcg by mouth every day.   furosemide (LASIX) 20 MG Tab 1/21/2020 at 0716 MAR from Other Facility Yes Yes   Sig: Take 20 mg by mouth every day.   ipratropium-albuterol (DUONEB) 0.5-2.5 (3) MG/3ML nebulizer solution 1/19/2020 at 1752 MAR from Other Facility Yes Yes   Sig: 3 mL by Nebulization route every 6 hours as needed for Shortness of Breath.   ipratropium-albuterol (DUONEB) 0.5-2.5 (3) MG/3ML nebulizer solution 1/21/2020 at 1243 MAR from Other Facility Yes Yes   Sig: 3 mL by Nebulization route every 6 hours.   ketoconazole (NIZORAL) 2 % Cream 1/21/2020 at 2052 MAR from Other Facility No No   Sig: Apply 1 Application to affected area(s) 2 Times a Day.   lisinopril (PRINIVIL) 2.5 MG Tab 1/21/2020 at 2052 MAR from Other Facility Yes No   Sig: Take 2.5 mg by mouth 2 Times a Day.   omeprazole (PRILOSEC) 20 MG delayed-release capsule 1/21/2020 at 0716 MAR from Other Facility Yes Yes   Sig: Take 20 mg by mouth every day.   rivaroxaban (XARELTO) 20 MG Tab tablet 1/21/2020 at 1737 MAR from Other Facility Yes Yes   Sig: Take 20 mg by mouth every day.   simvastatin (ZOCOR) 10 MG Tab 1/21/2020 at 2052 MAR from Other Facility Yes No   Sig: Take 10 mg by mouth every evening.   sodium chloride (OCEAN) 0.65 % Solution PRN at PRN MAR from Other Facility No No   Sig: Spray 2 Sprays in nose every 2 hours as needed.   traZODone (DESYREL) 50 MG Tab 1/20/2020 at 2052 MAR from Other Facility Yes Yes   Sig: Take 50 mg by mouth every evening.   vitamin D (CHOLECALCIFEROL) 1000 UNIT Tab 1/21/2020 at 2052 MAR from Other Facility Yes No   Sig: Take 1,000 Units by mouth 2 Times a Day.      Facility-Administered Medications: None       Physical Exam  Temp:  [36.8 °C (98.2 °F)] 36.8 °C (98.2 °F)  Pulse:  [62-81] 64  Resp:  [9-24] 18  BP: ()/(30-55) 91/55  SpO2:  [72 %-100 %] 100 %    Physical Exam  Constitutional:       Appearance: He is ill-appearing.   HENT:      Head:  Normocephalic.      Right Ear: External ear normal.      Left Ear: External ear normal.      Nose: Nose normal.      Mouth/Throat:      Mouth: Mucous membranes are moist.      Comments: Intubated   Eyes:      General: No scleral icterus.     Conjunctiva/sclera: Conjunctivae normal.      Pupils: Pupils are equal, round, and reactive to light.   Neck:      Musculoskeletal: Normal range of motion and neck supple.   Cardiovascular:      Rate and Rhythm: Regular rhythm. Tachycardia present.      Pulses: Normal pulses.      Heart sounds: Normal heart sounds. No murmur. No friction rub. No gallop.    Pulmonary:      Effort: Pulmonary effort is normal. No respiratory distress.      Breath sounds: No stridor. Wheezing, rhonchi and rales present.      Comments: Intubated  Abdominal:      General: Abdomen is flat. Bowel sounds are normal. There is no distension.      Palpations: There is no mass.      Tenderness: There is no tenderness. There is no right CVA tenderness, left CVA tenderness, guarding or rebound.      Hernia: No hernia is present.   Musculoskeletal: Normal range of motion.      Left lower leg: Edema present.   Skin:     General: Skin is warm and dry.      Capillary Refill: Capillary refill takes more than 3 seconds.      Findings: No erythema.      Comments: Slightly mottled, poor capillary refill   Neurological:      Mental Status: He is alert.      Comments: Sedated on mechanical ventilation   Psychiatric:      Comments: Sedated on mechanical ventilation         Laboratory:  Recent Labs     01/22/20  0815   WBC 10.1   RBC 4.41*   HEMOGLOBIN 13.6*   HEMATOCRIT 45.8   .9*   MCH 30.8   MCHC 29.7*   RDW 59.5*   PLATELETCT 202   MPV 10.4         No results for input(s): ALTSGPT, ASTSGOT, ALKPHOSPHAT, TBILIRUBIN, DBILIRUBIN, GAMMAGT, AMYLASE, LIPASE, ALB, PREALBUMIN, GLUCOSE in the last 72 hours.      No results for input(s): NTPROBNP in the last 72 hours.      No results for input(s): TROPONINT in the last  72 hours.    Urinalysis:    Recent Labs     01/22/20  0822   SPECGRAVITY 1.017   GLUCOSEUR Negative   KETONES Negative   NITRITE Negative   LEUKESTERAS Negative   WBCURINE 0-2*   RBCURINE 0-2*   EPITHELCELL Few        Imaging:  DX-CHEST-PORTABLE (1 VIEW)   Final Result      1.  Grossly unchanged appearance of LEFT basilar atelectasis and possible superimposed airspace disease   2.  Small LEFT pleural effusion      CT-HEAD W/O    (Results Pending)   EC-ECHOCARDIOGRAM COMPLETE W/O CONT    (Results Pending)         Assessment/Plan:  I anticipate this patient will require at least two midnights for appropriate medical management, necessitating inpatient admission.    Acute on chronic respiratory failure with hypoxia and hypercapnia (HCC)- (present on admission)  Assessment & Plan  Severe, requiring mechanical ventilation  Patient will be admitted in critical condition to the ICU  Suspect related to COPD exacerbation  We will initiate aggressive management of COPD exacerbation  Unable to rule out infectious concerns, empiric antibiotics at this time  De-escalate antibiotics as clinically appropriate  Minimal concerns for venous thromboembolic disease while on therapeutic anticoagulation  We will obtain echocardiogram to rule out cardiac issues    Pulmonology/critical care consulted, defer further recommendations to pulmonology/critical care team    Hypotension,?  Sedation related to hypotension.  Uncertain, sepsis protocol for monitoring.    Acute encephalopathy- (present on admission)  Assessment & Plan  Noted at skilled nursing facility  Likely CO2 narcosis  Continue close clinical monitoring  On anticoagulation, will obtain CT head to rule out intracranial hemorrhage    Acute exacerbation of chronic obstructive pulmonary disease (COPD) (HCC)- (present on admission)  Assessment & Plan  Patient initiated on IV steroids  Empiric antibiotic therapy  Aggressive bronchodilator therapy  Intubated at this time  Poor  prognosis, recommend palliative team consultation  Pulmonology/critical care team evaluating    Alzheimer's dementia without behavioral disturbance (HCC)- (present on admission)  Assessment & Plan  History of, at risk of ICU delirium    Paroxysmal atrial fibrillation (HCC)- (present on admission)  Assessment & Plan  Continue Xarelto, digoxin  Holding diltiazem, resume as clinically appropriate    Lung cancer (HCC)- (present on admission)  Assessment & Plan  History left-sided lobectomy  Maintain outpatient follow-up    Dyslipidemia- (present on admission)  Assessment & Plan  Continue statins    Essential hypertension- (present on admission)  Assessment & Plan  Holding antihypertensive therapy, resume as clinically appropriate      VTE prophylaxis: Xarelto

## 2020-01-22 NOTE — FLOWSHEET NOTE
20 08   Vent Clock   Vent Initiated Yes   Ventilator Management Group   Intensivist Group Yes   Events/Summary/Plan   Events/Summary/Plan Pt intubated with x2 attempts with 8.0 @ 25 pulled back to achieve bilateral equal breath sounds. fog in tubing and color change placed on the follow settings   General Vent Information   #Ventilator Subsequent Day Yes   Ventilator Red Plug Ventilator Plugged into Red [employer]   Vent Temp HME Yes   Resuscitation Bag Resuscitation Bag and Mask at Bedside and Checked   Vent Alarms   Ventilation Alarms Reviewed / Activated Yes   Upper Pressure Limit (HI PIP) Alarm 50   Low VE (LPM) Alarm 4   High Respiratory Rate Alarm 40   Low Respiratory Rate Alarm 8   Low VT Alarm 150   Apnea Parameters Checked / Activated Yes   % Leak Alarm off   Cano Vent   Cano Vent Yes   Cano Vent Mode (S)CMV   Cano Conventional Settings   Rate (breaths/min) 18   Vt Target (mL) 490   TI (Seconds) 0.8   PEEP/CPAP 8   Pramp 50   FiO2 100   Sensitivity Setting Flow Trigger   Other Settings 5   Cano Measured Parameters   P Peak (PIP) 35    Minute Volume 8.2   Control VTE (exp VT) 462   f Total (Breaths/Min) 18   I:E Ratio 1:2.8   P MEAN 13   PEEP/CPAP MONITORED 8   Static Compliance (ml / cm H2O) 37.6   R exp 12   Respiratory WDL   Respiratory (WDL) X   Chest Exam   Work Of Breathing / Effort Vented   Breath Sounds   Pre/Post Intervention Pre Intervention Assessment   RUL Breath Sounds Clear   RML Breath Sounds Diminished   RLL Breath Sounds Diminished   JENNIFER Breath Sounds Clear   LLL Breath Sounds Diminished   Secretions   Cough Productive   How Sputum Obtained Endotracheal;Oropharyngeal;Suction   Sputum Amount Moderate   Sputum Color White;Yellow   Sputum Consistency Thin;Thick   Suction Frequency Suctioned Once or Twice Per Encounter   Airway ETT 8.0   Placement Date/Time: 20   Airway Placement: Central  Airway Type: ETT  Style: Cuffed  Airway Size: 8.0   Inserted In: ER   Site Assessment Intact   Airway Tube Secured Applied;Commercial securing device   Date Securing Device changed? 01/22/20   Date Securing Device to be changed (Q 7 days) 01/29/20   Secured At  (cm) 23   Difficult Intubation? No   Tube Repositioned Center   Cuffless No   Suctioning Device Inline Catheter Replaced   Next Closed Suction Device Change Due  01/29/20

## 2020-01-23 NOTE — RESPIRATORY CARE
Adult Ventilation Update    Total Vent Days: 2    Patient Lines/Drains/Airways Status    Active Airway     Name: Placement date: Placement time: Site: Days:    Airway ETT 8.0  01/22/20 0820   no documentation  2              Static Compliance (ml / cm H2O): 47 (01/23/20 0125)    Sputum/Suction   Cough: Weak(assisted) (01/23/20 0000)  Sputum Amount: Scant (01/23/20 0125)  Sputum Color: Clear;White (01/23/20 0125)  Sputum Consistency: Thin (01/23/20 0125)    Mobility  Level of Mobility: Level I(sedated) (01/22/20 1230)  Activity Performed: Unable to mobilize (01/22/20 2000)  Assistance: Assistance of Two or More (01/22/20 1230)  Ambulation Tolerance: Tolerates Well (01/22/20 1230)  Pt Calls for Assistance: No (01/22/20 1230)  Gait: Unable to Ambulate (01/23/20 0000)  Reason Not Mobilized: Bed rest (01/22/20 2000)

## 2020-01-23 NOTE — PROCEDURES
BRONCHOSCOPY PROCEDURE NOTE      Date: 1/22/2020  Time: 6:10 PM    Time out: performed. Name, MRN, allergy and procedure were confirmed.     Indication: acute hypoxic resp failure    Consent: Informed consent obtained from patient or designated decision maker after explaining the benefits/risks of the procedure including but not limited to bleeding, infection, airway trauma or loss thereof, pneumothorax/hemothorax, arrythmia, or death. Patient or surrogate expressed understanding and agreement and signed consent which can be found in the patient's chart.    Procedure: Bronchoscopy with bronchoalveolar lavage and therapeutic aspiration of secretions    Sedation: propofol total 90mg, fentanyl total 50mcg, 1% lidocaine    Findings:  Respiratory therapy and nursing at bedside throughout procedure. Patient provided sedation and analgesia throughout the procedure. Placed on full ventilator support with an FiO2 of 100% during procedure. Using a fiberoptic bronchoscope, trachea entered via ET tube.  5 mL of local anesthetic sprayed at the shyann and LC 2 (1% lidocaine) achieving appropriate comfort level for patient. Airways visualized directly and careful inspection of airway was accomplished.     Upon enter, noted purulent secretions in the left main stem extending to the left lower lobe. This was suctioned and BAL was done in LLL with good cloudy purulent secretions. This was sent to micro. Some purulent secretions in the lingula. JENNIFER was absent s/p lobectomy in 2017. Right main stem, RUL, RML and RLL segmental and subsegmental areas were explored. Minimal thin secretions in RUL and RLL, otherwise no purulent secretions. No endobronchial lesions noted.      All secretions were suctioned and cleared.     Specimen sent to microbiology/pathology: cell count, routine gs/cx, fungal/AFB smear/culture    Complications:   Patient tolerated procedure well without any difficulties and left in care of bedside nurse/RT.      Estimated blood loss: none    For the above procedure I also performed the conscious sedation and was directly involved with administration of medication, monitoring airway, vital signs, preventing complications.  Administered propofol total 90mg and fentanly 50mcg in titration fashion until achieving a level of moderate procedural sedation.      Patient tolerated procedure well without complications from sedation and was left in the care of his bedside nurse and respiratory therapy. Procedural sedation time equals 15 minutes which was separate from procedure time as described above.  Start time: 1745  Stop time: 1800      Nick Kumar D.O.  Critical Care Medicine

## 2020-01-23 NOTE — PALLIATIVE CARE
Spiritual Care Note    Patient Information     Patient's Name: Humphrey Cano Jr.   MRN: [id number]    YOB: 1940   Age and Gender: 79 y.o. male   Unit: [facility]   Room (and Bed): [facility]   Ethnicity or Nationality: white   Primary Language: English   Mandaen/Spiritual Preference: Taoist   Place of Residence: [place]   Family Present: spouse, Daiana   Medical Diagnoses: acute on chronic respiratory failure          w/ hypoxia and hypercapnia   acute encephalopathy  acute exacerbation of COPD  pneumonia of left lower lobe due to MRSA  s/p  lobectomy of lung  Alzheimer's dementia w/o  behavioral disturbance  paroxysmal atrial fibrillation   lung cancer   dyslipidemia   Code Status: Full Code    Date of Admission: 1/22/2020   Length of Stay: 1 day        Spiritual Care Provider Information  Title of Spiritual Care Provider:    Name of Spiritual Care Provider:   DANG Almonte  Phone Number:     [phone]   E-Mail:       [email or web address]  Total Time:      15 minutes      Spiritual Screen Results  Is your spiritual health or inner well-being important to you as you cope with your medical condition?   No  Would you like to receive a visit from our Spiritual Care team or your own Yazdanism or spiritual leader?   Yes    Palliative Care Comment:   Wife would like a visit for herself.   aware.      Encounter/Request Information  Visited With:      Spouse  Nature of the Visit:     Initial, On Shift  Continue Visiting:     Yes  Crisis Visit:      Critical Care  Referral From/Origin of Request:   Epic Order (Palliative Care)      Religous Needs/Values  Mandaen Needs:     Prayer      Spiritual Assessment   Observations/Symptoms:    Patient lying bed, altered mental state.  Family at bedside.)  Interacton/Conversation:    Visited with spouse.  She talked about his medical condition.  She asked me to pray.  Assessment:      Need  Need:       Seeking Spiritual Assistance and  Support  Interventions:      Compassionate Presence  Reflective Listening  Emotional Support  Prayer  Outcomes:      Connectedness with God, Progress with Trust, Spiritual Comfort  Plan:       Continue Visiting

## 2020-01-23 NOTE — CARE PLAN
Problem: Skin Integrity  Goal: Risk for impaired skin integrity will decrease  Outcome: PROGRESSING AS EXPECTED  Intervention: Implement precautions to protect skin integrity in collaboration with the interdisciplinary team  Note:   Implement q2hr turns with pillows for repositioning to prevent skin breakdown.      Problem: Pain Management  Goal: Pain level will decrease to patient's comfort goal  Outcome: PROGRESSING AS EXPECTED  Intervention: Follow pain managment plan developed in collaboration with patient and Interdisciplinary Team  Note:   Implement pain management interventions including utilizing CPOT assessment and medicate per MAR.

## 2020-01-23 NOTE — PROGRESS NOTES
Critical Care Progress Note    Date of admission  1/22/2020    Chief Complaint  Acute resp failure    Hospital Course    79 y.o. male with hx of COPD s/p JENNIFER lobectomy and recent pneumococcal pneumonia requiring hospitalization 12/29-1/14 with ICU admission and mechanical ventilation who now presented with AMS. Per EMS, pt was found hypoxic in 78% on NRB 15L/min.     At ED, pt was obtunded, pH 7.1 with pCO2 >100. Afebrile, HR in 60s, SBP 90s/50s. Lactate 1.0. Pt was subsequently intubated. 2.5L fluids were given. Ceftriaxone was given. Blood cultures obtained.      Interval Problem Update  Reviewed last 24 hour events:  Remains crtiically ill  Went into afib with RVR, phenylephrine gtt wa started for 3 hours. Off this am. .   Afebrile Tm 37.1C  HR in 90s, SBP in 110-120s.   On full vent support with AC VC+, FiO2 40%, PEEP 8, paO2 95, sat >92%  Sodium slighlty high at 146  Creatinine 0.7. UOP was low and bolused 3L fluid boluses. UOP in 20-40cc/hr. Oliguric  Lactic acid rising to 4.2  Procal is 0.45    Review of Systems  Review of Systems   Reason unable to perform ROS: intubated, sedated, RASS -4, unable to perform.        Vital Signs for last 24 hours   Temp:  [36.8 °C (98.2 °F)] 36.8 °C (98.2 °F)  Pulse:  [] 93  Resp:  [0-24] 0  BP: ()/(30-75) 119/69  SpO2:  [72 %-100 %] 97 %    Hemodynamic parameters for last 24 hours       Respiratory Information for the last 24 hours  Cano Vent Mode: APVCMV  Rate (breaths/min): 20  Vt Target (mL): 460  PEEP/CPAP: 8  FiO2: 40  P MEAN: 12  Control VTE (exp VT): 455    Physical Exam   Physical Exam  Vitals signs and nursing note reviewed.   Constitutional:       General: He is not in acute distress.     Appearance: He is not ill-appearing, toxic-appearing or diaphoretic.   HENT:      Head: Normocephalic and atraumatic.      Mouth/Throat:      Mouth: Mucous membranes are moist.      Comments: ET tube in place  Neck:      Musculoskeletal: Neck supple.    Cardiovascular:      Rate and Rhythm: Normal rate and regular rhythm.      Pulses: Normal pulses.      Heart sounds: Normal heart sounds. No murmur.   Pulmonary:      Effort: Pulmonary effort is normal. No respiratory distress.      Breath sounds: Normal breath sounds. No wheezing, rhonchi or rales.      Comments: Diminished at bases  Abdominal:      General: Bowel sounds are normal. There is no distension.      Palpations: Abdomen is soft. There is no mass.      Tenderness: There is no tenderness.      Hernia: No hernia is present.   Musculoskeletal:         General: No swelling or tenderness.   Skin:     General: Skin is warm and dry.      Capillary Refill: Capillary refill takes less than 2 seconds.      Coloration: Skin is not jaundiced.   Neurological:      General: No focal deficit present.      Mental Status: He is alert and oriented to person, place, and time.      Cranial Nerves: No cranial nerve deficit.   Psychiatric:         Mood and Affect: Mood normal.         Behavior: Behavior normal.         Medications  Current Facility-Administered Medications   Medication Dose Route Frequency Provider Last Rate Last Dose   • phenylephrine (FREDO-SYNEPHRINE) 40 mg in  mL Infusion  0-300 mcg/min Intravenous Continuous Yoshi Rojas M.D. 18.8 mL/hr at 01/23/20 0152 50 mcg/min at 01/23/20 0152   • lactated ringers infusion (BOLUS)  1,000 mL Intravenous Once PRN Franci Vela M.D.       • famotidine (PEPCID) injection 20 mg  20 mg Intravenous BID Franci Vela M.D.   20 mg at 01/23/20 0456   • methylPREDNISolone sod succ (SOLU-MEDROL) 125 MG injection 62.5 mg  62.5 mg Intravenous Q6HRS Franci Vela M.D.   62.5 mg at 01/23/20 0456   • ipratropium-albuterol (DUONEB) nebulizer solution  3 mL Nebulization Q4HRS (RT) Franci Vela M.D.   Stopped at 01/23/20 0125   • ipratropium-albuterol (DUONEB) nebulizer solution  3 mL Nebulization Q4H PRN (RT) Franci Vela M.D.       • senna-docusate (PERICOLACE or SENOKOT S)  8.6-50 MG per tablet 2 Tab  2 Tab Enteral Tube BID Franci Vela M.D.   2 Tab at 01/23/20 0456    And   • polyethylene glycol/lytes (MIRALAX) PACKET 1 Packet  1 Packet Enteral Tube QDAY PRN Franci Vela M.D.        And   • magnesium hydroxide (MILK OF MAGNESIA) suspension 30 mL  30 mL Enteral Tube QDAY PRN Franci Vela M.D.        And   • bisacodyl (DULCOLAX) suppository 10 mg  10 mg Rectal QDAY PRN Franci Vela M.D.       • Respiratory Care per Protocol   Nebulization Continuous RT Franci Vela M.D.       • acetaminophen (TYLENOL) tablet 650 mg  650 mg Enteral Tube Q6HRS PRN Franci Vela M.D.       • enalaprilat (VASOTEC) injection 1.25 mg  1.25 mg Intravenous Q6HRS PRN Franci Vela M.D.       • ondansetron (ZOFRAN) syringe/vial injection 4 mg  4 mg Intravenous Q4HRS PRN Franci Vela M.D.       • norepinephrine (LEVOPHED) 8 mg in  mL Infusion  0-30 mcg/min Intravenous Continuous Nick Kumar, D.O.   Stopped at 01/22/20 1100   • cefTRIAXone (ROCEPHIN) 2 g in  mL IVPB  2 g Intravenous Q24HRS Nick Kumar, D.O.   Stopped at 01/23/20 0526   • azithromycin (ZITHROMAX) 500 mg in D5W 250 mL IVPB  500 mg Intravenous Q24HRS Nick Kumar, D.O.   Stopped at 01/23/20 0616   • fentaNYL (SUBLIMAZE) 50 mcg/mL in 50mL (Continuous Infusion)   Intravenous Continuous Nick Kumar D.O. 2 mL/hr at 01/22/20 1558 100 mcg/hr at 01/22/20 1558   • propofol (DIPRIVAN) injection  0-80 mcg/kg/min Intravenous Continuous Nick Kumar, D.O. 4.5 mL/hr at 01/22/20 2058 10 mcg/kg/min at 01/22/20 2058   • digoxin (LANOXIN) tablet 125 mcg  125 mcg Enteral Tube DAILY AT 1800 Nick Kumar D.O.   125 mcg at 01/22/20 1819   • rivaroxaban (XARELTO) tablet 20 mg  20 mg Enteral Tube DAILY AT 1800 Nick Kumar D.O.   20 mg at 01/22/20 1810   • simvastatin (ZOCOR) tablet 10 mg  10 mg Enteral Tube Nightly Nick Kumra D.O.   10 mg at 01/22/20 2001   • ondansetron (ZOFRAN ODT) dispertab 4 mg  4 mg Enteral Tube Q4HRS PRN Nick Kumar D.O.       • lidocaine  (XYLOCAINE) 1 % injection 0.5 mL  0.5 mL Intradermal Once PRN Nick Kumar D.O.           Fluids    Intake/Output Summary (Last 24 hours) at 1/23/2020 0615  Last data filed at 1/23/2020 0200  Gross per 24 hour   Intake 3226.96 ml   Output 405 ml   Net 2821.96 ml       Laboratory  Recent Labs     01/22/20  0857 01/22/20  0943 01/23/20  0341   ISTATAPH 6.918* 7.310* 7.418   ISTATAPCO2 15.4* 79.2* 50.6*   ISTATAPO2 51* 83 76   ISTATATCO2 <10* 42* 34*   IVRJAEP1UQE 62* 94 95   ISTATARTHCO3 3.1* 39.9* 32.7*   ISTATARTBE -26* 11* 7*   ISTATTEMP 37.1 C 37.1 C 36.9 C   ISTATFIO2 100 100 40   ISTATSPEC Arterial Arterial Arterial   ISTATAPHTC 6.917* 7.309* 7.420   KOROHVBV8OU 52* 84 75         Recent Labs     01/22/20  1016 01/22/20 1952 01/23/20  0513   SODIUM 147* 146* 146*   POTASSIUM 5.8* 4.1 3.9   CHLORIDE 106 101 104   CO2 38* 34* 34*   BUN 23* 28* 31*   CREATININE 0.96 0.96 0.77   MAGNESIUM 1.8  --  1.9   PHOSPHORUS 3.9  --  1.7*   CALCIUM 8.2* 9.1 8.7     Recent Labs     01/22/20  1016 01/22/20 1952 01/23/20  0513   ALTSGPT 21  --  18   ASTSGOT 30  --  17   ALKPHOSPHAT 63  --  64   TBILIRUBIN 0.4  --  0.4   GLUCOSE 112* 174* 190*     Recent Labs     01/22/20  0815 01/22/20  1016 01/23/20  0513   WBC 10.1  --   --    NEUTSPOLYS 84.30*  --   --    LYMPHOCYTES 8.70*  --   --    MONOCYTES 6.10  --   --    EOSINOPHILS 0.00  --   --    BASOPHILS 0.00  --   --    ASTSGOT  --  30 17   ALTSGPT  --  21 18   ALKPHOSPHAT  --  63 64   TBILIRUBIN  --  0.4 0.4     Recent Labs     01/22/20  0815   RBC 4.41*   HEMOGLOBIN 13.6*   HEMATOCRIT 45.8   PLATELETCT 202   PROTHROMBTM 14.3   APTT 31.9   INR 1.08       Imaging  X-Ray:  I have personally reviewed the images and compared with prior images.    Assessment/Plan  Acute on chronic respiratory failure with hypoxia and hypercapnia (HCC)- (present on admission)  Assessment & Plan  Due to COPD exacerbation.   Bedside echo with mildly dilated RV, good RV function. IVC is full.   Continue  full vent support, to keep sat>88%  Goal paO2 >55      Acute exacerbation of chronic obstructive pulmonary disease (COPD) (HCC)- (present on admission)  Assessment & Plan  Intubated for hypoxic hypercapneic resp failure.   Daily SAT and SBT  Antibiotics with ceftriaxone and azithromycin  Solumedrol 62.5 Q6H  Follow up BAL culture      S/P lobectomy of lung  Assessment & Plan  S/p JENNIFER lobectomy in 2017     Palliative care had discussion with family and family decided DNR, I ok    VTE:  Heparin  Ulcer: H2 Antagonist  Lines: Central Line  Ongoing indication addressed and Alvarado Catheter  Ongoing indication addressed    I have performed a physical exam and reviewed and updated ROS and Plan today (1/23/2020). In review of yesterday's note (1/22/2020), there are no changes except as documented above.     Discussed patient condition and risk of morbidity and/or mortality with RN, RT, Pharmacy and Charge nurse / hot rounds  The patient remains critically ill.  Critical care time = 45 minutes in directly providing and coordinating critical care and extensive data review.  No time overlap and excludes procedures.

## 2020-01-23 NOTE — DIETARY
"Nutrition Support Assessment:  Day 1 of admit. Humphrey Cano Jr. is a 79 y.o. male with admitting DX of COPD exacerbation.      Current problem list:  1. Acute on chronic respiratory failure with hypoxia and hypercapnia  2. Acute encephalopathy  3. Acute exacerbation of chronic obstructive pulmonary disease  4. S/P lobectomy of lung  5. Alzheimer's dementia without behavioral disturbance  6. Paroxysmal atrial fibrillation   7. Lung cancer  8. Essential hypertension  9. Dyslipidemia      Assessment:  Estimated Nutritional Needs based on:   Height: 182.9 cm (6' 0.01\")  Weight: 80.4 kg (177 lb 4 oz)  Weight to Use in Calculations: 80.4 kg (177 lb 4 oz)  Ideal Body Weight: 80.7 kg (178 lb)  Percent Ideal Body Weight: 99.6  Body mass index is 24.03 kg/m²., BMI classification: normal    Calculation/Equation: [employer] Equation 2003b = 1907 kcal/day, Tmax = 37.9 C, vent # = 9.2 L/min  Total Calories / day: 1907 - 2207  (Calories / k - 27)  Total Grams Protein / day: 96 - 120  (Grams Protein / k.2 - 1.5)     Evaluation:   1. PMHx: COPD, chronic respiratory failure, DLD, HTN, CAD, A. Fib, recent PNA  2. Tube feeding consult received. Cortrak placement pending.  3. Vent day #2.   4. Pt is +2.7 L per I/O flow sheet.   5. Meds: pepcid, precedex @ 0.2 mcg/kg/hr, fentanyl PRN, magnesium sulfate, prednisolone, potassium phosphate, bowel protocol  6. Labs: sodium 146, glucose 190, BUN 31, phosphorus 1.7    7. Specialized formula indicated since pt is fluid overloaded and to meet estimated nutritional needs.       Malnutrition Risk: Not enough information to discern risk at this time.      Recommendations/Plan:  Start Impact Peptide @ 25 ml/hr and advance per protocol to 55 ml/hr (goal rate) to provide 1980 kcal (24 kcal/kg), 124 grams protein (1.5 gm/kg), and 1016 ml free water per day.   Will order metabolic cart study.  Fluids per MD.  Monitor weight.     RD following.               "

## 2020-01-23 NOTE — PROGRESS NOTES
Pt Afibb on monitor  Follow up order on EKG confirms Afibb  Pt hypotensive bp 76/52  Dr. Rojas notified of pt condition  Orders for neosynephrine gtt.  No orders for ammio at this time  Received and implemented orders  Will continue to round and monitor to ensure pt safety.

## 2020-01-23 NOTE — PROGRESS NOTES
Critical lab called from lab with lactic acid 4.6  Dr. Rojas notified with readback  Spoke with physician on pt general condition  Orders received and implemented  Will continue to round to ensure pt safety.

## 2020-01-23 NOTE — PROGRESS NOTES
Called about lactic of 4.6 patient admitted COPD excerbation and hypercarbic respiratory failure, s./p JENNIFER lobectomy, recent strep pneumonia on ventilator with pna seen on bronchoscopy. Poor urine output, blood pressor stable not on pressors. Will bolus IVF NS x1l. Will continue to trend lactate and urine output.     Called back patient went into afib and developed hypotension to 70's hx of afib and on xarelto.     Will start neosynephrine gtt for map > 65  Repeat bolus given of LR  Lactate mild improvement to 4.2     Patient remains in critical condition from from hypotension and shock needing to restart philomena synephrine and atrial fibrillation with potential acute further decompensation. Critical care time provided was 50 minutes. This excludes all separate billable procedures.

## 2020-01-23 NOTE — PROGRESS NOTES
Cortrak Placement    Tube Team verified patient name and medical record number prior to tube placement.  Cortrak tube (43 inches, 12 American) placed at r cm in right nare.  Per Cortrak picture, tube appears to be in the stomach.  Nursing Instructions: Awaiting KUB to confirm placement before use for medications or feeding. Once placement confirmed, flush tube with 30 ml of water, and then remove and save stylet, in patient medication drawer.

## 2020-01-23 NOTE — PROGRESS NOTES
Critical lab called of lactic acid 4.2  Dr. Rojas notified and aware  Wants to add 500 ml NS bolus in addition to the one previously ordered for low urine output  Physician was also notified for restraint order and follow up for restraint documentation during initiation to the ICU pending signature of attending at bedside in physical chart  Physician is aware of the need for restraints prior too and was seen yesterday to confirm needs and safety  Will continue to monitor to ensure pt safety.

## 2020-01-23 NOTE — PROGRESS NOTES
Dr. Lawler notified of   Pt hypotensive sbp in the 80s  Pt has arrhythmia on monitor varying 113-145  Pt has urine output of 10 in the past two hours  Orders received and implemented  Will continue to monitor to ensure pt safety.

## 2020-01-23 NOTE — CARE PLAN
Adult Ventilation Update    Total Vent Days: 2    Patient Lines/Drains/Airways Status      Active Airway       Name: Placement date: Placement time: Site: Days:    Airway ETT 8.0  @ 23  01/22/20   0820   --  1                  APV: 20, 460, +8, 40%  DUO Q4      In the last 24 hours, the patient tolerated SBT for 2 hours on settings of 5/8.

## 2020-01-23 NOTE — CONSULTS
"Reason for PC Consult: Advance Care Planning    Consulted by: Dr. Vela; currently Dr. Kumar    Assessment:  General: 78 y/o male from [facility] with obtundation and hypoxia, believed to be with COPD exacerbation and PNA. Pt required intubation on admission and pressor support initiated d/t HoTN. Cortrak placed today. Recent admission 12/28-1/14 for COPD exacerbation also requiring vent support at that time. PMHx of HTN, DLD, CAD, afib on Xarelto, lung CA s/p lobectomy. Pt currently on vent support and sedated    Social: Pt lives outside of [place], [place] \"[place] the grid\" with his wife Daiana. They live on 40 acres and \"he works the land every day.\" No supplemental O2 at home. They have 5 kids, Nevin, Nikole, Jocelin, Nikkie and Zach. They are all spread about and no one lives in their vicinity. They have numerous grandkids, notably Sheila, Guy and Tay who may call for updates    Consults: PMA    Dyspnea: Yes- full vent support  Last BM: (PTA)-    Pain: No-    Depression: Unable to determine-    Dementia: Unable to Determine;       Spiritual:  Is Yazidi or spirituality important for coping with this illness? No- wife would like a visit for herself;  aware  Has a  or spiritual provider visit been requested? Yes    Palliative Performance Scale: 40%    Advance Directive: None-    DPOA: No-NOK is wife Daiana    POLST: No-      Code Status: Full- wife wishes for DNR; okay with intubation    Outcome:  PC RN met with Daiana and granddaughter Sheila at bedside and explained the role of PC. Daiana provided her understanding of the current situation and concerns with this being the 2nd episode in such a short time. She provided Humphrey's past medical and social history as well, noting he was wildly independent and able-bodied prior to the December admission. He took pride in living off the grid and being able to manage their 40 acres of land. Daiana states they have such great support from all of their children and " "grandkids.     Explored pt's values, beliefs, and preferences in order to identify GOC. Daiana recalls doing an advance directive at some point but not sure where it's at; nothing on file here currently. She states that she and Humphrey have spoken about his wishes and that \"this [intubation] is ok but he would not want this forever.\" PC RN discussed the disease process noting that going on/off the vent is sometimes necessary, and confirmed that Humphrey would not want a tracheostomy or long term vent support; she agrees. She is hopeful it will not come to it at this time, but attempting to prepare herself as best possible. PC RN validated her feelings and Humphrey's statement, noting the difference in quality of live and just being alive. Daiana agrees that he would want QoL, to enjoy his family and be home. PC discussed code status and she believes that if Humphrey suffered any major event and required CPR/shocking, he would not want that. She feels DNR is most appropriate and intubation remains okay. PC offered spiritual support and she would like a visit for herself, noting Humphrey was not very spiritual. At this point, plan made to watch how Humphrey does over the next several days and readdress goals as needed with any change in his condition.    Daiana denied any needs/concerns at this time. While talking to Daiana and Sheila, PC RN provided therapeutic communication, including open ended questions, reflective listening, and normalization of thought and feelings throughout encounter, as well as reinforced Humphrey's goals of care. PC contact information provided to the family and encouraged to call with any questions or concerns.     Updated: MD/BS RN    Plan: update code status when verified; watch clinical course- hopeful for extubation. Pt may benefit from AD/POLST at that time if able    Thank you for allowing Palliative Care to support this patient and family. Contact [phone] for additional assistance, change in patient status, " or with any questions/concerns.

## 2020-01-24 NOTE — PROGRESS NOTES
Critical Care Progress Note    Date of admission  1/22/2020    Chief Complaint  Acute resp failure    Hospital Course    79 y.o. male with hx of COPD s/p JENNIFER lobectomy and recent pneumococcal pneumonia requiring hospitalization 12/29-1/14 with ICU admission and mechanical ventilation who now presented with AMS. Per EMS, pt was found hypoxic in 78% on NRB 15L/min.     At ED, pt was obtunded, pH 7.1 with pCO2 >100. Afebrile, HR in 60s, SBP 90s/50s. Lactate 1.0. Pt was subsequently intubated. 2.5L fluids were given. Ceftriaxone was given. Blood cultures obtained.      Interval Problem Update  Reviewed last 24 hour events:  Remains crtiically ill  Attemped SAT yesterday but pt too agitated, RASS +3  No vent setting changes   Afebrile Tm 37.2C  HR in 90s, SBP in 110-120s.   On full vent support with AC VC+, FiO2 40%, PEEP 8, paO2 95, sat >92%  [id number]  Creatinine 0.8. Good UOP from lasix.     Review of Systems  Review of Systems   Reason unable to perform ROS: intubated, sedated, RASS -4, unable to perform.        Vital Signs for last 24 hours   Pulse:  [] 100  Resp:  [9-34] 25  BP: ()/(49-90) 126/63  SpO2:  [93 %-97 %] 97 %    Hemodynamic parameters for last 24 hours       Respiratory Information for the last 24 hours  Cano Vent Mode: Spont  Rate (breaths/min): 20  Vt Target (mL): 460  PEEP/CPAP: 8  FiO2: 40  P Support: 5  P MEAN: 12  Length of Weaning Trial (Hours): 1  Control VTE (exp VT): 425    Physical Exam   Physical Exam  Vitals signs and nursing note reviewed.   Constitutional:       General: He is not in acute distress.     Appearance: He is not ill-appearing, toxic-appearing or diaphoretic.   HENT:      Head: Normocephalic and atraumatic.      Mouth/Throat:      Mouth: Mucous membranes are moist.      Comments: ET tube in place  Neck:      Musculoskeletal: Neck supple.   Cardiovascular:      Rate and Rhythm: Normal rate and regular rhythm.      Pulses: Normal pulses.      Heart sounds: Normal  heart sounds. No murmur.   Pulmonary:      Effort: Pulmonary effort is normal. No respiratory distress.      Breath sounds: Normal breath sounds. No wheezing, rhonchi or rales.      Comments: Diminished at bases  Abdominal:      General: Bowel sounds are normal. There is no distension.      Palpations: Abdomen is soft. There is no mass.      Tenderness: There is no tenderness.      Hernia: No hernia is present.   Musculoskeletal:         General: No swelling or tenderness.   Skin:     General: Skin is warm and dry.      Capillary Refill: Capillary refill takes less than 2 seconds.      Coloration: Skin is not jaundiced.   Neurological:      General: No focal deficit present.      Mental Status: He is alert and oriented to person, place, and time.      Cranial Nerves: No cranial nerve deficit.   Psychiatric:         Mood and Affect: Mood normal.         Behavior: Behavior normal.         Medications  Current Facility-Administered Medications   Medication Dose Route Frequency Provider Last Rate Last Dose   • amLODIPine (NORVASC) tablet 10 mg  10 mg Oral Q DAY Yoshi Rojas M.D.   10 mg at 01/24/20 0204   • phenylephrine (FREDO-SYNEPHRINE) 40 mg in  mL Infusion  0-300 mcg/min Intravenous Continuous Yoshi Rojas M.D. 18.8 mL/hr at 01/23/20 0152 50 mcg/min at 01/23/20 0152   • Respiratory Care per Protocol   Nebulization Continuous RT Nick Kumar D.O.       • ipratropium-albuterol (DUONEB) nebulizer solution  3 mL Nebulization Q2HRS PRN (RT) Nick Kumar D.O.       • MD Alert...ICU Electrolyte Replacement per Pharmacy   Other PHARMACY TO DOSE Nick Kumar D.O.       • lidocaine (XYLOCAINE) 1 % injection 1-2 mL  1-2 mL Tracheal Tube Q30 MIN PRN Nick Kumar D.O.       • dexmedetomidine (PRECEDEX) 400 mcg/100mL NS premix infusion  0.1-1.5 mcg/kg/hr Intravenous Continuous Nick Kumar D.O. 14.1 mL/hr at 01/24/20 0353 0.7 mcg/kg/hr at 01/24/20 0353   • fentaNYL (SUBLIMAZE) injection  mcg   mcg  Intravenous Q HOUR PRN Nick Kumar D.O.   100 mcg at 01/24/20 0121   • famotidine (PEPCID) tablet 20 mg  20 mg Enteral Tube BID ABBY Davis.O.   20 mg at 01/24/20 0600   • furosemide (LASIX) injection 20 mg  20 mg Intravenous BID DIURETIC ABBY Davis.O.   20 mg at 01/24/20 0600   • lactated ringers infusion (BOLUS)  1,000 mL Intravenous Once PRN Franci Vela M.D.       • methylPREDNISolone sod succ (SOLU-MEDROL) 125 MG injection 62.5 mg  62.5 mg Intravenous Q6HRS Franci Vela M.D.   62.5 mg at 01/24/20 0600   • ipratropium-albuterol (DUONEB) nebulizer solution  3 mL Nebulization Q4HRS (RT) Franci Vela M.D.   3 mL at 01/24/20 0635   • senna-docusate (PERICOLACE or SENOKOT S) 8.6-50 MG per tablet 2 Tab  2 Tab Enteral Tube BID Franci Vela M.D.   2 Tab at 01/24/20 0600    And   • polyethylene glycol/lytes (MIRALAX) PACKET 1 Packet  1 Packet Enteral Tube QDAY PRN Franci Vela M.D.        And   • magnesium hydroxide (MILK OF MAGNESIA) suspension 30 mL  30 mL Enteral Tube QDAY PRN Franci Vela M.D.        And   • bisacodyl (DULCOLAX) suppository 10 mg  10 mg Rectal QDAY PRN Franci Vela M.D.       • acetaminophen (TYLENOL) tablet 650 mg  650 mg Enteral Tube Q6HRS PRN Franci Vela M.D.       • enalaprilat (VASOTEC) injection 1.25 mg  1.25 mg Intravenous Q6HRS PRN Franci Vela M.D.   1.25 mg at 01/23/20 2301   • ondansetron (ZOFRAN) syringe/vial injection 4 mg  4 mg Intravenous Q4HRS PRN Franci Vela M.D.       • norepinephrine (LEVOPHED) 8 mg in  mL Infusion  0-30 mcg/min Intravenous Continuous Nick Kumar D.O.   Stopped at 01/22/20 1100   • cefTRIAXone (ROCEPHIN) 2 g in  mL IVPB  2 g Intravenous Q24HRS ABBY Davis.O.   Stopped at 01/24/20 0630   • fentaNYL (SUBLIMAZE) 50 mcg/mL in 50mL (Continuous Infusion)   Intravenous Continuous ABBY Davis.O.   Stopped at 01/23/20 0944   • propofol (DIPRIVAN) injection  0-80 mcg/kg/min Intravenous Continuous ABBY Davis.O.   Stopped at 01/23/20 0944   • digoxin  (LANOXIN) tablet 125 mcg  125 mcg Enteral Tube DAILY AT 1800 ABBY Davis.O.   125 mcg at 01/23/20 1742   • rivaroxaban (XARELTO) tablet 20 mg  20 mg Enteral Tube DAILY AT 1800 ABBY Davis.O.   20 mg at 01/23/20 1747   • simvastatin (ZOCOR) tablet 10 mg  10 mg Enteral Tube Nightly ABBY Davis.O.   10 mg at 01/23/20 1947   • ondansetron (ZOFRAN ODT) dispertab 4 mg  4 mg Enteral Tube Q4HRS PRN ABBY Davis.OJose Enrique           Fluids    Intake/Output Summary (Last 24 hours) at 1/24/2020 0648  Last data filed at 1/24/2020 0600  Gross per 24 hour   Intake 2679.02 ml   Output 955 ml   Net 1724.02 ml       Laboratory  Recent Labs     01/22/20  0943 01/23/20  0341 01/24/20  0459   ISTATAPH 7.310* 7.418 7.472   ISTATAPCO2 79.2* 50.6* 47.3*   ISTATAPO2 83 76 73   ISTATATCO2 42* 34* 36*   TIEPHKK1UBM 94 95 95   ISTATARTHCO3 39.9* 32.7* 34.6*   ISTATARTBE 11* 7* 10*   ISTATTEMP 37.1 C 36.9 C 99.1 F   ISTATFIO2 100 40 40   ISTATSPEC Arterial Arterial Arterial   ISTATAPHTC 7.309* 7.420 7.468   TOPVUGQA2EL 84 75 75         Recent Labs     01/22/20  1016 01/22/20 1952 01/23/20  0513   SODIUM 147* 146* 146*   POTASSIUM 5.8* 4.1 3.9   CHLORIDE 106 101 104   CO2 38* 34* 34*   BUN 23* 28* 31*   CREATININE 0.96 0.96 0.77   MAGNESIUM 1.8  --  1.9   PHOSPHORUS 3.9  --  1.7*   CALCIUM 8.2* 9.1 8.7     Recent Labs     01/22/20  1016 01/22/20 1952 01/23/20  0513   ALTSGPT 21  --  18   ASTSGOT 30  --  17   ALKPHOSPHAT 63  --  64   TBILIRUBIN 0.4  --  0.4   GLUCOSE 112* 174* 190*     Recent Labs     01/22/20  0815 01/22/20  1016 01/23/20 0513 01/24/20 0628   WBC 10.1  --  9.9 17.4*   NEUTSPOLYS 84.30*  --  91.20* 92.20*   LYMPHOCYTES 8.70*  --  0.90* 2.90*   MONOCYTES 6.10  --  2.60 4.20   EOSINOPHILS 0.00  --  0.00 0.00   BASOPHILS 0.00  --  0.90 0.10   ASTSGOT  --  30 17  --    ALTSGPT  --  21 18  --    ALKPHOSPHAT  --  63 64  --    TBILIRUBIN  --  0.4 0.4  --      Recent Labs     01/22/20  0815 01/23/20  0513 01/24/20  0628   RBC 4.41*  3.51* 3.84*   HEMOGLOBIN 13.6* 10.8* 11.8*   HEMATOCRIT 45.8 34.2* 35.6*   PLATELETCT 202 209 272   PROTHROMBTM 14.3  --   --    APTT 31.9  --   --    INR 1.08  --   --        Imaging  X-Ray:  I have personally reviewed the images and compared with prior images.    Assessment/Plan  Acute on chronic respiratory failure with hypoxia and hypercapnia (HCC)- (present on admission)  Assessment & Plan  Due to COPD exacerbation.   Bedside echo with mildly dilated RV, good RV function. IVC is full.   Continue full vent support, to keep sat>88%  Goal paO2 >55  SAT, SBT   Not ready. Pt was tachypneic in early 30s  Will optimize fluid status in next 24 hours. Then reattempt SAT and SBT    Acute exacerbation of chronic obstructive pulmonary disease (COPD) (HCC)- (present on admission)  Assessment & Plan  Intubated for hypoxic hypercapneic resp failure.   Daily SAT and SBT  Antibiotics with ceftriaxone and azithromycin  Solumedrol 62.5 Q6H  Follow up BAL culture      S/P lobectomy of lung  Assessment & Plan  S/p JENNIFER lobectomy in 2017     Palliative care had discussion with family and family decided DNR, I ok    VTE:  Heparin  Ulcer: H2 Antagonist  Lines: Central Line  Ongoing indication addressed and Alvarado Catheter  Ongoing indication addressed    I have performed a physical exam and reviewed and updated ROS and Plan today (1/24/2020). In review of yesterday's note (1/23/2020), there are no changes except as documented above.     Discussed patient condition and risk of morbidity and/or mortality with RN, RT, Pharmacy and Charge nurse / hot rounds  The patient remains critically ill.  Critical care time = 50 minutes in directly providing and coordinating critical care and extensive data review.  No time overlap and excludes procedures.

## 2020-01-24 NOTE — PROGRESS NOTES
Lab called with critical result of MRSA in BAL. Critical lab result read back to lab   Dr. Kumar notified of critical lab result at 1030.  Critical lab result read back by Dr. Kumar. See new orders

## 2020-01-24 NOTE — PROGRESS NOTES
0900: Dr. Kumar at bedside for multidisciplinary rounds; updated on all assessment findings. MD instructed to leave dex gtt off at this time for continued sbt. MD aware that patient is intermittently extremely agitated. States to attempt to leave precedex off unless patient becomes +2 or +3 agitated.    0950: MD Kumar at bedside for extreme +4 agitation; patient throwing legs over side of bed, lifting out of bed and pulling at ETT and mendoza. Very strong. MD Kumar states okay to give prn fent 100 at this time and to restart dex drip for agitation. Will continue to assess.

## 2020-01-24 NOTE — RESPIRATORY CARE
Adult Ventilation Update    Total Vent Days: 3    Patient Lines/Drains/Airways Status    Active Airway     Name: Placement date: Placement time: Site: Days:    Airway ETT 8.0  01/22/20 0820   --  1              In the last 24 hours, the patient tolerated SBT for 2 hours on settings of 5/8 40%.    #FVC / Vital Capacity (liters) : (did not follow for parameters) (01/23/20 0751)  NIF (cm H2O) : (did not follow for parameters) (01/23/20 0751)  Rapid Shallow Breathing Index (RR/VT): 44 (01/23/20 0751)     Static Compliance (ml / cm H2O): 42 (01/23/20 1814)    Patient failed trials because of    Barriers to SBT Weaning Trial Stopped due to:: Pt weaned for 1 hour and returned to rest settings per protocol (01/23/20 0751)  Length of Weaning Trial Length of Weaning Trial (Hours): 1 (01/23/20 0751)    Sputum/Suction   Cough: Weak (01/24/20 0223)  Sputum Amount: Scant (01/24/20 0223)  Sputum Color: Clear (01/24/20 0223)  Sputum Consistency: Thin (01/24/20 0223)    Mobility  Level of Mobility: Level I(Sedated) (01/23/20 0800)  Activity Performed: Unable to mobilize (01/23/20 2000)  Assistance: Assistance of Two or More (01/23/20 0800)  Ambulation Tolerance: Tolerates Well (01/22/20 1230)  Pt Calls for Assistance: No (01/22/20 1230)  Gait: Unable to Ambulate (01/23/20 0400)  Reason Not Mobilized: Bed rest;Unstable condition (01/23/20 2000)    Events/Summary/Plan: vent check, svn given, pt placed on spont (01/23/20 3264)

## 2020-01-24 NOTE — CARE PLAN
Problem: Nutritional:  Goal: Nutrition support tolerated and meeting greater than 85% of estimated needs  Outcome: NOT MET     TF @ 45 ml/hr per RD visualization. Please continue to advance to goal rate of 55 ml/hr.     RD continues to follow.

## 2020-01-25 PROBLEM — J15.212 PNEUMONIA OF LEFT LOWER LOBE DUE TO METHICILLIN-RESISTANT STAPHYLOCOCCUS AUREUS (MRSA) (HCC): Status: ACTIVE | Noted: 2020-01-01

## 2020-01-25 NOTE — CARE PLAN
Problem: Safety  Goal: Will remain free from injury  Outcome: PROGRESSING AS EXPECTED     Problem: Knowledge Deficit  Goal: Knowledge of disease process/condition, treatment plan, diagnostic tests, and medications will improve  Outcome: PROGRESSING AS EXPECTED     Problem: Respiratory:  Goal: Respiratory status will improve  Outcome: PROGRESSING AS EXPECTED     Problem: Skin Integrity  Goal: Risk for impaired skin integrity will decrease  Outcome: PROGRESSING AS EXPECTED     Problem: Urinary Elimination:  Goal: Ability to reestablish a normal urinary elimination pattern will improve  Outcome: PROGRESSING AS EXPECTED     Problem: Pain Management  Goal: Pain level will decrease to patient's comfort goal  Outcome: PROGRESSING AS EXPECTED     Problem: Communication  Goal: The ability to communicate needs accurately and effectively will improve  Outcome: PROGRESSING SLOWER THAN EXPECTED     Problem: Infection  Goal: Will remain free from infection  Outcome: PROGRESSING SLOWER THAN EXPECTED     Problem: Bowel/Gastric:  Goal: Normal bowel function is maintained or improved  Outcome: PROGRESSING SLOWER THAN EXPECTED

## 2020-01-25 NOTE — FLOWSHEET NOTE
01/25/20 1141   Vent Clock   Vent Discontinued Yes   Events/Summary/Plan   Events/Summary/Plan Placed on 5L oxymask   General Vent Information   Pulse Oximetry 96 %   Pulse 97   Extubated   Patient Extubated, Vent Clock Stopped and Smart Text Completed? Yes

## 2020-01-25 NOTE — CARE PLAN
Problem: Infection  Goal: Will remain free from infection  Outcome: PROGRESSING SLOWER THAN EXPECTED     Problem: Knowledge Deficit  Goal: Knowledge of disease process/condition, treatment plan, diagnostic tests, and medications will improve  Outcome: PROGRESSING SLOWER THAN EXPECTED  Goal: Knowledge of the prescribed therapeutic regimen will improve  Outcome: PROGRESSING SLOWER THAN EXPECTED     Problem: Discharge Barriers/Planning  Goal: Patient's continuum of care needs will be met  Outcome: PROGRESSING SLOWER THAN EXPECTED     Problem: Respiratory:  Goal: Respiratory status will improve  Outcome: PROGRESSING SLOWER THAN EXPECTED     Problem: Fluid Volume:  Goal: Will maintain balanced intake and output  Outcome: PROGRESSING SLOWER THAN EXPECTED     Problem: Skin Integrity  Goal: Risk for impaired skin integrity will decrease  Outcome: PROGRESSING SLOWER THAN EXPECTED     Problem: Urinary Elimination:  Goal: Ability to reestablish a normal urinary elimination pattern will improve  Outcome: PROGRESSING SLOWER THAN EXPECTED     Problem: Pain Management  Goal: Pain level will decrease to patient's comfort goal  Outcome: PROGRESSING SLOWER THAN EXPECTED     Problem: Psychosocial Needs:  Goal: Level of anxiety will decrease  Outcome: PROGRESSING SLOWER THAN EXPECTED

## 2020-01-25 NOTE — DOCUMENTATION QUERY
"                                                                         [facility]                                                                       Query Response Note      PATIENT:               JOSE DE JESUS HOPPER  ACCT #:                  [id number]  MRN:                     [id number]  :                      1940  ADMIT DATE:       2020 8:02 AM  DISCH DATE:          RESPONDING  PROVIDER #:        [id number]           QUERY TEXT:    Pneumonia is documented in the ED Provider Note.  \"Persistent LLL pneumonia on CXR\" is documented in the Pulmonary Consult Note, however pneumonia is not documented in the problem list.  Can this diagnosis be clarified?    NOTE: if an appropriate response is not listed  below, please respond with a new note    The patient's Clinical Indicators include:  Per Pulmonary Consult Note: will need to bronch given persistent LLL on CXR  Per  Bronchoscopy report: upon enter, noted purulent secretions in the left main stem extending to the left lower lobe, some purulent secretions in the lingual.  Per H&P and Progress Notes: acute on chronic respiratory failure, acute exacerbation of COPD    Per ED provider Note: pneumonia due to infectious organism .  Pt is currently on antibiotics for pneumonia    wbc 17.4  Risk Factors: COPD, recent pna  Treatment: bronchoscopy, rocephin, zithromax  Options provided:   -- Pneumonia is ruled in   -- Pneumonia is ruled out   -- Unable to determine      Query created by: Peña Herbert on 2020 9:43 AM    RESPONSE TEXT:    Pneumonia is ruled in          Electronically signed by:  ARTUR GUERRERO DO 2020 6:09 AM              "

## 2020-01-25 NOTE — PROGRESS NOTES
Dr. Rojas rounded  Notified of pt blood pressure bp 180/82  Physician is aware and no further intervention at this point.  Physician states to notify if bp should be greater than 220 sbp  Will continue to  Monitor pt to ensure pt safety.

## 2020-01-25 NOTE — ASSESSMENT & PLAN NOTE
On norvasc 10mg daily   Add lisinopril 5mg daily   Labetalol 10mg IV Q4H prn SBP >160  Should be able to resume other oral home antiHTN when pt is more stable. Pt was on cardizem

## 2020-01-25 NOTE — PROGRESS NOTES
Critical Care Progress Note    Date of admission  1/22/2020    Chief Complaint  Acute resp failure    Hospital Course    79 y.o. male with hx of COPD s/p JENNIFER lobectomy and recent pneumococcal pneumonia requiring hospitalization 12/29-1/14 with ICU admission and mechanical ventilation who now presented with AMS. Per EMS, pt was found hypoxic in 78% on NRB 15L/min.     At ED, pt was obtunded, pH 7.1 with pCO2 >100. Afebrile, HR in 60s, SBP 90s/50s. Lactate 1.0. Pt was subsequently intubated. 2.5L fluids were given. Ceftriaxone was given. Blood cultures obtained.      Interval Problem Update  Reviewed last 24 hour events:  Remains crtiically ill  Failed SAT yesterday. Did well on SBT  Pt is on dexmedetomidine gtt at 0.7. Not on any fentanyl gtt  This am, much more alert and cooperative. RASS 0  On lasix 20mg IV Q12H, excellent response.   No vent changes overnight.   Attemped SAT yesterday but pt too agitated, RASS +3  No vent setting changes.  FiO2 40%, PEEP 8, paO2 95, sat >92%  Afebrile Tm 37.2C  HR in 60-90s, SBP in 140-170s.   [id number]  Creatinine 0.8. Good UOP from lasix.     Review of Systems  Review of Systems   Reason unable to perform ROS: intubated, sedated, RASS -4, unable to perform.        Vital Signs for last 24 hours   Temp:  [37.2 °C (99 °F)-37.6 °C (99.7 °F)] 37.6 °C (99.7 °F)  Pulse:  [] 95  Resp:  [22-37] 22  BP: (120-180)/(55-87) 140/63  SpO2:  [93 %-100 %] 97 %    Hemodynamic parameters for last 24 hours       Respiratory Information for the last 24 hours  Cano Vent Mode: Spont  Rate (breaths/min): 20  Vt Target (mL): 460  PEEP/CPAP: 5  FiO2: 40  P Support: 5  P MEAN: 8.1  Length of Weaning Trial (Hours): 13.25   Control VTE (exp VT): 356    Physical Exam   Physical Exam  Vitals signs and nursing note reviewed.   Constitutional:       General: He is not in acute distress.     Appearance: He is ill-appearing. He is not toxic-appearing or diaphoretic.      Comments: Alert, RASS  0  Following commands, comforatble on PS 5/5  Family at bedside   HENT:      Head: Normocephalic and atraumatic.      Mouth/Throat:      Mouth: Mucous membranes are moist.      Comments: ET tube in place  Neck:      Musculoskeletal: Neck supple.   Cardiovascular:      Rate and Rhythm: Normal rate and regular rhythm.      Pulses: Normal pulses.      Heart sounds: Normal heart sounds. No murmur.   Pulmonary:      Effort: Pulmonary effort is normal. No respiratory distress.      Breath sounds: Normal breath sounds. No wheezing, rhonchi or rales.      Comments: Diminished at bases  Abdominal:      General: Bowel sounds are normal. There is no distension.      Palpations: Abdomen is soft. There is no mass.      Tenderness: There is no tenderness. There is no guarding.      Hernia: No hernia is present.   Musculoskeletal:         General: No swelling or tenderness.   Skin:     General: Skin is warm and dry.      Capillary Refill: Capillary refill takes less than 2 seconds.      Coloration: Skin is not jaundiced.   Neurological:      General: No focal deficit present.      Mental Status: He is alert and oriented to person, place, and time.      Cranial Nerves: No cranial nerve deficit.   Psychiatric:         Mood and Affect: Mood normal.         Behavior: Behavior normal.         Medications  Current Facility-Administered Medications   Medication Dose Route Frequency Provider Last Rate Last Dose   • labetalol (NORMODYNE/TRANDATE) injection 10 mg  10 mg Intravenous Q4HRS PRN Nick Kumar D.OJose Enrique       • magnesium sulfate IVPB premix 2 g  2 g Intravenous Once STEF DavisOJose Enrique 25 mL/hr at 01/25/20 1023 2 g at 01/25/20 1023   • linezolid (ZYVOX) tablet 600 mg  600 mg Oral Q12HRS Nick Kumar D.O.       • amLODIPine (NORVASC) tablet 10 mg  10 mg Enteral Tube Q DAY Nick Kumar D.O.   10 mg at 01/25/20 0517   • methylPREDNISolone (SOLU-MEDROL) 40 MG injection 40 mg  40 mg Intravenous Q12HRS ABBY Davis.O.   40 mg at 01/25/20 0517    • Respiratory Care per Protocol   Nebulization Continuous RT Nick Kumar D.O.       • ipratropium-albuterol (DUONEB) nebulizer solution  3 mL Nebulization Q2HRS PRN (RT) Nick Kumar D.O.       • MD Alert...ICU Electrolyte Replacement per Pharmacy   Other PHARMACY TO DOSE Nick Kumar D.O.       • lidocaine (XYLOCAINE) 1 % injection 1-2 mL  1-2 mL Tracheal Tube Q30 MIN PRN Nick Kumar D.O.       • dexmedetomidine (PRECEDEX) 400 mcg/100mL NS premix infusion  0.1-1.5 mcg/kg/hr Intravenous Continuous Nick Kumar D.O. 18.1 mL/hr at 01/25/20 0722 0.9 mcg/kg/hr at 01/25/20 0722   • fentaNYL (SUBLIMAZE) injection  mcg   mcg Intravenous Q HOUR PRN STEF DavisOJose Enrique   100 mcg at 01/25/20 0517   • famotidine (PEPCID) tablet 20 mg  20 mg Enteral Tube BID STEF DavisO.   20 mg at 01/25/20 0519   • furosemide (LASIX) injection 20 mg  20 mg Intravenous BID DIURETIC STEF DavisOJose Enrique   20 mg at 01/25/20 0519   • lactated ringers infusion (BOLUS)  1,000 mL Intravenous Once PRN Franci Vela M.D.       • ipratropium-albuterol (DUONEB) nebulizer solution  3 mL Nebulization Q4HRS (RT) Franci Vela M.D.   3 mL at 01/25/20 0646   • senna-docusate (PERICOLACE or SENOKOT S) 8.6-50 MG per tablet 2 Tab  2 Tab Enteral Tube BID Franci Vela M.D.   2 Tab at 01/25/20 0517    And   • polyethylene glycol/lytes (MIRALAX) PACKET 1 Packet  1 Packet Enteral Tube QDAY PRN Franci Vela M.D.        And   • magnesium hydroxide (MILK OF MAGNESIA) suspension 30 mL  30 mL Enteral Tube QDAY PRN Franci Vela M.D.        And   • bisacodyl (DULCOLAX) suppository 10 mg  10 mg Rectal QDAY PRN Franci Vela M.D.       • acetaminophen (TYLENOL) tablet 650 mg  650 mg Enteral Tube Q6HRS PRN Franci Vela M.D.       • enalaprilat (VASOTEC) injection 1.25 mg  1.25 mg Intravenous Q6HRS PRN Franci Vela M.D.   1.25 mg at 01/24/20 1939   • ondansetron (ZOFRAN) syringe/vial injection 4 mg  4 mg Intravenous Q4HRS PRN Franci Vela M.D.       • digoxin (LANOXIN)  tablet 125 mcg  125 mcg Enteral Tube DAILY AT 1800 ABBY Davis.O.   125 mcg at 01/24/20 1843   • rivaroxaban (XARELTO) tablet 20 mg  20 mg Enteral Tube DAILY AT 1800 ABBY Davis.O.   20 mg at 01/24/20 1843   • simvastatin (ZOCOR) tablet 10 mg  10 mg Enteral Tube Nightly ABBY Davis.O.   10 mg at 01/24/20 1939   • ondansetron (ZOFRAN ODT) dispertab 4 mg  4 mg Enteral Tube Q4HRS PRN ABBY Davis.OJose Enrique           Fluids    Intake/Output Summary (Last 24 hours) at 1/25/2020 1051  Last data filed at 1/25/2020 0800  Gross per 24 hour   Intake 2807.52 ml   Output 3625 ml   Net -817.48 ml       Laboratory  Recent Labs     01/23/20  0341 01/24/20  0459 01/25/20  0538   ISTATAPH 7.418 7.472 7.486   ISTATAPCO2 50.6* 47.3* 52.9*   ISTATAPO2 76 73 80   ISTATATCO2 34* 36* 42*   [id number] 95 95 96   ISTATARTHCO3 32.7* 34.6* 40.0*   ISTATARTBE 7* 10* 14*   ISTATTEMP 36.9 C 99.1 F 98.6 F   ISTATFIO2 40 40 40   ISTATSPEC Arterial Arterial Arterial   ISTATAPHTC 7.420 7.468 7.486   [id number] 75 75 80         Recent Labs     01/23/20  0513 01/24/20  0628 01/25/20  0345   SODIUM 146* 144 143   POTASSIUM 3.9 4.2 4.1   CHLORIDE 104 101 99   CO2 34* 35* 38*   BUN 31* 36* 36*   CREATININE 0.77 0.80 0.70   MAGNESIUM 1.9 2.1 1.9   PHOSPHORUS 1.7* 2.7 2.5   CALCIUM 8.7 9.0 9.2     Recent Labs     01/23/20  0513 01/24/20  0628 01/25/20  0345   ALTSGPT 18  --   --    ASTSGOT 17  --   --    ALKPHOSPHAT 64  --   --    TBILIRUBIN 0.4  --   --    GLUCOSE 190* 203* 186*     Recent Labs     01/23/20  0513 01/24/20  0628 01/25/20  0345   WBC 9.9 17.4* 19.9*   NEUTSPOLYS 91.20* 92.20* 92.20*   LYMPHOCYTES 0.90* 2.90* 2.60*   MONOCYTES 2.60 4.20 4.20   EOSINOPHILS 0.00 0.00 0.00   BASOPHILS 0.90 0.10 0.20   ASTSGOT 17  --   --    ALTSGPT 18  --   --    ALKPHOSPHAT 64  --   --    TBILIRUBIN 0.4  --   --      Recent Labs     01/23/20  0513 01/24/20  0628 01/25/20  0345   RBC 3.51* 3.84* 3.94*   HEMOGLOBIN 10.8* 11.8* 11.9*   HEMATOCRIT 34.2* 35.6*  36.9*   PLATELETCT 209 610 292       Imaging  X-Ray:  I have personally reviewed the images and compared with prior images.    Assessment/Plan  Acute on chronic respiratory failure with hypoxia and hypercapnia (HCC)- (present on admission)  Assessment & Plan  Due to COPD exacerbation.   Bedside echo at admission with mildly dilated RV, good RV function. IVC is full.   Being diuresed with lasix 20 IV Q12H  Improving overall  On precedex gtt to keep RASS 0  Daily SAT and SBT  Hopeful to be extubated today. Will extubate to BIPAP  Goal keep sat >88%, paO2 >55  Goal I/O negative 500-1000cc in next 24 hours      Acute exacerbation of chronic obstructive pulmonary disease (COPD) ([facility])- (present on admission)  Assessment & Plan  Intubated for hypoxic hypercapneic resp failure.   Daily SAT and SBT  Discontinue ceftriaxone and azithromycin  On Linezolid for MRSA pneumonia. Treat for 7 days.   Solumedrol 40 Q12H. Will do taper steroid for 2 weeks given his severe COPD        Pneumonia of left lower lobe due to methicillin-resistant Staphylococcus aureus (MRSA) ([facility])  Assessment & Plan  MRSA pneumonia.   On linezolid. Can switch to PO. Treat for 7 days.   Discontinue ceftriaxone    S/P lobectomy of lung  Assessment & Plan  S/p JENNIFER lobectomy in 2017    Paroxysmal atrial fibrillation (HCC)- (present on admission)  Assessment & Plan  Continue digoxin and home xarelto 20mg daily   Rate controlled    Essential hypertension- (present on admission)  Assessment & Plan  On norvasc 10mg daily   Labetalol 10mg IV Q4H prn SBP >160  Should be able to resume other oral home antiHTN when pt is more stable. Pt was on lisinopril and cardizem     Palliative care had discussion with family and family decided DNR, I ok    VTE:  Heparin  Ulcer: H2 Antagonist  Lines: Central Line  Ongoing indication addressed and Alvarado Catheter  Ongoing indication addressed    I have performed a physical exam and reviewed and updated ROS and Plan today (1/25/2020).  In review of yesterday's note (1/24/2020), there are no changes except as documented above.     Discussed patient condition and risk of morbidity and/or mortality with RN, RT, Pharmacy and Charge nurse / hot rounds  The patient remains critically ill.  Critical care time = 50 minutes in directly providing and coordinating critical care and extensive data review.  No time overlap and excludes procedures.

## 2020-01-25 NOTE — RESPIRATORY CARE
Ventilator Weaning Update    Patient is on vent day 4.  SBT was tolerated for a minimum of 13.25  hours on settings of 5/5 40%.    Recommendation: wean to extubate              Events/Summary/Plan: Spont (01/25/20 0445)

## 2020-01-25 NOTE — PROGRESS NOTES
Discussed patient in multi disciplinary rounds; informed MD of all assessment findings including high UOP this AM  and high bp overnight with little relief from vasotec prn. New orders for labetalol prn sbp >160.  VSS. Patient resting calmly with precedex gtt running. Family at bedside. Will continue to monitor.

## 2020-01-25 NOTE — CARE PLAN
Adult Ventilation Update    Total Vent Days: 4    Patient Lines/Drains/Airways Status    Active Airway     Name: Placement date: Placement time: Site: Days:    Airway ETT 8.0  01/22/20 0820   --  2              In the last 24 hours, the patient tolerated SBT for 13.25 hours   on settings of 5/5 40%.    #FVC / Vital Capacity (liters) : 0.98 (01/24/20 0636)  NIF (cm H2O) : -24(-31) (01/24/20 0636)  Rapid Shallow Breathing Index (RR/VT): 63 (01/24/20 0636)  Plateau Pressure (Q Shift): 19 (01/24/20 0636)  Static Compliance (ml / cm H2O): 39 (01/24/20 2150)    Patient failed trials because of    Barriers to SBT Weaning Trial Stopped due to:: Pt weaned for 1 hour and returned to rest settings per protocol (01/24/20 1901)  Length of Weaning Trial Length of Weaning Trial (Hours): 13.25  (01/24/20 1901)    Sputum/Suction   Cough: (no suction indicated) (01/24/20 1012)  Sputum Amount: Scant (01/25/20 0400)  Sputum Color: Clear (01/25/20 0400)  Sputum Consistency: Thin (01/25/20 0400)    Mobility  Level of Mobility: Level I(sedated) (01/24/20 0800)  Activity Performed: Unable to mobilize (01/24/20 2000)  Assistance: Assistance of Two or More (01/24/20 0800)  Ambulation Tolerance: Tolerates Well (01/22/20 1230)  Pt Calls for Assistance: No (01/24/20 0800)  Gait: Unable to Ambulate (01/25/20 0400)  Reason Not Mobilized: Bed rest (01/24/20 2000)  Mobilization Comments: TOO AGITATED TO MOBILIZE: CANNOT ENSURE PT SAFETY WHILE INTUBATED (01/24/20 1200)    Events/Summary/Plan: Spont (01/25/20 6533)

## 2020-01-26 NOTE — DOCUMENTATION QUERY
[facility]                                                                       Query Response Note      PATIENT:               JOSE DE JESUS HOPPER  ACCT #:                  [id number]  MRN:                     [id number]  :                      1940  ADMIT DATE:       2020 8:02 AM  DISCH DATE:          RESPONDING  PROVIDER #:        [id number]           QUERY TEXT:    Encephalopathy is documented in the Medical Record. Please specify type.    NOTE:  If an appropriate response is not listed below, please respond with a new note.    The patient's Clinical Indicators include:  Acute encephalopathy, noted at SNF.  Likely CO2 narcosis  Risk Factors: acute on chronic respiratory failure with hypoxia/ hypercapnia, COPD exacerbation, alzheimers, age, hospitalization  Treatment: mechanical ventilation, supplemental 02, close clinical monitoring  Options provided:   -- Due to medications or drugs   -- Metabolic encephalopathy   -- Toxic encephalopathy   -- Other type of encephalopathy   -- Unable to determine      Query created by: Peña Herbert on 2020 9:47 AM    RESPONSE TEXT:    Metabolic encephalopathy          Electronically signed by:  CLAU CHATMAN MD 2020 9:42 AM

## 2020-01-27 NOTE — THERAPY
SPEECH THERAPY CONTACT NOTE:     This SLP attempted to see patient for clinical swallow evaluation. Patient laying on side, refusing to lay flat on back, and refusing evaluation despite extensive education from this SLP and encouragement from his wife and daughter. Per RN, patient sundowns in early afternoon and this may be a factor. SLP will hold evaluation and re-attempt later as able and appropriate. Thank you.

## 2020-01-27 NOTE — CARE PLAN
Problem: Nutritional:  Goal: Nutrition support tolerated and meeting greater than 85% of estimated needs  1/27/2020 0926 by July Benítez R.D.  Reactivated   Impact Peptide 1.5 @ 55 mL/hr (final goal rate).

## 2020-01-27 NOTE — PROGRESS NOTES
MD Brown at bedside for rounds; all assessment findings and overnight events reviewed with MD. KRISHNAN. Patient resting calmly at this time.

## 2020-01-27 NOTE — PROGRESS NOTES
Informed MD Kumar patient passed bedside swallow; however due to confusion, pneumonia/copd and recent intubation, MD states will await swallow eval for diet recommendation. MD states sips and chips okay at this time.

## 2020-01-27 NOTE — PROGRESS NOTES
Critical Care Progress Note    Date of admission  1/22/2020    Chief Complaint  Acute resp failure    Hospital Course    79 y.o. male with hx of COPD s/p JENNIFER lobectomy and recent pneumococcal pneumonia requiring hospitalization 12/29-1/14 with ICU admission and mechanical ventilation who now presented with AMS. Per EMS, pt was found hypoxic in 78% on NRB 15L/min.     At ED, pt was obtunded, pH 7.1 with pCO2 >100. Afebrile, HR in 60s, SBP 90s/50s. Lactate 1.0. Pt was subsequently intubated. 2.5L fluids were given. Ceftriaxone was given. Blood cultures obtained  1/25 Extubated.      Interval Problem Update  Reviewed last 24 hour events:  Extubated yesterday, no acute changes overnight.   Pt was maintained sat >88% with 3-4L NC.   Pt is somewhat delirious, but reorientable.   Off precedex gtt and fentanyl gtt.   On lasix 40 Q8H and pt has excellent urine response.   Afebrile Tm 37.2C  HR in 70-100s, SBP in 130-150s   Sodium 141  Creatinine 0.8. Good UOP from lasix.     Review of Systems  Review of Systems   Constitutional: Negative for chills, fever and malaise/fatigue.   Respiratory: Negative for cough and shortness of breath.    Cardiovascular: Negative for chest pain, palpitations, orthopnea and leg swelling.   Gastrointestinal: Negative for abdominal pain, diarrhea, nausea and vomiting.   Musculoskeletal: Negative for back pain.   Neurological: Negative for weakness and headaches.   All other systems reviewed and are negative.       Vital Signs for last 24 hours   Temp:  [37.4 °C (99.3 °F)-37.8 °C (100 °F)] 37.5 °C (99.5 °F)  Pulse:  [] 109  Resp:  [21-32] 27  BP: (113-172)/(56-86) 150/74  SpO2:  [93 %-100 %] 97 %    Hemodynamic parameters for last 24 hours       Respiratory Information for the last 24 hours       Physical Exam   Physical Exam  Vitals signs and nursing note reviewed.   Constitutional:       General: He is not in acute distress.     Appearance: He is ill-appearing. He is not toxic-appearing or  diaphoretic.      Comments: Wife at bedside   HENT:      Head: Normocephalic and atraumatic.      Mouth/Throat:      Comments: ET tube in place  Neck:      Musculoskeletal: Neck supple.   Cardiovascular:      Rate and Rhythm: Normal rate and regular rhythm.      Pulses: Normal pulses.      Heart sounds: Normal heart sounds. No murmur.   Pulmonary:      Effort: Pulmonary effort is normal. No respiratory distress.      Breath sounds: Normal breath sounds. No wheezing, rhonchi or rales.      Comments: Diminished at bases  No wheezing.   Abdominal:      General: Bowel sounds are normal. There is no distension.      Palpations: Abdomen is soft. There is no mass.      Tenderness: There is no tenderness. There is no guarding.      Hernia: No hernia is present.   Musculoskeletal:         General: No swelling or tenderness.   Skin:     General: Skin is warm and dry.      Capillary Refill: Capillary refill takes less than 2 seconds.      Coloration: Skin is not jaundiced.   Neurological:      General: No focal deficit present.      Mental Status: He is alert and oriented to person, place, and time.      Cranial Nerves: No cranial nerve deficit.   Psychiatric:         Mood and Affect: Mood normal.         Behavior: Behavior normal.         Medications  Current Facility-Administered Medications   Medication Dose Route Frequency Provider Last Rate Last Dose   • budesonide-formoterol (SYMBICORT) 160-4.5 MCG/ACT inhaler 2 Puff  2 Puff Inhalation BID Nick Kumar D.O.   2 Puff at 01/26/20 1708   • glycopyrrolate (Seebri) 15.6 mcg inhalation capsule 1 Cap  1 Cap Inhalation BID STEF DavisO.       • omeprazole (FIRST-OMEPRAZOLE) 2 mg/mL oral susp 40 mg  40 mg Enteral Tube DAILY STEF DavisO.   40 mg at 01/26/20 1706   • [START ON 1/27/2020] furosemide (LASIX) injection 40 mg  40 mg Intravenous Q DAY STEF DavisOJose Enrique       • acetaZOLAMIDE (DIAMOX) 500 mg in NS 50 mL IVPB  500 mg Intravenous Q12HRS Nick Kumar D.O.   Stopped at  01/26/20 1457   • predniSONE (DELTASONE) tablet 40 mg  40 mg Enteral Tube DAILY STEF DavisOJose Enrique   40 mg at 01/26/20 1707   • labetalol (NORMODYNE/TRANDATE) injection 10 mg  10 mg Intravenous Q4HRS PRN Nick Kumar D.O.       • linezolid (ZYVOX) tablet 600 mg  600 mg Enteral Tube Q12HRS ABBY Davis.O.   600 mg at 01/26/20 1707   • amLODIPine (NORVASC) tablet 10 mg  10 mg Enteral Tube Q DAY ABBY Davis.O.   10 mg at 01/26/20 0613   • Respiratory Care per Protocol   Nebulization Continuous RT Nick Kumar D.O.       • ipratropium-albuterol (DUONEB) nebulizer solution  3 mL Nebulization Q2HRS PRN (RT) Nick Kumar D.O.       • MD Alert...ICU Electrolyte Replacement per Pharmacy   Other PHARMACY TO DOSE Nick Kumar D.O.       • lactated ringers infusion (BOLUS)  1,000 mL Intravenous Once PRN Franci Vela M.D.       • senna-docusate (PERICOLACE or SENOKOT S) 8.6-50 MG per tablet 2 Tab  2 Tab Enteral Tube BID Franci Vela M.D.   Stopped at 01/26/20 1800    And   • polyethylene glycol/lytes (MIRALAX) PACKET 1 Packet  1 Packet Enteral Tube QDAY PRN Franci Vela M.D.        And   • magnesium hydroxide (MILK OF MAGNESIA) suspension 30 mL  30 mL Enteral Tube QDAY PRN Franci Vela M.D.        And   • bisacodyl (DULCOLAX) suppository 10 mg  10 mg Rectal QDAY PRN Franci Vela M.D.       • acetaminophen (TYLENOL) tablet 650 mg  650 mg Enteral Tube Q6HRS PRN Franci Vela M.D.       • enalaprilat (VASOTEC) injection 1.25 mg  1.25 mg Intravenous Q6HRS PRN Franci Vela M.D.   1.25 mg at 01/24/20 1939   • ondansetron (ZOFRAN) syringe/vial injection 4 mg  4 mg Intravenous Q4HRS PRN Franci Vela M.D.       • digoxin (LANOXIN) tablet 125 mcg  125 mcg Enteral Tube DAILY AT 1800 Nick Kumar D.O.   125 mcg at 01/26/20 1707   • rivaroxaban (XARELTO) tablet 20 mg  20 mg Enteral Tube DAILY AT 1800 ABBY Davis.O.   20 mg at 01/26/20 1707   • simvastatin (ZOCOR) tablet 10 mg  10 mg Enteral Tube Nightly Nick Kumar D.O.   10 mg at 01/25/20 2128    • ondansetron (ZOFRAN ODT) dispertab 4 mg  4 mg Enteral Tube Q4HRS PRN Nick Kumar D.O.           Fluids    Intake/Output Summary (Last 24 hours) at 1/26/2020 1948  Last data filed at 1/26/2020 1800  Gross per 24 hour   Intake 1055.55 ml   Output 4900 ml   Net -3844.45 ml       Laboratory  Recent Labs     01/24/20  0459 01/25/20  0538   ISTATAPH 7.472 7.486   ISTATAPCO2 47.3* 52.9*   ISTATAPO2 73 80   ISTATATCO2 36* 42*   VLDJYZB8OLC 95 96   ISTATARTHCO3 34.6* 40.0*   ISTATARTBE 10* 14*   ISTATTEMP 99.1 F 98.6 F   ISTATFIO2 40 40   ISTATSPEC Arterial Arterial   ISTATAPHTC 7.468 7.486   WPMCETES2EW 75 80         Recent Labs     01/24/20  0628 01/25/20  0345 01/26/20  0600   SODIUM 144 143 141   POTASSIUM 4.2 4.1 4.2   CHLORIDE 101 99 96   CO2 35* 38* 43*   BUN 36* 36* 35*   CREATININE 0.80 0.70 0.61   MAGNESIUM 2.1 1.9 2.1   PHOSPHORUS 2.7 2.5 3.6   CALCIUM 9.0 9.2 8.6     Recent Labs     01/24/20  0628 01/25/20  0345 01/26/20  0600   GLUCOSE 203* 186* 130*     Recent Labs     01/24/20  0628 01/25/20  0345 01/26/20  0600   WBC 17.4* 19.9* 17.6*   NEUTSPOLYS 92.20* 92.20* 89.60*   LYMPHOCYTES 2.90* 2.60* 4.30*   MONOCYTES 4.20 4.20 5.20   EOSINOPHILS 0.00 0.00 0.00   BASOPHILS 0.10 0.20 0.30     Recent Labs     01/24/20 0628 01/25/20 0345 01/26/20  0600   RBC 3.84* 3.94* 3.86*   HEMOGLOBIN 11.8* 11.9* 11.6*   HEMATOCRIT 35.6* 36.9* 36.4*   PLATELETCT 272 292 306       Imaging  X-Ray:  I have personally reviewed the images and compared with prior images.    Assessment/Plan  Acute on chronic respiratory failure with hypoxia and hypercapnia (HCC)- (present on admission)  Assessment & Plan  Due to COPD exacerbation.   Bedside echo at admission with mildly dilated RV, good RV function. IVC is full.   Being diuresed with lasix 40IV Q8H  Bicarb is up to 40s, will decrease lasix to 40 IV daily and add diamox 500 BID   Would like to keep fluid balance negative in next 2-3 days.   Goal keep sat >88%, paO2 >55  Goal I/O  negative 500-1000cc in next 24 hours      Acute exacerbation of chronic obstructive pulmonary disease (COPD) ([facility])- (present on admission)  Assessment & Plan  Intubated for hypoxic hypercapneic resp failure.   On Linezolid for MRSA pneumonia. Treat for 7 days.   Solumedrol 40 Q12H. Change to prednisone 40mg daily and will treat for total 10 days given his severe COPD  Resume symbicort. Add long acting anticholinergic Seebri  Duonebs prn       Pneumonia of left lower lobe due to methicillin-resistant Staphylococcus aureus (MRSA) ([facility])  Assessment & Plan  MRSA pneumonia.   On linezolid PO BID. Treat for 7 days.   Discontinue ceftriaxone    S/P lobectomy of lung  Assessment & Plan  S/p JENNIFER lobectomy in 2017    Paroxysmal atrial fibrillation (HCC)- (present on admission)  Assessment & Plan  Continue digoxin and home xarelto 20mg daily   Rate controlled    Essential hypertension- (present on admission)  Assessment & Plan  On norvasc 10mg daily   Add lisinopril 5mg daily   Labetalol 10mg IV Q4H prn SBP >160  Should be able to resume other oral home antiHTN when pt is more stable. Pt was on cardizem         Palliative care had discussion with family and family decided DNR, I ok    VTE:  Heparin  Ulcer: H2 Antagonist  Lines: Central Line  Ongoing indication addressed and Alvarado Catheter  Ongoing indication addressed    I have performed a physical exam and reviewed and updated ROS and Plan today (1/26/2020). In review of yesterday's note (1/25/2020), there are no changes except as documented above.     Discussed patient condition and risk of morbidity and/or mortality with RN, RT, Pharmacy and Charge nurse / hot rounds

## 2020-01-27 NOTE — CARE PLAN
Problem: Communication  Goal: The ability to communicate needs accurately and effectively will improve  Outcome: PROGRESSING AS EXPECTED     Problem: Safety  Goal: Will remain free from falls  Outcome: PROGRESSING AS EXPECTED     Problem: Venous Thromboembolism (VTW)/Deep Vein Thrombosis (DVT) Prevention:  Goal: Patient will participate in Venous Thrombosis (VTE)/Deep Vein Thrombosis (DVT)Prevention Measures  Outcome: PROGRESSING AS EXPECTED     Problem: Bowel/Gastric:  Goal: Normal bowel function is maintained or improved  Outcome: PROGRESSING AS EXPECTED  Goal: Will not experience complications related to bowel motility  Outcome: PROGRESSING AS EXPECTED     Problem: Respiratory:  Goal: Respiratory status will improve  Outcome: PROGRESSING AS EXPECTED     Problem: Fluid Volume:  Goal: Will maintain balanced intake and output  Outcome: PROGRESSING AS EXPECTED     Problem: Skin Integrity  Goal: Risk for impaired skin integrity will decrease  Outcome: PROGRESSING AS EXPECTED     Problem: Pain Management  Goal: Pain level will decrease to patient's comfort goal  Outcome: PROGRESSING AS EXPECTED

## 2020-01-27 NOTE — PROGRESS NOTES
Hospital Medicine Daily Progress Note    Date of Service  1/26/2020  Chief Complaint  79 y.o. male admitted 1/22/2020 with acute respiratory failure, history of COPD, history of left upper lobe lobectomy, prior pneumococcal pneumonia, found initially significantly hypoxic and required intubation, care provided by the intensive care team.  The patient now extubated and care transferred back to the hospitalist service in conjunction.    Hospital Course    Admitted with pneumonia and respiratory failure, prior severe COPD  Interval Problem Update  Patient seen and examined today.    Patient tolerating treatment and therapies.  All Data, Medication data reviewed.  Case discussed with nursing as available.  Plan of Care reviewed with patient and notified of changes.  1/26 the patient is extubated, he appears lethargic, he is in isolation secondary to MRSA, he remains on 3 to 4 L per nasal cannula, some delirium and disorientation on and off, diuresing with IV Lasix, afebrile, family at the bedside updated.    Consultants/Specialty  Pulmonary/critical care    Code Status  DNR, intubation okay    Disposition  ICU, with transition to medical unit    Review of Systems  Review of Systems   Constitutional: Positive for malaise/fatigue.   HENT: Positive for hearing loss.    Eyes: Negative.    Respiratory: Positive for cough, sputum production and shortness of breath.    Cardiovascular: Negative.  Negative for chest pain and leg swelling.   Gastrointestinal: Negative.    Genitourinary: Negative.    Musculoskeletal: Negative.    Skin: Negative.    Neurological: Negative.    Endo/Heme/Allergies: Negative.    Psychiatric/Behavioral: The patient is nervous/anxious.    All other systems reviewed and are negative.       Physical Exam  Temp:  [37.4 °C (99.3 °F)-37.5 °C (99.5 °F)] 37.5 °C (99.5 °F)  Pulse:  [] 91  Resp:  [20-46] 20  BP: ()/(40-85) 126/53  SpO2:  [94 %-100 %] 94 %    Physical Exam  Vitals signs and nursing  note reviewed.   Constitutional:       Appearance: He is well-developed.      Interventions: Nasal cannula in place.      Comments: Pt seen and examined.   HENT:      Head: Normocephalic and atraumatic.   Eyes:      Pupils: Pupils are equal, round, and reactive to light.   Neck:      Musculoskeletal: Normal range of motion and neck supple.   Cardiovascular:      Rate and Rhythm: Normal rate.      Heart sounds: Normal heart sounds.   Pulmonary:      Effort: Pulmonary effort is normal.      Breath sounds: Rhonchi and rales present.   Abdominal:      General: Bowel sounds are normal.      Palpations: Abdomen is soft.   Genitourinary:     Penis: Normal.    Musculoskeletal: Normal range of motion.   Skin:     General: Skin is warm and dry.   Neurological:      Mental Status: He is oriented to person, place, and time. He is lethargic.   Psychiatric:         Behavior: Behavior normal.         Fluids    Intake/Output Summary (Last 24 hours) at 1/27/2020 1258  Last data filed at 1/27/2020 1200  Gross per 24 hour   Intake 802.88 ml   Output 3025 ml   Net -2222.12 ml       Laboratory  Recent Labs     01/25/20 0345 01/26/20  0600 01/27/20  0545   WBC 19.9* 17.6* 15.3*   RBC 3.94* 3.86* 3.83*   HEMOGLOBIN 11.9* 11.6* 11.7*   HEMATOCRIT 36.9* 36.4* 36.9*   MCV 93.7 94.3 96.3   MCH 30.2 30.1 30.5   MCHC 32.2* 31.9* 31.7*   RDW 51.9* 51.6* 52.3*   PLATELETCT 292 306 315   MPV 10.9 10.7 10.9     Recent Labs     01/25/20  0345 01/26/20  0600 01/27/20  0545   SODIUM 143 141 142   POTASSIUM 4.1 4.2 3.9   CHLORIDE 99 96 100   CO2 38* 43* 40*   GLUCOSE 186* 130* 153*   BUN 36* 35* 43*   CREATININE 0.70 0.61 0.79   CALCIUM 9.2 8.6 8.7                   Imaging  DX-ABDOMEN FOR TUBE PLACEMENT   Final Result         Feeding tube with tip projecting over the expected area of the stomach.      DX-CHEST-PORTABLE (1 VIEW)   Final Result         1. Interval extubation. No other significant interval change.      DX-ABDOMEN FOR TUBE PLACEMENT    Final Result      Feeding tube tip in the second portion of the duodenum.      DX-CHEST-PORTABLE (1 VIEW)   Final Result      Stable enlargement of the cardiomediastinal silhouette with stable left pleural effusion and associated basilar atelectasis and/or consolidation.      DX-CHEST-PORTABLE (1 VIEW)   Final Result      Stable left basilar consolidation and pleural fluid      DX-ABDOMEN FOR TUBE PLACEMENT   Final Result      Dobbhoff type feeding tube terminates with the tip projecting over the expected location of the gastric antrum or first portion of the duodenum.      EC-ECHOCARDIOGRAM COMPLETE W/O CONT   Final Result      DX-CHEST-PORTABLE (1 VIEW)   Final Result      Improving left lung aeration      CT-HEAD W/O   Final Result      1. Cerebral atrophy.   2. White matter lucencies most consistent with small vessel ischemic change versus demyelination or gliosis.   3. Otherwise, Head CT without contrast with no acute findings. No evidence of acute cerebral infarction, hemorrhage or mass lesion.      DX-CHEST-PORTABLE (1 VIEW)   Final Result      1.  No evidence of pneumothorax following RIGHT neck catheter placement   2.  Grossly unchanged appearance of LEFT basilar atelectasis and possible superimposed airspace disease   3.  Small LEFT pleural effusion      DX-CHEST-PORTABLE (1 VIEW)   Final Result      1.  Grossly unchanged appearance of LEFT basilar atelectasis and possible superimposed airspace disease   2.  Small LEFT pleural effusion           Assessment/Plan  Acute on chronic respiratory failure with hypoxia and hypercapnia (HCC)- (present on admission)  Assessment & Plan  Severe, requiring mechanical ventilation on admission  Patient with severe COPD, previously oxygen dependent with nocturnal oxygen  The patient now extubated, treating for MRSA pneumonia,  Ongoing aggressive respiratory care      Acute encephalopathy- (present on admission)  Assessment & Plan  Improved after mechanical ventilation  and improved of respiratory status  Still some delirium present, ongoing monitoring and PT OT    Acute exacerbation of chronic obstructive pulmonary disease (COPD) (HCC)- (present on admission)  Assessment & Plan  Ongoing respiratory protocol, prednisone, treating bacterial pneumonia    Pneumonia of left lower lobe due to methicillin-resistant Staphylococcus aureus (MRSA) (HCC)  Assessment & Plan  Full antibiotic course with Zyvox, respiratory isolation    S/P lobectomy of lung  Assessment & Plan  History of left upper lobe resection    Alzheimer's dementia without behavioral disturbance (HCC)- (present on admission)  Assessment & Plan  History of, at risk of ICU delirium    Paroxysmal atrial fibrillation (HCC)- (present on admission)  Assessment & Plan  Continue Xarelto, digoxin  Holding diltiazem, resume as clinically appropriate    Lung cancer (HCC)- (present on admission)  Assessment & Plan  History left-sided lobectomy  Maintain outpatient follow-up    Dyslipidemia- (present on admission)  Assessment & Plan  Continue statins    Essential hypertension- (present on admission)  Assessment & Plan  Initially held secondary to the patient's critical status,  Restart slowly monitor     Plan  Continue with antibiotic therapy  Continue with diuresis, consider decreasing dose  Isolation  PT OT as appropriate  Wean oxygen as appropriate  See orders  Medically complex high risk    VTE prophylaxis: Xarelto  I have performed a physical exam and reviewed and updated ROS and Plan today . In review of yesterday's note , there are no changes except as documented above.

## 2020-01-28 PROBLEM — Z66 DNR (DO NOT RESUSCITATE): Status: ACTIVE | Noted: 2020-01-01

## 2020-01-28 NOTE — PROGRESS NOTES
Assumed pt care at 0715.  Received report from night RN.  Assessment completed.  Pt oriented to self.  No other s/s of discomfort or distress.  Bed in lowest position and locked.  Pt does not call appropriately.  Bilateral wrist restraints, mendoza, and cortrak in place. Call light within reach and staff numbers provided.  Pt needs met at this time.

## 2020-01-28 NOTE — PROGRESS NOTES
Hospital Medicine Daily Progress Note    Date of Service  1/27/2020    Chief Complaint  79 y.o. male admitted 1/22/2020 with acute respiratory failure, history of COPD, history of left upper lobe lobectomy, prior pneumococcal pneumonia, found initially significantly hypoxic and required intubation, care provided by the intensive care team.  The patient now extubated and care transferred back to the hospitalist service in conjunction.    Hospital Course    Admitted with pneumonia and respiratory failure, prior severe COPD  Interval Problem Update  Patient seen and examined today.    Patient tolerating treatment and therapies.  All Data, Medication data reviewed.  Case discussed with nursing as available.  Plan of Care reviewed with patient and notified of changes.  1/26 the patient is extubated, he appears lethargic, he is in isolation secondary to MRSA, he remains on 3 to 4 L per nasal cannula, some delirium and disorientation on and off, diuresing with IV Lasix, afebrile, family at the bedside updated.  1/27 the patient seems slightly improved, remains afebrile, heart rate in the 90s, blood pressure in the 140s to 120s, ongoing antibiotic therapy, ongoing diuresis, on 3 L nasal cannula.  The patient expectorating sputum, no significant pain reported, remains with feeding tube in place.  Consultants/Specialty  Pulmonary/critical care    Code Status  DNR, intubation okay    Disposition  ICU, with transition to medical unit     Review of Systems  Review of Systems   Constitutional: Positive for malaise/fatigue.   HENT: Positive for hearing loss.    Eyes: Negative.    Respiratory: Positive for cough, sputum production and shortness of breath.    Cardiovascular: Negative.  Negative for chest pain and leg swelling.   Gastrointestinal: Negative.    Genitourinary: Negative.    Musculoskeletal: Negative.    Skin: Negative.    Neurological: Negative.    Endo/Heme/Allergies: Negative.    Psychiatric/Behavioral: The patient is  nervous/anxious.    All other systems reviewed and are negative.       Physical Exam  Temp:  [37.4 °C (99.3 °F)-37.5 °C (99.5 °F)] 37.5 °C (99.5 °F)  Pulse:  [] 99  Resp:  [20-46] 33  BP: ()/(40-85) 145/64  SpO2:  [94 %-100 %] 97 %    Physical Exam  Vitals signs and nursing note reviewed.   Constitutional:       Appearance: He is well-developed.      Interventions: Nasal cannula in place.      Comments: Pt seen and examined.   HENT:      Head: Normocephalic and atraumatic.   Eyes:      Pupils: Pupils are equal, round, and reactive to light.   Neck:      Musculoskeletal: Normal range of motion and neck supple.   Cardiovascular:      Rate and Rhythm: Normal rate.      Heart sounds: Normal heart sounds.   Pulmonary:      Effort: Pulmonary effort is normal.      Breath sounds: Rhonchi and rales present.   Abdominal:      General: Bowel sounds are normal.      Palpations: Abdomen is soft.   Genitourinary:     Penis: Normal.    Musculoskeletal: Normal range of motion.   Skin:     General: Skin is warm and dry.   Neurological:      Mental Status: He is oriented to person, place, and time. He is lethargic.   Psychiatric:         Behavior: Behavior normal.         Fluids    Intake/Output Summary (Last 24 hours) at 1/27/2020 1622  Last data filed at 1/27/2020 1200  Gross per 24 hour   Intake 662.88 ml   Output 2025 ml   Net -1362.12 ml       Laboratory  Recent Labs     01/25/20 0345 01/26/20  0600 01/27/20  0545   WBC 19.9* 17.6* 15.3*   RBC 3.94* 3.86* 3.83*   HEMOGLOBIN 11.9* 11.6* 11.7*   HEMATOCRIT 36.9* 36.4* 36.9*   MCV 93.7 94.3 96.3   MCH 30.2 30.1 30.5   MCHC 32.2* 31.9* 31.7*   RDW 51.9* 51.6* 52.3*   PLATELETCT 292 306 315   MPV 10.9 10.7 10.9     Recent Labs     01/25/20  0345 01/26/20  0600 01/27/20  0545   SODIUM 143 141 142   POTASSIUM 4.1 4.2 3.9   CHLORIDE 99 96 100   CO2 38* 43* 40*   GLUCOSE 186* 130* 153*   BUN 36* 35* 43*   CREATININE 0.70 0.61 0.79   CALCIUM 9.2 8.6 8.7                    Imaging  DX-ABDOMEN FOR TUBE PLACEMENT   Final Result         Feeding tube with tip projecting over the expected area of the stomach.      DX-CHEST-PORTABLE (1 VIEW)   Final Result         1. Interval extubation. No other significant interval change.      DX-ABDOMEN FOR TUBE PLACEMENT   Final Result      Feeding tube tip in the second portion of the duodenum.      DX-CHEST-PORTABLE (1 VIEW)   Final Result      Stable enlargement of the cardiomediastinal silhouette with stable left pleural effusion and associated basilar atelectasis and/or consolidation.      DX-CHEST-PORTABLE (1 VIEW)   Final Result      Stable left basilar consolidation and pleural fluid      DX-ABDOMEN FOR TUBE PLACEMENT   Final Result      Dobbhoff type feeding tube terminates with the tip projecting over the expected location of the gastric antrum or first portion of the duodenum.      EC-ECHOCARDIOGRAM COMPLETE W/O CONT   Final Result      DX-CHEST-PORTABLE (1 VIEW)   Final Result      Improving left lung aeration      CT-HEAD W/O   Final Result      1. Cerebral atrophy.   2. White matter lucencies most consistent with small vessel ischemic change versus demyelination or gliosis.   3. Otherwise, Head CT without contrast with no acute findings. No evidence of acute cerebral infarction, hemorrhage or mass lesion.      DX-CHEST-PORTABLE (1 VIEW)   Final Result      1.  No evidence of pneumothorax following RIGHT neck catheter placement   2.  Grossly unchanged appearance of LEFT basilar atelectasis and possible superimposed airspace disease   3.  Small LEFT pleural effusion      DX-CHEST-PORTABLE (1 VIEW)   Final Result      1.  Grossly unchanged appearance of LEFT basilar atelectasis and possible superimposed airspace disease   2.  Small LEFT pleural effusion           Assessment/Plan  Acute on chronic respiratory failure with hypoxia and hypercapnia (HCC)- (present on admission)  Assessment & Plan  Severe, requiring mechanical ventilation on  admission  Patient with severe COPD, previously oxygen dependent with nocturnal oxygen  The patient now extubated, treating for MRSA pneumonia,  Ongoing aggressive respiratory care      Acute encephalopathy- (present on admission)  Assessment & Plan  Improved after mechanical ventilation and improved of respiratory status  Still some delirium present, ongoing monitoring and PT OT    Acute exacerbation of chronic obstructive pulmonary disease (COPD) (HCC)- (present on admission)  Assessment & Plan  Ongoing respiratory protocol, prednisone, treating bacterial pneumonia    Pneumonia of left lower lobe due to methicillin-resistant Staphylococcus aureus (MRSA) (HCC)  Assessment & Plan  Full antibiotic course with Zyvox, respiratory isolation    S/P lobectomy of lung  Assessment & Plan  History of left upper lobe resection    Alzheimer's dementia without behavioral disturbance (HCC)- (present on admission)  Assessment & Plan  History of, at risk of ICU delirium    Paroxysmal atrial fibrillation (HCC)- (present on admission)  Assessment & Plan  Continue Xarelto, digoxin  Holding diltiazem, resume as clinically appropriate    Lung cancer (HCC)- (present on admission)  Assessment & Plan  History left-sided lobectomy  Maintain outpatient follow-up    Dyslipidemia- (present on admission)  Assessment & Plan  Continue statins    Essential hypertension- (present on admission)  Assessment & Plan  Initially held secondary to the patient's critical status,  Restart slowly monitor     Plan  Continue with antibiotic therapy  Continue with diuresis, reduce dose, Diamox given high bicarb  Isolation continue  PT OT as appropriate  Wean oxygen as appropriate  See orders  Medically complex high risk  Continue PT OT, speech therapy  VTE prophylaxis: Xarelto  I have performed a physical exam and reviewed and updated ROS and Plan today . In review of yesterday's note , there are no changes except as documented above.

## 2020-01-28 NOTE — PROGRESS NOTES
[facility] Medicine Daily Progress Note    Date of Service  1/28/2020    Chief Complaint  79 y.o. male admitted 1/22/2020 with shortness of breath    Hospital Course    Mr. Cano has a past medical history of coronary artery disease, atrial fibrillation on chronic Xarelto therapy, and COPD was recently admitted last month with ornis of breath and atrial fibrillation requiring mechanical ventilation.  Was transferred to a skilled nursing facility.  Was brought to the emergency room from the skilled nursing facility due to increasing shortness of breath and hypoxemia with obtundation.  He required intubation the emergency room was admitted to the intensive care unit.      Interval Problem Update  1/26 the patient is extubated, he appears lethargic, he is in isolation secondary to MRSA, he remains on 3 to 4 L per nasal cannula, some delirium and disorientation on and off, diuresing with IV Lasix, afebrile, family at the bedside updated.  1/27 the patient seems slightly improved, remains afebrile, heart rate in the 90s, blood pressure in the 140s to 120s, ongoing antibiotic therapy, ongoing diuresis, on 3 L nasal cannula.  The patient expectorating sputum, no significant pain reported, remains with feeding tube in place  1/28: patient seen and evaluated in the ICU. His wife visited today. She has elected to change his code status to DNR/DNI and is open to meeting with Hospice which has been ordered. He remains in soft wrist restraints.   Consultants/Specialty  Critical care.   Palliative care  Code Status  DNR, DNI    Disposition  Medical     Review of Systems  Review of Systems   Unable to perform ROS: Mental acuity        Physical Exam  Temp:  [37.4 °C (99.3 °F)-37.5 °C (99.5 °F)] 37.5 °C (99.5 °F)  Pulse:  [] 106  Resp:  [20-44] 28  BP: ()/(46-78) 147/66  SpO2:  [86 %-100 %] 99 %    Physical Exam  Vitals signs and nursing note reviewed.   Constitutional:       Comments: thin   HENT:      Head:  Normocephalic and atraumatic.      Nose:      Comments: cortrak nares     Mouth/Throat:      Mouth: Mucous membranes are dry.      Pharynx: Oropharynx is clear.   Eyes:      General: No scleral icterus.     Conjunctiva/sclera: Conjunctivae normal.   Neck:      Musculoskeletal: Normal range of motion and neck supple.   Cardiovascular:      Comments: Tachycardia  Irregular  No murmur  Pulmonary:      Effort: No respiratory distress.      Breath sounds: No stridor. No wheezing or rhonchi.   Abdominal:      General: There is no distension.      Tenderness: There is no tenderness.   Musculoskeletal:      Right lower leg: No edema.      Left lower leg: No edema.   Skin:     General: Skin is warm and dry.   Neurological:      Mental Status: He is alert.      Comments: He moves his extremities equally   Psychiatric:      Comments: Oriented only to person  Speech is clear         Fluids    Intake/Output Summary (Last 24 hours) at 1/28/2020 0719  Last data filed at 1/28/2020 0600  Gross per 24 hour   Intake 1060 ml   Output 1225 ml   Net -165 ml       Laboratory  Recent Labs     01/26/20  0600 01/27/20  0545 01/28/20  0331   WBC 17.6* 15.3* 15.7*   RBC 3.86* 3.83* 4.49*   HEMOGLOBIN 11.6* 11.7* 13.2*   HEMATOCRIT 36.4* 36.9* 42.8   MCV 94.3 96.3 95.3   MCH 30.1 30.5 29.4   MCHC 31.9* 31.7* 30.8*   RDW 51.6* 52.3* 52.2*   PLATELETCT 306 315 391   MPV 10.7 10.9 10.6     Recent Labs     01/26/20  0600 01/27/20  0545 01/28/20  0331   SODIUM 141 142 147*   POTASSIUM 4.2 3.9 3.9   CHLORIDE 96 100 102   CO2 43* 40* 40*   GLUCOSE 130* 153* 137*   BUN 35* 43* 52*   CREATININE 0.61 0.79 0.94   CALCIUM 8.6 8.7 9.2                   Imaging  DX-CHEST-PORTABLE (1 VIEW)   Final Result      Stable examination.      DX-ABDOMEN FOR TUBE PLACEMENT   Final Result         Feeding tube with tip projecting over the expected area of the stomach.      DX-CHEST-PORTABLE (1 VIEW)   Final Result         1. Interval extubation. No other significant  interval change.      DX-ABDOMEN FOR TUBE PLACEMENT   Final Result      Feeding tube tip in the second portion of the duodenum.      DX-CHEST-PORTABLE (1 VIEW)   Final Result      Stable enlargement of the cardiomediastinal silhouette with stable left pleural effusion and associated basilar atelectasis and/or consolidation.      DX-CHEST-PORTABLE (1 VIEW)   Final Result      Stable left basilar consolidation and pleural fluid      DX-ABDOMEN FOR TUBE PLACEMENT   Final Result      Dobbhoff type feeding tube terminates with the tip projecting over the expected location of the gastric antrum or first portion of the duodenum.      EC-ECHOCARDIOGRAM COMPLETE W/O CONT   Final Result      DX-CHEST-PORTABLE (1 VIEW)   Final Result      Improving left lung aeration      CT-HEAD W/O   Final Result      1. Cerebral atrophy.   2. White matter lucencies most consistent with small vessel ischemic change versus demyelination or gliosis.   3. Otherwise, Head CT without contrast with no acute findings. No evidence of acute cerebral infarction, hemorrhage or mass lesion.      DX-CHEST-PORTABLE (1 VIEW)   Final Result      1.  No evidence of pneumothorax following RIGHT neck catheter placement   2.  Grossly unchanged appearance of LEFT basilar atelectasis and possible superimposed airspace disease   3.  Small LEFT pleural effusion      DX-CHEST-PORTABLE (1 VIEW)   Final Result      1.  Grossly unchanged appearance of LEFT basilar atelectasis and possible superimposed airspace disease   2.  Small LEFT pleural effusion           Assessment/Plan  Acute on chronic respiratory failure with hypoxia and hypercapnia (HCC)- (present on admission)  Assessment & Plan  Severe, requiring mechanical ventilation on admission  Patient with severe COPD, previously oxygen dependent with nocturnal oxygen  The patient now extubated, treating for MRSA pneumonia,  Ongoing oxygen requirements at 5 liters.      Acute encephalopathy- (present on  admission)  Assessment & Plan  Acute metabolic encephalopathy with underlying dementia  CT head negative    Acute exacerbation of chronic obstructive pulmonary disease (COPD) (HCC)- (present on admission)  Assessment & Plan  Ongoing respiratory protocol, prednisone, treating bacterial pneumonia    DNR (do not resuscitate)- (present on admission)  Assessment & Plan  DNR/DNI orders placed on 1/28  Hospice orders placed    Pneumonia of left lower lobe due to methicillin-resistant Staphylococcus aureus (MRSA) ([facility])  Assessment & Plan  Full antibiotic course with Zyvox, respiratory isolation    S/P lobectomy of lung  Assessment & Plan  History of left upper lobe resection    Alzheimer's dementia without behavioral disturbance (HCC)- (present on admission)  Assessment & Plan  History of, at risk of ICU delirium    Paroxysmal atrial fibrillation (HCC)- (present on admission)  Assessment & Plan  Continue Xarelto, digoxin  Holding diltiazem, resume as clinically appropriate    Lung cancer (HCC)- (present on admission)  Assessment & Plan  History left-sided lobectomy  Maintain outpatient follow-up    Dyslipidemia- (present on admission)  Assessment & Plan  Continue statins    Essential hypertension- (present on admission)  Assessment & Plan  Initially held secondary to the patient's critical status,  Restart slowly monitor       VTE prophylaxis: Xarelto

## 2020-01-28 NOTE — THERAPY
"Occupational Therapy Evaluation completed.   Functional Status:  Pt is a 80yo male admitted for COPD exacerbation and PNA. PMHx of JENNIFER lobectomy, Alzheimer's dementia, COPD, afib, MI, CA, HTN, and CAD. Supine>sit with max A x2. Sit>stand with mod A x2 ppl for safety; req max v/cs for foot placement. Reported fearful of feet feeling like they were \"slipping\"; req encouragement to participate. LB dressing with mod A; became frustrated and agitated when pointed out pt only attempting to dress LLE and only touching his R knee. Stopped following commands, reported he needed to rest, but then resisted sit>supine. Req max A for sit>supine, then refused to roll supine. Wife and dtr present and appreciative, but unclear if have good understanding/awareness of diagnoses/cognition. Limited by decreased functional mobility, activity tolerance, cognition, coordination, and balance which are currently affecting pt's ability to complete ADLs/IADLs at baseline. Currently recommend acute OT, and post-acute placement for additional occupational therapy services prior to discharge home.    Plan of Care: Will benefit from Occupational Therapy 3 times per week  Discharge Recommendations:  Equipment: Will Continue to Assess for Equipment Needs. Post-acute therapy: Recommend post-acute placement for additional occupational therapy services prior to discharge home.     See \"Rehab Therapy-Acute\" Patient Summary Report for complete documentation.    "

## 2020-01-28 NOTE — THERAPY
Pt refusing PO. Agitated easily. Family had been giving ice and thin water. Recommend NPO until swallow assessment. Pt has NG.honey

## 2020-01-28 NOTE — DOCUMENTATION QUERY
Critical access hospital                                                                       Query Response Note      PATIENT:               JOSE DE JESUS HOPPER  ACCT #:                  [id number]  MRN:                     [id number]  :                      1940  ADMIT DATE:       2020 8:02 AM  DISCH DATE:          RESPONDING  PROVIDER #:        [id number]           QUERY TEXT:    Pneumonia is documented in the Medical Record. WBC 17.4, pulse 125 and tachypnea are noted in the  Lab Results and Vitals.  Can a diagnosis be provided to support this finding?    NOTE:  If an appropriate response is not listed below, please respond with a new note.    The patient's Clinical Indicators include:   wbc 10.1,  wbc 17.4  Per Vitals Flowsheet: Admit vitals: 127/37, 81, 22, 98.2F, 72% 15L non-rebreather   lactic acid 4.6, procalcitonin 0.45   0800 Vitals: 111/69, 117, 31, Temp 99.0F @ 1800   BAL +MRSA  Risk Factors: MRSA pneumonia  Treatment: linezolid, Zithromax, rocephin, IVF  Options provided:   -- Sepsis present on admission   -- Sepsis developed after admission   -- Sepsis has been ruled out   -- SIRS that is unrelated to any infection   -- SIRS of non-infectious origin with acute organ dysfunction   -- Findings of no clinical significance   -- Unable to determine      Query created by: Peña Herbert on 2020 8:56 AM    RESPONSE TEXT:    Sepsis present on admission          Electronically signed by:  ARTUR GUERRERO DO 2020 11:18 AM

## 2020-01-28 NOTE — THERAPY
"Physical Therapy Evaluation completed.   Bed Mobility:  Supine to Sit: Maximal Assist  Transfers: Sit to Stand: Moderate Assist  Gait: Level Of Assist: Unable to Participate with No Equipment Needed       Plan of Care: Will benefit from Physical Therapy 3 times per week  Discharge Recommendations: Equipment: Will Continue to Assess for Equipment Needs. Post-acute therapy Discharge to a transitional care facility for continued skilled therapy services.    Pt admitted for COPD exacerbation workup with hx of Alzheimer's dementia presenting confused and grossly debilitated. He required max A to achieve upright at EOB, but once there sustained at close SBA for safety. To initiate sit to stand, pt required maximal cues for LE placement and then mod A for lift off; pt reporting fear that his feet were going to \"slip\" and also required encouragement to attempt to stand. Toward end of evaluation pt became increasingly frustrated and ultimately ceased following commands. Spouse and daughter present but unclear their level of understanding of pt's cognition and diagnoses. PT will follow to address weakness, poor activity tolerance, and instability. Recommend placement.     See \"Rehab Therapy-Acute\" Patient Summary Report for complete documentation.     "

## 2020-01-29 NOTE — PALLIATIVE CARE
"Palliative Care follow-up  PC RN met with Daiana and 2 family friends in the family room after receiving updates from BS RN. Humphrey remains confused and agitated at times, not participating in therapies or following commands. Daiana notes this was \"how he was at [place] towards the end\" and notes that he was \"tired of it all.\" Daiana recognized and verbalized the decline since his extubation and notes having spoken with their PC about his situation too. She states that Humphrey would never want to have to be cared for in this manner. His friends echo this and stats that he would want to have some function and independence, able to \"work on his land.\" Daiana feels that \"I'll know the direction this is going in a couple days.\" PC RN validated her feelings and recognized that she will know when Humphrey's threshold has been met. PC RN discussed hospice and how this would not be achievable where they live. Her friends asked if this could be done at their home in [place] and PC confirmed this is absolutely a possibility. PC inquired about Daiana's feelings about intubation at this point and whether she would want that for Humphrey; she declined stating \"No. Not like this.\" Daiana states she has great support from her children and friends. Plan made for PC to revisit with Milad Ahmadi and Milad's wife (Shelly?) at  Wednesday around 1600 to further discuss Daiana's feelings and place hospice request if this is the path they are wanting to explore. No questions at this time.    Updated: NARENDRA RN, MD (TT)    Plan: f/u Wednesday at 1600    Thank you for allowing Palliative Care to support this patient and family. Contact [phone] for additional assistance, change in patient status, or with any questions/concerns.   "

## 2020-01-29 NOTE — CARE PLAN
Problem: Communication  Goal: The ability to communicate needs accurately and effectively will improve  Intervention: Educate patient and significant other/support system about the plan of care, procedures, treatments, medications and allow for questions  Note:   Meeting at 1600 with Palliative to discuss plan of care with patient's wife.      Problem: Pain Management  Goal: Pain level will decrease to patient's comfort goal  Note:   Per wife, pt has chronic back pain but has never needed to take narcotic pain medication.  PRN tylenol in use.

## 2020-01-29 NOTE — THERAPY
SPEECH THERAPY CONTACT NOTE:     Per RN, hold clinical swallow evaluation again today, as pt is obtunded. RN reports family meeting planned for today at 4pm. SLP will check back tomorrow to see if pt is appropriate and ck POC. Thank you.

## 2020-01-29 NOTE — PROGRESS NOTES
[facility] Medicine Daily Progress Note    Date of Service  1/29/2020    Chief Complaint  79 y.o. male admitted 1/22/2020 with shortness of breath    Hospital Course    Mr. Cano has a past medical history of coronary artery disease, atrial fibrillation on chronic Xarelto therapy, and COPD was recently admitted last month with ornis of breath and atrial fibrillation requiring mechanical ventilation.  Was transferred to a skilled nursing facility.  Was brought to the emergency room from the skilled nursing facility due to increasing shortness of breath and hypoxemia with obtundation.  He required intubation the emergency room was admitted to the intensive care unit.      Interval Problem Update  1/26 the patient is extubated, he appears lethargic, he is in isolation secondary to MRSA, he remains on 3 to 4 L per nasal cannula, some delirium and disorientation on and off, diuresing with IV Lasix, afebrile, family at the bedside updated.  1/27 the patient seems slightly improved, remains afebrile, heart rate in the 90s, blood pressure in the 140s to 120s, ongoing antibiotic therapy, ongoing diuresis, on 3 L nasal cannula.  The patient expectorating sputum, no significant pain reported, remains with feeding tube in place  1/28: patient seen and evaluated in the ICU. His wife visited today. She has elected to change his code status to DNR/DNI and is open to meeting with Hospice which has been ordered. He remains in soft wrist restraints.   1/29: Mr. Cano was evaluated and examined in the ICU.  I met with his 2 daughters as well as his wife and palliative care.  Effectively, tomorrow the plan will be for transitioning him to hospice at his friend's house that lives in [place].  I met with the friends that will accept him to their house where they have a room that they can take him on hospice.  We discussed that his prognosis quite guarded I do not know the timeframe from now until death.  35 minutes were spent that  which over 50% time spent in counseling on end-of-life and hospice.  Consultants/Specialty  Critical care.   Palliative care  Code Status  DNR, DNI    Disposition  Medical     Review of Systems  Review of Systems   Unable to perform ROS: Mental acuity        Physical Exam  Pulse:  [] 79  Resp:  [27-50] 50  BP: ()/(47-91) 145/61  SpO2:  [77 %-99 %] 99 %    Physical Exam  Vitals signs and nursing note reviewed.   Constitutional:       Comments: thin   HENT:      Head: Normocephalic and atraumatic.      Nose:      Comments: cortrak nares     Mouth/Throat:      Mouth: Mucous membranes are dry.      Pharynx: Oropharynx is clear.   Eyes:      General: No scleral icterus.     Conjunctiva/sclera: Conjunctivae normal.   Neck:      Musculoskeletal: Normal range of motion and neck supple.   Cardiovascular:      Comments: Tachycardia  Irregular  No murmur  Pulmonary:      Effort: Respiratory distress present.      Breath sounds: No stridor. Rhonchi present. No wheezing.   Abdominal:      General: There is no distension.      Tenderness: There is no tenderness.   Musculoskeletal:      Right lower leg: No edema.      Left lower leg: No edema.   Skin:     General: Skin is warm and dry.   Neurological:      Mental Status: He is alert.      Comments: He does withdraw and moves his extremities equally though not to command   Psychiatric:      Comments: He is effectively nonverbal and nearly obtunded         Fluids    Intake/Output Summary (Last 24 hours) at 1/29/2020 0644  Last data filed at 1/29/2020 0200  Gross per 24 hour   Intake 1250 ml   Output 2045 ml   Net -795 ml       Laboratory  Recent Labs     01/27/20  0545 01/28/20  0331 01/29/20  0525   WBC 15.3* 15.7* 19.9*   RBC 3.83* 4.49* 4.73   HEMOGLOBIN 11.7* 13.2* 14.1   HEMATOCRIT 36.9* 42.8 46.6   MCV 96.3 95.3 98.5*   MCH 30.5 29.4 29.8   MCHC 31.7* 30.8* 30.3*   RDW 52.3* 52.2* 55.1*   PLATELETCT 315 391 408   MPV 10.9 10.6 11.1     Recent Labs      01/27/20  0545 01/28/20  0331 01/29/20  0525   SODIUM 142 147* 147*   POTASSIUM 3.9 3.9 4.2   CHLORIDE 100 102 106   CO2 40* 40* 41*   GLUCOSE 153* 137* 160*   BUN 43* 52* 60*   CREATININE 0.79 0.94 0.88   CALCIUM 8.7 9.2 9.0                   Imaging  DX-CHEST-PORTABLE (1 VIEW)   Final Result      Stable examination.      DX-ABDOMEN FOR TUBE PLACEMENT   Final Result         Feeding tube with tip projecting over the expected area of the stomach.      DX-CHEST-PORTABLE (1 VIEW)   Final Result         1. Interval extubation. No other significant interval change.      DX-ABDOMEN FOR TUBE PLACEMENT   Final Result      Feeding tube tip in the second portion of the duodenum.      DX-CHEST-PORTABLE (1 VIEW)   Final Result      Stable enlargement of the cardiomediastinal silhouette with stable left pleural effusion and associated basilar atelectasis and/or consolidation.      DX-CHEST-PORTABLE (1 VIEW)   Final Result      Stable left basilar consolidation and pleural fluid      DX-ABDOMEN FOR TUBE PLACEMENT   Final Result      Dobbhoff type feeding tube terminates with the tip projecting over the expected location of the gastric antrum or first portion of the duodenum.      EC-ECHOCARDIOGRAM COMPLETE W/O CONT   Final Result      DX-CHEST-PORTABLE (1 VIEW)   Final Result      Improving left lung aeration      CT-HEAD W/O   Final Result      1. Cerebral atrophy.   2. White matter lucencies most consistent with small vessel ischemic change versus demyelination or gliosis.   3. Otherwise, Head CT without contrast with no acute findings. No evidence of acute cerebral infarction, hemorrhage or mass lesion.      DX-CHEST-PORTABLE (1 VIEW)   Final Result      1.  No evidence of pneumothorax following RIGHT neck catheter placement   2.  Grossly unchanged appearance of LEFT basilar atelectasis and possible superimposed airspace disease   3.  Small LEFT pleural effusion      DX-CHEST-PORTABLE (1 VIEW)   Final Result      1.  Grossly  unchanged appearance of LEFT basilar atelectasis and possible superimposed airspace disease   2.  Small LEFT pleural effusion           Assessment/Plan  Acute on chronic respiratory failure with hypoxia and hypercapnia (HCC)- (present on admission)  Assessment & Plan  Severe, requiring mechanical ventilation on admission  Patient with severe COPD, previously oxygen dependent with nocturnal oxygen  The patient now extubated, treating for MRSA pneumonia,  Ongoing oxygen requirements at 5 liters.      Acute encephalopathy- (present on admission)  Assessment & Plan  Acute metabolic encephalopathy with underlying dementia  CT head negative    Acute exacerbation of chronic obstructive pulmonary disease (COPD) (HCC)- (present on admission)  Assessment & Plan  Ongoing respiratory protocol, prednisone, treating bacterial pneumonia    DNR (do not resuscitate)- (present on admission)  Assessment & Plan  DNR/DNI orders placed on 1/28  Hospice orders placed    Pneumonia of left lower lobe due to methicillin-resistant Staphylococcus aureus (MRSA) ([facility])  Assessment & Plan  Full antibiotic course with Zyvox, respiratory isolation    S/P lobectomy of lung  Assessment & Plan  History of left upper lobe resection    Alzheimer's dementia without behavioral disturbance (HCC)- (present on admission)  Assessment & Plan  History of, at risk of ICU delirium    Paroxysmal atrial fibrillation (HCC)- (present on admission)  Assessment & Plan  Continue Xarelto, digoxin  Holding diltiazem, resume as clinically appropriate    Lung cancer (HCC)- (present on admission)  Assessment & Plan  History left-sided lobectomy  Maintain outpatient follow-up    Dyslipidemia- (present on admission)  Assessment & Plan  Continue statins    Essential hypertension- (present on admission)  Assessment & Plan  Initially held secondary to the patient's critical status,  Restart slowly monitor       VTE prophylaxis: Xarelto

## 2020-01-30 NOTE — PROGRESS NOTES
Pt is A&Ox1; oriented to self. Pt mainly nonverbal, was able to response yes to RN question however, pt falls back asleep quickly. Pt is resting in bed, no signs of labored breathing or pain. Pt on 2L via NC. Call light & personal belongings within reach, bed in lowest position & locked, and bed alarm is on. Fall precautions in place. Pt on contact and droplet precautions with isolation cart outside of room.  Will continue to monitor.

## 2020-01-30 NOTE — DISCHARGE PLANNING
ATTN: Case Management   RE: Referral for Hospice    [facility]. A nurse liaison will be in contact with the patient and family to assess for Hospice appropriateness. For immediate assistance, please call [phone] to speak to a member of our intake team.

## 2020-01-30 NOTE — PROGRESS NOTES
Bedside shift report given. Patient is stable and Ax1. Isolation precautions. Bed in lowest position. Bedside table within reach. Call bell at bedside. Doors open.

## 2020-01-30 NOTE — PALLIATIVE CARE
Met at the bedside with pts wife, Daiana, their best friends, Milad with spouse Gigi and Palliative  Katy. Spoke about previous conversation that she had with PC RN Sandrine yesterday. Spoke about pts condition today and lack of improvement.   Daiana stated that she spoke with her two daughters overnight and might decide on Friday. Asked her what she felt unsure over. She stated that she felt she wanted hospice, but still felt unsure. Asked her what her fears were, she was concerned that pt would not be allowed to have oxygen at home while on Hospice. Let family know that if they felt oxygen would be for comfort then that would be per their wishes.   Spoke about hospice, that they would provide the needed DME, such as a bed and any other needed physical equipment needed. That they would have a Hospice provider oversee the medical course and prescribing of medication and then visits from the RNs and CNAs.     Shelly asked how the process would work. Explained choice of agency, then a meeting with a liaison, then arrangements of DME and [employer] to home. Everyone was happy to hear that this was fairly straightforward and would not take a great deal of time to arrange.   Discussed making a list of questions to ask during the meeting with hospice liaison, such as wish for home O2 for comfort, how often RNs and CNAs visit.   Spoke about POLST for [place] to home, family feels that they will complete this closer to DC.     Milad asked if they could speak with the Doctor again, let them know the Dr Batres has stated he was more than happy to return and speak with them. Updated Dr Batres via everbill.    Spoke further on pts life and their friendships. Spoke about Hospice agencies, provided with choice sheet in order for them to have time to make calls and evaluate agencies per their request.     Dr Batres arrived and answered Milad's questions. Everyone spoke further on quality of life and the unknown factors within the  medicine. Daiana agreed to speak with PC RN tomorrow further on choice of hospice agency.     Ensured that everyone had contact number for Palliative Team, Daiana stated that she will arrive some time approx mid morning at 09:00-10:00 tomorrow and speak further with the Team.

## 2020-01-30 NOTE — PALLIATIVE CARE
"Palliative Care follow-up  PC RN met with Marcela Ahmadi, friend Orlando, and CM Michelle at . Daiana expressed concern with \"wondering if he had a stroke\" and inquiring about further testing. She relates this to the big change she recognized after Monday, though denies any neglect on one side, facial droop or other symptoms. PC RN discussed limiting interventions if further measures/treatment was not being considered, which she said is not. Additionally, PC expressed concern with his ability to tolerate MRI without sedation given his restlessness and she too agreed this is not what she wants. PC RN discussed next steps and she and Marcela again feel that hospice is most appropriate. PC outlined comfort care and hospice, including the use of medications to alleviate his agitation, stop his feeding tubes/feeds and get him out of restraints. They are in agreement with this. Marcela additionally feels that the catheter is a source of his agitation and they are asking to have this removed which PC confirmed. Discussed hospice providers and Daiana requesting to meet with [facility]. They ultimately feel \"the sooner the better\" but know it will take some time to orchestrate. POL discussed and completed for DNR/comfort measures/no TF. Reviewed and signed by Daiana; left on the chart for signature by MD.     The plan is to return to [place]'s home at [address] [address]. Marcela's number is [phone]. Updates to MD via TT; family appreciative of the MD's visits with them as well.     Updated:  RN/CM present. Spoke with [facility] Hospice team and they will coordinate with Daiana who plans to be at the  till 1400 today    Plan: home to friends' home when arranged    Thank you for allowing Palliative Care to support this patient and family. Contact [phone] for additional assistance, change in patient status, or with any questions/concerns.   "

## 2020-01-30 NOTE — PROGRESS NOTES
[facility] Medicine Daily Progress Note    Date of Service  1/30/2020    Chief Complaint  79 y.o. male admitted 1/22/2020 with shortness of breath    Hospital Course    Mr. Cano has a past medical history of coronary artery disease, atrial fibrillation on chronic Xarelto therapy, and COPD was recently admitted last month with ornis of breath and atrial fibrillation requiring mechanical ventilation.  Was transferred to a skilled nursing facility.  Was brought to the emergency room from the skilled nursing facility due to increasing shortness of breath and hypoxemia with obtundation.  He required intubation the emergency room was admitted to the intensive care unit.      Interval Problem Update  1/26 the patient is extubated, he appears lethargic, he is in isolation secondary to MRSA, he remains on 3 to 4 L per nasal cannula, some delirium and disorientation on and off, diuresing with IV Lasix, afebrile, family at the bedside updated.  1/27 the patient seems slightly improved, remains afebrile, heart rate in the 90s, blood pressure in the 140s to 120s, ongoing antibiotic therapy, ongoing diuresis, on 3 L nasal cannula.  The patient expectorating sputum, no significant pain reported, remains with feeding tube in place  1/28: patient seen and evaluated in the ICU. His wife visited today. She has elected to change his code status to DNR/DNI and is open to meeting with Hospice which has been ordered. He remains in soft wrist restraints.   1/29: Mr. Cano was evaluated and examined in the ICU.  I met with his 2 daughters as well as his wife and palliative care.  Effectively, tomorrow the plan will be for transitioning him to hospice at his friend's house that lives in [place].  I met with the friends that will accept him to their house where they have a room that they can take him on hospice.  We discussed that his prognosis quite guarded I do not know the timeframe from now until death.    1/30: patient seen in the  ICU. He remains very encephalopathic. His friends are at bedside and are ready to take him to their house on Hospice. He remains in restraints and has a mendoza catheter. IV haldol ordered to assist in calming him. Cortrak and mendoza to be removed. I discussed with Sandrine Casey Palliative RN. Comfort care orders placed for end of life with Hospice arrangements.   Consultants/Specialty  Critical care.   Palliative care  Code Status  DNR, DNI    Disposition  Medical     Review of Systems  Review of Systems   Unable to perform ROS: Mental acuity        Physical Exam  Temp:  [36.7 °C (98 °F)-37.2 °C (98.9 °F)] 37.2 °C (98.9 °F)  Pulse:  [] 110  Resp:  [18-45] 30  BP: (132-169)/(56-81) 169/81  SpO2:  [92 %-99 %] 95 %    Physical Exam  Vitals signs and nursing note reviewed.   Constitutional:       Appearance: He is ill-appearing.      Comments: Tall and thin   HENT:      Head: Normocephalic and atraumatic.      Nose:      Comments: cortrak nares     Mouth/Throat:      Mouth: Mucous membranes are dry.      Pharynx: Oropharynx is clear.   Eyes:      General: No scleral icterus.     Conjunctiva/sclera: Conjunctivae normal.   Neck:      Musculoskeletal: Normal range of motion and neck supple.   Cardiovascular:      Comments: Tachycardia  Irregular  No murmur  Pulmonary:      Effort: Respiratory distress present.      Breath sounds: No stridor. Rhonchi present. No wheezing.   Abdominal:      General: There is no distension.      Tenderness: There is no tenderness.   Musculoskeletal:      Right lower leg: No edema.      Left lower leg: No edema.      Comments: Soft wrist restraints.   Skin:     General: Skin is warm and dry.      Findings: No rash.   Neurological:      Mental Status: He is alert.      Comments: He does withdraw and moves his extremities equally though not to command   Psychiatric:      Comments: Encephalopathic and non-verbal.         Fluids    Intake/Output Summary (Last 24 hours) at 1/30/2020 1218  Last  data filed at 1/30/2020 0800  Gross per 24 hour   Intake 330 ml   Output 1125 ml   Net -795 ml       Laboratory  Recent Labs     01/28/20 0331 01/29/20 0525 01/30/20  0344   WBC 15.7* 19.9* 22.4*   RBC 4.49* 4.73 4.62*   HEMOGLOBIN 13.2* 14.1 13.6*   HEMATOCRIT 42.8 46.6 44.9   MCV 95.3 98.5* 97.2   MCH 29.4 29.8 29.4   MCHC 30.8* 30.3* 30.3*   RDW 52.2* 55.1* 54.5*   PLATELETCT 391 408 384   MPV 10.6 11.1 10.7     Recent Labs     01/28/20 0331 01/29/20 0525 01/30/20  0344   SODIUM 147* 147* 155*   POTASSIUM 3.9 4.2 4.4   CHLORIDE 102 106 109   CO2 40* 41* 41*   GLUCOSE 137* 160* 174*   BUN 52* 60* 57*   CREATININE 0.94 0.88 0.70   CALCIUM 9.2 9.0 9.3                   Imaging  DX-CHEST-PORTABLE (1 VIEW)   Final Result      Stable examination.      DX-ABDOMEN FOR TUBE PLACEMENT   Final Result         Feeding tube with tip projecting over the expected area of the stomach.      DX-CHEST-PORTABLE (1 VIEW)   Final Result         1. Interval extubation. No other significant interval change.      DX-ABDOMEN FOR TUBE PLACEMENT   Final Result      Feeding tube tip in the second portion of the duodenum.      DX-CHEST-PORTABLE (1 VIEW)   Final Result      Stable enlargement of the cardiomediastinal silhouette with stable left pleural effusion and associated basilar atelectasis and/or consolidation.      DX-CHEST-PORTABLE (1 VIEW)   Final Result      Stable left basilar consolidation and pleural fluid      DX-ABDOMEN FOR TUBE PLACEMENT   Final Result      Dobbhoff type feeding tube terminates with the tip projecting over the expected location of the gastric antrum or first portion of the duodenum.      EC-ECHOCARDIOGRAM COMPLETE W/O CONT   Final Result      DX-CHEST-PORTABLE (1 VIEW)   Final Result      Improving left lung aeration      CT-HEAD W/O   Final Result      1. Cerebral atrophy.   2. White matter lucencies most consistent with small vessel ischemic change versus demyelination or gliosis.   3. Otherwise, Head CT  without contrast with no acute findings. No evidence of acute cerebral infarction, hemorrhage or mass lesion.      DX-CHEST-PORTABLE (1 VIEW)   Final Result      1.  No evidence of pneumothorax following RIGHT neck catheter placement   2.  Grossly unchanged appearance of LEFT basilar atelectasis and possible superimposed airspace disease   3.  Small LEFT pleural effusion      DX-CHEST-PORTABLE (1 VIEW)   Final Result      1.  Grossly unchanged appearance of LEFT basilar atelectasis and possible superimposed airspace disease   2.  Small LEFT pleural effusion           Assessment/Plan  Acute on chronic respiratory failure with hypoxia and hypercapnia (HCC)- (present on admission)  Assessment & Plan  Severe, requiring mechanical ventilation on admission  Patient with severe COPD, previously oxygen dependent with nocturnal oxygen  The patient now extubated, treated for MRSA pneumonia,  Ongoing oxygen requirements       Acute encephalopathy- (present on admission)  Assessment & Plan  Acute metabolic encephalopathy with underlying dementia  CT head negative  IV Haldol for acute agitation and may need sublingual morphine by Hospice    Acute exacerbation of chronic obstructive pulmonary disease (COPD) ([facility])- (present on admission)  Assessment & Plan  Ongoing respiratory protocol, prednisone, treated bacterial pneumonia    DNR (do not resuscitate)- (present on admission)  Assessment & Plan  DNR/DNI orders placed on 1/28  Hospice orders placed  Comfort care measure placed on 1/30    Pneumonia of left lower lobe due to methicillin-resistant Staphylococcus aureus (MRSA) ([facility])  Assessment & Plan  Full antibiotic course with Zyvox    S/P lobectomy of lung  Assessment & Plan  History of left upper lobe resection    Alzheimer's dementia without behavioral disturbance (HCC)- (present on admission)  Assessment & Plan  History of, at risk of ICU delirium    Paroxysmal atrial fibrillation (HCC)- (present on admission)  Assessment &  Plan  Stop meds    Lung cancer (HCC)- (present on admission)  Assessment & Plan  History left-sided lobectomy      Dyslipidemia- (present on admission)  Assessment & Plan  Continue statins       VTE prophylaxis: comfort care

## 2020-01-30 NOTE — CARE PLAN
Problem: Communication  Goal: The ability to communicate needs accurately and effectively will improve  1/30/2020 1301 by Ann Eckert R.N.  Outcome: PROGRESSING AS EXPECTED  1/30/2020 1055 by Ann Eckert R.N.  Outcome: PROGRESSING AS EXPECTED     Problem: Safety  Goal: Will remain free from falls  Outcome: PROGRESSING AS EXPECTED     Problem: Pain Management  Goal: Pain level will decrease to patient's comfort goal  Intervention:  comfort care orders initiated and request PRN pain medication from MD  Outcome: PROGRESSING AS EXPECTED

## 2020-01-30 NOTE — THERAPY
Occupational Therapy Contact Note  Per chart, pt has transitioned to comfort care; d/c from OT. Please re-consult if change in POC.   Krystyna Toro OTD, OTR/L    Pager #[phone]

## 2020-01-30 NOTE — DIETARY
Nutrition Services: Brief Update    Pt previously on tube feeding.  Pt has transitioned to comfort care.  Cortrak has been removed, tube feeding order has been discontinued.  Diet has been advanced to Regular, Dysphagia 3.    RD will screen pt weekly. Consult RD as needed.

## 2020-01-30 NOTE — PALLIATIVE CARE
Palliative Care follow-up  PC RN visited BS; no family present and per RN, Daiana waiting for her oxygen before coming to the hospital. Appreciate RN calling when she arrives.          Thank you for allowing Palliative Care to support this patient and family. Contact [phone] for additional assistance, change in patient status, or with any questions/concerns.

## 2020-01-30 NOTE — DISCHARGE PLANNING
Care Transition Team Assessment  In the case of an emergency, pt's legal NOK is Spouse Daiana /    RNCM met with pt's wife  at bedside and obtained the information used in this assessment. Pt verified accuracy of facesheet. Pt lives in a single story home with wife in [place]. Prior to current hospitalization, pt was a resident at [facility]. Pt has a good support system. Pt denies any hx of substance use and denies any dx of mh. Wife has made decision to proceed with hospice care. PC has been involved with this case. PC to refer to [facility]. Pt will be going to friend, Marcela's home at home [address] [place]. Hospice to contact Marcela's spouse, Milad for DME delivery. Anticipate discharge tomorrow via [facility].    Information Source:spouse  Orientation : Unable to Assess  Information Given By: Spouse  Informant's Name: (Daiana)  Who is responsible for making decisions for patient? : Patient         Elopement Risk  Legal Hold: No  Ambulatory or Self Mobile in Wheelchair: No-Not an Elopement Risk  Elopement Risk: Not at Risk for Elopement    Interdisciplinary Discharge Planning  Does Admitting Nurse Feel This Could be a Complex Discharge?: No  Primary Care Physician: (Dr. Jama)  Lives with - Patient's Self Care Capacity: Spouse  Housing / Facility: 1 Story House  Do You Take your Prescribed Medications Regularly: Yes  Mobility Issues: Yes  Prior Services: None  Assistance Needed: Yes    Discharge Preparedness  What is your plan after discharge?: (hospice)  What are your discharge supports?: Spouse  Prior Functional Level: Bladder Incontinence, Bowel Incontinence, Needs Assist with Activities of Daily Living, Needs Assist with Medication Management, Total Assist  Difficulity with ADLs: Bathing, Brushing teeth, Dressing, Eating, Toileting, Walking  Difficulity with IADLs: Cooking, Driving, Keeping track of finances, Laundry, Managing medication, Shopping, Using the telephone or  computer    Functional Assesment  Prior Functional Level: Bladder Incontinence, Bowel Incontinence, Needs Assist with Activities of Daily Living, Needs Assist with Medication Management, Total Assist    Finances  Prescription Coverage: Yes              Advance Directive  Advance Directive?: POLST    Domestic Abuse  Have you ever been the victim of abuse or violence?: No    Psychological Assessment  History of Substance Abuse: None  History of Psychiatric Problems: No         Anticipated Discharge Information  Anticipated discharge disposition: Hospice  Discharge Address: ([address] [place] [address])  Discharge Contact Phone Number: [phone]

## 2020-01-30 NOTE — CARE PLAN
Problem: Safety  Goal: Will remain free from injury  Outcome: PROGRESSING AS EXPECTED  Goal: Will remain free from falls  Outcome: PROGRESSING AS EXPECTED     Problem: Infection  Goal: Will remain free from infection  Outcome: PROGRESSING AS EXPECTED     Problem: Venous Thromboembolism (VTW)/Deep Vein Thrombosis (DVT) Prevention:  Goal: Patient will participate in Venous Thrombosis (VTE)/Deep Vein Thrombosis (DVT)Prevention Measures  Outcome: PROGRESSING AS EXPECTED     Problem: Bowel/Gastric:  Goal: Normal bowel function is maintained or improved  Outcome: PROGRESSING AS EXPECTED  Goal: Will not experience complications related to bowel motility  Outcome: PROGRESSING AS EXPECTED     Problem: Discharge Barriers/Planning  Goal: Patient's continuum of care needs will be met  Outcome: PROGRESSING AS EXPECTED     Problem: Respiratory:  Goal: Respiratory status will improve  Outcome: PROGRESSING AS EXPECTED     Problem: Fluid Volume:  Goal: Will maintain balanced intake and output  Outcome: PROGRESSING AS EXPECTED     Problem: Skin Integrity  Goal: Risk for impaired skin integrity will decrease  Outcome: PROGRESSING AS EXPECTED     Problem: Urinary Elimination:  Goal: Ability to reestablish a normal urinary elimination pattern will improve  Outcome: PROGRESSING AS EXPECTED     Problem: Pain Management  Goal: Pain level will decrease to patient's comfort goal  Outcome: PROGRESSING AS EXPECTED

## 2020-01-31 PROBLEM — J15.212 PNEUMONIA OF LEFT LOWER LOBE DUE TO METHICILLIN-RESISTANT STAPHYLOCOCCUS AUREUS (MRSA) (HCC): Status: RESOLVED | Noted: 2020-01-01 | Resolved: 2020-01-01

## 2020-01-31 NOTE — PROGRESS NOTES
Pt. Received in bed asleep, lethargic but wakes up on verbal command. On comfort care status. Skin assessment done. Mostly non verbal, agitated, on 2LPM O2 via NC tolerates well. Provided comfort and safety measures. Fall precautions in place. Maintained on droplet contact precautions. Due meds given, needs attended.

## 2020-01-31 NOTE — PROGRESS NOTES
Received phone call from pt. Wife, updated about patients status. Wife informed RN that she will be coming this morning. POLST stating comfort care status in front of patient paper chart.

## 2020-01-31 NOTE — DISCHARGE SUMMARY
Discharge Summary    CHIEF COMPLAINT ON ADMISSION  Chief Complaint   Patient presents with   • ALOC       Reason for Admission  EMS RM 1     Admission Date  1/22/2020    CODE STATUS  Comfort Care/DNR    HPI & HOSPITAL COURSE  This is a 79 y.o. male here with shortness of breath   Mr. 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  He was transferred to a skilled nursing facility then was brought to the emergency room from the skilled nursing facility due to increasing shortness of breath and hypoxemia with obtundation.  He required intubation the emergency room was admitted to the intensive care unit.  He had MRSA positive culture from bronchoscopy.  Treated with broad-spectrum IV antibiotics.  Echocardiogram was performed showing a normal ejection fraction.  He was extubated to a DO NOT RESUSCITATE status.  Remained extremely encephalopathic despite extubation and his mentation has not improved.  He had had a negative MRI in 2017 for metastasis and we performed a repeat CAT scan on 1/22/2020 which did not show any evidence of hemorrhage nor tumor.  His wife noted that he was recently assessed by his primary care provider Dr. Jama consideration of dementia and apparently have been diagnosed with cognitive impairment to some degree.  Despite tube feeding, IV fluids, treat with antibiotics, his mental status has not improved he remains extremely encephalopathic.  Comfort care measures have been initiated.  Hospice is been consulted.  Today he will be transferred to his friend's house on hospice.  I have met with his friends, his wife, and 2 daughters and all agree with this next step.  His wife lives in [facility] off the grid without electricity and would not be  able to have hospice at her house.    Therefore, he is discharged in guarded and stable condition to hospice.    The patient met 2-midnight criteria for an inpatient stay at the time of discharge.    Discharge Date  1/31    FOLLOW UP ITEMS POST DISCHARGE  Hospice    DISCHARGE DIAGNOSES  Active Problems:    Acute on chronic respiratory failure with hypoxia and hypercapnia (HCC) POA: Yes    Acute exacerbation of chronic obstructive pulmonary disease (COPD) (HCC) POA: Yes    Acute encephalopathy POA: Yes    S/P lobectomy of lung POA: Unknown    DNR (do not resuscitate) POA: Yes    Essential hypertension POA: Yes    Dyslipidemia POA: Yes      Overview: Resume Lipitor.    Lung cancer (HCC) POA: Yes    Paroxysmal atrial fibrillation (HCC) POA: Yes    Alzheimer's dementia without behavioral disturbance (HCC) POA: Yes  Resolved Problems:    Pneumonia of left lower lobe due to methicillin-resistant Staphylococcus aureus (MRSA) (HCC) POA: Unknown      FOLLOW UP  No future appointments.  No follow-up provider specified.    MEDICATIONS ON DISCHARGE     Medication List      STOP taking these medications    albuterol 108 (90 Base) MCG/ACT Aers inhalation aerosol     amoxicillin-clavulanate 500-125 MG Tabs  Commonly known as:  AUGMENTIN     Aplisol 5 UNIT/0.1ML Soln  Generic drug:  Tuberculin PPD     budesonide-formoterol 160-4.5 MCG/ACT Aero  Commonly known as:  SYMBICORT     CoQ-10 100 MG Caps     digoxin 125 MCG Tabs  Commonly known as:  LANOXIN     DILTIAZem 120 MG Tabs  Commonly known as:  CARDIZEM     furosemide 20 MG Tabs  Commonly known as:  LASIX     ipratropium-albuterol 0.5-2.5 (3) MG/3ML nebulizer solution  Commonly known as:  DUONEB     ketoconazole 2 % Crea  Commonly known as:  NIZORAL     lisinopril 2.5 MG Tabs  Commonly known as:  PRINIVIL     omeprazole 20 MG delayed-release capsule  Commonly known as:  PRILOSEC     simvastatin 10 MG Tabs  Commonly known as:  ZOCOR     sodium chloride 0.65 % Soln  Commonly known  as:  OCEAN     traZODone 50 MG Tabs  Commonly known as:  DESYREL     Vitamin C 1000 MG Tabs     vitamin D 1000 Unit (25 mcg) Tabs  Commonly known as:  cholecalciferol     Xarelto 20 MG Tabs tablet  Generic drug:  rivaroxaban            Allergies  No Known Allergies    DIET  Orders Placed This Encounter   Procedures   • Diet Order Regular     Standing Status:   Standing     Number of Occurrences:   1     Order Specific Question:   Diet:     Answer:   Regular [1]     Order Specific Question:   Texture/Fiber modifications:     Answer:   Dysphagia 3(Mechanical Soft)specify fluid consistency(question 6) [3]       CONSULTATIONS  Critical care  Hospice  Palliative care    PROCEDURES  Intubation  Central line    LABORATORY  Lab Results   Component Value Date    SODIUM 155 (H) 01/30/2020    POTASSIUM 4.4 01/30/2020    CHLORIDE 109 01/30/2020    CO2 41 (HH) 01/30/2020    GLUCOSE 174 (H) 01/30/2020    BUN 57 (H) 01/30/2020    CREATININE 0.70 01/30/2020    CREATININE 1.0 03/26/2008        Lab Results   Component Value Date    WBC 22.4 (H) 01/30/2020    HEMOGLOBIN 13.6 (L) 01/30/2020    HEMATOCRIT 44.9 01/30/2020    PLATELETCT 384 01/30/2020    Imaging studies:  DX-CHEST-PORTABLE (1 VIEW)   Final Result      Stable examination.      DX-ABDOMEN FOR TUBE PLACEMENT   Final Result         Feeding tube with tip projecting over the expected area of the stomach.      DX-CHEST-PORTABLE (1 VIEW)   Final Result         1. Interval extubation. No other significant interval change.      DX-ABDOMEN FOR TUBE PLACEMENT   Final Result      Feeding tube tip in the second portion of the duodenum.      DX-CHEST-PORTABLE (1 VIEW)   Final Result      Stable enlargement of the cardiomediastinal silhouette with stable left pleural effusion and associated basilar atelectasis and/or consolidation.      DX-CHEST-PORTABLE (1 VIEW)   Final Result      Stable left basilar consolidation and pleural fluid      DX-ABDOMEN FOR TUBE PLACEMENT   Final Result       Dobbhoff type feeding tube terminates with the tip projecting over the expected location of the gastric antrum or first portion of the duodenum.      EC-ECHOCARDIOGRAM COMPLETE W/O CONT   Final Result      DX-CHEST-PORTABLE (1 VIEW)   Final Result      Improving left lung aeration      CT-HEAD W/O   Final Result      1. Cerebral atrophy.   2. White matter lucencies most consistent with small vessel ischemic change versus demyelination or gliosis.   3. Otherwise, Head CT without contrast with no acute findings. No evidence of acute cerebral infarction, hemorrhage or mass lesion.      DX-CHEST-PORTABLE (1 VIEW)   Final Result      1.  No evidence of pneumothorax following RIGHT neck catheter placement   2.  Grossly unchanged appearance of LEFT basilar atelectasis and possible superimposed airspace disease   3.  Small LEFT pleural effusion      DX-CHEST-PORTABLE (1 VIEW)   Final Result      1.  Grossly unchanged appearance of LEFT basilar atelectasis and possible superimposed airspace disease   2.  Small LEFT pleural effusion            Total time of the discharge process exceeds 35 minutes.

## 2020-01-31 NOTE — DISCHARGE PLANNING
Anticipated Discharge Disposition: Home with Hospice     Action: LSW called hospice to clarify how pt was getting home and what time the pt was discharging. LSW asked MD about discharge plan and MD said the nurse told him the pt was going to discharge around 1300 today. Hospice nurse came in and requested this LSW to set up transport for pt around 1300 for pt via [facility]. LSW update MD and bedside RN.     Barriers to Discharge: none     Plan: LSW will continue to assess for any discharge needs.

## 2020-01-31 NOTE — PROGRESS NOTES
"      Spiritual Care Note    Patient Information     Patient's Name: Humphrey Cano Jr.   MRN: [id number]    YOB: 1940   Age and Gender: 79 y.o. male   Service Area: Medical/Nephrology   Room (and Bed): [place] [address]   Ethnicity or Nationality:     Primary Language: English   Congregation/Spiritual preference: Denominational   Place of Residence: [place], [place]   Family/Friends/Others Present: Yes   Clinical Team Present: No   Medical Diagnosis(-es)/Procedure(s): COPD Exacerbation (EOL)   Code Status: Comfort Care/DNR    Date of Admission: 1/22/2020   Length of Stay: 9 days        Spiritual Care Provider Information:  Name of Spiritual Care Provider: Tisha Herrera  Title of Spiritual Care Provider: Associate   Phone Number: [phone]  E-mail: [email or web address]  Total time : 15 minutes    Spiritual Screen Results:    Gen Nursing        Palliative Care  PC Congregation/Spiritual Screening  Is your spiritual health or inner well-being important to you as you cope with your medical condition?: No  Would you like to receive a visit from our Spiritual Care team or your own Sabianism or spiritual leader?: Yes  PC Sprituality screening comment: wife would like a visit for herself;  aware      Encounter/Request Information  Encounter/Request Type   Visited With: Patient and family together  Nature of the Visit: Follow-up  Continue Visiting: Yes  Crisis Visit: Patient actively dying/EOL  Referral From/ Origin of Request: [place] management rounds, Verbal staff    Religous Needs/Values  Congregation Needs Visit  Congregation Needs: Prayer    Spiritual Assessment     Spiritual Care Encounters    Observations/Symptoms: (Pt unresponsive in bed; family at bedside.)    Interaction/Conversation: RN asked  to visit this patient's family.  Spouse said, \"Please pray for him after you leave,\" but declined prayer at the bedside.  Family thanked the  for stopping by. Patient being discharged to home " hospice today.    Assessment: Need    Need: Seeking Spiritual Assistance and Support    Interventions: Compassionate presence, prayer.    Outcomes: Spiritual Comfort    Plan: No Further Visits    Notes:

## 2020-01-31 NOTE — DISCHARGE INSTRUCTIONS
Discharge Instructions per Shiraz Batres M.D.    To Hospice  Discharge Instructions    Discharged to other by ambulance with escort. Discharged via ambulance, hospital escort: Yes.  Special equipment needed: Not Applicable    Be sure to schedule a follow-up appointment with your primary care doctor or any specialists as instructed.     Discharge Plan:   Diet Plan: Discussed  Activity Level: Discussed  Smoking Cessation Offered: (confused)  Confirmed Follow up Appointment: No (Comments)  Confirmed Symptoms Management: Discussed  Medication Reconciliation Updated: Yes  Influenza Vaccine Indication: Not indicated: Previously immunized this influenza season and > 8 years of age    I understand that a diet low in cholesterol, fat, and sodium is recommended for good health. Unless I have been given specific instructions below for another diet, I accept this instruction as my diet prescription.   Other diet: Comfort Care    Special Instructions: None    · Is patient discharged on Warfarin / Coumadin?   No     Depression / Suicide Risk    As you are discharged from this [facility], it is important to learn how to keep safe from harming yourself.    Recognize the warning signs:  · Abrupt changes in personality, positive or negative- including increase in energy   · Giving away possessions  · Change in eating patterns- significant weight changes-  positive or negative  · Change in sleeping patterns- unable to sleep or sleeping all the time   · Unwillingness or inability to communicate  · Depression  · Unusual sadness, discouragement and loneliness  · Talk of wanting to die  · Neglect of personal appearance   · Rebelliousness- reckless behavior  · Withdrawal from people/activities they love  · Confusion- inability to concentrate     If you or a loved one observes any of these behaviors or has concerns about self-harm, here's what you can do:  · Talk about it- your feelings and reasons for harming  yourself  · Remove any means that you might use to hurt yourself (examples: pills, rope, extension cords, firearm)  · Get professional help from the community (Mental Health, Substance Abuse, psychological counseling)  · Do not be alone:Call your Safe Contact- someone whom you trust who will be there for you.  · Call your local CRISIS HOTLINE 767-9865 or 579-407-8603  · Call your local Children's Mobile Crisis Response Team [place] (094) 344-0981 or www[employer]  · Call the toll free National Suicide Prevention Hotlines   · National Suicide Prevention Lifeline 434-712-LUEZ (3654)  · National Hope Line Network 800-SUICIDE (424-1999)          Chronic Obstructive Pulmonary Disease  Chronic obstructive pulmonary disease (COPD) is a common lung problem. In COPD, the flow of air from the lungs is limited. The way your lungs work will probably never return to normal, but there are things you can do to improve your lungs and make yourself feel better. Your doctor may treat your condition with:  · Medicines.  · Oxygen.  · Lung surgery.  · Changes to your diet.  · Rehabilitation. This may involve a team of specialists.  Follow these instructions at home:  · Take all medicines as told by your doctor.  · Avoid medicines or cough syrups that dry up your airway (such as antihistamines) and do not allow you to get rid of thick spit. You do not need to avoid them if told differently by your doctor.  · If you smoke, stop. Smoking makes the problem worse.  · Avoid being around things that make your breathing worse (like smoke, chemicals, and fumes).  · Use oxygen therapy and therapy to help improve your lungs (pulmonary rehabilitation) if told by your doctor. If you need home oxygen therapy, ask your doctor if you should buy a tool to measure your oxygen level (oximeter).  · Avoid people who have a sickness you can catch (contagious).  · Avoid going outside when it is very hot, cold, or humid.  · Eat healthy foods. Eat  smaller meals more often. Rest before meals.  · Stay active, but remember to also rest.  · Make sure to get all the shots (vaccines) your doctor recommends. Ask your doctor if you need a pneumonia shot.  · Learn and use tips on how to relax.  · Learn and use tips on how to control your breathing as told by your doctor. Try:  1. Breathing in (inhaling) through your nose for 1 second. Then, pucker your lips and breath out (exhale) through your lips for 2 seconds.  2. Putting one hand on your belly (abdomen). Breathe in slowly through your nose for 1 second. Your hand on your belly should move out. Pucker your lips and breathe out slowly through your lips. Your hand on your belly should move in as you breathe out.  · Learn and use controlled coughing to clear thick spit from your lungs. The steps are:  1. Lean your head a little forward.  2. Breathe in deeply.  3. Try to hold your breath for 3 seconds.  4. Keep your mouth slightly open while coughing 2 times.  5. Spit any thick spit out into a tissue.  6. Rest and do the steps again 1 or 2 times as needed.  Contact a doctor if:  · You cough up more thick spit than usual.  · There is a change in the color or thickness of the spit.  · It is harder to breathe than usual.  · Your breathing is faster than usual.  Get help right away if:  · You have shortness of breath while resting.  · You have shortness of breath that stops you from:  ¨ Being able to talk.  ¨ Doing normal activities.  · You chest hurts for longer than 5 minutes.  · Your skin color is more blue than usual.  · Your pulse oximeter shows that you have low oxygen for longer than 5 minutes.  This information is not intended to replace advice given to you by your health care provider. Make sure you discuss any questions you have with your health care provider.  Document Released: 06/05/2009 Document Revised: 05/25/2017 Document Reviewed: 08/14/2014  Elsevier Interactive Patient Education © 2017 Elsevier  Inc.

## 2020-01-31 NOTE — PROGRESS NOTES
Pt. Reassessed does not appear restless or in distress, no noticeable secretions with decrease in frequency of cough. Resting comfortably. Respirations 22 resting but otherwise no moaning or no complaint of pain.

## 2020-01-31 NOTE — PROGRESS NOTES
2 RN skin check completed with Tamra MALHOTRA.   Devices in place nasal cannula, PIV.  Skin assessed under devices yes.  Confirmed pressure ulcers found on none.  New potential pressure ulcers noted on none. Wound consult placed n/a.  Skin assessment:  -bilateral heels both red/pink and blanching, pt. Refusing float heel boots  -sacral area is intact pink and blanching  -bilateral elbows pink and blanching  -back of left ear pink and blanching  -back of right ear is red and slow to jone   -mouth/lips dry  -overall skin is fragile/dry     The following interventions in place   -Q2 turns when pt. Allows  -waflle mattress placed  -floated heels with pillow although pt. Tends to kick it and move around in bed   -pt. Refuses foam protection on elbows  -foam pads attached to nasal cannula to protect both back of the pt. Ears, pt. Tends to also removed this at times.  -barrier cream and routine incontinence checking  -moisturizer on the pt. Mouth

## 2020-01-31 NOTE — PROGRESS NOTES
Discharge instructions give to pt's wife and daughter at bedside. POA verbalizes understanding and states plans for follow up. Changes in medications reviewed.  PV cathlons removed. All belongings accounted for, all questions answered at this time. Pt leaving via [facility] on hospice.

## 2020-01-31 NOTE — RESPIRATORY CARE
COPD EDUCATION by COPD CLINICAL EDUCATOR  1/31/2020 at 1:50 PM by Sheba Monzon, RRT     Patient reviewed by COPD education team. Patient is on comfort care. Education not done.

## 2020-01-31 NOTE — CARE PLAN
Problem: Pain  Goal: Alleviation of Pain or a reduction in pain to the patient's comfort goal  Outcome: PROGRESSING AS EXPECTED  PRN pain med for comfort on board.      Problem: Oxygenation/Respiratory Function  Goal: Respiratory Function supported  Outcome: PROGRESSING SLOWER THAN EXPECTED   Pt. O2 sat ranges from 87-90% on 2LPM, no labored breathing after Morphine    Problem: Safety  Goal: Free from accidental injury  Outcome: PROGRESSING AS EXPECTED   Bed alarm in place.

## 2020-01-31 NOTE — DISCHARGE PLANNING
Received Transport Form @ 0911  Spoke to Beatriz @ [facility]    Transport is scheduled for 1/31 @1300 going to home:  [address] [place]  [address].    MIRTA Bailey notified.

## 2020-01-31 NOTE — PROGRESS NOTES
Pt is A&Ox1; nonverbal. Pt opens his eyes when RN states name but does not respond and falls back asleep quickly. Pt is resting in bed, no signs of labored breathing or pain. Pt on 2L via NC. Call light & personal belongings within reach, bed in lowest position & locked, and bed alarm is on. Waffle mattress in place. Fall precautions in place. Pt on droplet and contact isolation for MRSA, isolation precautions in place. Pt updated on plan of care for the day. Will continue to monitor.

## 2020-01-31 NOTE — DOCUMENTATION QUERY
"                                                                         [facility]                                                                       Query Response Note      PATIENT:               JOSE DE JESUS HOPPER  ACCT #:                  [id number]  MRN:                     [id number]  :                      1940  ADMIT DATE:       2020 8:02 AM  DISCH DATE:          RESPONDING  PROVIDER #:        [id number]           QUERY TEXT:    \"Sepsis present on admission\" was documented in a previous query response by Dr Kumar on .  Sepsis was not brought into the problem list of subsequent Progress Notes.  Can you clarify if you agree with the assessment of Sepsis?      NOTE: if an appropriate response is not listed below, please respond with a new note    The patient's Clinical Indicators include:  Query Response  (Dr Kumar): Sepsis POA  Per H/P: minimal hypotension, uncertain if propofol related vs severe sepsis, sepsis protocol    wbc 10.1,  wbc 17.4  Per Vitals Flowsheet: Admit vitals: 127/37, 81, 22, 98.2F, 72% 15L non-rebreather   lactic acid 4.6, procalcitonin 0.45   0800 Vitals: 111/69, 117, 31, Temp 99.0F @ 1800   BAL +MRSA  Risk Factors: MRSA pneumonia  Treatment: linezolid, Zithromax, rocephin, IVF  Options provided:   -- Agree with assessment of Sepsis   -- Disagree with assessment of Sepsis   -- Unable to determine      Query created by: Peña Herbert on 2020 7:52 AM    RESPONSE TEXT:    Agree with assessment of Sepsis          Electronically signed by:  MICHAEL SKINNER MD 2020 7:50 AM              "

## 2020-01-31 NOTE — PROGRESS NOTES
"Paged Dr. Rockwell for comfort care orders as none was ordered during day. Pt. Noted to be having a lot of secretions and with weak productive cough, suctioned PRN. Pt. Restless and had been tossing and turning in bed, when asked pt. Resistive and pulling RN hands and refuses to be straightened in bed. RN provided safety measures and explained to the pt. What is being done, pt. Lethargic but otherwise responds to verbal stimuli when RN said hello and he replied \"hello\". Refused to answer orientation question. Received orders from MD for comfort care measures.   "

## 2020-02-18 NOTE — DOCUMENTATION QUERY
[facility]                                                                       Query Response Note      PATIENT:               JOSE DE JESUS HOPPER  ACCT #:                  [id number]  MRN:                     [id number]  :                      1940  ADMIT DATE:       2020 8:02 AM  DISCH DATE:        2020 1:34 PM  RESPONDING  PROVIDER #:        [id number]           QUERY TEXT:    Shock is documented in the ED Provider Note and  Progress Note, and not documented elsewhere in the Medical Record.  Can the diagnosis of shock be clarified?    NOTE:  If an appropriate response is not listed below, please respond with a new note.    The patient's Clinical Indicators include:  Per ED Provider Note: he does have sepsis with shock  Per  Pulmonary Progress Note:  remains in critical condition from hypotension and shock needing to restart neosynephrine and atrial fibrillation with potential acute further decompensation.  Low urine output.  BP stable not on pressors  Per H/P: minimal hypotension, uncertain if propofol related vs severe sepsis   Lactic acid 2.1, 2.5, 4.6, 4.2  Per Vitals Flowsheet:  low BP's: 92/44, 89/37, 93/50  Risk Factors: sepsis, atrial fibrillation  Treatment: IVF bolus, neosynephrine on , trend lactic acid, antibiotics  Options provided:   -- Cardiogenic shock   -- Septic shock   -- Hypovolemic shock   -- Shock ruled out   -- Unable to determine      Query created by: Jag Grant on 2020 12:15 PM    RESPONSE TEXT:    Cardiogenic shock          Electronically signed by:  JANINE BROWN MD 2020 6:50 AM

## 2020-02-24 LAB
MYCOBACTERIUM SPEC CULT: NORMAL
RHODAMINE-AURAMINE STN SPEC: NORMAL
SIGNIFICANT IND 70042: NORMAL
SITE SITE: NORMAL
SOURCE SOURCE: NORMAL

## 2020-02-28 NOTE — CARE PLAN
Chart review. Pt declined HH. Made a non-admit. TYLER CUEVAS attempted x 3 to reach pt by phone today. Couldn't leave a message or there was no answer. TYLER CUEVAS contacted LIZBETH Ibarra C'worker to inform of above. Dr. Ekaterina Lombardo updated. Goals      Pt will be safe and well  maintained in home setting      TYLER CUEVAS will monitor pt in home; collaborate with [facility], as indicated - re eval 3/30/20             This note will not be viewable in MyChart. Problem: Bowel/Gastric:  Goal: Normal bowel function is maintained or improved  Outcome: PROGRESSING AS EXPECTED    Problem: Respiratory:  Goal: Respiratory status will improve  Outcome: PROGRESSING AS EXPECTED  Possible DC of chest tube in a.m.

## 2021-04-14 NOTE — TELEPHONE ENCOUNTER
----- Message from Shirley Lutz sent at 9/19/2017 12:28 PM PDT -----  Regarding: calling about [facility] received [facility]    Patient's wife Daiana is calling about a denial letter they received from [facility]. She can be reached at [phone].       Per patient she is returning nurses call, patient can be reach at [phone].

## 2023-03-21 NOTE — ASSESSMENT & PLAN NOTE
Due to demand from hypoxia   Cont cardiac monitoring   Serial troponin    Burow's Graft Text: The defect edges were debeveled with a #15 scalpel blade.  Given the location of the defect, shape of the defect, the proximity to free margins and the presence of a standing cone deformity a Burow's skin graft was deemed most appropriate. The standing cone was removed and this tissue was then trimmed to the shape of the primary defect. The adipose tissue was also removed until only dermis and epidermis were left.  The skin margins of the secondary defect were undermined to an appropriate distance in all directions utilizing iris scissors.  The secondary defect was closed with interrupted buried subcutaneous sutures.  The skin edges were then re-apposed with running  sutures.  The skin graft was then placed in the primary defect and oriented appropriately.

## 2023-08-15 NOTE — PROGRESS NOTES
Caller: JOYCELYN DO SR.    Relationship to Patient: SELF    Phone Number: [phone]    Reason for Call:  PATIENT CALLED IN NEEDING REFERRAL FOR [facility] [employer] REQUIRES [facility] SINCE HE'S NEVER BEEN TO THE NEW CLINIC  BEFORE   [facility]-PHONE NUMBER [phone]   Monitor Summary  SR [id number]  (f) PAC  [birth year]

## (undated) DEVICE — GOWN SURGEONS X-LARGE - DISP. (30/CA)

## (undated) DEVICE — NEPTUNE 4 PORT MANIFOLD - (20/PK)

## (undated) DEVICE — TROCAR 5X100 NON BLADED Z-TH - READ KII (6/BX)

## (undated) DEVICE — SET SUCTION/IRRIGATION WITH DISPOSABLE TIP (6/CA )PART #0250-070-520 IS A SUB

## (undated) DEVICE — SUCTION INSTRUMENT YANKAUER BULBOUS TIP W/O VENT (50EA/CA)

## (undated) DEVICE — SLEEVE, VASO, THIGH, MED

## (undated) DEVICE — SET BLOOD Y TYPE SECONDARY - 26IN NO DEHP(48EA/CS) MFG B/O USE 2016 AS SUB (INF/CNU/PED/PICU 19695)

## (undated) DEVICE — SUTURE 2-0 VICRYL PLUS CT-2 - 27 INCH (36/BX)

## (undated) DEVICE — ELECTRODE DUAL RETURN W/ CORD - (50/PK)

## (undated) DEVICE — CHLORAPREP 26 ML APPLICATOR - ORANGE TINT(25/CA)

## (undated) DEVICE — DRAIN CHEST ADULT (6EA/CA) DELETED ITEM  ORDER #15909

## (undated) DEVICE — GLOVE BIOGEL SZ 6.5 SURGICAL PF LTX (50PR/BX 4BX/CA)

## (undated) DEVICE — BLANKET WARMING LOWER BODY - (10/CA) INACTIVE USE #8585

## (undated) DEVICE — TUBING CLEARLINK DUO-VENT - C-FLO (48EA/CA)

## (undated) DEVICE — SOLUTION NORMOSOL-4 INJ 1000ML

## (undated) DEVICE — ELECTRODE 850 FOAM ADHESIVE - HYDROGEL RADIOTRNSPRNT (50/PK)

## (undated) DEVICE — KIT ROOM DECONTAMINATION

## (undated) DEVICE — DRESSING TRANSPARENT FILM TEGADERM 2.375 X 2.75"  (100EA/BX)"

## (undated) DEVICE — SPONGE GAUZESTER. 2X2 4-PL - (2/PK 50PK/BX 30BX/CS)

## (undated) DEVICE — GLOVE BIOGEL SZ 8 SURGICAL PF LTX - (50PR/BX 4BX/CA)

## (undated) DEVICE — CONNECTOR HUBLESS DRAINAGE - ONE WAY (20/BX)

## (undated) DEVICE — Device

## (undated) DEVICE — SENSOR SPO2 NEO LNCS ADHESIVE (20/BX) SEE USER NOTES

## (undated) DEVICE — GLOVE BIOGEL INDICATOR SZ 7.5 SURGICAL PF LTX - (50PR/BX 4BX/CA)

## (undated) DEVICE — TROCAR 12MM THORACOPORT (6EA/BX)

## (undated) DEVICE — GLOVE BIOGEL INDICATOR SZ 8 SURGICAL PF LTX - (50/BX 4BX/CA)

## (undated) DEVICE — BAG RETRIEVAL LARGE 10EA/BX

## (undated) DEVICE — SPONGE DRAIN 4 X 4IN 6-PLY - (2/PK25PK/BX12BX/CS)

## (undated) DEVICE — STAPLE 45MM GRAY 2.0MM (12EA/BX)

## (undated) DEVICE — TUBE ENDOBRONCHEAL 39FR - (1/BX)

## (undated) DEVICE — GLOVE BIOGEL SZ 7.5 SURGICAL PF LTX - (50PR/BX 4BX/CA)

## (undated) DEVICE — BLADE SURGICAL CLIPPER - (50EA/CA)

## (undated) DEVICE — CONNECTOR Y TBG CRTY 5 IN 1 STERILE (50EA/CA)

## (undated) DEVICE — TAPE CLOTH MEDIPORE 6 INCH - (12RL/CA)

## (undated) DEVICE — SUTURE 0 SILK CT-1 (36PK/BX)

## (undated) DEVICE — HEAD HOLDER JUNIOR/ADULT

## (undated) DEVICE — KIT ANESTHESIA W/CIRCUIT & 3/LT BAG W/FILTER (20EA/CA)

## (undated) DEVICE — GLOVE BIOGEL M SZ 8 SURGICAL PF LTX - (50/BX 4BX/CA)

## (undated) DEVICE — CANNULA W/SEAL 5X100 Z-THRE - ADED KII (12/BX)

## (undated) DEVICE — SUTURE GENERAL

## (undated) DEVICE — STAPLE 45MM GREEN 4.1 ECHELON (12EA/BX)

## (undated) DEVICE — PROTECTOR ULNA NERVE - (36PR/CA)

## (undated) DEVICE — TUBE SHILEY ENDOTRACHEAL ORAL RAE CUFFED 7.0MM WITH TAPERGUARD (10EA/PK)

## (undated) DEVICE — STAPLE 45MM BLUE 3.5MM ECHELON (12EA/BX)

## (undated) DEVICE — SET FLUID WARMING STANDARD FLOW - (10/CA)

## (undated) DEVICE — DRAPE CHEST/BREAST - (12EA/CA)

## (undated) DEVICE — SUTURE 4-0 VICRYL PLUS FS-2 - 27 INCH (36/BX)

## (undated) DEVICE — CANISTER SUCTION 3000ML MECHANICAL FILTER AUTO SHUTOFF MEDI-VAC NONSTERILE LF DISP  (40EA/CA)

## (undated) DEVICE — STAPLER POWERED 45MM (3EA/BX)

## (undated) DEVICE — LACTATED RINGERS INJ 1000 ML - (14EA/CA 60CA/PF)

## (undated) DEVICE — DRAPE IOBAN II INCISE 23X17 - (10EA/BX 4BX/CA)

## (undated) DEVICE — GLOVE BIOGEL PI INDICATOR SZ 7.0 SURGICAL PF LF - (50/BX 4BX/CA)

## (undated) DEVICE — SET LEADWIRE 5 LEAD BEDSIDE DISPOSABLE ECG (1SET OF 5/EA)

## (undated) DEVICE — STAPLE 45MM GOLD 3.8MM ECHELN (12EA/BX) WAS ECR45D

## (undated) DEVICE — MASK ANESTHESIA ADULT  - (100/CA)

## (undated) DEVICE — DRESSING NON-ADHERING 8 X 3 - (50/BX)

## (undated) DEVICE — PAD LAP STERILE 18 X 18 - (5/PK 40PK/CA)

## (undated) DEVICE — LEAD SET 6 DISP. EKG NIHON KOHDEN

## (undated) DEVICE — SET EXTENSION WITH 2 PORTS (48EA/CA) ***PART #2C8610 IS A SUBSTITUTE*****

## (undated) DEVICE — CLIP MED LG INTNL HRZN TI ESCP - (20/BX)

## (undated) DEVICE — DRESSING TRANSPARENT FILM TEGADERM 4 X 4.75" (50EA/BX)"

## (undated) DEVICE — SODIUM CHL IRRIGATION 0.9% 1000ML (12EA/CA)

## (undated) DEVICE — STAPLE 45MM WHTE 2.5MM - (12/BX)

## (undated) DEVICE — PACK LAP CHOLE OR - (2EA/CA)

## (undated) DEVICE — DRAPESURG STERI-DRAPE LONG - (10/BX 4BX/CA)